# Patient Record
Sex: MALE | Race: WHITE | Employment: OTHER | URBAN - METROPOLITAN AREA
[De-identification: names, ages, dates, MRNs, and addresses within clinical notes are randomized per-mention and may not be internally consistent; named-entity substitution may affect disease eponyms.]

---

## 2017-03-29 ENCOUNTER — HOSPITAL ENCOUNTER (OUTPATIENT)
Age: 52
Discharge: HOME | End: 2017-03-29
Payer: MEDICARE

## 2017-03-29 ENCOUNTER — HOSPITAL ENCOUNTER (OUTPATIENT)
Dept: GENERAL RADIOLOGY | Age: 52
Discharge: HOME | End: 2017-03-29
Payer: MEDICARE

## 2017-03-29 DIAGNOSIS — R07.81 RIB PAIN ON RIGHT SIDE: ICD-10-CM

## 2017-03-29 DIAGNOSIS — Z00.00 HEALTH CARE MAINTENANCE: ICD-10-CM

## 2017-03-29 PROCEDURE — 36415 COLL VENOUS BLD VENIPUNCTURE: CPT

## 2017-03-29 PROCEDURE — 86787 VARICELLA-ZOSTER ANTIBODY: CPT

## 2017-03-29 PROCEDURE — 71101 X-RAY EXAM UNILAT RIBS/CHEST: CPT

## 2017-03-31 LAB — VZV IGG SER QL IA: 3.24

## 2017-06-12 ENCOUNTER — HOSPITAL ENCOUNTER (OUTPATIENT)
Age: 52
Setting detail: SPECIMEN
Discharge: HOME | End: 2017-06-12
Payer: COMMERCIAL

## 2017-06-12 DIAGNOSIS — R74.01 ELEVATED AST (SGOT): ICD-10-CM

## 2017-06-12 DIAGNOSIS — R10.11 RIGHT UPPER QUADRANT ABDOMINAL PAIN: ICD-10-CM

## 2017-06-12 LAB
ABSOLUTE EOS #: 0.3 K/UL (ref 0–0.4)
ABSOLUTE LYMPH #: 1.6 K/UL (ref 1–4.8)
ABSOLUTE MONO #: 1 K/UL (ref 0.1–1.2)
ALBUMIN SERPL-MCNC: 4.2 G/DL (ref 3.5–5.2)
ALBUMIN/GLOBULIN RATIO: 1.2 (ref 1–2.5)
ALP BLD-CCNC: 79 U/L (ref 40–129)
ALT SERPL-CCNC: 44 U/L (ref 5–41)
AST SERPL-CCNC: 129 U/L
BASOPHILS # BLD: 1 %
BASOPHILS ABSOLUTE: 0.1 K/UL (ref 0–0.2)
BILIRUB SERPL-MCNC: 0.9 MG/DL (ref 0.3–1.2)
BILIRUBIN DIRECT: 0.23 MG/DL
BILIRUBIN, INDIRECT: 0.67 MG/DL (ref 0–1)
DIFFERENTIAL TYPE: ABNORMAL
EOSINOPHILS RELATIVE PERCENT: 4 %
GGT: 422 U/L (ref 8–61)
GLOBULIN: ABNORMAL G/DL (ref 1.5–3.8)
HCT VFR BLD CALC: 42.3 % (ref 41–53)
HEMOGLOBIN: 14.2 G/DL (ref 13.5–17.5)
LYMPHOCYTES # BLD: 21 %
MCH RBC QN AUTO: 34 PG (ref 26–34)
MCHC RBC AUTO-ENTMCNC: 33.5 G/DL (ref 31–37)
MCV RBC AUTO: 101.6 FL (ref 80–100)
MONOCYTES # BLD: 13 %
PDW BLD-RTO: 12.4 % (ref 12.5–15.4)
PLATELET # BLD: 114 K/UL (ref 140–450)
PLATELET ESTIMATE: ABNORMAL
PMV BLD AUTO: 7.3 FL (ref 6–12)
RBC # BLD: 4.17 M/UL (ref 4.5–5.9)
RBC # BLD: ABNORMAL 10*6/UL
SEG NEUTROPHILS: 61 %
SEGMENTED NEUTROPHILS ABSOLUTE COUNT: 4.7 K/UL (ref 1.8–7.7)
TOTAL PROTEIN: 7.8 G/DL (ref 6.4–8.3)
WBC # BLD: 7.7 K/UL (ref 3.5–11)
WBC # BLD: ABNORMAL 10*3/UL

## 2017-06-26 PROBLEM — F10.10 EXCESSIVE DRINKING ALCOHOL: Status: ACTIVE | Noted: 2017-06-26

## 2017-06-26 PROBLEM — R74.8 ELEVATED LIVER ENZYMES: Status: ACTIVE | Noted: 2017-06-26

## 2017-06-26 PROBLEM — G89.29 CHRONIC LOW BACK PAIN: Status: ACTIVE | Noted: 2017-06-26

## 2017-06-26 PROBLEM — M54.50 CHRONIC LOW BACK PAIN: Status: ACTIVE | Noted: 2017-06-26

## 2017-06-26 PROBLEM — D69.6 THROMBOCYTOPENIA (HCC): Status: ACTIVE | Noted: 2017-06-26

## 2017-06-28 PROBLEM — D69.6 PLATELETS DECREASED (HCC): Status: ACTIVE | Noted: 2017-06-28

## 2017-06-28 PROBLEM — D69.6 THROMBOCYTOPENIA (HCC): Status: RESOLVED | Noted: 2017-06-26 | Resolved: 2017-06-28

## 2017-07-12 ENCOUNTER — ANESTHESIA (OUTPATIENT)
Dept: OPERATING ROOM | Age: 52
End: 2017-07-12
Payer: MEDICARE

## 2017-07-12 ENCOUNTER — HOSPITAL ENCOUNTER (OUTPATIENT)
Age: 52
Setting detail: OUTPATIENT SURGERY
Discharge: HOME | End: 2017-07-12
Attending: SURGERY | Admitting: SURGERY
Payer: MEDICARE

## 2017-07-12 ENCOUNTER — ANESTHESIA EVENT (OUTPATIENT)
Dept: OPERATING ROOM | Age: 52
End: 2017-07-12
Payer: MEDICARE

## 2017-07-12 VITALS
WEIGHT: 210 LBS | DIASTOLIC BLOOD PRESSURE: 77 MMHG | HEIGHT: 72 IN | HEART RATE: 93 BPM | OXYGEN SATURATION: 98 % | RESPIRATION RATE: 18 BRPM | TEMPERATURE: 96.6 F | SYSTOLIC BLOOD PRESSURE: 133 MMHG | BODY MASS INDEX: 28.44 KG/M2

## 2017-07-12 VITALS — OXYGEN SATURATION: 100 % | SYSTOLIC BLOOD PRESSURE: 114 MMHG | DIASTOLIC BLOOD PRESSURE: 69 MMHG

## 2017-07-12 PROCEDURE — 3700000000 HC ANESTHESIA ATTENDED CARE: Performed by: SURGERY

## 2017-07-12 PROCEDURE — 7100000001 HC PACU RECOVERY - ADDTL 15 MIN: Performed by: SURGERY

## 2017-07-12 PROCEDURE — 7100000031 HC ASPR PHASE II RECOVERY - ADDTL 15 MIN: Performed by: SURGERY

## 2017-07-12 PROCEDURE — 2580000003 HC RX 258: Performed by: ANESTHESIOLOGY

## 2017-07-12 PROCEDURE — 88305 TISSUE EXAM BY PATHOLOGIST: CPT

## 2017-07-12 PROCEDURE — 2500000003 HC RX 250 WO HCPCS

## 2017-07-12 PROCEDURE — 7100000000 HC PACU RECOVERY - FIRST 15 MIN: Performed by: SURGERY

## 2017-07-12 PROCEDURE — 7100000030 HC ASPR PHASE II RECOVERY - FIRST 15 MIN: Performed by: SURGERY

## 2017-07-12 PROCEDURE — 3609010400 HC COLONOSCOPY POLYPECTOMY HOT BIOPSY: Performed by: SURGERY

## 2017-07-12 PROCEDURE — 3700000001 HC ADD 15 MINUTES (ANESTHESIA): Performed by: SURGERY

## 2017-07-12 PROCEDURE — 6360000002 HC RX W HCPCS

## 2017-07-12 RX ORDER — MEPERIDINE HYDROCHLORIDE 25 MG/ML
12.5 INJECTION INTRAMUSCULAR; INTRAVENOUS; SUBCUTANEOUS EVERY 5 MIN PRN
Status: DISCONTINUED | OUTPATIENT
Start: 2017-07-12 | End: 2017-07-12 | Stop reason: HOSPADM

## 2017-07-12 RX ORDER — ONDANSETRON 2 MG/ML
4 INJECTION INTRAMUSCULAR; INTRAVENOUS
Status: DISCONTINUED | OUTPATIENT
Start: 2017-07-12 | End: 2017-07-12 | Stop reason: HOSPADM

## 2017-07-12 RX ORDER — MIDAZOLAM HYDROCHLORIDE 1 MG/ML
INJECTION INTRAMUSCULAR; INTRAVENOUS PRN
Status: DISCONTINUED | OUTPATIENT
Start: 2017-07-12 | End: 2017-07-12 | Stop reason: SDUPTHER

## 2017-07-12 RX ORDER — SODIUM CHLORIDE, SODIUM LACTATE, POTASSIUM CHLORIDE, CALCIUM CHLORIDE 600; 310; 30; 20 MG/100ML; MG/100ML; MG/100ML; MG/100ML
INJECTION, SOLUTION INTRAVENOUS CONTINUOUS
Status: DISCONTINUED | OUTPATIENT
Start: 2017-07-12 | End: 2017-07-12 | Stop reason: HOSPADM

## 2017-07-12 RX ORDER — FENTANYL CITRATE 50 UG/ML
25 INJECTION, SOLUTION INTRAMUSCULAR; INTRAVENOUS EVERY 5 MIN PRN
Status: DISCONTINUED | OUTPATIENT
Start: 2017-07-12 | End: 2017-07-12 | Stop reason: HOSPADM

## 2017-07-12 RX ORDER — EPHEDRINE SULFATE 50 MG/ML
INJECTION, SOLUTION INTRAVENOUS PRN
Status: DISCONTINUED | OUTPATIENT
Start: 2017-07-12 | End: 2017-07-12 | Stop reason: SDUPTHER

## 2017-07-12 RX ORDER — LIDOCAINE HYDROCHLORIDE 10 MG/ML
INJECTION, SOLUTION EPIDURAL; INFILTRATION; INTRACAUDAL; PERINEURAL PRN
Status: DISCONTINUED | OUTPATIENT
Start: 2017-07-12 | End: 2017-07-12 | Stop reason: SDUPTHER

## 2017-07-12 RX ORDER — FENTANYL CITRATE 50 UG/ML
INJECTION, SOLUTION INTRAMUSCULAR; INTRAVENOUS PRN
Status: DISCONTINUED | OUTPATIENT
Start: 2017-07-12 | End: 2017-07-12 | Stop reason: SDUPTHER

## 2017-07-12 RX ORDER — PROPOFOL 10 MG/ML
INJECTION, EMULSION INTRAVENOUS PRN
Status: DISCONTINUED | OUTPATIENT
Start: 2017-07-12 | End: 2017-07-12 | Stop reason: SDUPTHER

## 2017-07-12 RX ORDER — KETAMINE HYDROCHLORIDE 50 MG/ML
INJECTION, SOLUTION, CONCENTRATE INTRAMUSCULAR; INTRAVENOUS PRN
Status: DISCONTINUED | OUTPATIENT
Start: 2017-07-12 | End: 2017-07-12 | Stop reason: SDUPTHER

## 2017-07-12 RX ORDER — DIPHENHYDRAMINE HYDROCHLORIDE 50 MG/ML
12.5 INJECTION INTRAMUSCULAR; INTRAVENOUS
Status: DISCONTINUED | OUTPATIENT
Start: 2017-07-12 | End: 2017-07-12 | Stop reason: HOSPADM

## 2017-07-12 RX ORDER — PROMETHAZINE HYDROCHLORIDE 25 MG/ML
6.25 INJECTION, SOLUTION INTRAMUSCULAR; INTRAVENOUS
Status: DISCONTINUED | OUTPATIENT
Start: 2017-07-12 | End: 2017-07-12 | Stop reason: HOSPADM

## 2017-07-12 RX ORDER — LABETALOL HYDROCHLORIDE 5 MG/ML
5 INJECTION, SOLUTION INTRAVENOUS EVERY 10 MIN PRN
Status: DISCONTINUED | OUTPATIENT
Start: 2017-07-12 | End: 2017-07-12 | Stop reason: HOSPADM

## 2017-07-12 RX ADMIN — EPHEDRINE SULFATE 10 MG: 50 INJECTION INTRAMUSCULAR; INTRAVENOUS; SUBCUTANEOUS at 14:13

## 2017-07-12 RX ADMIN — KETAMINE HYDROCHLORIDE 30 MG: 50 INJECTION, SOLUTION INTRAMUSCULAR; INTRAVENOUS at 13:48

## 2017-07-12 RX ADMIN — FENTANYL CITRATE 50 MCG: 50 INJECTION, SOLUTION INTRAMUSCULAR; INTRAVENOUS at 14:00

## 2017-07-12 RX ADMIN — SODIUM CHLORIDE, POTASSIUM CHLORIDE, SODIUM LACTATE AND CALCIUM CHLORIDE: 600; 310; 30; 20 INJECTION, SOLUTION INTRAVENOUS at 12:06

## 2017-07-12 RX ADMIN — PROPOFOL 100 MG: 10 INJECTION, EMULSION INTRAVENOUS at 13:55

## 2017-07-12 RX ADMIN — LIDOCAINE HYDROCHLORIDE 50 MG: 10 INJECTION, SOLUTION EPIDURAL; INFILTRATION; INTRACAUDAL; PERINEURAL at 13:52

## 2017-07-12 RX ADMIN — MIDAZOLAM 2 MG: 1 INJECTION INTRAMUSCULAR; INTRAVENOUS at 13:44

## 2017-07-12 ASSESSMENT — PAIN SCALES - GENERAL
PAINLEVEL_OUTOF10: 0

## 2017-07-12 ASSESSMENT — PAIN - FUNCTIONAL ASSESSMENT: PAIN_FUNCTIONAL_ASSESSMENT: 0-10

## 2017-07-12 ASSESSMENT — COPD QUESTIONNAIRES: CAT_SEVERITY: MODERATE

## 2017-07-13 LAB — SURGICAL PATHOLOGY REPORT: NORMAL

## 2017-07-23 ENCOUNTER — HOSPITAL ENCOUNTER (OUTPATIENT)
Age: 52
Discharge: HOME | End: 2017-07-23
Payer: MEDICARE

## 2017-07-23 DIAGNOSIS — R74.8 ELEVATED LIVER ENZYMES: ICD-10-CM

## 2017-07-23 DIAGNOSIS — Z12.5 SCREENING FOR PROSTATE CANCER: ICD-10-CM

## 2017-07-23 DIAGNOSIS — R73.09 ELEVATED GLUCOSE: ICD-10-CM

## 2017-07-23 DIAGNOSIS — Z00.00 HEALTH CARE MAINTENANCE: ICD-10-CM

## 2017-07-23 DIAGNOSIS — I10 BENIGN ESSENTIAL HYPERTENSION: ICD-10-CM

## 2017-07-23 LAB
ABSOLUTE EOS #: 0.3 K/UL (ref 0–0.4)
ABSOLUTE LYMPH #: 1.3 K/UL (ref 1–4.8)
ABSOLUTE MONO #: 1 K/UL (ref 0.1–1.3)
ALBUMIN SERPL-MCNC: 3.9 G/DL (ref 3.5–5.2)
ALBUMIN/GLOBULIN RATIO: ABNORMAL (ref 1–2.5)
ALP BLD-CCNC: 82 U/L (ref 40–129)
ALT SERPL-CCNC: 42 U/L (ref 5–41)
AST SERPL-CCNC: 67 U/L
BASOPHILS # BLD: 1 %
BASOPHILS ABSOLUTE: 0.2 K/UL (ref 0–0.2)
BILIRUB SERPL-MCNC: 0.57 MG/DL (ref 0.3–1.2)
BILIRUBIN DIRECT: 0.2 MG/DL
BILIRUBIN, INDIRECT: 0.37 MG/DL (ref 0–1)
CHOLESTEROL, FASTING: 150 MG/DL
CHOLESTEROL/HDL RATIO: 3.9
DIFFERENTIAL TYPE: ABNORMAL
EOSINOPHILS RELATIVE PERCENT: 2 %
GLOBULIN: ABNORMAL G/DL (ref 1.5–3.8)
GLUCOSE FASTING: 117 MG/DL (ref 70–99)
HAV IGM SER IA-ACNC: NONREACTIVE
HBV SURFACE AB TITR SER: <3.5 MIU/ML
HCT VFR BLD CALC: 43.3 % (ref 41–53)
HDLC SERPL-MCNC: 38 MG/DL
HEMOGLOBIN: 14.7 G/DL (ref 13.5–17.5)
HEPATITIS B SURFACE ANTIGEN: NONREACTIVE
HEPATITIS C ANTIBODY: NONREACTIVE
LDL CHOLESTEROL: 98 MG/DL (ref 0–130)
LYMPHOCYTES # BLD: 9 %
MCH RBC QN AUTO: 34.1 PG (ref 26–34)
MCHC RBC AUTO-ENTMCNC: 34 G/DL (ref 31–37)
MCV RBC AUTO: 100.4 FL (ref 80–100)
MONOCYTES # BLD: 7 %
PDW BLD-RTO: 11.6 % (ref 11.5–14.9)
PLATELET # BLD: 316 K/UL (ref 150–450)
PLATELET ESTIMATE: ABNORMAL
PMV BLD AUTO: 7.5 FL (ref 6–12)
PROSTATE SPECIFIC ANTIGEN: 0.66 UG/L
RBC # BLD: 4.32 M/UL (ref 4.5–5.9)
RBC # BLD: ABNORMAL 10*6/UL
SEG NEUTROPHILS: 81 %
SEGMENTED NEUTROPHILS ABSOLUTE COUNT: 11.1 K/UL (ref 1.3–9.1)
TOTAL PROTEIN: 7.6 G/DL (ref 6.4–8.3)
TRIGLYCERIDE, FASTING: 68 MG/DL
VLDLC SERPL CALC-MCNC: ABNORMAL MG/DL (ref 1–30)
WBC # BLD: 13.8 K/UL (ref 3.5–11)
WBC # BLD: ABNORMAL 10*3/UL

## 2017-07-23 PROCEDURE — 80076 HEPATIC FUNCTION PANEL: CPT

## 2017-07-23 PROCEDURE — G0103 PSA SCREENING: HCPCS

## 2017-07-23 PROCEDURE — 80061 LIPID PANEL: CPT

## 2017-07-23 PROCEDURE — 86709 HEPATITIS A IGM ANTIBODY: CPT

## 2017-07-23 PROCEDURE — 85025 COMPLETE CBC W/AUTO DIFF WBC: CPT

## 2017-07-23 PROCEDURE — 82947 ASSAY GLUCOSE BLOOD QUANT: CPT

## 2017-07-23 PROCEDURE — 87340 HEPATITIS B SURFACE AG IA: CPT

## 2017-07-23 PROCEDURE — 36415 COLL VENOUS BLD VENIPUNCTURE: CPT

## 2017-07-23 PROCEDURE — 86803 HEPATITIS C AB TEST: CPT

## 2017-07-23 PROCEDURE — 87350 HEPATITIS BE AG IA: CPT

## 2017-07-23 PROCEDURE — 86706 HEP B SURFACE ANTIBODY: CPT

## 2017-07-26 LAB — HEPATITIS BE ANTIGEN: NEGATIVE

## 2017-07-30 PROBLEM — F10.230 ALCOHOL DEPENDENCE WITH UNCOMPLICATED WITHDRAWAL (HCC): Status: ACTIVE | Noted: 2017-07-30

## 2017-07-30 PROBLEM — F10.10 EXCESSIVE DRINKING ALCOHOL: Status: RESOLVED | Noted: 2017-06-26 | Resolved: 2017-07-30

## 2017-08-07 PROBLEM — I83.90 VARICOSE VEIN OF LEG: Status: ACTIVE | Noted: 2017-08-07

## 2017-08-17 ENCOUNTER — INITIAL CONSULT (OUTPATIENT)
Dept: VASCULAR SURGERY | Age: 52
End: 2017-08-17
Payer: MEDICARE

## 2017-08-17 VITALS
HEIGHT: 72 IN | WEIGHT: 207.89 LBS | OXYGEN SATURATION: 98 % | SYSTOLIC BLOOD PRESSURE: 126 MMHG | RESPIRATION RATE: 18 BRPM | HEART RATE: 75 BPM | BODY MASS INDEX: 28.16 KG/M2 | DIASTOLIC BLOOD PRESSURE: 72 MMHG

## 2017-08-17 DIAGNOSIS — F10.10 ALCOHOL ABUSE: ICD-10-CM

## 2017-08-17 DIAGNOSIS — I87.2 SAPHENOFEMORAL VENOUS REFLUX: ICD-10-CM

## 2017-08-17 DIAGNOSIS — I83.813 VARICOSE VEINS OF BOTH LOWER EXTREMITIES WITH PAIN: Primary | ICD-10-CM

## 2017-08-17 PROCEDURE — 99203 OFFICE O/P NEW LOW 30 MIN: CPT | Performed by: SURGERY

## 2017-08-21 ENCOUNTER — TELEPHONE (OUTPATIENT)
Dept: VASCULAR SURGERY | Age: 52
End: 2017-08-21

## 2017-08-23 PROBLEM — F10.21 ALCOHOL DEPENDENCE IN EARLY FULL REMISSION (HCC): Status: ACTIVE | Noted: 2017-08-23

## 2017-08-23 PROBLEM — F10.230 ALCOHOL DEPENDENCE WITH UNCOMPLICATED WITHDRAWAL (HCC): Status: RESOLVED | Noted: 2017-07-30 | Resolved: 2017-08-23

## 2017-09-02 ENCOUNTER — OFFICE VISIT (OUTPATIENT)
Dept: FAMILY MEDICINE CLINIC | Age: 52
End: 2017-09-02
Payer: MEDICARE

## 2017-09-02 VITALS
HEART RATE: 96 BPM | DIASTOLIC BLOOD PRESSURE: 75 MMHG | OXYGEN SATURATION: 98 % | WEIGHT: 207 LBS | BODY MASS INDEX: 28.04 KG/M2 | TEMPERATURE: 97.8 F | SYSTOLIC BLOOD PRESSURE: 126 MMHG | RESPIRATION RATE: 18 BRPM

## 2017-09-02 DIAGNOSIS — R10.13 EPIGASTRIC ABDOMINAL PAIN: Primary | ICD-10-CM

## 2017-09-02 PROCEDURE — 99213 OFFICE O/P EST LOW 20 MIN: CPT | Performed by: INTERNAL MEDICINE

## 2017-09-02 ASSESSMENT — ENCOUNTER SYMPTOMS
NAUSEA: 0
ABDOMINAL PAIN: 1
VOMITING: 0
SHORTNESS OF BREATH: 0
DIARRHEA: 0
BLOOD IN STOOL: 0
CONSTIPATION: 0

## 2017-09-20 ENCOUNTER — HOSPITAL ENCOUNTER (OUTPATIENT)
Dept: VASCULAR LAB | Age: 52
Discharge: HOME | End: 2017-09-20
Payer: MEDICARE

## 2017-09-20 DIAGNOSIS — I83.813 VARICOSE VEINS OF BOTH LOWER EXTREMITIES WITH PAIN: ICD-10-CM

## 2017-09-20 DIAGNOSIS — I87.2 SAPHENOFEMORAL VENOUS REFLUX: ICD-10-CM

## 2017-09-20 DIAGNOSIS — F10.10 ALCOHOL ABUSE: ICD-10-CM

## 2017-09-20 PROCEDURE — 93970 EXTREMITY STUDY: CPT

## 2017-10-22 ENCOUNTER — HOSPITAL ENCOUNTER (OUTPATIENT)
Dept: GENERAL RADIOLOGY | Age: 52
Discharge: HOME | End: 2017-10-22
Payer: MEDICARE

## 2017-10-22 ENCOUNTER — HOSPITAL ENCOUNTER (OUTPATIENT)
Age: 52
Discharge: HOME | End: 2017-10-22
Payer: MEDICARE

## 2017-10-22 DIAGNOSIS — M25.552 HIP PAIN, BILATERAL: ICD-10-CM

## 2017-10-22 DIAGNOSIS — M25.551 HIP PAIN, BILATERAL: ICD-10-CM

## 2017-10-22 PROCEDURE — 73502 X-RAY EXAM HIP UNI 2-3 VIEWS: CPT

## 2017-11-01 PROBLEM — B35.1 ONYCHOMYCOSIS: Status: ACTIVE | Noted: 2017-11-01

## 2017-11-01 PROBLEM — L24.4 IRRITANT CONTACT DERMATITIS DUE TO DRUG IN CONTACT WITH SKIN: Status: ACTIVE | Noted: 2017-11-01

## 2018-02-10 ENCOUNTER — HOSPITAL ENCOUNTER (OUTPATIENT)
Age: 53
Discharge: HOME | End: 2018-02-10
Payer: MEDICARE

## 2018-02-10 DIAGNOSIS — R74.8 ELEVATED LIVER ENZYMES: ICD-10-CM

## 2018-02-10 DIAGNOSIS — Z13.220 ENCOUNTER FOR LIPID SCREENING FOR CARDIOVASCULAR DISEASE: ICD-10-CM

## 2018-02-10 DIAGNOSIS — R73.09 ELEVATED GLUCOSE: ICD-10-CM

## 2018-02-10 DIAGNOSIS — I10 BENIGN ESSENTIAL HYPERTENSION: ICD-10-CM

## 2018-02-10 DIAGNOSIS — Z12.5 ENCOUNTER FOR PROSTATE CANCER SCREENING: ICD-10-CM

## 2018-02-10 DIAGNOSIS — D69.6 PLATELETS DECREASED (HCC): ICD-10-CM

## 2018-02-10 DIAGNOSIS — Z13.6 ENCOUNTER FOR LIPID SCREENING FOR CARDIOVASCULAR DISEASE: ICD-10-CM

## 2018-02-10 LAB
ABSOLUTE EOS #: 0.3 K/UL (ref 0–0.4)
ABSOLUTE IMMATURE GRANULOCYTE: ABNORMAL K/UL (ref 0–0.3)
ABSOLUTE LYMPH #: 1.7 K/UL (ref 1–4.8)
ABSOLUTE MONO #: 0.8 K/UL (ref 0.1–1.3)
ALBUMIN SERPL-MCNC: 4.1 G/DL (ref 3.5–5.2)
ALBUMIN/GLOBULIN RATIO: NORMAL (ref 1–2.5)
ALP BLD-CCNC: 100 U/L (ref 40–129)
ALT SERPL-CCNC: 19 U/L (ref 5–41)
ANION GAP SERPL CALCULATED.3IONS-SCNC: 8 MMOL/L (ref 9–17)
AST SERPL-CCNC: 19 U/L
BASOPHILS # BLD: 1 % (ref 0–2)
BASOPHILS ABSOLUTE: 0.1 K/UL (ref 0–0.2)
BILIRUB SERPL-MCNC: 0.6 MG/DL (ref 0.3–1.2)
BILIRUBIN DIRECT: 0.12 MG/DL
BILIRUBIN, INDIRECT: 0.48 MG/DL (ref 0–1)
BUN BLDV-MCNC: 10 MG/DL (ref 6–20)
BUN/CREAT BLD: ABNORMAL (ref 9–20)
CALCIUM SERPL-MCNC: 9.6 MG/DL (ref 8.6–10.4)
CHLORIDE BLD-SCNC: 101 MMOL/L (ref 98–107)
CHOLESTEROL/HDL RATIO: 3.7
CHOLESTEROL: 152 MG/DL
CO2: 31 MMOL/L (ref 20–31)
CREAT SERPL-MCNC: <0.4 MG/DL (ref 0.7–1.2)
DIFFERENTIAL TYPE: ABNORMAL
EOSINOPHILS RELATIVE PERCENT: 3 % (ref 0–4)
GFR AFRICAN AMERICAN: ABNORMAL ML/MIN
GFR NON-AFRICAN AMERICAN: ABNORMAL ML/MIN
GFR SERPL CREATININE-BSD FRML MDRD: ABNORMAL ML/MIN/{1.73_M2}
GFR SERPL CREATININE-BSD FRML MDRD: ABNORMAL ML/MIN/{1.73_M2}
GLOBULIN: NORMAL G/DL (ref 1.5–3.8)
GLUCOSE BLD-MCNC: 109 MG/DL (ref 70–99)
HCT VFR BLD CALC: 41.9 % (ref 41–53)
HDLC SERPL-MCNC: 41 MG/DL
HEMOGLOBIN: 14.6 G/DL (ref 13.5–17.5)
IMMATURE GRANULOCYTES: ABNORMAL %
LDL CHOLESTEROL: 94 MG/DL (ref 0–130)
LYMPHOCYTES # BLD: 22 % (ref 24–44)
MCH RBC QN AUTO: 31.3 PG (ref 26–34)
MCHC RBC AUTO-ENTMCNC: 34.9 G/DL (ref 31–37)
MCV RBC AUTO: 89.8 FL (ref 80–100)
MONOCYTES # BLD: 9 % (ref 1–7)
NRBC AUTOMATED: ABNORMAL PER 100 WBC
PDW BLD-RTO: 13.3 % (ref 11.5–14.9)
PLATELET # BLD: 203 K/UL (ref 150–450)
PLATELET ESTIMATE: ABNORMAL
PMV BLD AUTO: 7.6 FL (ref 6–12)
POTASSIUM SERPL-SCNC: 4.5 MMOL/L (ref 3.7–5.3)
PROSTATE SPECIFIC ANTIGEN: 0.71 UG/L
RBC # BLD: 4.66 M/UL (ref 4.5–5.9)
RBC # BLD: ABNORMAL 10*6/UL
SEG NEUTROPHILS: 65 % (ref 36–66)
SEGMENTED NEUTROPHILS ABSOLUTE COUNT: 5.2 K/UL (ref 1.3–9.1)
SODIUM BLD-SCNC: 140 MMOL/L (ref 135–144)
TOTAL PROTEIN: 7.6 G/DL (ref 6.4–8.3)
TRIGL SERPL-MCNC: 85 MG/DL
VLDLC SERPL CALC-MCNC: NORMAL MG/DL (ref 1–30)
WBC # BLD: 8 K/UL (ref 3.5–11)
WBC # BLD: ABNORMAL 10*3/UL

## 2018-02-10 PROCEDURE — 85025 COMPLETE CBC W/AUTO DIFF WBC: CPT

## 2018-02-10 PROCEDURE — 80061 LIPID PANEL: CPT

## 2018-02-10 PROCEDURE — 80048 BASIC METABOLIC PNL TOTAL CA: CPT

## 2018-02-10 PROCEDURE — G0103 PSA SCREENING: HCPCS

## 2018-02-10 PROCEDURE — 36415 COLL VENOUS BLD VENIPUNCTURE: CPT

## 2018-02-10 PROCEDURE — 80076 HEPATIC FUNCTION PANEL: CPT

## 2018-02-10 PROCEDURE — 83036 HEMOGLOBIN GLYCOSYLATED A1C: CPT

## 2018-02-11 LAB
ESTIMATED AVERAGE GLUCOSE: 111 MG/DL
HBA1C MFR BLD: 5.5 % (ref 4–6)

## 2018-03-12 ENCOUNTER — HOSPITAL ENCOUNTER (OUTPATIENT)
Dept: PHYSICAL THERAPY | Age: 53
Setting detail: THERAPIES SERIES
Discharge: HOME | End: 2018-03-12
Payer: MEDICARE

## 2018-03-12 PROCEDURE — G8986 CARRY D/C STATUS: HCPCS

## 2018-03-12 PROCEDURE — 97110 THERAPEUTIC EXERCISES: CPT

## 2018-03-12 PROCEDURE — 97161 PT EVAL LOW COMPLEX 20 MIN: CPT

## 2018-03-12 PROCEDURE — G8984 CARRY CURRENT STATUS: HCPCS

## 2018-03-12 PROCEDURE — G8985 CARRY GOAL STATUS: HCPCS

## 2018-03-12 NOTE — PROGRESS NOTES
Arc   [x]   IR      Tinel   [x]   Supraspinatus            Infraspinatus            Serratus Ant            Pectoralis            Lats            Mid Trap            Lower Trap            Elbow Flex. Elbow Ext. Pronation            Supination            Wrist Flex. Wrist Ext. Rad. Dev. Ulnar Dev.               Comments: Held MMT due to pain     OBSERVATION No Deficit Deficit Not Tested Comments   Forward Head [] [x] []    Rounded Shoulders [] [x] []    Kyphosis [] [x] [] Sig increase   Scap Height/Position [] [x] []    Winging [x] [] [] ABDucted bilateral   SH Rhythm [] [x] []    INSPECTION/PALPATION       SC/AC Joint [] [] []    Supraspinatus [] [] []    Biceps tendon/groove [] [x] []    Posterior shld [] [] []    Subscapularis [] [] []    NEUROLOGICAL       Cervical ROM/Quadrant [] [] [x]    Reflexes [] [] [x]    Compression/Distraction [] [] [x]    Sensation [] [] [x]        FUNCTION Normal Difficult Unable   Overhead reach [] [] [x]   Underarm reach  [] [x] []   Groom/Dress [] [x] []   Bra/Shirt tuck [] [x] []   Lift/Carry [] [x] []    [] [] []     Comments: Patient states his insurance co payment is too high to attend therapy . He requested to be instructed in a HEP and will continue with that IND at home    Assessment:  Problems:    [x] ? Pain: 3/10  [x] ? ROM: Limited all directions  [] ? Strength:  [x] ? Function: Optimal Instrument =13/3  [] Other:    STG: Per patient request evaluation only                   Patient goals: To reduce pain    Functional Limitation:   Functional Assessment Used:  Optimal Instrument =13/3      Rehab Potential:  [x] Good  [] Fair  [] Poor     Pt. Education:  [x] Plans/Goals, Risks/Benefits discussed  [x] Home exercise program  Method of Education: [x] Verbal  [x] Demo  [x] Written  Comprehension of Education:  [] Verbalizes understanding. [] Demonstrates understanding. [] Needs Review.   []

## 2018-03-21 ENCOUNTER — HOSPITAL ENCOUNTER (OUTPATIENT)
Age: 53
Discharge: HOME | End: 2018-03-21
Payer: MEDICARE

## 2018-03-21 DIAGNOSIS — R53.83 OTHER FATIGUE: ICD-10-CM

## 2018-03-21 DIAGNOSIS — I49.3 FREQUENT PVCS: ICD-10-CM

## 2018-03-21 DIAGNOSIS — I10 ESSENTIAL (PRIMARY) HYPERTENSION: ICD-10-CM

## 2018-03-21 PROBLEM — M54.50 CHRONIC LOW BACK PAIN: Status: RESOLVED | Noted: 2017-06-26 | Resolved: 2018-03-21

## 2018-03-21 PROBLEM — G89.29 CHRONIC LOW BACK PAIN: Status: RESOLVED | Noted: 2017-06-26 | Resolved: 2018-03-21

## 2018-03-21 PROBLEM — R74.8 ELEVATED LIVER ENZYMES: Status: RESOLVED | Noted: 2017-06-26 | Resolved: 2018-03-21

## 2018-03-21 LAB
ABSOLUTE EOS #: 0.3 K/UL (ref 0–0.4)
ABSOLUTE IMMATURE GRANULOCYTE: ABNORMAL K/UL (ref 0–0.3)
ABSOLUTE LYMPH #: 2 K/UL (ref 1–4.8)
ABSOLUTE MONO #: 0.8 K/UL (ref 0.1–1.3)
ANION GAP SERPL CALCULATED.3IONS-SCNC: 10 MMOL/L (ref 9–17)
BASOPHILS # BLD: 1 % (ref 0–2)
BASOPHILS ABSOLUTE: 0.1 K/UL (ref 0–0.2)
BUN BLDV-MCNC: 12 MG/DL (ref 6–20)
BUN/CREAT BLD: ABNORMAL (ref 9–20)
CALCIUM SERPL-MCNC: 9.6 MG/DL (ref 8.6–10.4)
CHLORIDE BLD-SCNC: 97 MMOL/L (ref 98–107)
CO2: 30 MMOL/L (ref 20–31)
CREAT SERPL-MCNC: 0.41 MG/DL (ref 0.7–1.2)
DIFFERENTIAL TYPE: ABNORMAL
EOSINOPHILS RELATIVE PERCENT: 3 % (ref 0–4)
GFR AFRICAN AMERICAN: >60 ML/MIN
GFR NON-AFRICAN AMERICAN: >60 ML/MIN
GFR SERPL CREATININE-BSD FRML MDRD: ABNORMAL ML/MIN/{1.73_M2}
GFR SERPL CREATININE-BSD FRML MDRD: ABNORMAL ML/MIN/{1.73_M2}
GLUCOSE BLD-MCNC: 92 MG/DL (ref 70–99)
HCT VFR BLD CALC: 44.1 % (ref 41–53)
HEMOGLOBIN: 14.8 G/DL (ref 13.5–17.5)
IMMATURE GRANULOCYTES: ABNORMAL %
LYMPHOCYTES # BLD: 20 % (ref 24–44)
MAGNESIUM: 1.8 MG/DL (ref 1.6–2.6)
MCH RBC QN AUTO: 31.2 PG (ref 26–34)
MCHC RBC AUTO-ENTMCNC: 33.7 G/DL (ref 31–37)
MCV RBC AUTO: 92.6 FL (ref 80–100)
MONOCYTES # BLD: 8 % (ref 1–7)
NRBC AUTOMATED: ABNORMAL PER 100 WBC
PDW BLD-RTO: 13.1 % (ref 11.5–14.9)
PLATELET # BLD: 192 K/UL (ref 150–450)
PLATELET ESTIMATE: ABNORMAL
PMV BLD AUTO: 7.3 FL (ref 6–12)
POTASSIUM SERPL-SCNC: 4.8 MMOL/L (ref 3.7–5.3)
RBC # BLD: 4.76 M/UL (ref 4.5–5.9)
RBC # BLD: ABNORMAL 10*6/UL
SEG NEUTROPHILS: 68 % (ref 36–66)
SEGMENTED NEUTROPHILS ABSOLUTE COUNT: 6.7 K/UL (ref 1.3–9.1)
SODIUM BLD-SCNC: 137 MMOL/L (ref 135–144)
THYROXINE, FREE: 1.25 NG/DL (ref 0.93–1.7)
TSH SERPL DL<=0.05 MIU/L-ACNC: 1.27 MIU/L (ref 0.3–5)
WBC # BLD: 9.9 K/UL (ref 3.5–11)
WBC # BLD: ABNORMAL 10*3/UL

## 2018-03-21 PROCEDURE — 83735 ASSAY OF MAGNESIUM: CPT

## 2018-03-21 PROCEDURE — 85025 COMPLETE CBC W/AUTO DIFF WBC: CPT

## 2018-03-21 PROCEDURE — 84443 ASSAY THYROID STIM HORMONE: CPT

## 2018-03-21 PROCEDURE — 84439 ASSAY OF FREE THYROXINE: CPT

## 2018-03-21 PROCEDURE — 80048 BASIC METABOLIC PNL TOTAL CA: CPT

## 2018-03-21 PROCEDURE — 36415 COLL VENOUS BLD VENIPUNCTURE: CPT

## 2018-07-16 PROBLEM — B35.1 ONYCHOMYCOSIS: Status: RESOLVED | Noted: 2017-11-01 | Resolved: 2018-07-16

## 2018-07-16 PROBLEM — D69.6 PLATELETS DECREASED (HCC): Status: RESOLVED | Noted: 2017-06-28 | Resolved: 2018-07-16

## 2018-07-16 PROBLEM — L24.4 IRRITANT CONTACT DERMATITIS DUE TO DRUG IN CONTACT WITH SKIN: Status: RESOLVED | Noted: 2017-11-01 | Resolved: 2018-07-16

## 2018-12-05 LAB
ANION GAP SERPL CALCULATED.3IONS-SCNC: NORMAL MMOL/L
BUN BLDV-MCNC: NORMAL MG/DL
BUN/CREAT BLD: NORMAL
C-REACTIVE PROTEIN: NORMAL
CALCIUM SERPL-MCNC: NORMAL MG/DL
CHLORIDE BLD-SCNC: NORMAL MMOL/L
CO2: NORMAL MMOL/L
CREAT SERPL-MCNC: NORMAL MG/DL
GFR AFRICAN AMERICAN: NORMAL
GFR NON-AFRICAN AMERICAN: NORMAL
GLUCOSE FASTING: NORMAL MG/DL
POTASSIUM SERPL-SCNC: NORMAL MMOL/L
SEDIMENTATION RATE, ERYTHROCYTE: NORMAL
SODIUM BLD-SCNC: NORMAL MMOL/L

## 2018-12-10 DIAGNOSIS — M19.90 INFLAMMATORY ARTHRITIS: ICD-10-CM

## 2019-01-02 DIAGNOSIS — R76.8 POSITIVE ANA (ANTINUCLEAR ANTIBODY): Primary | ICD-10-CM

## 2019-01-02 RX ORDER — METOPROLOL TARTRATE 50 MG/1
50 TABLET, FILM COATED ORAL 2 TIMES DAILY
Qty: 180 TABLET | Refills: 3 | Status: SHIPPED | OUTPATIENT
Start: 2019-01-02 | End: 2019-11-27 | Stop reason: SDUPTHER

## 2019-01-04 ENCOUNTER — TELEPHONE (OUTPATIENT)
Dept: PRIMARY CARE CLINIC | Age: 54
End: 2019-01-04

## 2019-01-15 ENCOUNTER — OFFICE VISIT (OUTPATIENT)
Dept: PRIMARY CARE CLINIC | Age: 54
End: 2019-01-15
Payer: MEDICARE

## 2019-01-15 VITALS
DIASTOLIC BLOOD PRESSURE: 84 MMHG | SYSTOLIC BLOOD PRESSURE: 136 MMHG | WEIGHT: 239.6 LBS | BODY MASS INDEX: 32.45 KG/M2 | HEART RATE: 78 BPM | OXYGEN SATURATION: 98 %

## 2019-01-15 DIAGNOSIS — L82.1 SEBORRHEIC KERATOSIS: ICD-10-CM

## 2019-01-15 DIAGNOSIS — D48.5 NEOPLASM OF UNCERTAIN BEHAVIOR OF SKIN: ICD-10-CM

## 2019-01-15 DIAGNOSIS — L91.8 INFLAMED SKIN TAG: Primary | ICD-10-CM

## 2019-01-15 PROCEDURE — 99213 OFFICE O/P EST LOW 20 MIN: CPT | Performed by: PHYSICIAN ASSISTANT

## 2019-01-15 ASSESSMENT — ENCOUNTER SYMPTOMS
RESPIRATORY NEGATIVE: 1
ABDOMINAL PAIN: 0
EYES NEGATIVE: 1
COLOR CHANGE: 0

## 2019-01-18 ENCOUNTER — OFFICE VISIT (OUTPATIENT)
Dept: PRIMARY CARE CLINIC | Age: 54
End: 2019-01-18
Payer: MEDICARE

## 2019-01-18 VITALS
DIASTOLIC BLOOD PRESSURE: 70 MMHG | OXYGEN SATURATION: 97 % | BODY MASS INDEX: 32.42 KG/M2 | SYSTOLIC BLOOD PRESSURE: 116 MMHG | HEART RATE: 73 BPM | WEIGHT: 239.36 LBS

## 2019-01-18 DIAGNOSIS — D48.5 NEOPLASM OF UNCERTAIN BEHAVIOR OF SKIN: Primary | ICD-10-CM

## 2019-01-18 PROCEDURE — 11102 TANGNTL BX SKIN SINGLE LES: CPT | Performed by: PHYSICIAN ASSISTANT

## 2019-01-22 LAB — SURGICAL PATHOLOGY REPORT: NORMAL

## 2019-01-23 ENCOUNTER — TELEPHONE (OUTPATIENT)
Dept: PRIMARY CARE CLINIC | Age: 54
End: 2019-01-23

## 2019-03-26 ENCOUNTER — OFFICE VISIT (OUTPATIENT)
Dept: PRIMARY CARE CLINIC | Age: 54
End: 2019-03-26
Payer: MEDICARE

## 2019-03-26 VITALS
DIASTOLIC BLOOD PRESSURE: 74 MMHG | HEIGHT: 72 IN | SYSTOLIC BLOOD PRESSURE: 122 MMHG | BODY MASS INDEX: 33.51 KG/M2 | OXYGEN SATURATION: 95 % | WEIGHT: 247.4 LBS | HEART RATE: 79 BPM

## 2019-03-26 DIAGNOSIS — Z00.00 ROUTINE GENERAL MEDICAL EXAMINATION AT A HEALTH CARE FACILITY: Primary | ICD-10-CM

## 2019-03-26 DIAGNOSIS — M54.16 LUMBAR RADICULOPATHY: ICD-10-CM

## 2019-03-26 DIAGNOSIS — G89.29 CHRONIC LOW BACK PAIN, UNSPECIFIED BACK PAIN LATERALITY, WITH SCIATICA PRESENCE UNSPECIFIED: ICD-10-CM

## 2019-03-26 DIAGNOSIS — J84.10 LUNG GRANULOMA (HCC): ICD-10-CM

## 2019-03-26 DIAGNOSIS — M54.5 CHRONIC LOW BACK PAIN, UNSPECIFIED BACK PAIN LATERALITY, WITH SCIATICA PRESENCE UNSPECIFIED: ICD-10-CM

## 2019-03-26 DIAGNOSIS — F10.21 ALCOHOL DEPENDENCE IN EARLY FULL REMISSION (HCC): ICD-10-CM

## 2019-03-26 DIAGNOSIS — F06.4 ANXIETY DISORDER DUE TO KNOWN PHYSIOLOGICAL CONDITION: ICD-10-CM

## 2019-03-26 PROCEDURE — G0439 PPPS, SUBSEQ VISIT: HCPCS | Performed by: FAMILY MEDICINE

## 2019-03-26 RX ORDER — LISINOPRIL 10 MG/1
10 TABLET ORAL DAILY
Qty: 90 TABLET | Refills: 3 | Status: SHIPPED | OUTPATIENT
Start: 2019-03-26 | End: 2019-09-24

## 2019-03-26 RX ORDER — LISINOPRIL 10 MG/1
TABLET ORAL
Qty: 90 TABLET | Refills: 1 | Status: SHIPPED | OUTPATIENT
Start: 2019-03-26 | End: 2020-03-10 | Stop reason: SDUPTHER

## 2019-03-26 RX ORDER — TRAMADOL HYDROCHLORIDE 50 MG/1
50 TABLET ORAL EVERY 8 HOURS PRN
Qty: 90 TABLET | Refills: 1 | Status: SHIPPED | OUTPATIENT
Start: 2019-03-26 | End: 2019-04-25

## 2019-03-26 RX ORDER — MELOXICAM 15 MG/1
15 TABLET ORAL DAILY
COMMUNITY
End: 2019-09-03

## 2019-03-26 RX ORDER — TRAMADOL HYDROCHLORIDE 50 MG/1
50 TABLET ORAL EVERY 8 HOURS PRN
Qty: 90 TABLET | Refills: 1 | Status: SHIPPED | OUTPATIENT
Start: 2019-03-26 | End: 2019-03-26

## 2019-03-26 RX ORDER — LORAZEPAM 1 MG/1
1 TABLET ORAL EVERY 4 HOURS PRN
Qty: 90 TABLET | Refills: 0 | Status: SHIPPED | OUTPATIENT
Start: 2019-03-26 | End: 2019-04-25

## 2019-03-26 ASSESSMENT — PATIENT HEALTH QUESTIONNAIRE - PHQ9
SUM OF ALL RESPONSES TO PHQ QUESTIONS 1-9: 2
SUM OF ALL RESPONSES TO PHQ QUESTIONS 1-9: 2

## 2019-03-26 ASSESSMENT — LIFESTYLE VARIABLES: HOW OFTEN DO YOU HAVE A DRINK CONTAINING ALCOHOL: 0

## 2019-08-15 ENCOUNTER — TELEPHONE (OUTPATIENT)
Dept: PRIMARY CARE CLINIC | Age: 54
End: 2019-08-15

## 2019-08-15 RX ORDER — CYCLOBENZAPRINE HCL 10 MG
10 TABLET ORAL 2 TIMES DAILY PRN
Qty: 30 TABLET | Refills: 0 | Status: SHIPPED | OUTPATIENT
Start: 2019-08-15 | End: 2019-08-25

## 2019-09-03 ENCOUNTER — OFFICE VISIT (OUTPATIENT)
Dept: PRIMARY CARE CLINIC | Age: 54
End: 2019-09-03
Payer: MEDICARE

## 2019-09-03 VITALS
DIASTOLIC BLOOD PRESSURE: 72 MMHG | WEIGHT: 261 LBS | BODY MASS INDEX: 35.4 KG/M2 | SYSTOLIC BLOOD PRESSURE: 124 MMHG | HEART RATE: 83 BPM | OXYGEN SATURATION: 98 %

## 2019-09-03 DIAGNOSIS — R25.2 SPASM: Primary | ICD-10-CM

## 2019-09-03 DIAGNOSIS — M54.16 LUMBAR RADICULOPATHY: ICD-10-CM

## 2019-09-03 PROCEDURE — 99213 OFFICE O/P EST LOW 20 MIN: CPT | Performed by: FAMILY MEDICINE

## 2019-09-03 RX ORDER — PREDNISONE 10 MG/1
TABLET ORAL
Qty: 48 TABLET | Refills: 0 | Status: SHIPPED | OUTPATIENT
Start: 2019-09-03 | End: 2019-09-24

## 2019-09-03 RX ORDER — MELOXICAM 15 MG/1
15 TABLET ORAL DAILY
Qty: 90 TABLET | Refills: 3 | Status: SHIPPED | OUTPATIENT
Start: 2019-09-03 | End: 2020-05-01 | Stop reason: ALTCHOICE

## 2019-09-03 ASSESSMENT — ENCOUNTER SYMPTOMS
BACK PAIN: 1
SHORTNESS OF BREATH: 0
WHEEZING: 0
COUGH: 0
SORE THROAT: 0
ABDOMINAL PAIN: 0
EYE DISCHARGE: 0
EYE REDNESS: 0
RHINORRHEA: 0
NAUSEA: 0
DIARRHEA: 0
VOMITING: 0

## 2019-09-03 NOTE — PROGRESS NOTES
[facility] PRIMARY CARE  [address] [facility] [address]  Dept: [phone]    Horace Doan is a 46 y.o. male who presents today for his medical conditions/complaintsas noted below. Chief Complaint   Patient presents with    Spasms      started 1 month ago        HPI:     HPI  Pt states started with severe spasm, starts in lower back. Now migrated over the the right lower back. Started on the left. Had back surgery done 2006. Now with spasms ever since the surgery. Pain is severe, incapacitating.     LDL Cholesterol (mg/dL)   Date Value   02/10/2018 94   2017 98       (goal LDL is <100)   AST (U/L)   Date Value   02/10/2018 19     ALT (U/L)   Date Value   02/10/2018 19     BUN (mg/dL)   Date Value   2018 12     BP Readings from Last 3 Encounters:   19 124/72   19 122/74   19 116/70          (goal 120/80)    Past Medical History:   Diagnosis Date    Arthritis     COPD (chronic obstructive pulmonary disease) (HCC)     mild    GERD (gastroesophageal reflux disease)     H/O chest pain     H/O dermatitis     Hypertension     Kyphosis     Lump of skin     Scratch of forearm       Past Surgical History:   Procedure Laterality Date    BACK SURGERY      cage    COLONOSCOPY      COLONOSCOPY  2017    CYST REMOVAL Right     index finger    HEMORRHOID SURGERY      HERNIA REPAIR      NOSE SURGERY      NE COLSC FLX W/REMOVAL LESION BY HOT BX FORCEPS N/A 2017    COLONOSCOPY POLYPECTOMY HOT BIOPSY performed by Lisa Newby MD at [address] [place] [address] TOE SURGERY Right     2nd toe    TONSILLECTOMY      VASECTOMY         Family History   Problem Relation Age of Onset    Coronary Art Dis Father         premature-- at 48    Coronary Art Dis Paternal Uncle         premature    Heart Failure Paternal Uncle        Social History     Tobacco Use    Smoking status: Former Smoker    Smokeless tobacco: Current User

## 2019-09-04 ENCOUNTER — HOSPITAL ENCOUNTER (OUTPATIENT)
Age: 54
Discharge: HOME | End: 2019-09-04
Payer: MEDICARE

## 2019-09-04 ENCOUNTER — HOSPITAL ENCOUNTER (OUTPATIENT)
Dept: GENERAL RADIOLOGY | Age: 54
Discharge: HOME | End: 2019-09-06
Payer: MEDICARE

## 2019-09-04 ENCOUNTER — HOSPITAL ENCOUNTER (OUTPATIENT)
Age: 54
Setting detail: SPECIMEN
Discharge: HOME | End: 2019-09-04
Payer: MEDICARE

## 2019-09-04 DIAGNOSIS — R25.2 SPASM: ICD-10-CM

## 2019-09-04 DIAGNOSIS — M54.16 LUMBAR RADICULOPATHY: ICD-10-CM

## 2019-09-04 LAB
ABSOLUTE EOS #: 0.21 K/UL (ref 0–0.44)
ABSOLUTE IMMATURE GRANULOCYTE: 0.04 K/UL (ref 0–0.3)
ABSOLUTE LYMPH #: 1.37 K/UL (ref 1.1–3.7)
ABSOLUTE MONO #: 0.42 K/UL (ref 0.1–1.2)
ANION GAP SERPL CALCULATED.3IONS-SCNC: 11 MMOL/L (ref 9–17)
BASOPHILS # BLD: 1 % (ref 0–2)
BASOPHILS ABSOLUTE: 0.06 K/UL (ref 0–0.2)
BUN BLDV-MCNC: 13 MG/DL (ref 6–20)
BUN/CREAT BLD: ABNORMAL (ref 9–20)
CALCIUM SERPL-MCNC: 9.1 MG/DL (ref 8.6–10.4)
CHLORIDE BLD-SCNC: 102 MMOL/L (ref 98–107)
CO2: 27 MMOL/L (ref 20–31)
CREAT SERPL-MCNC: 0.53 MG/DL (ref 0.7–1.2)
DIFFERENTIAL TYPE: ABNORMAL
EOSINOPHILS RELATIVE PERCENT: 2 % (ref 1–4)
GFR AFRICAN AMERICAN: >60 ML/MIN
GFR NON-AFRICAN AMERICAN: >60 ML/MIN
GFR SERPL CREATININE-BSD FRML MDRD: ABNORMAL ML/MIN/{1.73_M2}
GFR SERPL CREATININE-BSD FRML MDRD: ABNORMAL ML/MIN/{1.73_M2}
GLUCOSE BLD-MCNC: 116 MG/DL (ref 70–99)
HCT VFR BLD CALC: 42.3 % (ref 40.7–50.3)
HEMOGLOBIN: 13 G/DL (ref 13–17)
IMMATURE GRANULOCYTES: 0 %
LYMPHOCYTES # BLD: 15 % (ref 24–43)
MAGNESIUM: 1.9 MG/DL (ref 1.6–2.6)
MCH RBC QN AUTO: 29 PG (ref 25.2–33.5)
MCHC RBC AUTO-ENTMCNC: 30.7 G/DL (ref 28.4–34.8)
MCV RBC AUTO: 94.4 FL (ref 82.6–102.9)
MONOCYTES # BLD: 5 % (ref 3–12)
NRBC AUTOMATED: 0 PER 100 WBC
PDW BLD-RTO: 12.4 % (ref 11.8–14.4)
PLATELET # BLD: 190 K/UL (ref 138–453)
PLATELET ESTIMATE: ABNORMAL
PMV BLD AUTO: 10.2 FL (ref 8.1–13.5)
POTASSIUM SERPL-SCNC: 5.1 MMOL/L (ref 3.7–5.3)
RBC # BLD: 4.48 M/UL (ref 4.21–5.77)
RBC # BLD: ABNORMAL 10*6/UL
SEG NEUTROPHILS: 77 % (ref 36–65)
SEGMENTED NEUTROPHILS ABSOLUTE COUNT: 6.86 K/UL (ref 1.5–8.1)
SODIUM BLD-SCNC: 140 MMOL/L (ref 135–144)
WBC # BLD: 9 K/UL (ref 3.5–11.3)
WBC # BLD: ABNORMAL 10*3/UL

## 2019-09-04 PROCEDURE — 72100 X-RAY EXAM L-S SPINE 2/3 VWS: CPT

## 2019-09-06 ENCOUNTER — TELEPHONE (OUTPATIENT)
Dept: PRIMARY CARE CLINIC | Age: 54
End: 2019-09-06

## 2019-09-06 NOTE — TELEPHONE ENCOUNTER
OV: 9/3/19-Muscle Spasms. Taking Prednisone and Meloxicam as prescribed, still experiencing a lot of spasms-painful. Asking if okay to try using ice or heat for sx relief? XR and labs completed-awaiting results, understands next step possible PT. Any other recommendations? Please review/advise and call pt back.

## 2019-09-07 ENCOUNTER — TELEPHONE (OUTPATIENT)
Dept: PRIMARY CARE CLINIC | Age: 54
End: 2019-09-07

## 2019-09-21 ENCOUNTER — TELEPHONE (OUTPATIENT)
Dept: PRIMARY CARE CLINIC | Age: 54
End: 2019-09-21

## 2019-09-21 DIAGNOSIS — M62.838 MUSCLE SPASM: Primary | ICD-10-CM

## 2019-09-21 RX ORDER — TIZANIDINE 4 MG/1
4 TABLET ORAL EVERY 8 HOURS PRN
Qty: 20 TABLET | Refills: 0 | Status: SHIPPED | OUTPATIENT
Start: 2019-09-21 | End: 2019-09-24

## 2019-09-23 ENCOUNTER — HOSPITAL ENCOUNTER (EMERGENCY)
Age: 54
Discharge: LEFT BEFORE DISCHARGE | End: 2019-09-23
Payer: MEDICARE

## 2019-09-24 ENCOUNTER — TELEPHONE (OUTPATIENT)
Dept: PRIMARY CARE CLINIC | Age: 54
End: 2019-09-24

## 2019-09-24 ENCOUNTER — OFFICE VISIT (OUTPATIENT)
Dept: PRIMARY CARE CLINIC | Age: 54
End: 2019-09-24
Payer: MEDICARE

## 2019-09-24 ENCOUNTER — HOSPITAL ENCOUNTER (OUTPATIENT)
Age: 54
Setting detail: SPECIMEN
Discharge: HOME | End: 2019-09-24
Payer: MEDICARE

## 2019-09-24 VITALS — SYSTOLIC BLOOD PRESSURE: 126 MMHG | HEART RATE: 73 BPM | OXYGEN SATURATION: 97 % | DIASTOLIC BLOOD PRESSURE: 72 MMHG

## 2019-09-24 DIAGNOSIS — M54.16 LUMBAR RADICULOPATHY: Primary | ICD-10-CM

## 2019-09-24 DIAGNOSIS — M54.16 LUMBAR RADICULOPATHY: ICD-10-CM

## 2019-09-24 LAB
ANION GAP SERPL CALCULATED.3IONS-SCNC: 14 MMOL/L (ref 9–17)
BUN BLDV-MCNC: 12 MG/DL (ref 6–20)
BUN/CREAT BLD: ABNORMAL (ref 9–20)
CALCIUM SERPL-MCNC: 9.2 MG/DL (ref 8.6–10.4)
CHLORIDE BLD-SCNC: 98 MMOL/L (ref 98–107)
CO2: 28 MMOL/L (ref 20–31)
CREAT SERPL-MCNC: 0.56 MG/DL (ref 0.7–1.2)
GFR AFRICAN AMERICAN: >60 ML/MIN
GFR NON-AFRICAN AMERICAN: >60 ML/MIN
GFR SERPL CREATININE-BSD FRML MDRD: ABNORMAL ML/MIN/{1.73_M2}
GFR SERPL CREATININE-BSD FRML MDRD: ABNORMAL ML/MIN/{1.73_M2}
GLUCOSE BLD-MCNC: 89 MG/DL (ref 70–99)
POTASSIUM SERPL-SCNC: 4.6 MMOL/L (ref 3.7–5.3)
SODIUM BLD-SCNC: 140 MMOL/L (ref 135–144)

## 2019-09-24 PROCEDURE — 99214 OFFICE O/P EST MOD 30 MIN: CPT | Performed by: FAMILY MEDICINE

## 2019-09-24 RX ORDER — OXYCODONE AND ACETAMINOPHEN 10; 325 MG/1; MG/1
1 TABLET ORAL EVERY 4 HOURS PRN
Qty: 84 TABLET | Refills: 0 | Status: SHIPPED | OUTPATIENT
Start: 2019-09-24 | End: 2019-10-08

## 2019-09-24 RX ORDER — DIAZEPAM 2 MG/1
2 TABLET ORAL
COMMUNITY
Start: 2019-09-23 | End: 2019-09-24

## 2019-09-24 RX ORDER — TRAMADOL HYDROCHLORIDE 50 MG/1
50 TABLET ORAL EVERY 8 HOURS PRN
COMMUNITY
End: 2019-09-24

## 2019-09-24 RX ORDER — DIAZEPAM 5 MG/1
5 TABLET ORAL EVERY 6 HOURS PRN
Qty: 56 TABLET | Refills: 0 | Status: SHIPPED | OUTPATIENT
Start: 2019-09-24 | End: 2019-10-07

## 2019-09-24 RX ORDER — OXYCODONE HYDROCHLORIDE AND ACETAMINOPHEN 5; 325 MG/1; MG/1
1 TABLET ORAL
COMMUNITY
Start: 2019-09-23 | End: 2019-09-26

## 2019-09-24 ASSESSMENT — ENCOUNTER SYMPTOMS
SORE THROAT: 0
WHEEZING: 0
ABDOMINAL PAIN: 0
EYE REDNESS: 0
DIARRHEA: 0
RHINORRHEA: 0
BACK PAIN: 1
SHORTNESS OF BREATH: 0
EYE DISCHARGE: 0
VOMITING: 0
NAUSEA: 0
COUGH: 0

## 2019-09-25 ENCOUNTER — TELEPHONE (OUTPATIENT)
Dept: PRIMARY CARE CLINIC | Age: 54
End: 2019-09-25

## 2019-09-25 DIAGNOSIS — M54.16 LUMBAR RADICULOPATHY: Primary | ICD-10-CM

## 2019-10-03 ENCOUNTER — TELEPHONE (OUTPATIENT)
Dept: PRIMARY CARE CLINIC | Age: 54
End: 2019-10-03

## 2019-10-04 RX ORDER — FUROSEMIDE 20 MG/1
20 TABLET ORAL 2 TIMES DAILY
Qty: 60 TABLET | Refills: 0 | Status: SHIPPED | OUTPATIENT
Start: 2019-10-04 | End: 2020-02-19 | Stop reason: ALTCHOICE

## 2019-10-09 ENCOUNTER — HOSPITAL ENCOUNTER (OUTPATIENT)
Dept: MRI IMAGING | Age: 54
Discharge: HOME | End: 2019-10-11
Payer: MEDICARE

## 2019-10-09 ENCOUNTER — ANESTHESIA EVENT (OUTPATIENT)
Dept: MRI IMAGING | Age: 54
End: 2019-10-09

## 2019-10-09 ENCOUNTER — ANESTHESIA (OUTPATIENT)
Dept: MRI IMAGING | Age: 54
End: 2019-10-09

## 2019-10-09 VITALS
TEMPERATURE: 97.7 F | DIASTOLIC BLOOD PRESSURE: 76 MMHG | SYSTOLIC BLOOD PRESSURE: 106 MMHG | HEART RATE: 83 BPM | WEIGHT: 261.5 LBS | HEIGHT: 72 IN | BODY MASS INDEX: 35.42 KG/M2 | RESPIRATION RATE: 15 BRPM | OXYGEN SATURATION: 98 %

## 2019-10-09 VITALS
OXYGEN SATURATION: 97 % | RESPIRATION RATE: 24 BRPM | DIASTOLIC BLOOD PRESSURE: 84 MMHG | SYSTOLIC BLOOD PRESSURE: 148 MMHG

## 2019-10-09 DIAGNOSIS — M54.16 LUMBAR RADICULOPATHY: ICD-10-CM

## 2019-10-09 PROCEDURE — 6360000002 HC RX W HCPCS: Performed by: NURSE ANESTHETIST, CERTIFIED REGISTERED

## 2019-10-09 PROCEDURE — 7100000001 HC PACU RECOVERY - ADDTL 15 MIN

## 2019-10-09 PROCEDURE — 7100000010 HC PHASE II RECOVERY - FIRST 15 MIN

## 2019-10-09 PROCEDURE — 7100000000 HC PACU RECOVERY - FIRST 15 MIN

## 2019-10-09 PROCEDURE — 3700000000 HC ANESTHESIA ATTENDED CARE

## 2019-10-09 PROCEDURE — 3700000001 HC ADD 15 MINUTES (ANESTHESIA)

## 2019-10-09 PROCEDURE — 6360000002 HC RX W HCPCS: Performed by: ANESTHESIOLOGY

## 2019-10-09 PROCEDURE — 72158 MRI LUMBAR SPINE W/O & W/DYE: CPT

## 2019-10-09 PROCEDURE — 2500000003 HC RX 250 WO HCPCS: Performed by: NURSE ANESTHETIST, CERTIFIED REGISTERED

## 2019-10-09 PROCEDURE — A9576 INJ PROHANCE MULTIPACK: HCPCS | Performed by: FAMILY MEDICINE

## 2019-10-09 PROCEDURE — 2580000003 HC RX 258: Performed by: ANESTHESIOLOGY

## 2019-10-09 PROCEDURE — 6360000004 HC RX CONTRAST MEDICATION: Performed by: FAMILY MEDICINE

## 2019-10-09 RX ORDER — PROPOFOL 10 MG/ML
INJECTION, EMULSION INTRAVENOUS PRN
Status: DISCONTINUED | OUTPATIENT
Start: 2019-10-09 | End: 2019-10-09 | Stop reason: SDUPTHER

## 2019-10-09 RX ORDER — SODIUM CHLORIDE, SODIUM LACTATE, POTASSIUM CHLORIDE, CALCIUM CHLORIDE 600; 310; 30; 20 MG/100ML; MG/100ML; MG/100ML; MG/100ML
INJECTION, SOLUTION INTRAVENOUS CONTINUOUS
Status: DISCONTINUED | OUTPATIENT
Start: 2019-10-09 | End: 2019-10-12 | Stop reason: HOSPADM

## 2019-10-09 RX ORDER — FENTANYL CITRATE 50 UG/ML
25 INJECTION, SOLUTION INTRAMUSCULAR; INTRAVENOUS EVERY 5 MIN PRN
Status: DISCONTINUED | OUTPATIENT
Start: 2019-10-09 | End: 2019-10-12 | Stop reason: HOSPADM

## 2019-10-09 RX ORDER — FENTANYL CITRATE 50 UG/ML
INJECTION, SOLUTION INTRAMUSCULAR; INTRAVENOUS PRN
Status: DISCONTINUED | OUTPATIENT
Start: 2019-10-09 | End: 2019-10-09 | Stop reason: SDUPTHER

## 2019-10-09 RX ORDER — MEPERIDINE HYDROCHLORIDE 50 MG/ML
12.5 INJECTION INTRAMUSCULAR; INTRAVENOUS; SUBCUTANEOUS EVERY 5 MIN PRN
Status: DISCONTINUED | OUTPATIENT
Start: 2019-10-09 | End: 2019-10-12 | Stop reason: HOSPADM

## 2019-10-09 RX ORDER — PROPOFOL 10 MG/ML
INJECTION, EMULSION INTRAVENOUS CONTINUOUS PRN
Status: DISCONTINUED | OUTPATIENT
Start: 2019-10-09 | End: 2019-10-09 | Stop reason: SDUPTHER

## 2019-10-09 RX ORDER — FENTANYL CITRATE 50 UG/ML
50 INJECTION, SOLUTION INTRAMUSCULAR; INTRAVENOUS EVERY 5 MIN PRN
Status: DISCONTINUED | OUTPATIENT
Start: 2019-10-09 | End: 2019-10-12 | Stop reason: HOSPADM

## 2019-10-09 RX ORDER — ONDANSETRON 2 MG/ML
4 INJECTION INTRAMUSCULAR; INTRAVENOUS
Status: ACTIVE | OUTPATIENT
Start: 2019-10-09 | End: 2019-10-09

## 2019-10-09 RX ORDER — OXYCODONE AND ACETAMINOPHEN 10; 325 MG/1; MG/1
1 TABLET ORAL EVERY 4 HOURS PRN
COMMUNITY
End: 2020-01-06

## 2019-10-09 RX ORDER — DIAZEPAM 10 MG/1
10 TABLET ORAL EVERY 6 HOURS PRN
COMMUNITY
End: 2020-02-21 | Stop reason: ALTCHOICE

## 2019-10-09 RX ORDER — MIDAZOLAM HYDROCHLORIDE 1 MG/ML
1 INJECTION INTRAMUSCULAR; INTRAVENOUS EVERY 10 MIN PRN
Status: DISCONTINUED | OUTPATIENT
Start: 2019-10-09 | End: 2019-10-12 | Stop reason: HOSPADM

## 2019-10-09 RX ORDER — SODIUM CHLORIDE 0.9 % (FLUSH) 0.9 %
10 SYRINGE (ML) INJECTION 2 TIMES DAILY
Status: DISCONTINUED | OUTPATIENT
Start: 2019-10-09 | End: 2019-10-12 | Stop reason: HOSPADM

## 2019-10-09 RX ORDER — LIDOCAINE HYDROCHLORIDE 10 MG/ML
INJECTION, SOLUTION INFILTRATION; PERINEURAL PRN
Status: DISCONTINUED | OUTPATIENT
Start: 2019-10-09 | End: 2019-10-09 | Stop reason: SDUPTHER

## 2019-10-09 RX ADMIN — SODIUM CHLORIDE, POTASSIUM CHLORIDE, SODIUM LACTATE AND CALCIUM CHLORIDE: 600; 310; 30; 20 INJECTION, SOLUTION INTRAVENOUS at 11:27

## 2019-10-09 RX ADMIN — PROPOFOL 100 MCG/KG/MIN: 10 INJECTION, EMULSION INTRAVENOUS at 13:36

## 2019-10-09 RX ADMIN — SODIUM CHLORIDE, POTASSIUM CHLORIDE, SODIUM LACTATE AND CALCIUM CHLORIDE: 600; 310; 30; 20 INJECTION, SOLUTION INTRAVENOUS at 13:10

## 2019-10-09 RX ADMIN — FENTANYL CITRATE 50 MCG: 50 INJECTION INTRAMUSCULAR; INTRAVENOUS at 13:37

## 2019-10-09 RX ADMIN — FENTANYL CITRATE 50 MCG: 50 INJECTION INTRAMUSCULAR; INTRAVENOUS at 13:53

## 2019-10-09 RX ADMIN — PROPOFOL 200 MG: 10 INJECTION, EMULSION INTRAVENOUS at 13:36

## 2019-10-09 RX ADMIN — MIDAZOLAM HYDROCHLORIDE 2 MG: 1 INJECTION, SOLUTION INTRAMUSCULAR; INTRAVENOUS at 13:33

## 2019-10-09 RX ADMIN — GADOTERIDOL 20 ML: 279.3 INJECTION, SOLUTION INTRAVENOUS at 14:55

## 2019-10-09 RX ADMIN — PROPOFOL 30 MG: 10 INJECTION, EMULSION INTRAVENOUS at 14:07

## 2019-10-09 RX ADMIN — PROPOFOL 50 MG: 10 INJECTION, EMULSION INTRAVENOUS at 13:53

## 2019-10-09 RX ADMIN — LIDOCAINE HYDROCHLORIDE 50 MG: 10 INJECTION, SOLUTION INFILTRATION; PERINEURAL at 13:36

## 2019-10-09 ASSESSMENT — PAIN DESCRIPTION - PAIN TYPE
TYPE: CHRONIC PAIN
TYPE: CHRONIC PAIN

## 2019-10-09 ASSESSMENT — PAIN SCALES - GENERAL
PAINLEVEL_OUTOF10: 4
PAINLEVEL_OUTOF10: 0
PAINLEVEL_OUTOF10: 6

## 2019-10-09 ASSESSMENT — PAIN DESCRIPTION - LOCATION
LOCATION: BACK
LOCATION: BACK

## 2019-10-09 ASSESSMENT — PAIN - FUNCTIONAL ASSESSMENT: PAIN_FUNCTIONAL_ASSESSMENT: 0-10

## 2019-10-10 ENCOUNTER — TELEPHONE (OUTPATIENT)
Dept: PRIMARY CARE CLINIC | Age: 54
End: 2019-10-10

## 2019-10-10 DIAGNOSIS — M62.838 MUSCLE SPASM: Primary | ICD-10-CM

## 2019-10-11 ENCOUNTER — PATIENT MESSAGE (OUTPATIENT)
Dept: PRIMARY CARE CLINIC | Age: 54
End: 2019-10-11

## 2019-10-13 LAB
EKG ATRIAL RATE: 79 BPM
EKG P AXIS: -12 DEGREES
EKG P-R INTERVAL: 154 MS
EKG Q-T INTERVAL: 390 MS
EKG QRS DURATION: 104 MS
EKG QTC CALCULATION (BAZETT): 447 MS
EKG R AXIS: 20 DEGREES
EKG T AXIS: 23 DEGREES
EKG VENTRICULAR RATE: 79 BPM

## 2019-10-14 DIAGNOSIS — M62.838 MUSCLE SPASM: Primary | ICD-10-CM

## 2019-10-30 ENCOUNTER — OFFICE VISIT (OUTPATIENT)
Dept: NEUROLOGY | Age: 54
End: 2019-10-30
Payer: MEDICARE

## 2019-10-30 VITALS
HEIGHT: 72 IN | WEIGHT: 263.2 LBS | SYSTOLIC BLOOD PRESSURE: 119 MMHG | HEART RATE: 75 BPM | DIASTOLIC BLOOD PRESSURE: 77 MMHG | BODY MASS INDEX: 35.65 KG/M2

## 2019-10-30 DIAGNOSIS — M54.16 LUMBAR RADICULOPATHY: Primary | ICD-10-CM

## 2019-10-30 PROCEDURE — 99204 OFFICE O/P NEW MOD 45 MIN: CPT | Performed by: PSYCHIATRY & NEUROLOGY

## 2019-10-30 RX ORDER — LORAZEPAM 1 MG/1
1 TABLET ORAL EVERY 6 HOURS PRN
COMMUNITY
End: 2021-01-02 | Stop reason: ALTCHOICE

## 2019-10-30 RX ORDER — GABAPENTIN 100 MG/1
CAPSULE ORAL
Qty: 90 CAPSULE | Refills: 3 | Status: SHIPPED | OUTPATIENT
Start: 2019-10-30 | End: 2020-01-06

## 2019-11-18 ENCOUNTER — TELEPHONE (OUTPATIENT)
Dept: NEUROLOGY | Age: 54
End: 2019-11-18

## 2019-11-27 RX ORDER — METOPROLOL TARTRATE 50 MG/1
TABLET, FILM COATED ORAL
Qty: 180 TABLET | Refills: 2 | Status: SHIPPED | OUTPATIENT
Start: 2019-11-27 | End: 2020-09-18

## 2020-01-06 ENCOUNTER — HOSPITAL ENCOUNTER (OUTPATIENT)
Age: 55
Setting detail: SPECIMEN
Discharge: HOME | End: 2020-01-06
Payer: MEDICARE

## 2020-01-06 ENCOUNTER — OFFICE VISIT (OUTPATIENT)
Dept: PRIMARY CARE CLINIC | Age: 55
End: 2020-01-06
Payer: MEDICARE

## 2020-01-06 VITALS
OXYGEN SATURATION: 97 % | SYSTOLIC BLOOD PRESSURE: 138 MMHG | DIASTOLIC BLOOD PRESSURE: 70 MMHG | HEART RATE: 78 BPM | BODY MASS INDEX: 37.3 KG/M2 | HEIGHT: 72 IN | WEIGHT: 275.4 LBS

## 2020-01-06 LAB
THYROXINE, FREE: 1.14 NG/DL (ref 0.93–1.7)
TSH SERPL DL<=0.05 MIU/L-ACNC: 0.97 MIU/L (ref 0.3–5)

## 2020-01-06 PROCEDURE — 99213 OFFICE O/P EST LOW 20 MIN: CPT | Performed by: FAMILY MEDICINE

## 2020-01-06 PROCEDURE — G0296 VISIT TO DETERM LDCT ELIG: HCPCS | Performed by: FAMILY MEDICINE

## 2020-01-06 RX ORDER — GABAPENTIN 300 MG/1
300 CAPSULE ORAL 3 TIMES DAILY
Qty: 90 CAPSULE | Refills: 2
Start: 2020-01-06 | End: 2020-01-21 | Stop reason: SDUPTHER

## 2020-01-06 ASSESSMENT — ENCOUNTER SYMPTOMS
DIARRHEA: 0
SORE THROAT: 0
VOMITING: 0
SHORTNESS OF BREATH: 0
ABDOMINAL PAIN: 0
COUGH: 0
BACK PAIN: 1
EYE DISCHARGE: 0
WHEEZING: 0
RHINORRHEA: 0
EYE REDNESS: 0
NAUSEA: 0

## 2020-01-06 ASSESSMENT — PATIENT HEALTH QUESTIONNAIRE - PHQ9
SUM OF ALL RESPONSES TO PHQ QUESTIONS 1-9: 2
SUM OF ALL RESPONSES TO PHQ QUESTIONS 1-9: 2
2. FEELING DOWN, DEPRESSED OR HOPELESS: 1
SUM OF ALL RESPONSES TO PHQ9 QUESTIONS 1 & 2: 2
1. LITTLE INTEREST OR PLEASURE IN DOING THINGS: 1

## 2020-01-07 ENCOUNTER — TELEPHONE (OUTPATIENT)
Dept: PRIMARY CARE CLINIC | Age: 55
End: 2020-01-07

## 2020-01-20 NOTE — TELEPHONE ENCOUNTER
Pt called in stating that we increased the dose of his Gabapentin, he is now running out of medication. He is asking for a refill at the increased dose. He is now taking 200 mg TID. I told him that we would get the new script sent in. After looking at his chart I see that Dr Harper Jha increased his dose to 300 mg TID on 1/6/20. I called ptHEMANTH with this information and to call back with any other questions.

## 2020-01-21 RX ORDER — GABAPENTIN 300 MG/1
300 CAPSULE ORAL 3 TIMES DAILY
Qty: 90 CAPSULE | Refills: 2 | Status: SHIPPED | OUTPATIENT
Start: 2020-01-21 | End: 2020-02-13

## 2020-02-13 ENCOUNTER — OFFICE VISIT (OUTPATIENT)
Dept: PRIMARY CARE CLINIC | Age: 55
End: 2020-02-13
Payer: MEDICARE

## 2020-02-13 VITALS
HEART RATE: 74 BPM | OXYGEN SATURATION: 97 % | HEIGHT: 72 IN | DIASTOLIC BLOOD PRESSURE: 70 MMHG | SYSTOLIC BLOOD PRESSURE: 118 MMHG | WEIGHT: 266 LBS | BODY MASS INDEX: 36.03 KG/M2

## 2020-02-13 PROBLEM — M40.205 KYPHOSIS OF THORACOLUMBAR REGION: Status: ACTIVE | Noted: 2020-02-13

## 2020-02-13 PROCEDURE — 99213 OFFICE O/P EST LOW 20 MIN: CPT | Performed by: FAMILY MEDICINE

## 2020-02-13 RX ORDER — GABAPENTIN 400 MG/1
400 CAPSULE ORAL 3 TIMES DAILY
Qty: 90 CAPSULE | Refills: 2 | Status: SHIPPED | OUTPATIENT
Start: 2020-02-13 | End: 2020-03-10 | Stop reason: SDUPTHER

## 2020-02-13 RX ORDER — AMOXICILLIN 500 MG/1
1 CAPSULE ORAL 3 TIMES DAILY
COMMUNITY
Start: 2020-02-10 | End: 2020-03-10 | Stop reason: ALTCHOICE

## 2020-02-13 ASSESSMENT — ENCOUNTER SYMPTOMS
RHINORRHEA: 0
SHORTNESS OF BREATH: 0
DIARRHEA: 0
COUGH: 0
ABDOMINAL PAIN: 0
BACK PAIN: 1
EYE DISCHARGE: 0
VOMITING: 0
EYE REDNESS: 0
NAUSEA: 0
WHEEZING: 0
SORE THROAT: 0

## 2020-02-13 NOTE — PROGRESS NOTES
 BACK SURGERY  2006    cage    COLONOSCOPY      CYST REMOVAL Right     index finger    HEMORRHOID SURGERY  2017    HERNIA REPAIR      UMBILICAL    NOSE SURGERY      REALIGNED NOSE    TX COLSC FLX W/REMOVAL LESION BY HOT BX FORCEPS N/A 2017    COLONOSCOPY POLYPECTOMY HOT BIOPSY performed by Rosemary Stovall MD at [facility] TOE SURGERY Right     2nd toe    TONSILLECTOMY      AS A CHILD    VASECTOMY         Family History   Problem Relation Age of Onset    Coronary Art Dis Father         premature-- at 52    Diabetes Father     High Blood Pressure Father     Other Father         COPD, EMPHYSEMA    Coronary Art Dis Paternal Uncle         premature    Heart Failure Paternal Uncle     Diabetes Mother        Social History     Tobacco Use    Smoking status: Former Smoker     Packs/day: 1.50     Years: 23.00     Pack years: 34.50     Types: Cigarettes     Last attempt to quit:      Years since quittin.1    Smokeless tobacco: Former User     Types: Chew     Quit date: 9/10/2019   Substance Use Topics    Alcohol use: Not Currently     Comment: He states \"a lot\" daily, quit 16      Current Outpatient Medications   Medication Sig Dispense Refill    gabapentin (NEURONTIN) 400 MG capsule Take 1 capsule by mouth 3 times daily for 30 days. 1 tab a day for 3 days then 1 tab BID for 3 days then 1 tab TID 90 capsule 2    metoprolol tartrate (LOPRESSOR) 50 MG tablet TAKE 1 TABLET BY MOUTH TWO TIMES A DAY  180 tablet 2    LORazepam (ATIVAN) 1 MG tablet Take 1 mg by mouth every 6 hours as needed for Anxiety.  Acetaminophen (TYLENOL ARTHRITIS PAIN PO) Take by mouth      diazepam (VALIUM) 10 MG tablet Take 10 mg by mouth every 6 hours as needed for Anxiety.       glucosamine-chondroitin 500-400 MG tablet Take 1 tablet by mouth daily      furosemide (LASIX) 20 MG tablet Take 1 tablet by mouth 2 times daily 60 tablet 0    meloxicam (MOBIC) 15 MG tablet Take 1 tablet by mouth daily 90 Negative for abdominal pain, diarrhea, nausea and vomiting. Genitourinary: Negative for dysuria and frequency. Musculoskeletal: Positive for back pain. Negative for arthralgias and myalgias. Neurological: Negative for dizziness, light-headedness and headaches. Psychiatric/Behavioral: Negative for sleep disturbance. Objective:     /70   Pulse 74   Ht 6' (1.829 m)   Wt 266 lb (120.7 kg)   SpO2 97%   BMI 36.08 kg/m²   Physical Exam  Vitals signs and nursing note reviewed. Constitutional:       General: He is not in acute distress. Appearance: He is well-developed. HENT:      Head: Normocephalic and atraumatic. Eyes:      General: No scleral icterus. Right eye: No discharge. Left eye: No discharge. Conjunctiva/sclera: Conjunctivae normal.      Pupils: Pupils are equal, round, and reactive to light. Neck:      Thyroid: No thyromegaly. Trachea: No tracheal deviation. Cardiovascular:      Rate and Rhythm: Normal rate and regular rhythm. Heart sounds: Normal heart sounds. Comments: No carotid bruits  Pulmonary:      Effort: Pulmonary effort is normal. No respiratory distress. Breath sounds: Normal breath sounds. No wheezing. Lymphadenopathy:      Cervical: No cervical adenopathy. Skin:     General: Skin is warm. Findings: No rash. Neurological:      Mental Status: He is alert and oriented to person, place, and time. Psychiatric:         Behavior: Behavior normal.         Thought Content: Thought content normal.       1. Kyphosis of thoracolumbar region, unspecified kyphosis type  Unchanged. Increase neurontin to 400mg tid  Keep f/u with neurosurgery      2. Lumbar radiculopathy  Stable. continue neurontin  - gabapentin (NEURONTIN) 400 MG capsule; Take 1 capsule by mouth 3 times daily for 30 days. 1 tab a day for 3 days then 1 tab BID for 3 days then 1 tab TID  Dispense: 90 capsule; Refill: 2    3.  Class 2 obesity due to excess

## 2020-02-17 ENCOUNTER — HOSPITAL ENCOUNTER (OUTPATIENT)
Dept: ULTRASOUND IMAGING | Age: 55
Discharge: HOME | End: 2020-02-19
Payer: MEDICARE

## 2020-02-17 ENCOUNTER — TELEPHONE (OUTPATIENT)
Dept: ONCOLOGY | Age: 55
End: 2020-02-17

## 2020-02-17 PROCEDURE — 76536 US EXAM OF HEAD AND NECK: CPT

## 2020-02-17 NOTE — LETTER
2/17/2020        [address] [facility] [address]  [facility]    Dear Torrence Pallas:    Your healthcare provider has ordered a low dose CT scan of the chest for lung cancer screening. You will find enclosed, information about CT lung screening. Please review the statement of understanding, you will be asked to sign a copy of this at the time of your CT scan    If you have not already been contacted to make the appointment for your scan, please call our scheduling department at [phone]    Keep in mind that CT lung screening does not take the place of smoking cessation. If you are a current smoker, you will find enclosed smoking cessation resources. Please do not hesitate to contact me if you have any questions or concerns.     [address] [place] [address] Lung Screening Program  [phone]

## 2020-02-17 NOTE — TELEPHONE ENCOUNTER
Order received for ct lung screening does not contain all CMS required data. The order is missing the NPI. New Pending order placed to include required data. Patient is scheduled for 2/21/2020. Please sign new pended order. Thank you   I was able to add the with anesthesia.

## 2020-02-21 ENCOUNTER — ANESTHESIA EVENT (OUTPATIENT)
Dept: CT IMAGING | Age: 55
End: 2020-02-21

## 2020-02-21 ENCOUNTER — ANESTHESIA (OUTPATIENT)
Dept: CT IMAGING | Age: 55
End: 2020-02-21

## 2020-02-21 ENCOUNTER — HOSPITAL ENCOUNTER (OUTPATIENT)
Dept: MRI IMAGING | Age: 55
Discharge: HOME | End: 2020-02-23
Payer: MEDICARE

## 2020-02-21 ENCOUNTER — ANESTHESIA (OUTPATIENT)
Dept: MRI IMAGING | Age: 55
End: 2020-02-21

## 2020-02-21 ENCOUNTER — ANESTHESIA EVENT (OUTPATIENT)
Dept: MRI IMAGING | Age: 55
End: 2020-02-21

## 2020-02-21 ENCOUNTER — APPOINTMENT (OUTPATIENT)
Dept: CT IMAGING | Age: 55
End: 2020-02-21
Payer: MEDICARE

## 2020-02-21 ENCOUNTER — HOSPITAL ENCOUNTER (OUTPATIENT)
Dept: CT IMAGING | Age: 55
Discharge: HOME | End: 2020-02-23
Payer: MEDICARE

## 2020-02-21 VITALS
BODY MASS INDEX: 35.49 KG/M2 | SYSTOLIC BLOOD PRESSURE: 113 MMHG | RESPIRATION RATE: 16 BRPM | HEART RATE: 68 BPM | DIASTOLIC BLOOD PRESSURE: 75 MMHG | HEIGHT: 72 IN | TEMPERATURE: 97 F | WEIGHT: 262 LBS | OXYGEN SATURATION: 98 %

## 2020-02-21 VITALS — DIASTOLIC BLOOD PRESSURE: 56 MMHG | SYSTOLIC BLOOD PRESSURE: 88 MMHG

## 2020-02-21 VITALS
RESPIRATION RATE: 19 BRPM | SYSTOLIC BLOOD PRESSURE: 92 MMHG | DIASTOLIC BLOOD PRESSURE: 47 MMHG | OXYGEN SATURATION: 100 %

## 2020-02-21 LAB
GFR NON-AFRICAN AMERICAN: >60 ML/MIN
GFR SERPL CREATININE-BSD FRML MDRD: >60 ML/MIN
GFR SERPL CREATININE-BSD FRML MDRD: NORMAL ML/MIN/{1.73_M2}
GLUCOSE BLD-MCNC: 115 MG/DL (ref 74–100)
POC CREATININE: 0.51 MG/DL (ref 0.51–1.19)
POC POTASSIUM: 4.7 MMOL/L (ref 3.5–4.5)

## 2020-02-21 PROCEDURE — 7100000011 HC PHASE II RECOVERY - ADDTL 15 MIN

## 2020-02-21 PROCEDURE — 7100000001 HC PACU RECOVERY - ADDTL 15 MIN

## 2020-02-21 PROCEDURE — 2500000003 HC RX 250 WO HCPCS: Performed by: NURSE ANESTHETIST, CERTIFIED REGISTERED

## 2020-02-21 PROCEDURE — 2580000003 HC RX 258: Performed by: ANESTHESIOLOGY

## 2020-02-21 PROCEDURE — G0297 LDCT FOR LUNG CA SCREEN: HCPCS

## 2020-02-21 PROCEDURE — 84132 ASSAY OF SERUM POTASSIUM: CPT

## 2020-02-21 PROCEDURE — 3700000000 HC ANESTHESIA ATTENDED CARE

## 2020-02-21 PROCEDURE — 3700000001 HC ADD 15 MINUTES (ANESTHESIA)

## 2020-02-21 PROCEDURE — 2580000003 HC RX 258: Performed by: NURSE ANESTHETIST, CERTIFIED REGISTERED

## 2020-02-21 PROCEDURE — 82565 ASSAY OF CREATININE: CPT

## 2020-02-21 PROCEDURE — 6360000002 HC RX W HCPCS: Performed by: NURSE ANESTHETIST, CERTIFIED REGISTERED

## 2020-02-21 PROCEDURE — 82947 ASSAY GLUCOSE BLOOD QUANT: CPT

## 2020-02-21 PROCEDURE — 72146 MRI CHEST SPINE W/O DYE: CPT

## 2020-02-21 PROCEDURE — 72141 MRI NECK SPINE W/O DYE: CPT

## 2020-02-21 PROCEDURE — 7100000010 HC PHASE II RECOVERY - FIRST 15 MIN

## 2020-02-21 PROCEDURE — 7100000000 HC PACU RECOVERY - FIRST 15 MIN

## 2020-02-21 RX ORDER — PROPOFOL 10 MG/ML
INJECTION, EMULSION INTRAVENOUS CONTINUOUS PRN
Status: DISCONTINUED | OUTPATIENT
Start: 2020-02-21 | End: 2020-02-21 | Stop reason: SDUPTHER

## 2020-02-21 RX ORDER — PROPOFOL 10 MG/ML
INJECTION, EMULSION INTRAVENOUS PRN
Status: DISCONTINUED | OUTPATIENT
Start: 2020-02-21 | End: 2020-02-21 | Stop reason: SDUPTHER

## 2020-02-21 RX ORDER — SODIUM CHLORIDE, SODIUM LACTATE, POTASSIUM CHLORIDE, CALCIUM CHLORIDE 600; 310; 30; 20 MG/100ML; MG/100ML; MG/100ML; MG/100ML
INJECTION, SOLUTION INTRAVENOUS CONTINUOUS
Status: DISCONTINUED | OUTPATIENT
Start: 2020-02-21 | End: 2020-02-24 | Stop reason: HOSPADM

## 2020-02-21 RX ORDER — SODIUM CHLORIDE, SODIUM LACTATE, POTASSIUM CHLORIDE, CALCIUM CHLORIDE 600; 310; 30; 20 MG/100ML; MG/100ML; MG/100ML; MG/100ML
INJECTION, SOLUTION INTRAVENOUS CONTINUOUS PRN
Status: DISCONTINUED | OUTPATIENT
Start: 2020-02-21 | End: 2020-02-21 | Stop reason: SDUPTHER

## 2020-02-21 RX ORDER — FENTANYL CITRATE 50 UG/ML
INJECTION, SOLUTION INTRAMUSCULAR; INTRAVENOUS PRN
Status: DISCONTINUED | OUTPATIENT
Start: 2020-02-21 | End: 2020-02-21 | Stop reason: SDUPTHER

## 2020-02-21 RX ORDER — MIDAZOLAM HYDROCHLORIDE 1 MG/ML
INJECTION INTRAMUSCULAR; INTRAVENOUS PRN
Status: DISCONTINUED | OUTPATIENT
Start: 2020-02-21 | End: 2020-02-21 | Stop reason: SDUPTHER

## 2020-02-21 RX ORDER — LIDOCAINE HYDROCHLORIDE 10 MG/ML
INJECTION, SOLUTION INFILTRATION; PERINEURAL PRN
Status: DISCONTINUED | OUTPATIENT
Start: 2020-02-21 | End: 2020-02-21 | Stop reason: SDUPTHER

## 2020-02-21 RX ADMIN — SODIUM CHLORIDE, POTASSIUM CHLORIDE, SODIUM LACTATE AND CALCIUM CHLORIDE: 600; 310; 30; 20 INJECTION, SOLUTION INTRAVENOUS at 09:09

## 2020-02-21 RX ADMIN — LIDOCAINE HYDROCHLORIDE 50 MG: 10 INJECTION, SOLUTION INFILTRATION; PERINEURAL at 09:23

## 2020-02-21 RX ADMIN — PROPOFOL 200 MG: 10 INJECTION, EMULSION INTRAVENOUS at 09:23

## 2020-02-21 RX ADMIN — FENTANYL CITRATE 50 MCG: 50 INJECTION INTRAMUSCULAR; INTRAVENOUS at 09:23

## 2020-02-21 RX ADMIN — SODIUM CHLORIDE, POTASSIUM CHLORIDE, SODIUM LACTATE AND CALCIUM CHLORIDE: 600; 310; 30; 20 INJECTION, SOLUTION INTRAVENOUS at 10:31

## 2020-02-21 RX ADMIN — SODIUM CHLORIDE, POTASSIUM CHLORIDE, SODIUM LACTATE AND CALCIUM CHLORIDE: 600; 310; 30; 20 INJECTION, SOLUTION INTRAVENOUS at 08:44

## 2020-02-21 RX ADMIN — PROPOFOL 150 MCG/KG/MIN: 10 INJECTION, EMULSION INTRAVENOUS at 09:23

## 2020-02-21 RX ADMIN — MIDAZOLAM HYDROCHLORIDE 2 MG: 1 INJECTION, SOLUTION INTRAMUSCULAR; INTRAVENOUS at 09:17

## 2020-02-21 RX ADMIN — PROPOFOL 170 MCG/KG/MIN: 10 INJECTION, EMULSION INTRAVENOUS at 10:31

## 2020-02-21 ASSESSMENT — PAIN SCALES - GENERAL
PAINLEVEL_OUTOF10: 5
PAINLEVEL_OUTOF10: 6
PAINLEVEL_OUTOF10: 9
PAINLEVEL_OUTOF10: 4

## 2020-02-21 ASSESSMENT — PAIN DESCRIPTION - PAIN TYPE: TYPE: ACUTE PAIN

## 2020-02-21 ASSESSMENT — PAIN - FUNCTIONAL ASSESSMENT: PAIN_FUNCTIONAL_ASSESSMENT: 0-10

## 2020-02-21 ASSESSMENT — PAIN DESCRIPTION - LOCATION
LOCATION: NECK
LOCATION: NECK

## 2020-02-21 NOTE — ANESTHESIA PRE PROCEDURE
Units by mouth daily Indications: take as directed     Historical Provider, MD       Current medications:    Current Outpatient Medications   Medication Sig Dispense Refill    amoxicillin (AMOXIL) 500 MG capsule Take 1 capsule by mouth 3 times daily      gabapentin (NEURONTIN) 400 MG capsule Take 1 capsule by mouth 3 times daily for 30 days. 1 tab a day for 3 days then 1 tab BID for 3 days then 1 tab TID 90 capsule 2    metoprolol tartrate (LOPRESSOR) 50 MG tablet TAKE 1 TABLET BY MOUTH TWO TIMES A DAY  180 tablet 2    LORazepam (ATIVAN) 1 MG tablet Take 1 mg by mouth every 6 hours as needed for Anxiety.  Acetaminophen (TYLENOL ARTHRITIS PAIN PO) Take by mouth      diazepam (VALIUM) 10 MG tablet Take 10 mg by mouth every 6 hours as needed for Anxiety.  glucosamine-chondroitin 500-400 MG tablet Take 1 tablet by mouth daily      meloxicam (MOBIC) 15 MG tablet Take 1 tablet by mouth daily 90 tablet 3    lisinopril (PRINIVIL;ZESTRIL) 10 MG tablet TAKE 1 TABLET BY MOUTH ONE TIME A DAY  (Patient taking differently: Take 10 mg by mouth daily ) 90 tablet 1    Magnesium 400 MG TABS Take 400 mg by mouth 2 times daily 60 tablet 5    tadalafil (CIALIS) 20 MG tablet Take 1/2 tablet PRN 3 tablet 0    aspirin 81 MG tablet Take 81 mg by mouth 2 times daily       omeprazole (PRILOSEC) 20 MG capsule Take 20 mg by mouth daily      Cholecalciferol (VITAMIN D) 2000 UNITS CAPS capsule Take 2,000 Units by mouth daily Indications: take as directed        No current facility-administered medications for this visit. Allergies: Allergies   Allergen Reactions    Cymbalta [Duloxetine Hcl] Other (See Comments)     Unknown reaction    Adhesive Tape Other (See Comments)     REDNESS AND TAKES SKIN OFF. PAPER TAPE OK.     Neosporin [Neomycin-Polymyxin-Gramicidin] Rash     rash       Problem List:    Patient Active Problem List   Diagnosis Code    Anxiety disorder F41.9    Benign essential hypertension I10    Esophageal reflux K21.9    Frequent PVCs I49.3    Hypogonadism male E29.1    Inflammatory arthritis M19.90    Kyphoscoliosis M41.9    Lumbar radiculopathy M54.16    Pulmonary granuloma ([facility]) J84.10    Male erectile disorder N52.9    Sensorineural hearing loss H90.5    Tinnitus of both ears H93.13    Psoriasis L40.9    Arthralgia of right hip M25.551    Hip impingement syndrome M25.859    Varicose vein of leg I83.90    Alcohol dependence in early full remission ([facility]) F10.21    Inflamed skin tag L91.8    Neoplasm of uncertain behavior of skin D48.5    Seborrheic keratosis L82.1    Kyphosis of thoracolumbar region M40.205       Past Medical History:        Diagnosis Date    Arthritis     BACK AND FINGERS     Chronic back pain     dr Aubrey Hernandez [facility]    COPD (chronic obstructive pulmonary disease) ([facility])     pt denies    GERD (gastroesophageal reflux disease)     H/O chest pain     H/O dermatitis     [place] (hard of hearing)     LEFT EAR WORSE THAN RIGHT.  Hypertension     DR Kt Conley PCP    Kyphosis     Kyphosis     Left eye injury     BUNGY CORD STRAP BUSTED AND INJURED LEFT EYE    Lump of skin     Muscle spasm     LOWER RIGHT BACK    Nocturia     Prolonged emergence from general anesthesia     PATIENT STATES HIS B/P WOULD DROP WITH INTUBATION, GO UP AFTER ET TUBE REMOVED.  PVC's (premature ventricular contractions)     PVC'S.  NO CARDIOLOGIST, PCP DR Clarisa Holter    Rash     occas to see rheumatoid arthritis drLiss workup for lupus    Scoliosis     Scratch of forearm     Snores     mild, denies apnea, does \"grind\" his teeth    Use of cane as ambulatory aid     Uses walker     Wears glasses     Wears partial dentures     LOWER       Past Surgical History:        Procedure Laterality Date    BACK SURGERY  2006    cage    COLONOSCOPY      CYST REMOVAL Right     index finger    HEMORRHOID SURGERY  2017    HERNIA REPAIR      UMBILICAL    NOSE SURGERY REALIGNED NOSE    HI COLSC FLX W/REMOVAL LESION BY HOT BX FORCEPS N/A 2017    COLONOSCOPY POLYPECTOMY HOT BIOPSY performed by Loco Davis MD at [address] [place] [address] TOE SURGERY Right     2nd toe    TONSILLECTOMY      AS A CHILD    VASECTOMY         Social History:    Social History     Tobacco Use    Smoking status: Former Smoker     Packs/day: 1.50     Years: 23.00     Pack years: 34.50     Types: Cigarettes     Last attempt to quit: 2006     Years since quittin.1    Smokeless tobacco: Former User     Types: Chew     Quit date: 9/10/2019   Substance Use Topics    Alcohol use: Not Currently     Comment: He states \"a lot\" daily, quit 17                                Counseling given: Not Answered      Vital Signs (Current): There were no vitals filed for this visit.                                            BP Readings from Last 3 Encounters:   20 118/70   20 138/70   10/30/19 119/77       NPO Status:                                                                                 BMI:   Wt Readings from Last 3 Encounters:   20 262 lb (118.8 kg)   20 266 lb (120.7 kg)   20 275 lb 6.4 oz (124.9 kg)     There is no height or weight on file to calculate BMI.    CBC:   Lab Results   Component Value Date    WBC 9.0 2019    RBC 4.48 2019    HGB 13.0 2019    HCT 42.3 2019    MCV 94.4 2019    RDW 12.4 2019     2019       CMP:   Lab Results   Component Value Date     2019    K 4.6 2019    CL 98 2019    CO2 28 2019    BUN 12 2019    CREATININE 0.56 2019    GFRAA >60 2019    LABGLOM >60 2019    GLUCOSE 89 2019    PROT 7.6 02/10/2018    CALCIUM 9.2 2019    BILITOT 0.60 02/10/2018    ALKPHOS 100 02/10/2018    AST 19 02/10/2018    ALT 19 02/10/2018       POC Tests: No results for input(s): POCGLU, POCNA, POCK, POCCL, POCBUN, POCHEMO, POCHCT in the last 72 hours.    Coags: No results found for: PROTIME, INR, APTT    HCG (If Applicable): No results found for: PREGTESTUR, PREGSERUM, HCG, HCGQUANT     ABGs: No results found for: PHART, PO2ART, VHX4ZBY, HSX1QFK, BEART, G7RRFJCD     Type & Screen (If Applicable):  No results found for: LABABO, [address]    Anesthesia Evaluation  Patient summary reviewed and Nursing notes reviewed  Airway: Mallampati: III  TM distance: >3 FB   Neck ROM: full  Mouth opening: > = 3 FB Dental: normal exam         Pulmonary:normal exam    (+) COPD:                             Cardiovascular:    (+) hypertension:,                   Neuro/Psych:   (+) neuromuscular disease:, psychiatric history:            GI/Hepatic/Renal:   (+) GERD:,           Endo/Other:    (+) : arthritis: OA., .                 Abdominal:   (+) obese,         Vascular:                                          Anesthesia Plan      general     ASA 3       Induction: intravenous. Anesthetic plan and risks discussed with patient. Plan discussed with CRNA.                   Nithin Varner MD   2/21/2020

## 2020-02-21 NOTE — H&P
Units by mouth daily      UNABLE TO FIND Take by mouth 2 times daily Prescription tooth paste      amoxicillin (AMOXIL) 500 MG capsule Take 1 capsule by mouth 3 times daily For dental abscess      gabapentin (NEURONTIN) 400 MG capsule Take 1 capsule by mouth 3 times daily for 30 days. 1 tab a day for 3 days then 1 tab BID for 3 days then 1 tab TID (Patient taking differently: Take 300 mg by mouth 3 times daily. Dose being increased to 400 mg but has not started this yet) 90 capsule 2    metoprolol tartrate (LOPRESSOR) 50 MG tablet TAKE 1 TABLET BY MOUTH TWO TIMES A DAY  180 tablet 2    Acetaminophen (TYLENOL ARTHRITIS PAIN PO) Take 325 mg by mouth every 6 hours as needed       glucosamine-chondroitin 500-400 MG tablet Take 1 tablet by mouth daily      meloxicam (MOBIC) 15 MG tablet Take 1 tablet by mouth daily (Patient taking differently: Take 15 mg by mouth nightly ) 90 tablet 3    lisinopril (PRINIVIL;ZESTRIL) 10 MG tablet TAKE 1 TABLET BY MOUTH ONE TIME A DAY  (Patient taking differently: Take 10 mg by mouth daily ) 90 tablet 1    aspirin 81 MG tablet Take 81 mg by mouth 2 times daily       omeprazole (PRILOSEC) 20 MG capsule Take 20 mg by mouth nightly       LORazepam (ATIVAN) 1 MG tablet Take 1 mg by mouth every 6 hours as needed for Anxiety.  tadalafil (CIALIS) 20 MG tablet Take 1/2 tablet PRN 3 tablet 0     Continuous Infusions:  PRN Meds:. Allergies  is allergic to cymbalta [duloxetine hcl]; metronidazole; adhesive tape; and neosporin [neomycin-polymyxin-gramicidin]. Family History    family history includes Coronary Art Dis in his father and paternal uncle; Diabetes in his father and mother; Heart Failure in his paternal uncle; High Blood Pressure in his father; Other in his father.     Family Status   Relation Name Status    Father      PUnc  (Not Specified)    PUnc  (Not Specified)    Mother  Alive         Social History  Social History     Socioeconomic History    Marital lymphadenopathy noted, trachea midline and straight       2+ carotid, no bruit    LUNGS: Chest expands equally bilaterally upon respiration, no accessory muscle used. Ausculation reveals no adventitious breath sounds. CARDIOVASCULAR: \"Heart sounds are normal.  Regular rate and rhythm without murmur,    ABDOMEN: Bowel sounds are present in all four quadrants , obese     GENATALIA:Deferred. NEUROLOGIC: \"CN II-XII are grossly intact. EXTREMITIES: Pitting edema:  No,  Varicose veins: No     Dorsal pedal/posterior tibial pulses palpable: Yes         Strength:  Normal in right  , is right hand dominant , left hand 3/5        Patient Active Problem List   Diagnosis    Anxiety disorder    Benign essential hypertension    Esophageal reflux    Frequent PVCs    Hypogonadism male    Inflammatory arthritis    Kyphoscoliosis    Lumbar radiculopathy    Pulmonary granuloma ([address]    Male erectile disorder    Sensorineural hearing loss    Tinnitus of both ears    Psoriasis    Arthralgia of right hip    Hip impingement syndrome    Varicose vein of leg    Alcohol dependence in early full remission ([address]    Inflamed skin tag    Neoplasm of uncertain behavior of skin    Seborrheic keratosis    Kyphosis of thoracolumbar region               IMPRESSIONS:   1.    kyphosis  2. does not have any pertinent problems on file.     Nathalie [facility]  Electronically signed 2/21/2020 at 7:58 AM       Scheduled for:   MRI cervical and  thoracic  MRI  spine W/O with anesthesia , 1030 CT lung screening with anesthesia

## 2020-02-21 NOTE — ANESTHESIA PRE PROCEDURE
1/2 tablet PRN 7/10/17   Lis Chaney PA-C   aspirin 81 MG tablet Take 81 mg by mouth 2 times daily     Historical Provider, MD   omeprazole (PRILOSEC) 20 MG capsule Take 20 mg by mouth nightly     Historical Provider, MD       Current medications:    Current Outpatient Medications   Medication Sig Dispense Refill    Magnesium Cl-Calcium Carbonate (SLOW-MAG PO) Take 1 tablet by mouth 2 times daily      VITAMIN D PO Take 1,000 Units by mouth daily      UNABLE TO FIND Take by mouth 2 times daily Prescription tooth paste      amoxicillin (AMOXIL) 500 MG capsule Take 1 capsule by mouth 3 times daily For dental abscess      gabapentin (NEURONTIN) 400 MG capsule Take 1 capsule by mouth 3 times daily for 30 days. 1 tab a day for 3 days then 1 tab BID for 3 days then 1 tab TID (Patient taking differently: Take 300 mg by mouth 3 times daily. Dose being increased to 400 mg but has not started this yet) 90 capsule 2    metoprolol tartrate (LOPRESSOR) 50 MG tablet TAKE 1 TABLET BY MOUTH TWO TIMES A DAY  180 tablet 2    LORazepam (ATIVAN) 1 MG tablet Take 1 mg by mouth every 6 hours as needed for Anxiety.  Acetaminophen (TYLENOL ARTHRITIS PAIN PO) Take 325 mg by mouth every 6 hours as needed       glucosamine-chondroitin 500-400 MG tablet Take 1 tablet by mouth daily      meloxicam (MOBIC) 15 MG tablet Take 1 tablet by mouth daily (Patient taking differently: Take 15 mg by mouth nightly ) 90 tablet 3    lisinopril (PRINIVIL;ZESTRIL) 10 MG tablet TAKE 1 TABLET BY MOUTH ONE TIME A DAY  (Patient taking differently: Take 10 mg by mouth daily ) 90 tablet 1    tadalafil (CIALIS) 20 MG tablet Take 1/2 tablet PRN 3 tablet 0    aspirin 81 MG tablet Take 81 mg by mouth 2 times daily       omeprazole (PRILOSEC) 20 MG capsule Take 20 mg by mouth nightly        No current facility-administered medications for this visit.       Facility-Administered Medications Ordered in Other Visits   Medication Dose Route Frequency denies    Dizziness     GERD (gastroesophageal reflux disease)     H/O chest pain     H/O dermatitis     [place]hard [place] hearing[place]     LEFT EAR WORSE THAN RIGHT.  Hypertension     DR Sotero Vela PCP    Kyphosis     Kyphosis     Left eye injury     BUNGY CORD STRAP BUSTED AND INJURED LEFT EYE    Lump of skin     Muscle spasm     LOWER RIGHT BACK    Nocturia     Prolonged emergence from general anesthesia     PATIENT STATES HIS B/P WOULD DROP WITH INTUBATION, GO UP AFTER ET TUBE REMOVED.  PVC's (premature ventricular contractions)     PVC'S. NO CARDIOLOGIST, PCP DR Frank Mar    Rash     occas to see rheumatoid arthritis drLiss workup for lupus    Scoliosis     Scratch of forearm     Snores     mild, denies apnea, does \"grind\" his teeth    Use of cane as ambulatory aid     Uses walker     Wears glasses     Wears partial dentures     LOWER       Past Surgical History:        Procedure Laterality Date    BACK SURGERY  2006    cage    COLONOSCOPY      CYST REMOVAL Right     index finger    HEMORRHOID SURGERY  2017    HERNIA REPAIR      UMBILICAL    NOSE SURGERY      REALIGNED NOSE    OTHER SURGICAL HISTORY  2020    MRI cervical and thoracic spine without contrast . CT of lung    NH COLSC FLX W/REMOVAL LESION BY HOT BX FORCEPS N/A 2017    COLONOSCOPY POLYPECTOMY HOT BIOPSY performed by Macy Coffey MD at [address] [place] [address] TOE SURGERY Right     2nd toe    TONSILLECTOMY      AS A CHILD    VASECTOMY         Social History:    Social History     Tobacco Use    Smoking status: Former Smoker     Packs/day: 1.50     Years: 23.00     Pack years: 34.50     Types: Cigarettes     Last attempt to quit: 2006     Years since quittin.1    Smokeless tobacco: Former User     Types: Chew     Quit date: 9/10/2019   Substance Use Topics    Alcohol use: Not Currently     Comment: denies prior abuse                                Counseling given: Not Answered      Vital Signs (Current):    There were no vitals filed for this visit.                                            BP Readings from Last 3 Encounters:   02/21/20 (!) 88/56   02/21/20 (!) 92/47   02/21/20 98/64       NPO Status:                                                                                 BMI:   Wt Readings from Last 3 Encounters:   02/21/20 262 lb (118.8 kg)   02/19/20 262 lb (118.8 kg)   02/13/20 266 lb (120.7 kg)     There is no height or weight on file to calculate BMI.    CBC:   Lab Results   Component Value Date    WBC 9.0 09/04/2019    RBC 4.48 09/04/2019    HGB 13.0 09/04/2019    HCT 42.3 09/04/2019    MCV 94.4 09/04/2019    RDW 12.4 09/04/2019     09/04/2019       CMP:   Lab Results   Component Value Date     09/24/2019    K 4.6 09/24/2019    CL 98 09/24/2019    CO2 28 09/24/2019    BUN 12 09/24/2019    CREATININE 0.51 02/21/2020    CREATININE 0.56 09/24/2019    GFRAA >60 09/24/2019    LABGLOM >60 02/21/2020    GLUCOSE 89 09/24/2019    PROT 7.6 02/10/2018    CALCIUM 9.2 09/24/2019    BILITOT 0.60 02/10/2018    ALKPHOS 100 02/10/2018    AST 19 02/10/2018    ALT 19 02/10/2018       POC Tests:   Recent Labs     02/21/20  0840   POCGLU 115*   POCK 4.7*       Coags: No results found for: PROTIME, INR, APTT    HCG (If Applicable): No results found for: PREGTESTUR, PREGSERUM, HCG, HCGQUANT     ABGs: No results found for: PHART, PO2ART, RDF2VCD, GQI8JYX, BEART, L2SWGJCA     Type & Screen (If Applicable):  No results found for: LABABO, [address]    Anesthesia Evaluation  Patient summary reviewed and Nursing notes reviewed  Airway: Mallampati: III  TM distance: >3 FB   Neck ROM: full  Mouth opening: > = 3 FB Dental: normal exam         Pulmonary:normal exam    (+) COPD:                             Cardiovascular:    (+) hypertension:,                   Neuro/Psych:   (+) neuromuscular disease:, psychiatric history:            GI/Hepatic/Renal:   (+) GERD:,           Endo/Other:    (+) : arthritis: OA., .                 Abdominal:

## 2020-03-13 ENCOUNTER — HOSPITAL ENCOUNTER (OUTPATIENT)
Dept: CT IMAGING | Age: 55
Discharge: HOME | End: 2020-03-15
Payer: MEDICARE

## 2020-03-13 ENCOUNTER — ANESTHESIA (OUTPATIENT)
Dept: CT IMAGING | Age: 55
End: 2020-03-13

## 2020-03-13 ENCOUNTER — ANESTHESIA EVENT (OUTPATIENT)
Dept: CT IMAGING | Age: 55
End: 2020-03-13

## 2020-03-13 VITALS
HEART RATE: 67 BPM | WEIGHT: 258 LBS | DIASTOLIC BLOOD PRESSURE: 58 MMHG | RESPIRATION RATE: 20 BRPM | TEMPERATURE: 97.7 F | BODY MASS INDEX: 34.95 KG/M2 | OXYGEN SATURATION: 98 % | HEIGHT: 72 IN | SYSTOLIC BLOOD PRESSURE: 118 MMHG

## 2020-03-13 VITALS
SYSTOLIC BLOOD PRESSURE: 112 MMHG | DIASTOLIC BLOOD PRESSURE: 69 MMHG | RESPIRATION RATE: 17 BRPM | OXYGEN SATURATION: 97 %

## 2020-03-13 VITALS
SYSTOLIC BLOOD PRESSURE: 110 MMHG | RESPIRATION RATE: 18 BRPM | DIASTOLIC BLOOD PRESSURE: 71 MMHG | TEMPERATURE: 97.2 F | OXYGEN SATURATION: 96 % | HEART RATE: 64 BPM

## 2020-03-13 PROCEDURE — 3700000000 HC ANESTHESIA ATTENDED CARE

## 2020-03-13 PROCEDURE — 7100000010 HC PHASE II RECOVERY - FIRST 15 MIN

## 2020-03-13 PROCEDURE — 72131 CT LUMBAR SPINE W/O DYE: CPT

## 2020-03-13 PROCEDURE — 6360000002 HC RX W HCPCS: Performed by: NURSE ANESTHETIST, CERTIFIED REGISTERED

## 2020-03-13 PROCEDURE — 2580000003 HC RX 258: Performed by: ANESTHESIOLOGY

## 2020-03-13 PROCEDURE — 3700000001 HC ADD 15 MINUTES (ANESTHESIA)

## 2020-03-13 PROCEDURE — 72128 CT CHEST SPINE W/O DYE: CPT

## 2020-03-13 RX ORDER — SODIUM CHLORIDE, SODIUM LACTATE, POTASSIUM CHLORIDE, CALCIUM CHLORIDE 600; 310; 30; 20 MG/100ML; MG/100ML; MG/100ML; MG/100ML
INJECTION, SOLUTION INTRAVENOUS CONTINUOUS
Status: DISCONTINUED | OUTPATIENT
Start: 2020-03-13 | End: 2020-03-16 | Stop reason: HOSPADM

## 2020-03-13 RX ORDER — PROPOFOL 10 MG/ML
INJECTION, EMULSION INTRAVENOUS PRN
Status: DISCONTINUED | OUTPATIENT
Start: 2020-03-13 | End: 2020-03-13 | Stop reason: SDUPTHER

## 2020-03-13 RX ORDER — FENTANYL CITRATE 50 UG/ML
INJECTION, SOLUTION INTRAMUSCULAR; INTRAVENOUS PRN
Status: DISCONTINUED | OUTPATIENT
Start: 2020-03-13 | End: 2020-03-13 | Stop reason: SDUPTHER

## 2020-03-13 RX ORDER — M-VIT,TX,IRON,MINS/CALC/FOLIC 27MG-0.4MG
1 TABLET ORAL DAILY
COMMUNITY

## 2020-03-13 RX ORDER — MIDAZOLAM HYDROCHLORIDE 1 MG/ML
INJECTION INTRAMUSCULAR; INTRAVENOUS PRN
Status: DISCONTINUED | OUTPATIENT
Start: 2020-03-13 | End: 2020-03-13 | Stop reason: SDUPTHER

## 2020-03-13 RX ADMIN — PROPOFOL 20 MG: 10 INJECTION, EMULSION INTRAVENOUS at 12:09

## 2020-03-13 RX ADMIN — FENTANYL CITRATE 50 MCG: 50 INJECTION INTRAMUSCULAR; INTRAVENOUS at 12:04

## 2020-03-13 RX ADMIN — FENTANYL CITRATE 50 MCG: 50 INJECTION INTRAMUSCULAR; INTRAVENOUS at 11:56

## 2020-03-13 RX ADMIN — PROPOFOL 20 MG: 10 INJECTION, EMULSION INTRAVENOUS at 12:04

## 2020-03-13 RX ADMIN — SODIUM CHLORIDE, POTASSIUM CHLORIDE, SODIUM LACTATE AND CALCIUM CHLORIDE: 600; 310; 30; 20 INJECTION, SOLUTION INTRAVENOUS at 07:57

## 2020-03-13 RX ADMIN — SODIUM CHLORIDE, POTASSIUM CHLORIDE, SODIUM LACTATE AND CALCIUM CHLORIDE: 600; 310; 30; 20 INJECTION, SOLUTION INTRAVENOUS at 10:32

## 2020-03-13 RX ADMIN — PROPOFOL 10 MG: 10 INJECTION, EMULSION INTRAVENOUS at 12:11

## 2020-03-13 RX ADMIN — FENTANYL CITRATE 50 MCG: 50 INJECTION INTRAMUSCULAR; INTRAVENOUS at 12:13

## 2020-03-13 RX ADMIN — PROPOFOL 20 MG: 10 INJECTION, EMULSION INTRAVENOUS at 12:07

## 2020-03-13 RX ADMIN — MIDAZOLAM HYDROCHLORIDE 2 MG: 1 INJECTION, SOLUTION INTRAMUSCULAR; INTRAVENOUS at 11:56

## 2020-03-13 ASSESSMENT — PAIN SCALES - GENERAL
PAINLEVEL_OUTOF10: 0
PAINLEVEL_OUTOF10: 0

## 2020-03-13 ASSESSMENT — PAIN - FUNCTIONAL ASSESSMENT: PAIN_FUNCTIONAL_ASSESSMENT: 0-10

## 2020-03-13 NOTE — ANESTHESIA PRE PROCEDURE
Department of Anesthesiology  Preprocedure Note       Name:  Gutierrez Marte   Age:  53 y.o.  :  1965                                          MRN:  [id number]         Date:  3/13/2020      Surgeon: * No surgeons listed *    Procedure: CT LUMBAR SPINE WO CONTRAST    Medications prior to admission:   Prior to Admission medications    Medication Sig Start Date End Date Taking? Authorizing Provider   Multiple Vitamins-Minerals (THERAPEUTIC MULTIVITAMIN-MINERALS) tablet Take 1 tablet by mouth daily   Yes Historical Provider, MD   gabapentin (NEURONTIN) 400 MG capsule Take 400 mg by mouth 3 times daily. Yes Historical Provider, MD   lisinopril (PRINIVIL;ZESTRIL) 10 MG tablet Take 10 mg by mouth daily   Yes Historical Provider, MD   Sod Fluoride-Potassium Nitrate (PREVIDENT 5000 SENSITIVE) 1.1-5 % PSTE Place onto teeth   Yes Historical Provider, MD   Magnesium Cl-Calcium Carbonate (SLOW-MAG PO) Take 1 tablet by mouth 2 times daily   Yes Historical Provider, MD   VITAMIN D PO Take 1,000 Units by mouth daily   Yes Historical Provider, MD   metoprolol tartrate (LOPRESSOR) 50 MG tablet TAKE 1 TABLET BY MOUTH TWO TIMES A DAY  19  Yes Millie Joseph MD   Acetaminophen (TYLENOL ARTHRITIS PAIN PO) Take 325 mg by mouth every 6 hours as needed    Yes Historical Provider, MD   glucosamine-chondroitin 500-400 MG tablet Take 1 tablet by mouth daily   Yes Historical Provider, MD   meloxicam (MOBIC) 15 MG tablet Take 1 tablet by mouth daily  Patient taking differently: Take 15 mg by mouth nightly  9/3/19  Yes Millie Joseph MD   aspirin 81 MG tablet Take 81 mg by mouth 2 times daily    Yes Historical Provider, MD   omeprazole (PRILOSEC) 20 MG capsule Take 20 mg by mouth nightly    Yes Historical Provider, MD   LORazepam (ATIVAN) 1 MG tablet Take 1 mg by mouth every 6 hours as needed for Anxiety.     Historical Provider, MD   tadalafil (CIALIS) 20 MG tablet Take 1/2 tablet PRN 7/10/17   Evelina Payan essential hypertension I10    Esophageal reflux K21.9    Frequent PVCs I49.3    Hypogonadism male E29.1    Inflammatory arthritis M19.90    Kyphoscoliosis M41.9    Lumbar radiculopathy M54.16    Pulmonary granuloma ([facility]) J84.10    Male erectile disorder N52.9    Sensorineural hearing loss H90.5    Tinnitus of both ears H93.13    Psoriasis L40.9    Arthralgia of right hip M25.551    Hip impingement syndrome M25.859    Varicose vein of leg I83.90    Alcohol dependence in early full remission ([facility]) F10.21    Inflamed skin tag L91.8    Neoplasm of uncertain behavior of skin D48.5    Seborrheic keratosis L82.1    Kyphosis of thoracolumbar region M40.205       Past Medical History:        Diagnosis Date    Arthritis     BACK AND FINGERS     Chronic back pain     dr Cynthia Sanchez of - SPINAL SPECIALIST    Dizziness     GERD (gastroesophageal reflux disease)     H/O chest pain     H/O dermatitis     [place] (hard of hearing)     LEFT EAR WORSE THAN RIGHT.  Hypertension     DR Ole Chadwick PCP    Kyphosis     Left eye injury     BUNGY CORD STRAP BUSTED AND INJURED LEFT EYE    Muscle spasm     LOWER RIGHT BACK    Nocturia     Prolonged emergence from general anesthesia     PATIENT STATES HIS B/P WOULD DROP WITH INTUBATION, GO UP AFTER ET TUBE REMOVED.  PVC's (premature ventricular contractions)     PVC'S.  NO CARDIOLOGIST, PCP DR Myles Joyce    Rash     occas to see rheumatoid arthritis  workup for lupus    Snores     mild, denies apnea, does \"grind\" his teeth    Use of cane as ambulatory aid     Uses walker     Wears glasses     Wears partial dentures     LOWER       Past Surgical History:        Procedure Laterality Date    BACK SURGERY  2006    cage    COLONOSCOPY      CYST REMOVAL Right     index finger    HEMORRHOID SURGERY  2017    HERNIA REPAIR      UMBILICAL    NOSE SURGERY      REALIGNED NOSE    OTHER SURGICAL HISTORY  02/21/2020    MRI cervical and thoracic spine without >60 09/24/2019    LABGLOM >60 02/21/2020    GLUCOSE 89 09/24/2019    PROT 7.6 02/10/2018    CALCIUM 9.2 09/24/2019    BILITOT 0.60 02/10/2018    ALKPHOS 100 02/10/2018    AST 19 02/10/2018    ALT 19 02/10/2018       POC Tests: No results for input(s): POCGLU, POCNA, POCK, POCCL, POCBUN, POCHEMO, POCHCT in the last 72 hours. Coags: No results found for: PROTIME, INR, APTT    HCG (If Applicable): No results found for: PREGTESTUR, PREGSERUM, HCG, HCGQUANT     ABGs: No results found for: PHART, PO2ART, HMS5QNN, UBI7IZI, BEART, P3FTKXDH     Type & Screen (If Applicable):  No results found for: LABABO, [address]    Anesthesia Evaluation  Patient summary reviewed and Nursing notes reviewed no history of anesthetic complications:   Airway: Mallampati: II  TM distance: >3 FB   Neck ROM: full  Mouth opening: > = 3 FB Dental:    (+) partials and lower dentures      Pulmonary:Negative Pulmonary ROS and normal exam                              ROS comment: Lung Granuloma   Cardiovascular:    (+) hypertension: no interval change,       ECG reviewed  Rhythm: regular  Rate: normal           Beta Blocker:  Not on Beta Blocker         Neuro/Psych:   (+) neuromuscular disease:, psychiatric history:depression/anxiety             GI/Hepatic/Renal:   (+) GERD: no interval change,           Endo/Other:    (+) : arthritis:., .                 Abdominal:           Vascular:                                      Anesthesia Plan      MAC     ASA 3       Induction: intravenous. Anesthetic plan and risks discussed with patient and spouse. Plan discussed with CRNA.                 Erick Diaz MD   3/13/2020

## 2020-03-13 NOTE — ANESTHESIA POSTPROCEDURE EVALUATION
Department of Anesthesiology  Postprocedure Note    Patient: Layne Caro  MRN: [id number]  YOB: 1965  Date of evaluation: 3/13/2020  Time:  12:35 PM     Procedure Summary     Date:  03/13/20 Room / Location:  [address] [place] [address] CT Scan    Anesthesia Start:  7872 Anesthesia Stop:  1224    Procedure:  CT LUMBAR SPINE WO CONTRAST Diagnosis:       Scoliosis, unspecified scoliosis type, unspecified spinal region      Muscle spasm      Pain in both lower extremities      History of lumbar fusion    Scheduled Providers:   Responsible Provider:  Tresa Elliott MD    Anesthesia Type:  general, MAC ASA Status:  3          Anesthesia Type: general, MAC    Marci Phase I:      Marci Phase II:      Last vitals: Reviewed and per EMR flowsheets.        Anesthesia Post Evaluation    Patient location during evaluation: PACU  Patient participation: complete - patient participated  Level of consciousness: awake and alert  Pain score: 0  Airway patency: patent  Nausea & Vomiting: no vomiting and no nausea  Complications: no  Cardiovascular status: hemodynamically stable  Respiratory status: acceptable  Hydration status: stable

## 2020-03-13 NOTE — H&P
history that includes Colonoscopy; Hemorrhoid surgery (2017); Nose surgery; Vasectomy; Toe Surgery (Right); cyst removal (Right); pr colsc flx w/removal lesion by hot bx forceps (N/A, 7/12/2017); back surgery (2006); hernia repair; and Tonsillectomy.         Medications   Scheduled Meds:  Current Outpatient Rx          Current Outpatient Rx   Medication Sig Dispense Refill    Magnesium Cl-Calcium Carbonate (SLOW-MAG PO) Take 1 tablet by mouth 2 times daily        VITAMIN D PO Take 1,000 Units by mouth daily        UNABLE TO FIND Take by mouth 2 times daily Prescription tooth paste        amoxicillin (AMOXIL) 500 MG capsule Take 1 capsule by mouth 3 times daily For dental abscess        gabapentin (NEURONTIN) 400 MG capsule Take 1 capsule by mouth 3 times daily for 30 days. 1 tab a day for 3 days then 1 tab BID for 3 days then 1 tab TID (Patient taking differently: Take 300 mg by mouth 3 times daily. Dose being increased to 400 mg but has not started this yet) 90 capsule 2    metoprolol tartrate (LOPRESSOR) 50 MG tablet TAKE 1 TABLET BY MOUTH TWO TIMES A DAY  180 tablet 2    Acetaminophen (TYLENOL ARTHRITIS PAIN PO) Take 325 mg by mouth every 6 hours as needed         glucosamine-chondroitin 500-400 MG tablet Take 1 tablet by mouth daily        meloxicam (MOBIC) 15 MG tablet Take 1 tablet by mouth daily (Patient taking differently: Take 15 mg by mouth nightly ) 90 tablet 3    lisinopril (PRINIVIL;ZESTRIL) 10 MG tablet TAKE 1 TABLET BY MOUTH ONE TIME A DAY  (Patient taking differently: Take 10 mg by mouth daily ) 90 tablet 1    aspirin 81 MG tablet Take 81 mg by mouth 2 times daily         omeprazole (PRILOSEC) 20 MG capsule Take 20 mg by mouth nightly         LORazepam (ATIVAN) 1 MG tablet Take 1 mg by mouth every 6 hours as needed for Anxiety.        tadalafil (CIALIS) 20 MG tablet Take 1/2 tablet PRN 3 tablet 0         Continuous Infusions:  PRN Meds:.   Allergies  is allergic to cymbalta [duloxetine   Physically abused: Not on file       Forced sexual activity: Not on file   Other Topics Concern    Not on file   Social History Narrative    Not on file             Service:No           Hobbies:    Use to go camping       OBJECTIVE:   VITALS:  vitals were not taken for this visit.      CONSTITUTIONAL: Alert and oriented times 3, no acute distress and cooperative to examination. friendly and pleasant , robust stocky muscular  build .     SKIN: rash No, tattoos      HEENT: Head is normocephalic, atraumatic. EOMI, PERRLA     Oral air way :slightly narrow Yes     NECK: neck supple, no lymphadenopathy noted, trachea midline and straight       2+ carotid, no bruit     LUNGS: Chest expands equally bilaterally upon respiration, no accessory muscle used. Ausculation reveals no adventitious breath sounds.     CARDIOVASCULAR: \"Heart sounds are normal.  Regular rate and rhythm without murmur,     ABDOMEN: Bowel sounds are present in all four quadrants , obese      GENATALIA:Deferred.     NEUROLOGIC: \"CN II-XII are grossly intact.         EXTREMITIES: Pitting edema:  No,  Varicose veins: No      Dorsal pedal/posterior tibial pulses palpable:  Yes            Strength:  Normal in right  , is right hand dominant , left hand 3/5              Patient Active Problem List   Diagnosis    Anxiety disorder    Benign essential hypertension    Esophageal reflux    Frequent PVCs    Hypogonadism male    Inflammatory arthritis    Kyphoscoliosis    Lumbar radiculopathy    Pulmonary granuloma ([address]    Male erectile disorder    Sensorineural hearing loss    Tinnitus of both ears    Psoriasis    Arthralgia of right hip    Hip impingement syndrome    Varicose vein of leg    Alcohol dependence in early full remission ([address]    Inflamed skin tag    Neoplasm of uncertain behavior of skin    Seborrheic keratosis    Kyphosis of thoracolumbar region                   IMPRESSIONS:   1.    kyphosis  2. does not have any pertinent problems on file.     Radha Heads [facility]  Electronically signed 2/21/2020 at 7:58 AM        Scheduled for:   MRI cervical and  thoracic  MRI  spine W/O with anesthesia , 1030 CT lung screening with anesthesia

## 2020-04-13 ENCOUNTER — TELEMEDICINE (OUTPATIENT)
Dept: PRIMARY CARE CLINIC | Age: 55
End: 2020-04-13
Payer: MEDICARE

## 2020-04-13 VITALS
DIASTOLIC BLOOD PRESSURE: 98 MMHG | WEIGHT: 262 LBS | SYSTOLIC BLOOD PRESSURE: 150 MMHG | BODY MASS INDEX: 35.49 KG/M2 | HEIGHT: 72 IN | HEART RATE: 77 BPM

## 2020-04-13 PROBLEM — L30.9 ECZEMA: Status: ACTIVE | Noted: 2020-04-13

## 2020-04-13 PROCEDURE — 99214 OFFICE O/P EST MOD 30 MIN: CPT | Performed by: PHYSICIAN ASSISTANT

## 2020-04-13 RX ORDER — CYCLOBENZAPRINE HCL 10 MG
10 TABLET ORAL NIGHTLY PRN
Qty: 30 TABLET | Refills: 1 | Status: SHIPPED | OUTPATIENT
Start: 2020-04-13 | End: 2020-04-23

## 2020-04-13 ASSESSMENT — ENCOUNTER SYMPTOMS
ABDOMINAL PAIN: 0
BACK PAIN: 1
GASTROINTESTINAL NEGATIVE: 1

## 2020-04-13 NOTE — PROGRESS NOTES
[facility] PRIMARY [facility]ARE  [address] [facility] [address]  Dept: [phone]    Jeronimo Hendricks is a 53 y.o. male who presents today for his medical conditions/complaintsas noted below. Chief Complaint   Patient presents with    Medication Check     patient taking gabapentin 300mg 3 x a day, pt states not working   [employer] Other     pt said he was referred to [facility] and has lab work done to rule out lupus, patient said he has not heard anything back. HPI:     HPI  Rash: red and itching in the elbow creases and behind knees. Hx of eczema per patient. Also using Cetaphil     Gabapenitn: Right hip and spine DDD. Takes 400mg 1 po tid--not helping. Having surgery July 20 at [facility] for thoracic back issues/neck    Pain wakes him every 2 hours with a grabbing pain in the right hip. Does take Mobic as well as the gabapentin. The feeling like his hip is out of joint occurs first and then muscle spasm. No N/T into groin or legs. NO loss of bowel or bladder. Forgot meds last night--BP high. NO CP, SOB, dizziness or syncope  LDL Cholesterol (mg/dL)   Date Value   02/10/2018 94   07/23/2017 98       (goal LDL is <100)   AST (U/L)   Date Value   02/10/2018 19     ALT (U/L)   Date Value   02/10/2018 19     BUN (mg/dL)   Date Value   09/24/2019 12     BP Readings from Last 3 Encounters:   04/13/20 (!) 151/85   04/13/20 (!) 151/85   03/13/20 110/71          (goal 120/80)    Past Medical History:   Diagnosis Date    Arthritis     BACK AND FINGERS     Chronic back pain     dr Annabel Morales of [facility] SPINAL SPECIAL[facility]    Dizziness     GERD (gastroesophageal reflux disease)     H/O chest pain     H/O dermatitis     [place] (hard of hearing[place]     LEFT EAR WORSE THAN RIGHT.     Hypertension     DR Edgar Mood PCP    Kyphosis     Left eye injury     BUNGY CORD STRAP BUSTED AND INJURED LEFT EYE    Muscle spasm     LOWER RIGHT BACK    Nocturia     Prolonged emergence from nightly as needed for Muscle spasms 30 tablet 1    Multiple Vitamins-Minerals (THERAPEUTIC MULTIVITAMIN-MINERALS) tablet Take 1 tablet by mouth daily      gabapentin (NEURONTIN) 400 MG capsule Take 400 mg by mouth 3 times daily.  lisinopril (PRINIVIL;ZESTRIL) 10 MG tablet Take 10 mg by mouth daily      Sod Fluoride-Potassium Nitrate (PREVIDENT 5000 SENSITIVE) 1.1-5 % PSTE Place onto teeth      Magnesium Cl-Calcium Carbonate (SLOW-MAG PO) Take 1 tablet by mouth 2 times daily      VITAMIN D PO Take 1,000 Units by mouth daily      metoprolol tartrate (LOPRESSOR) 50 MG tablet TAKE 1 TABLET BY MOUTH TWO TIMES A DAY  180 tablet 2    LORazepam (ATIVAN) 1 MG tablet Take 1 mg by mouth every 6 hours as needed for Anxiety.  Acetaminophen (TYLENOL ARTHRITIS PAIN PO) Take 325 mg by mouth every 6 hours as needed       glucosamine-chondroitin 500-400 MG tablet Take 1 tablet by mouth daily      meloxicam (MOBIC) 15 MG tablet Take 1 tablet by mouth daily (Patient taking differently: Take 15 mg by mouth nightly ) 90 tablet 3    aspirin 81 MG tablet Take 81 mg by mouth 2 times daily       omeprazole (PRILOSEC) 20 MG capsule Take 20 mg by mouth nightly        No current facility-administered medications for this visit.       Allergies   Allergen Reactions    Metronidazole Other (See Comments)     \" caused a prickly feeling in my legs \"    Adhesive Tape Other (See Comments)     opsite and paper tape ok, bandaid ok    Cymbalta [Duloxetine Hcl] Other (See Comments)     Unknown reaction    Neosporin [Neomycin-Polymyxin-Gramicidin] Rash       Health Maintenance   Topic Date Due    HIV screen  10/24/1979    Shingles Vaccine (2 of 3) 06/01/2017    Annual Wellness Visit (AWV)  05/29/2019    Flu vaccine (Season Ended) 09/01/2020    Potassium monitoring  02/21/2021    Creatinine monitoring  02/21/2021    Low dose CT lung screening  02/21/2021    DTaP/Tdap/Td vaccine (2 - Td) 01/02/2023    Lipid screen  02/10/2023

## 2020-04-30 ENCOUNTER — TELEPHONE (OUTPATIENT)
Dept: PRIMARY CARE CLINIC | Age: 55
End: 2020-04-30

## 2020-05-01 ENCOUNTER — TELEPHONE (OUTPATIENT)
Dept: PRIMARY CARE CLINIC | Age: 55
End: 2020-05-01

## 2020-05-01 RX ORDER — GABAPENTIN 100 MG/1
100 CAPSULE ORAL 3 TIMES DAILY
Qty: 90 CAPSULE | Refills: 0 | Status: SHIPPED | OUTPATIENT
Start: 2020-05-01 | End: 2020-05-22

## 2020-05-01 RX ORDER — CELECOXIB 200 MG/1
200 CAPSULE ORAL 2 TIMES DAILY
Qty: 60 CAPSULE | Refills: 0 | Status: SHIPPED | OUTPATIENT
Start: 2020-05-01 | End: 2020-05-22

## 2020-05-04 ENCOUNTER — TELEPHONE (OUTPATIENT)
Dept: PRIMARY CARE CLINIC | Age: 55
End: 2020-05-04

## 2020-05-20 ENCOUNTER — OFFICE VISIT (OUTPATIENT)
Dept: PRIMARY CARE CLINIC | Age: 55
End: 2020-05-20
Payer: MEDICARE

## 2020-05-20 ENCOUNTER — HOSPITAL ENCOUNTER (OUTPATIENT)
Dept: GENERAL RADIOLOGY | Age: 55
Discharge: HOME | End: 2020-05-22
Payer: MEDICARE

## 2020-05-20 ENCOUNTER — HOSPITAL ENCOUNTER (OUTPATIENT)
Age: 55
Discharge: HOME | End: 2020-05-22
Payer: MEDICARE

## 2020-05-20 VITALS
SYSTOLIC BLOOD PRESSURE: 130 MMHG | OXYGEN SATURATION: 97 % | WEIGHT: 264.8 LBS | HEART RATE: 71 BPM | HEIGHT: 72 IN | TEMPERATURE: 98.1 F | BODY MASS INDEX: 35.87 KG/M2 | DIASTOLIC BLOOD PRESSURE: 76 MMHG

## 2020-05-20 PROBLEM — M35.00 SJOGREN'S SYNDROME (HCC): Status: ACTIVE | Noted: 2020-05-20

## 2020-05-20 LAB
BUN BLDV-MCNC: NORMAL MG/DL
CALCIUM SERPL-MCNC: NORMAL MG/DL
CHLORIDE BLD-SCNC: NORMAL MMOL/L
CO2: NORMAL
CREAT SERPL-MCNC: NORMAL MG/DL
GFR CALCULATED: NORMAL
GLUCOSE BLD-MCNC: NORMAL MG/DL
POTASSIUM SERPL-SCNC: NORMAL MMOL/L
PROSTATE SPECIFIC ANTIGEN: NORMAL
SODIUM BLD-SCNC: NORMAL MMOL/L

## 2020-05-20 PROCEDURE — 73502 X-RAY EXAM HIP UNI 2-3 VIEWS: CPT

## 2020-05-20 PROCEDURE — G0439 PPPS, SUBSEQ VISIT: HCPCS | Performed by: FAMILY MEDICINE

## 2020-05-20 RX ORDER — HYDROXYCHLOROQUINE SULFATE 200 MG/1
200 TABLET, FILM COATED ORAL 2 TIMES DAILY
COMMUNITY
Start: 2020-05-14 | End: 2020-12-23

## 2020-05-20 ASSESSMENT — PATIENT HEALTH QUESTIONNAIRE - PHQ9
SUM OF ALL RESPONSES TO PHQ QUESTIONS 1-9: 2
SUM OF ALL RESPONSES TO PHQ QUESTIONS 1-9: 2

## 2020-05-22 RX ORDER — GABAPENTIN 100 MG/1
CAPSULE ORAL
Qty: 90 CAPSULE | Refills: 5 | Status: SHIPPED | OUTPATIENT
Start: 2020-05-22 | End: 2020-12-07

## 2020-05-22 RX ORDER — CELECOXIB 200 MG/1
CAPSULE ORAL
Qty: 60 CAPSULE | Refills: 0 | Status: SHIPPED | OUTPATIENT
Start: 2020-05-22 | End: 2020-06-22

## 2020-05-26 RX ORDER — GABAPENTIN 300 MG/1
CAPSULE ORAL
Qty: 90 CAPSULE | Refills: 5 | Status: SHIPPED | OUTPATIENT
Start: 2020-05-26 | End: 2020-11-23

## 2020-05-26 NOTE — TELEPHONE ENCOUNTER
Pt is asking about the 300 mg gabapentin, he takes 400 mg TID.  The rx filled on 5/22 was for 100 mg

## 2020-06-02 ENCOUNTER — OFFICE VISIT (OUTPATIENT)
Dept: ORTHOPEDIC SURGERY | Age: 55
End: 2020-06-02
Payer: MEDICARE

## 2020-06-02 PROBLEM — M70.61 TROCHANTERIC BURSITIS, RIGHT HIP: Status: ACTIVE | Noted: 2020-06-02

## 2020-06-02 PROBLEM — M48.062 SPINAL STENOSIS OF LUMBAR REGION WITH NEUROGENIC CLAUDICATION: Status: ACTIVE | Noted: 2020-06-02

## 2020-06-02 PROBLEM — M45.4 ANKYLOSING SPONDYLITIS OF THORACIC REGION (HCC): Status: ACTIVE | Noted: 2020-06-02

## 2020-06-02 PROCEDURE — 20610 DRAIN/INJ JOINT/BURSA W/O US: CPT | Performed by: ORTHOPAEDIC SURGERY

## 2020-06-02 PROCEDURE — 99203 OFFICE O/P NEW LOW 30 MIN: CPT | Performed by: ORTHOPAEDIC SURGERY

## 2020-06-02 RX ORDER — BUPIVACAINE HYDROCHLORIDE 5 MG/ML
2 INJECTION, SOLUTION PERINEURAL ONCE
Status: COMPLETED | OUTPATIENT
Start: 2020-06-02 | End: 2020-06-02

## 2020-06-02 RX ORDER — LIDOCAINE HYDROCHLORIDE 10 MG/ML
2 INJECTION, SOLUTION EPIDURAL; INFILTRATION; INTRACAUDAL; PERINEURAL ONCE
Status: COMPLETED | OUTPATIENT
Start: 2020-06-02 | End: 2020-06-02

## 2020-06-02 RX ORDER — BETAMETHASONE SODIUM PHOSPHATE AND BETAMETHASONE ACETATE 3; 3 MG/ML; MG/ML
12 INJECTION, SUSPENSION INTRA-ARTICULAR; INTRALESIONAL; INTRAMUSCULAR; SOFT TISSUE ONCE
Status: COMPLETED | OUTPATIENT
Start: 2020-06-02 | End: 2020-06-02

## 2020-06-02 RX ADMIN — BUPIVACAINE HYDROCHLORIDE 10 MG: 5 INJECTION, SOLUTION PERINEURAL at 11:03

## 2020-06-02 RX ADMIN — BETAMETHASONE SODIUM PHOSPHATE AND BETAMETHASONE ACETATE 12 MG: 3; 3 INJECTION, SUSPENSION INTRA-ARTICULAR; INTRALESIONAL; INTRAMUSCULAR; SOFT TISSUE at 11:03

## 2020-06-02 RX ADMIN — LIDOCAINE HYDROCHLORIDE 2 ML: 10 INJECTION, SOLUTION EPIDURAL; INFILTRATION; INTRACAUDAL; PERINEURAL at 11:04

## 2020-06-02 NOTE — PROGRESS NOTES
Multiple Vitamins-Minerals (THERAPEUTIC MULTIVITAMIN-MINERALS) tablet, Take 1 tablet by mouth daily, Disp: , Rfl:     lisinopril (PRINIVIL;ZESTRIL) 10 MG tablet, Take 10 mg by mouth daily, Disp: , Rfl:     Sod Fluoride-Potassium Nitrate (PREVIDENT 5000 SENSITIVE) 1.1-5 % PSTE, Place onto teeth, Disp: , Rfl:     Magnesium Cl-Calcium Carbonate (SLOW-MAG PO), Take 1 tablet by mouth 2 times daily, Disp: , Rfl:     VITAMIN D PO, Take 1,000 Units by mouth daily, Disp: , Rfl:     metoprolol tartrate (LOPRESSOR) 50 MG tablet, TAKE 1 TABLET BY MOUTH TWO TIMES A DAY , Disp: 180 tablet, Rfl: 2    LORazepam (ATIVAN) 1 MG tablet, Take 1 mg by mouth every 6 hours as needed for Anxiety. , Disp: , Rfl:     Acetaminophen (TYLENOL ARTHRITIS PAIN PO), Take 325 mg by mouth every 6 hours as needed , Disp: , Rfl:     glucosamine-chondroitin 500-400 MG tablet, Take 1 tablet by mouth daily, Disp: , Rfl:     aspirin 81 MG tablet, Take 81 mg by mouth daily , Disp: , Rfl:     omeprazole (PRILOSEC) 20 MG capsule, Take 20 mg by mouth nightly , Disp: , Rfl:   Allergies   Allergen Reactions    Metronidazole Other (See Comments)     \" caused a prickly feeling in my legs \"    Adhesive Tape Other (See Comments)     opsite and paper tape ok, bandaid ok    Cymbalta [Duloxetine Hcl] Nausea And Vomiting    Neosporin [Neomycin-Polymyxin-Gramicidin] Rash     Social History     Socioeconomic History    Marital status:      Spouse name: Not on file    Number of children: Not on file    Years of education: Not on file    Highest education level: Not on file   Occupational History    Not on file   Social Needs    Financial resource strain: Not on file    Food insecurity     Worry: Not on file     Inability: Not on file   [employer] needs     Medical: Not on file     Non-medical: Not on file   Tobacco Use    Smoking status: Former Smoker     Packs/day: 1.50     Years: 23.00     Pack years: 34.50     Types: Cigarettes     Last attempt to quit: 2006     Years since quittin.4    Smokeless tobacco: Former User     Types: Chew     Quit date: 9/10/2019   Substance and Sexual Activity    Alcohol use: Not Currently     Comment: denies prior abuse    Drug use: No    Sexual activity: Not on file   Lifestyle    Physical activity     Days per week: Not on file     Minutes per session: Not on file    Stress: Not on file   Relationships    Social connections     Talks on phone: Not on file     Gets together: Not on file     Attends [employer] service: Not on file     Active member of club or organization: Not on file     Attends meetings of clubs or organizations: Not on file     Relationship status: Not on file    Intimate partner violence     Fear of current or ex partner: Not on file     Emotionally abused: Not on file     Physically abused: Not on file     Forced sexual activity: Not on file   Other Topics Concern    Not on file   Social History Narrative    Not on file     Past Medical History:   Diagnosis Date    Arthritis     BACK AND FINGERS     Chronic back pain     [facility]    Dizziness     GERD (gastroesophageal reflux disease)     H/O chest pain     H/O dermatitis     [place] (hard of hearing)     LEFT EAR WORSE THAN RIGHT.  Hypertension     DR Lorie Adams PCP    Kyphosis     Left eye injury     BUNGY CORD STRAP BUSTED AND INJURED LEFT EYE    Muscle spasm     LOWER RIGHT BACK    Nocturia     Prolonged emergence from general anesthesia     PATIENT STATES HIS B/P WOULD DROP WITH INTUBATION, GO UP AFTER ET TUBE REMOVED.  PVC's (premature ventricular contractions)     PVC'S.  NO CARDIOLOGIST, PCP DR Joe topete to see rheumatoid arthritis  workup for lupus    Snores     mild, denies apnea, does \"grind\" his teeth    Use of cane as ambulatory aid     Uses walker     Wears glasses     Wears partial dentures     LOWER     Past Surgical History:   Procedure Laterality Date    BACK SURGERY  2006    cage    COLONOSCOPY      CYST REMOVAL Right     index finger    HEMORRHOID SURGERY  2017    HERNIA REPAIR      UMBILICAL    NOSE SURGERY      REALIGNED NOSE    OTHER SURGICAL HISTORY  2020    MRI cervical and thoracic spine without contrast . CT of lung , all under general anesthesia    MS COLSC FLX W/REMOVAL LESION BY HOT BX FORCEPS N/A 2017    COLONOSCOPY POLYPECTOMY HOT BIOPSY performed by Hyacinth Leyden, MD at [address] TOE SURGERY Right     2nd toe    TONSILLECTOMY      AS A CHILD    VASECTOMY       Family History   Problem Relation Age of Onset    Coronary Art Dis Father         premature-- at 52    Diabetes Father     High Blood Pressure Father     Other Father         COPD, EMPHYSEMA    Coronary Art Dis Paternal Uncle         premature    Heart Failure Paternal Uncle     Diabetes Mother         Physical Exam:  Constitutional: Patient is oriented to person, place, and time. Patient appears well-developed and well nourished. HENT: Negative otherwise noted  Head: Normocephalic and Atraumatic  Nose: Normal  Eyes: Conjunctivae and EOM are normal  Neck: Normal range of motion Neck supple. Respiratory/Cardio: Effort normal. No respiratory distress. Musculoskeletal:  He does have tenderness over the right greater trochanter. No pain with motion of right hip. Neurological: Patient is alert and oriented to person, place, and time. Normal strenght. No sensory deficit. Skin: Skin is warm and dry  Psychiatric: Behavior is normal. Thought content normal.  Nursing note and vitals reviewed. Labs and Imaging:     None taken today. Reviewed previous XR from 20 that showed mild degenerative changes. Reviewed CT of thoracic/lumbar spine from 2020. Imaging reviewed from in the system.     Right hip a little varus but otherwise largely normal.    Hip range of motion normal without aggravation of pain    Patient exquisitely point tender Site  Hip Right Administered By  Charlie Hicks    Ordering Provider:  Glen Flores MD    NDC:  8455-2346-68    Lot#:  [id number]    :  HOSPIRA    Patient Supplied?:  No          lidocaine PF 1 % injection 2 mL     Admin Date  06/02/2020  11:04 Action  Given Dose  2 mL Route  Intra-articular Site  Hip Right Administered By  Charlie Hicks    Ordering Provider:  Glen Flores MD    NDC:  0224-7506-96    Lot#:  [id number]    :  MYRBJ    Patient Supplied?:  No              Provider Attestation:  Savanna Hanson, personally performed the services described in this documentation. All medical record entries made by the scribe were at my direction and in my presence. I have reviewed the chart and discharge instructions and agree that the records reflect my personal performance and is accurate and complete. Harlo Romp Soyla Peabody, MD 06/02/20       Scribe Attestation:  By signing my name below, Dinh Sepulveda, attest that this documentation has been prepared under the direction and in the presence of Dr. Sarai Espinoza. Electronically signed: Paty Desir, 6/2/20     Please note that this chart was generated using voice recognition Dragon dictation software. Although every effort was made to ensure the accuracy of this automated transcription, some errors in transcription may have occurred.

## 2020-06-03 ENCOUNTER — TELEPHONE (OUTPATIENT)
Dept: PRIMARY CARE CLINIC | Age: 55
End: 2020-06-03

## 2020-06-05 PROBLEM — M47.817 LUMBOSACRAL SPONDYLOSIS WITHOUT MYELOPATHY: Status: ACTIVE | Noted: 2020-06-05

## 2020-06-05 ASSESSMENT — ENCOUNTER SYMPTOMS
NAUSEA: 0
CONSTIPATION: 0
BLOOD IN STOOL: 0
BACK PAIN: 1
VOMITING: 0
SHORTNESS OF BREATH: 1
EYE DISCHARGE: 0
APNEA: 0
ANAL BLEEDING: 0
RECTAL PAIN: 0
EYES NEGATIVE: 1
DIARRHEA: 0
WHEEZING: 0
CHOKING: 0
TROUBLE SWALLOWING: 0
COUGH: 0
RHINORRHEA: 1
CHEST TIGHTNESS: 0
COLOR CHANGE: 0
GASTROINTESTINAL NEGATIVE: 1
FACIAL SWELLING: 0
SINUS PRESSURE: 1
STRIDOR: 0
VOICE CHANGE: 0
PHOTOPHOBIA: 0
SORE THROAT: 0
SINUS PAIN: 1
ABDOMINAL DISTENTION: 0
ABDOMINAL PAIN: 0
EYE REDNESS: 0
EYE PAIN: 0
EYE ITCHING: 0

## 2020-06-05 ASSESSMENT — PAIN - FUNCTIONAL ASSESSMENT: PAIN_FUNCTIONAL_ASSESSMENT: PREVENTS OR INTERFERES SOME ACTIVE ACTIVITIES AND ADLS

## 2020-06-05 ASSESSMENT — PAIN DESCRIPTION - LOCATION: LOCATION: BACK

## 2020-06-05 ASSESSMENT — PAIN DESCRIPTION - DIRECTION: RADIATING_TOWARDS: RIGHT HIP AND RIGHT LEG

## 2020-06-05 ASSESSMENT — PAIN SCALES - GENERAL: PAINLEVEL_OUTOF10: 8

## 2020-06-05 ASSESSMENT — PAIN DESCRIPTION - ORIENTATION: ORIENTATION: RIGHT

## 2020-06-05 ASSESSMENT — PAIN DESCRIPTION - PROGRESSION: CLINICAL_PROGRESSION: GRADUALLY WORSENING

## 2020-06-05 ASSESSMENT — PAIN DESCRIPTION - ONSET: ONSET: ON-GOING

## 2020-06-05 ASSESSMENT — PAIN DESCRIPTION - PAIN TYPE: TYPE: CHRONIC PAIN

## 2020-06-05 ASSESSMENT — PAIN DESCRIPTION - FREQUENCY: FREQUENCY: CONTINUOUS

## 2020-06-05 NOTE — PROGRESS NOTES
living. Patient reports his pain is severe and he is not able to function because of the severe pain and muscle spasms in the low back. Patient reports he is using a cane or a walker because when he had severe spasms he is scared of falling down. His muscle spasms are mostly in the low back and in the right hip region. Patient's pain is mostly in the low back area radiating to the right hip and occasionally to the knee laterally and posteriorly. Patient was evaluated by Dr. Ladonna Sandoval and had a hip bursa injection without any improvement in the pain. Patient denies any groin pains. Patient's pain is worse in the morning and towards the night. Patient reports pain is decreased to a certain extent when he sits in the lazy boy for less than 20 minutes at which time his pain gets worse when he has to get up and move around. Otherwise he cannot relate any factors that help the pain. Patient reports he was on several pain medications in the past for pain. After surgeryon his low back he had morphine which apparently helped the pain. Patient reports other medications do not help his pain. Patient reports he has problems participating in activities of daily living like dressing himself or putting socks on. Back Pain   This is a chronic problem. The current episode started more than 1 year ago. The problem occurs constantly. The problem has been gradually worsening since onset. The pain is present in the lumbar spine and sacro-iliac. The quality of the pain is described as burning (sharp). Radiates to: rt hip to knee. The pain is at a severity of 8/10 (3-10). The pain is severe. The pain is the same all the time. The symptoms are aggravated by bending, standing, twisting, sitting, lying down and position (lifting,walking, ADLS,sneezing). Associated symptoms include weakness. Pertinent negatives include no abdominal pain, chest pain, dysuria, fever, headaches or numbness.  (Rt hip) Risk factors include obesity and and walking  Currently seeing a Psychiatristor Psychologist: No, in the Psychologist.... for Pain attacks 18 yrs ago  Emotional Issues: anxiety and panic attacks  Mood: Depression, states he is not suicidal. He has thoughts in the past, but states he would never act on it. Had attempted suicidal attempt by taking pills. States he feels better since he has better, Celebrex. ABERRANT BEHAVIORS SINCE LAST VISIT:  Have you ever been treated in another Pain Clinic yes, Dr Efra Archuleta for prescriptions appropriate: not applicable  Lost rx/pills: not applicable  Taking more medication than prescribed:  not applicable  Are you receiving PAIN medications from  other doctors: states he is not getting any pain meds, states nothing works, states the last meds were in October. States Dr. Sarahy Francisco would give him Tramadol. States no other narcotics. Last Urine/Serum Drug Screen :  None in Epic will need to see Dr. Reema Garibay notes  Was Serum/UDS as anticipated? Unable to collect today. Patient not present R/T Covid 19 restrictions  Brought pill bottles in :na  Was Pill count appropriate? :not applicable   Are currently pregnant? not applicable  Recent ER visits: No             Past Medical History      Diagnosis Date    Arthritis     BACK AND FINGERS     Chronic back pain     dr Car Romero [facility]    Dizziness     GERD (gastroesophageal reflux disease)     H/O chest pain     H/O dermatitis     [place] (hard of hearing)     LEFT EAR WORSE THAN RIGHT.  Hypertension     DR Jody Romero PCP    Kyphosis     Left eye injury     BUNGY CORD STRAP BUSTED AND INJURED LEFT EYE    Muscle spasm     LOWER RIGHT BACK    Nocturia     Prolonged emergence from general anesthesia     PATIENT STATES HIS B/P WOULD DROP WITH INTUBATION, GO UP AFTER ET TUBE REMOVED.  PVC's (premature ventricular contractions)     PVC'S.  NO CARDIOLOGIST, PCP DR Nicole topete to see rheumatoid arthritis  workup for lupus canal stenosis or neural foraminal narrowing of the   thoracic spine.           Impression   1. Moderate to severe thoracic kyphosis centered within the lower thoracic   spine.  Partial osseous fusion of multiple mid and lower thoracic vertebral   bodies.       2. No significant spinal canal stenosis or neural foraminal stenosis within   thoracic spine.       3. No significant spinal canal stenosis.  No significant neural foraminal   stenosis. ative   EXAMINATION:   MRI OF THE CERVICAL SPINE WITHOUT CONTRAST 2/21/2020 9:22 am       TECHNIQUE:   Multiplanar multisequence MRI of the cervical spine was performed without the   administration of intravenous contrast.       COMPARISON:   None.       HISTORY:   ORDERING SYSTEM PROVIDED HISTORY: Balance problem       FINDINGS:   BONES/ALIGNMENT:       There is normal alignment of the spine. The vertebral body heights are   maintained.  The bone marrow signal appears unremarkable.       SPINAL CORD:       No abnormal cord signal is seen.       SOFT TISSUES:       No paraspinal mass identified.       C2-C3: No significant disc herniation.  No significant neural foraminal   stenosis.  No significant spinal canal stenosis.       C3-C4: Minimal posterior disc osteophyte complex.  Mild-to-moderate bilateral   neural foraminal stenosis.  No significant spinal canal stenosis.       C4-C5: Mild posterior disc osteophyte complex with bilateral facet   degenerative changes.  Minimal bilateral neural foraminal stenosis.  No   significant spinal canal stenosis.       C5-C6: Mild posterior disc osteophyte complex with bilateral facet   degenerative changes.  Moderate to severe bilateral neural foraminal   stenosis.  Minimal spinal canal stenosis.       C6-C7: Mild posterior disc osteophyte complex with bilateral facet   degenerative changes.  Mild bilateral neural foraminal stenosis.  No   significant spinal canal stenosis.       C7-T1: No significant disc herniation.  No significant neural foraminal   stenosis.  No significant spinal canal stenosis.         Impression   1. Multilevel degenerative changes of the cervical spine.  No significant   spinal canal stenosis.       2. No abnormal T2 hyperintense signal within the cervical spinal cord. EXAMINATION:   MRI OF THE LUMBAR SPINE WITHOUT AND WITH CONTRAST  10/9/2019 1:37 pm       TECHNIQUE:   Multiplanar multisequence MRI of the lumbar spine was performed without and   with the administration of intravenous contrast.       COMPARISON:   None.       HISTORY:   ORDERING SYSTEM PROVIDED HISTORY: Lumbar radiculopathy   TECHNOLOGIST PROVIDED HISTORY:   intractable low back pain       FINDINGS:   BONES/ALIGNMENT: Postoperative changes are noted at the lumbosacral junction. Multilevel endplate Schmorl's node deformities are noted.  Multilevel   degenerative endplate signal changes are noted.  Multilevel disc space   narrowing and disc desiccation is noted.  There is no acute lumbar or lower   thoracic spine fracture       SPINAL CORD:  Conus terminates at L1-2 and is grossly normal in appearance.       SOFT TISSUES: There is no mass, fluid collection or retroperitoneal adenopathy       L1-L2: Endplate and facet hypertrophic changes are noted.  There is very   minimal disc bulging.  Minimal narrowing of the canal is noted       L2-L3: Endplate and facet hypertrophic changes are noted.  Epidural fat   prominence is noted.  Mild narrowing of the canal is noted. Almer Dyke is very   minimal disc bulging       L3-L4: Minimal disc bulging is noted.  Endplate, facet and ligament   hypertrophic changes are noted.  Prominent epidural fat is noted.  There is   moderate narrowing of the canal.       L4-L5: Facet and ligament hypertrophic changes are noted.  Mild narrowing of   the canal is noted.       L5-S1: Facet hypertrophic changes are noted.  Foraminal detail is limited due   to artifact.  Proximal foraminal narrowing would be difficult to exclude, not significantly changed since   prior exam.  No visible fracture.       SOFT TISSUES/RETROPERITONEUM: Heart is mildly enlarged.  Coronary artery   atherosclerosis.  Calcified bilateral hilar lymph nodes.  Stone is seen in   the gallbladder lumen.  No paraspinal mass.           Impression   1. Exaggerated lower thoracic kyphosis with relative reversal of the upper   thoracic kyphosis. 2. L5-S1 posterior fusion with no CT evidence of complication. 3. 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  Please correlate with clinical history of ankylosing spondylitis. No evidence of sacroiliac ankylosis. 4. Mild anterior wedge deformity of T9 through L1, suspected to be chronic. 5. Cholelithiasis.  No CT evidence of cholecystitis. 6. Atherosclerosis.           Patient Active Problem List   Diagnosis    Anxiety disorder    Benign essential hypertension    Esophageal reflux    Frequent PVCs    Hypogonadism male    Inflammatory arthritis    Kyphoscoliosis    Lumbar radicular pain    Pulmonary granuloma ([address]    Male erectile disorder    Sensorineural hearing loss    Tinnitus of both ears    Psoriasis    Right hip pain    Hip impingement syndrome    Varicose vein of leg    Alcohol dependence in early full remission ([address]    Inflamed skin tag    Neoplasm of uncertain behavior of skin    Seborrheic keratosis    Kyphosis of thoracolumbar region    Eczema    Sjogren's syndrome ([address]    Ankylosing spondylitis of thoracic region [facility])    Spinal stenosis of lumbar region with neurogenic claudication    Trochanteric bursitis, right hip    Lumbosacral spondylosis without myelopathy        ASSESSMENT    Elidia Treviño is a 53 y.o. male with a chief complaint of low back pain    1. Sacroiliitis ([address]    2. DDD (degenerative disc disease), lumbar    3. Lumbosacral spondylosis without myelopathy    4. Status post lumbar spinal fusion    5.  Arthropathy of lumbar Visit was conducted, with patient's consent, to reduce the patient's risk of exposure to COVID-19 and provide continuity of care for an established patient. Services were provided through a video synchronous discussion virtually to substitute for in-person appointment. \"  Documentation:  I communicated with the patient and/or health care decision maker about plan of care  Details of this discussion including any medical advice provided: Total Time: minutes: 50-60 minutes    I affirm this is a Patient Initiated Episode with an Established Patient who has not had a related appointment within my department in the past 7 days or scheduled within the next 24 hours. This note was created using voice recognition software. There may be inaccuracies of transcription  that are inadvertently overlooked prior to the signature. There is any questions about the transcription please contact me.     Electronically signed by Gideon Mckeon MD on 6/9/2020 at 2:55 AM

## 2020-06-08 ENCOUNTER — HOSPITAL ENCOUNTER (OUTPATIENT)
Dept: PAIN MANAGEMENT | Age: 55
Discharge: HOME | End: 2020-06-08
Payer: MEDICARE

## 2020-06-08 PROCEDURE — 99204 OFFICE O/P NEW MOD 45 MIN: CPT | Performed by: PAIN MEDICINE

## 2020-06-08 PROCEDURE — 99203 OFFICE O/P NEW LOW 30 MIN: CPT

## 2020-06-19 RX ORDER — LISINOPRIL 10 MG/1
TABLET ORAL
Qty: 90 TABLET | Refills: 0 | Status: SHIPPED | OUTPATIENT
Start: 2020-06-19 | End: 2020-09-12

## 2020-06-22 RX ORDER — CELECOXIB 200 MG/1
CAPSULE ORAL
Qty: 60 CAPSULE | Refills: 0 | Status: SHIPPED | OUTPATIENT
Start: 2020-06-22 | End: 2020-07-27

## 2020-07-13 ENCOUNTER — TELEPHONE (OUTPATIENT)
Dept: PRIMARY CARE CLINIC | Age: 55
End: 2020-07-13

## 2020-07-13 NOTE — TELEPHONE ENCOUNTER
Pt has appt 7/17- for procedure, skin tag removal-has questions re; fee-if ins will cover?  Please advise

## 2020-07-17 ENCOUNTER — OFFICE VISIT (OUTPATIENT)
Dept: PRIMARY CARE CLINIC | Age: 55
End: 2020-07-17
Payer: MEDICARE

## 2020-07-17 VITALS
DIASTOLIC BLOOD PRESSURE: 80 MMHG | HEIGHT: 72 IN | TEMPERATURE: 98.3 F | WEIGHT: 272 LBS | OXYGEN SATURATION: 97 % | HEART RATE: 85 BPM | BODY MASS INDEX: 36.84 KG/M2 | SYSTOLIC BLOOD PRESSURE: 134 MMHG

## 2020-07-17 PROCEDURE — 11200 RMVL SKIN TAGS UP TO&INC 15: CPT | Performed by: PHYSICIAN ASSISTANT

## 2020-07-17 NOTE — PROGRESS NOTES
[facility]  [address] [place] [id number]  Phone: [phone]  Fax: [phone]    Pretty Youssef is a 53 y.o. male who presents today for Skin Tag Removal.      After Cleaning with alcohol, # 15 skin tags were not anesthestized. All skin tags snipped with Iris Scissors. ACl used for hemostasis. 1 tag was definitely irritated    Plan:  Keep area(s) cleansed twice daily and apply Neosporin or Vaseline petroleum jelly twice daily for 5 days,      Diagnosis Orders   1. Inflamed skin tag  PA REMOVAL OF SKIN TAGS, UP TO 15   2. Multiple acquired skin tags  PA REMOVAL OF SKIN TAGS, UP TO 15     Return if symptoms worsen or fail to improve.     Electronically signed by Shasha Nayak PA-C on 7/17/20 at 4:10 PM EDT

## 2020-07-27 RX ORDER — CELECOXIB 200 MG/1
CAPSULE ORAL
Qty: 60 CAPSULE | Refills: 0 | Status: SHIPPED | OUTPATIENT
Start: 2020-07-27 | End: 2020-08-25

## 2020-08-18 ENCOUNTER — OFFICE VISIT (OUTPATIENT)
Dept: PRIMARY CARE CLINIC | Age: 55
End: 2020-08-18
Payer: MEDICARE

## 2020-08-18 ENCOUNTER — TELEPHONE (OUTPATIENT)
Dept: PRIMARY CARE CLINIC | Age: 55
End: 2020-08-18

## 2020-08-18 VITALS
BODY MASS INDEX: 37.3 KG/M2 | HEIGHT: 72 IN | HEART RATE: 71 BPM | WEIGHT: 275.4 LBS | SYSTOLIC BLOOD PRESSURE: 132 MMHG | TEMPERATURE: 97.3 F | OXYGEN SATURATION: 97 % | DIASTOLIC BLOOD PRESSURE: 78 MMHG

## 2020-08-18 PROBLEM — E66.01 MORBIDLY OBESE (HCC): Status: ACTIVE | Noted: 2020-08-18

## 2020-08-18 PROCEDURE — 99214 OFFICE O/P EST MOD 30 MIN: CPT | Performed by: PHYSICIAN ASSISTANT

## 2020-08-18 NOTE — TELEPHONE ENCOUNTER
Pt states Dr. Maria A Lopez is not in his network and is going to see Dr. Yane Montanez.  He states they do not require a referral. JUST AN FYI

## 2020-08-18 NOTE — PROGRESS NOTES
[facility]  [address]  Phone: [phone]  Fax: [phone]    Mahin Malik is a 53 y.o. male who presents today for his medical conditions/complaintsas noted below. Chief Complaint   Patient presents with    Hip Pain     pain in right hip. Pt said he had xrays done, in chart. Pt seen  for right hip pain. Patinet said  gave him a shot in the hip but it did not work         HPI:     HPI   Right hip pain level 10/10 when standing and walking. Sitting is ok. HX of inflammatory polyarthritis. HX of chronic back pain: On Celebrex and gabapentin and TENS unit. On Plaquenil for Sjogren's. Just had a trochanter injection with Dr. Nathanael Vail that did not help. Did see pain clinic while awaiting spinal surgery, Dr. Juan Gambino, wanted to do steroid injection. Saw Dr. Scar Velez in past for back, not for hip. Muscle relaxers did not help. Tried Cymbalta but was too nauseated. Current Outpatient Medications   Medication Sig Dispense Refill    celecoxib (CELEBREX) 200 MG capsule TAKE 1 CAPSULE BY MOUTH TWO TIMES A DAY  60 capsule 0    lisinopril (PRINIVIL;ZESTRIL) 10 MG tablet TAKE 1 TABLET BY MOUTH ONE TIME A DAY  90 tablet 0    gabapentin (NEURONTIN) 300 MG capsule Take 1 capsule three times a day along with the 100 mg to equal 400 mg three times daily.  90 capsule 5    gabapentin (NEURONTIN) 100 MG capsule TAKE 1 CAPSULE BY MOUTH THREE TIMES A DAY FOR 30 DAYS 90 capsule 5    hydroxychloroquine (PLAQUENIL) 200 MG tablet Take 200 mg by mouth 2 times daily      Multiple Vitamins-Minerals (THERAPEUTIC MULTIVITAMIN-MINERALS) tablet Take 1 tablet by mouth daily      Sod Fluoride-Potassium Nitrate (PREVIDENT 5000 SENSITIVE) 1.1-5 % PSTE Place onto teeth      Magnesium Cl-Calcium Carbonate (SLOW-MAG PO) Take 1 tablet by mouth 2 times daily      VITAMIN D PO Take 1,000 Units by mouth daily      metoprolol tartrate (LOPRESSOR) 50 MG tablet TAKE 1 TABLET BY MOUTH TWO TIMES A DAY  180 tablet 2    LORazepam (ATIVAN) 1 MG tablet Take 1 mg by mouth every 6 hours as needed for Anxiety.  glucosamine-chondroitin 500-400 MG tablet Take 1 tablet by mouth daily      aspirin 81 MG tablet Take 81 mg by mouth daily       omeprazole (PRILOSEC) 20 MG capsule Take 20 mg by mouth nightly       hydrocortisone (WESTCORT) 0.2 % cream Apply topically 2 times daily. (Patient not taking: Reported on 8/18/2020) 45 g 1    Acetaminophen (TYLENOL ARTHRITIS PAIN PO) Take 325 mg by mouth every 6 hours as needed        No current facility-administered medications for this visit. Allergies   Allergen Reactions    Metronidazole Other (See Comments)     \" caused a prickly feeling in my legs \"    Adhesive Tape Other (See Comments)     opsite and paper tape ok, bandaid ok    Cymbalta [Duloxetine Hcl] Nausea And Vomiting    Neosporin [Neomycin-Polymyxin-Gramicidin] Rash       Subjective:      Review of Systems   Musculoskeletal: Positive for arthralgias (right hip). Objective:     /78   Pulse 71   Temp 97.3 °F (36.3 °C)   Ht 6' (1.829 m)   Wt 275 lb 6.4 oz (124.9 kg)   SpO2 97%   BMI 37.35 kg/m²   Physical Exam  Vitals signs and nursing note reviewed. Constitutional:       Appearance: Normal appearance. Cardiovascular:      Rate and Rhythm: Normal rate and regular rhythm. Heart sounds: Normal heart sounds. Pulmonary:      Effort: Pulmonary effort is normal.      Breath sounds: Normal breath sounds. No wheezing, rhonchi or rales. Musculoskeletal:      Right shoulder: He exhibits decreased range of motion, tenderness, deformity (thoracic kyphosis) and pain. He exhibits no bony tenderness, no swelling, no effusion, no crepitus, no laceration, no spasm, normal pulse and normal strength. Neurological:      Mental Status: He is alert and oriented to person, place, and time.    Psychiatric:         Mood and Affect: Mood normal.

## 2020-08-25 RX ORDER — CELECOXIB 200 MG/1
CAPSULE ORAL
Qty: 60 CAPSULE | Refills: 0 | Status: SHIPPED | OUTPATIENT
Start: 2020-08-25 | End: 2020-09-28

## 2020-08-27 ENCOUNTER — OFFICE VISIT (OUTPATIENT)
Dept: PRIMARY CARE CLINIC | Age: 55
End: 2020-08-27
Payer: MEDICARE

## 2020-08-27 VITALS
SYSTOLIC BLOOD PRESSURE: 130 MMHG | HEART RATE: 76 BPM | OXYGEN SATURATION: 98 % | DIASTOLIC BLOOD PRESSURE: 80 MMHG | TEMPERATURE: 97.2 F

## 2020-08-27 PROCEDURE — 99213 OFFICE O/P EST LOW 20 MIN: CPT | Performed by: FAMILY MEDICINE

## 2020-08-27 RX ORDER — MORPHINE SULFATE 15 MG/1
15 TABLET, FILM COATED, EXTENDED RELEASE ORAL 2 TIMES DAILY
Qty: 60 TABLET | Refills: 0 | Status: SHIPPED | OUTPATIENT
Start: 2020-08-27 | End: 2021-06-23 | Stop reason: SDUPTHER

## 2020-08-27 ASSESSMENT — ENCOUNTER SYMPTOMS
WHEEZING: 0
COUGH: 0
BACK PAIN: 1
NAUSEA: 0
DIARRHEA: 0
ABDOMINAL PAIN: 0
EYE DISCHARGE: 0
SORE THROAT: 0
VOMITING: 0
RHINORRHEA: 0
SHORTNESS OF BREATH: 0
EYE REDNESS: 0

## 2020-08-27 ASSESSMENT — PATIENT HEALTH QUESTIONNAIRE - PHQ9
1. LITTLE INTEREST OR PLEASURE IN DOING THINGS: 1
SUM OF ALL RESPONSES TO PHQ QUESTIONS 1-9: 2
SUM OF ALL RESPONSES TO PHQ9 QUESTIONS 1 & 2: 2
SUM OF ALL RESPONSES TO PHQ QUESTIONS 1-9: 2
2. FEELING DOWN, DEPRESSED OR HOPELESS: 1

## 2020-08-27 NOTE — PROGRESS NOTES
[address] [facility] PRIMARY CARE  [address] [address]  [facility] [address]  Dept: [phone]    Gabriel Borges is a 53 y.o. male who presents today for his medical conditions/complaintsas noted below. Chief Complaint   Patient presents with    Hip Pain    Back Pain       HPI:     HPI  Pt states back to like he was last year. Has severe pain in right hip, lower back pain. Using a walker and cane for ambulation. Last time had calmed down on its own. Pt had done physical therapy without improvement. Pain had resolved before the MRI was done. Pt states needs to be \"knocked out\" for MRI. Pt states pain management was \"a joke\". Seen Dr. Santos Godinez Tuesday, recommended injection September 2cnd   Pt sates having burning and numbness down right leg. Pain mostly lower to mid lumbar region. Patient was evaluated by orthopedic surgery and diagnosed with ankylosing spondylitis. Patient states pain level can be 10 of 10 and is incapacitating. LDL Cholesterol (mg/dL)   Date Value   02/10/2018 94   07/23/2017 98       (goal LDL is <100)   AST (U/L)   Date Value   02/10/2018 19     ALT (U/L)   Date Value   02/10/2018 19     BUN (mg/dL)   Date Value   09/24/2019 12     BP Readings from Last 3 Encounters:   08/27/20 130/80   08/18/20 132/78   07/17/20 134/80          (goal 120/80)    Past Medical History:   Diagnosis Date    Arthritis     BACK AND FINGERS     Chronic back pain     dr Myrna Guajardo of [facility]    Dizziness     GERD (gastroesophageal reflux disease)     H/O chest pain     H/O dermatitis     [place] (hard of hearing[place]     LEFT EAR WORSE THAN RIGHT.  Hypertension     DR Kristina Patrick PCP    Kyphosis     Left eye injury     BUNGY CORD STRAP BUSTED AND INJURED LEFT EYE    Muscle spasm     LOWER RIGHT BACK    Nocturia     Prolonged emergence from general anesthesia     PATIENT STATES HIS B/P WOULD DROP WITH INTUBATION, GO UP AFTER ET TUBE REMOVED.     PVC's (premature ventricular contractions)     PVC'S. NO CARDIOLOGIST, PCP DR Milad topete to see rheumatoid arthritis drLiss workup for lupus    Snores     mild, denies apnea, does \"grind\" his teeth    Use of cane as ambulatory aid     Uses walker     Wears glasses     Wears partial dentures     LOWER      Past Surgical History:   Procedure Laterality Date    BACK SURGERY  2006    cage    COLONOSCOPY      CYST REMOVAL Right     index finger    HEMORRHOID SURGERY  2017    HERNIA REPAIR      UMBILICAL    NOSE SURGERY      REALIGNED NOSE    OTHER SURGICAL HISTORY  2020    MRI cervical and thoracic spine without contrast . CT of lung , all under general anesthesia    [place] COLSC FLX W/REMOVAL LESION BY HOT BX FORCEPS N/A 2017    COLONOSCOPY POLYPECTOMY HOT BIOPSY performed by Mary Jo Jacobs MD at [facility] TOE SURGERY Right     2nd toe    TONSILLECTOMY      AS A CHILD    VASECTOMY         Family History   Problem Relation Age of Onset    Coronary Art Dis Father         premature-- at 52    Diabetes Father     High Blood Pressure Father     Other Father         COPD, EMPHYSEMA    Coronary Art Dis Paternal Uncle         premature    Heart Failure Paternal Uncle     Diabetes Mother        Social History     Tobacco Use    Smoking status: Former Smoker     Packs/day: 1.50     Years: 23.00     Pack years: 34.50     Types: Cigarettes     Last attempt to quit: 2006     Years since quittin.6    Smokeless tobacco: Former User     Types: Chew     Quit date: 9/10/2019    Tobacco comment: quit 14 years ago   Substance Use Topics    Alcohol use: Not Currently     Comment: 2017      Current Outpatient Medications   Medication Sig Dispense Refill    morphine (MS CONTIN) 15 MG extended release tablet Take 1 tablet by mouth 2 times daily for 30 days.  60 tablet 0    celecoxib (CELEBREX) 200 MG capsule TAKE 1 CAPSULE BY MOUTH TWO TIMES A DAY  60 capsule 0    lisinopril (PRINIVIL;ZESTRIL) 10 MG tablet TAKE 1 TABLET BY MOUTH ONE TIME A DAY  90 tablet 0    hydroxychloroquine (PLAQUENIL) 200 MG tablet Take 200 mg by mouth 2 times daily      hydrocortisone (WESTCORT) 0.2 % cream Apply topically 2 times daily. 45 g 1    Multiple Vitamins-Minerals (THERAPEUTIC MULTIVITAMIN-MINERALS) tablet Take 1 tablet by mouth daily      Sod Fluoride-Potassium Nitrate (PREVIDENT 5000 SENSITIVE) 1.1-5 % PSTE Place onto teeth      Magnesium Cl-Calcium Carbonate (SLOW-MAG PO) Take 1 tablet by mouth 2 times daily      VITAMIN D PO Take 1,000 Units by mouth daily      metoprolol tartrate (LOPRESSOR) 50 MG tablet TAKE 1 TABLET BY MOUTH TWO TIMES A DAY  180 tablet 2    LORazepam (ATIVAN) 1 MG tablet Take 1 mg by mouth every 6 hours as needed for Anxiety.  Acetaminophen (TYLENOL ARTHRITIS PAIN PO) Take 325 mg by mouth every 6 hours as needed       glucosamine-chondroitin 500-400 MG tablet Take 1 tablet by mouth daily      aspirin 81 MG tablet Take 81 mg by mouth daily       omeprazole (PRILOSEC) 20 MG capsule Take 20 mg by mouth nightly       gabapentin (NEURONTIN) 300 MG capsule Take 1 capsule three times a day along with the 100 mg to equal 400 mg three times daily. 90 capsule 5    gabapentin (NEURONTIN) 100 MG capsule TAKE 1 CAPSULE BY MOUTH THREE TIMES A DAY FOR 30 DAYS 90 capsule 5     No current facility-administered medications for this visit.       Allergies   Allergen Reactions    Metronidazole Other (See Comments)     \" caused a prickly feeling in my legs \"    Adhesive Tape Other (See Comments)     opsite and paper tape ok, bandaid ok    Cymbalta [Duloxetine Hcl] Nausea And Vomiting    Neosporin [Neomycin-Polymyxin-Gramicidin] Rash       Health Maintenance   Topic Date Due    HIV screen  10/24/1979    Shingles Vaccine (2 of 3) 06/01/2017    Flu vaccine (1) 09/01/2020    Low dose CT lung screening  02/21/2021    Potassium monitoring  05/20/2021    Creatinine monitoring 05/20/2021    Annual Wellness Visit (AWV)  05/21/2021    DTaP/Tdap/Td vaccine (2 - Td) 01/02/2023    Lipid screen  02/10/2023    Colon cancer screen colonoscopy  07/12/2027    Pneumococcal 0-64 years Vaccine  Completed    Hepatitis C screen  Completed    Hepatitis A vaccine  Aged Out    Hepatitis B vaccine  Aged Out    Hib vaccine  Aged Out    Meningococcal (ACWY) vaccine  Aged Out       Subjective:      Review of Systems   Constitutional: Negative for chills and fever. HENT: Negative for rhinorrhea and sore throat. Eyes: Negative for discharge and redness. Respiratory: Negative for cough, shortness of breath and wheezing. Cardiovascular: Negative for chest pain and palpitations. Gastrointestinal: Negative for abdominal pain, diarrhea, nausea and vomiting. Genitourinary: Negative for dysuria and frequency. Musculoskeletal: Positive for arthralgias and back pain. Negative for myalgias. Neurological: Negative for dizziness, light-headedness and headaches. Psychiatric/Behavioral: Negative for sleep disturbance. Objective:     /80   Pulse 76   Temp 97.2 °F (36.2 °C)   SpO2 98%   Physical Exam  Vitals signs and nursing note reviewed. Constitutional:       General: He is not in acute distress. Appearance: He is well-developed. He is not ill-appearing. HENT:      Head: Normocephalic and atraumatic. Right Ear: External ear normal.      Left Ear: External ear normal.   Eyes:      General: No scleral icterus. Right eye: No discharge. Left eye: No discharge. Conjunctiva/sclera: Conjunctivae normal.      Pupils: Pupils are equal, round, and reactive to light. Neck:      Thyroid: No thyromegaly. Trachea: No tracheal deviation. Cardiovascular:      Rate and Rhythm: Normal rate and regular rhythm. Heart sounds: Normal heart sounds. Pulmonary:      Effort: Pulmonary effort is normal. No respiratory distress.       Breath sounds: Normal breath sounds. No wheezing. Musculoskeletal:      Comments: Very limited flexion in the lumbar spine. Marked tenderness. Lymphadenopathy:      Cervical: No cervical adenopathy. Skin:     General: Skin is warm. Findings: No rash. Neurological:      Mental Status: He is alert and oriented to person, place, and time. Psychiatric:         Mood and Affect: Mood normal.         Behavior: Behavior normal.         Thought Content: Thought content normal.         Assessment:       Diagnosis Orders   1. Sjogren's syndrome, with unspecified organ involvement ([facility] [address]     2. Ankylosing spondylitis of thoracic region (HCC)  morphine (MS CONTIN) 15 MG extended release tablet        Plan:    Start morphine time release 15 mg twice daily. Patient sees rheumatology early next week. Patient is to ask them if he truly has ankylosing spondylitis and with treatment with a tissue necrosing factor be of benefit. Continue follow-up with pain management for injections. Potential addiction and tolerance to medication was discussed. Return in about 4 weeks (around 9/24/2020). No orders of the defined types were placed in this encounter. Orders Placed This Encounter   Medications    morphine (MS CONTIN) 15 MG extended release tablet     Sig: Take 1 tablet by mouth 2 times daily for 30 days. Dispense:  60 tablet     Refill:  0     Reduce doses taken as pain becomes manageable       Patient given educationalmaterials - see patient instructions. Discussed use, benefit, and side effectsof prescribed medications. All patient questions answered. Pt voiced understanding. Reviewed health maintenance. Instructed to continue current medications, diet andexercise. Patient agreed with treatment plan. Follow up as directed.      Electronicallysigned by Lazarus Bland, MD on 8/27/2020 at 11:40 AM

## 2020-09-03 ENCOUNTER — TELEPHONE (OUTPATIENT)
Dept: PRIMARY CARE CLINIC | Age: 55
End: 2020-09-03

## 2020-09-03 NOTE — TELEPHONE ENCOUNTER
Pt had hip injections yesterday at the pain clinic with Dr. Cristel Holliday. Pt asking if you want him to stop the Morphine? Pt asked at PM, but was told he needed to ask you. Pt is pretty sore today.  [phone]

## 2020-09-14 RX ORDER — LISINOPRIL 10 MG/1
TABLET ORAL
Qty: 90 TABLET | Refills: 3 | Status: SHIPPED | OUTPATIENT
Start: 2020-09-14 | End: 2021-08-25

## 2020-09-18 RX ORDER — METOPROLOL TARTRATE 50 MG/1
TABLET, FILM COATED ORAL
Qty: 180 TABLET | Refills: 0 | Status: SHIPPED | OUTPATIENT
Start: 2020-09-18 | End: 2020-12-22 | Stop reason: SDUPTHER

## 2020-09-24 ENCOUNTER — OFFICE VISIT (OUTPATIENT)
Dept: PRIMARY CARE CLINIC | Age: 55
End: 2020-09-24
Payer: MEDICARE

## 2020-09-24 VITALS
OXYGEN SATURATION: 97 % | SYSTOLIC BLOOD PRESSURE: 130 MMHG | BODY MASS INDEX: 40.52 KG/M2 | WEIGHT: 273.6 LBS | HEIGHT: 69 IN | DIASTOLIC BLOOD PRESSURE: 70 MMHG | TEMPERATURE: 97 F | HEART RATE: 71 BPM

## 2020-09-24 PROBLEM — M48.10 DIFFUSE IDIOPATHIC SKELETAL HYPEROSTOSIS: Status: ACTIVE | Noted: 2020-09-24

## 2020-09-24 PROBLEM — M45.4 ANKYLOSING SPONDYLITIS OF THORACIC REGION (HCC): Status: RESOLVED | Noted: 2020-06-02 | Resolved: 2020-09-24

## 2020-09-24 PROBLEM — L91.8 INFLAMED SKIN TAG: Status: RESOLVED | Noted: 2019-01-15 | Resolved: 2020-09-24

## 2020-09-24 PROCEDURE — 99213 OFFICE O/P EST LOW 20 MIN: CPT | Performed by: FAMILY MEDICINE

## 2020-09-24 ASSESSMENT — ENCOUNTER SYMPTOMS
ABDOMINAL PAIN: 0
EYE DISCHARGE: 0
NAUSEA: 0
COUGH: 0
SHORTNESS OF BREATH: 0
RHINORRHEA: 0
SORE THROAT: 0
DIARRHEA: 0
EYE REDNESS: 0
WHEEZING: 0
VOMITING: 0

## 2020-09-24 NOTE — PROGRESS NOTES
[facility] PRIMARY CARE  [address] [facility] [address]  Dept: [phone]    Verlyn Bence is a 53 y.o. male who presents today for his medical conditions/complaintsas noted below. Chief Complaint   Patient presents with    1 Month Follow-Up    Flu Vaccine     Declined       HPI:     HPI  Pt states pain is better. Had injection with Dr. Noemi Gaming pain management. No fever or chills. No light headed. Pt has follow up appointment with nurse practitioner at pain management. Pt saw rheumatology for possible ankylosing spondylitis. Pt c/o intermittent anal leakage. States has been present since having hemorrhoid surgery. Pt states has been dealing with depression. Irritable, not happy. Pt has been on effexor, cymbalta, zoloft, celexa in the past.   Pt states has issues with having sex with his wife due to her being overweight. LDL Cholesterol (mg/dL)   Date Value   02/10/2018 94   07/23/2017 98       (goal LDL is <100)   AST (U/L)   Date Value   02/10/2018 19     ALT (U/L)   Date Value   02/10/2018 19     BUN (mg/dL)   Date Value   09/24/2019 12     BP Readings from Last 3 Encounters:   09/24/20 130/70   08/27/20 130/80   08/18/20 132/78          (goal 120/80)    Past Medical History:   Diagnosis Date    Arthritis     BACK AND FINGERS     Chronic back pain     dr Angeles Villalpando of [facility]    Dizziness     GERD (gastroesophageal reflux disease)     H/O chest pain     H/O dermatitis     [place] (hard of hearing)     LEFT EAR WORSE THAN RIGHT.  Hypertension     DR Israel Sanders PCP    Kyphosis     Left eye injury     BUNGY CORD STRAP BUSTED AND INJURED LEFT EYE    Muscle spasm     LOWER RIGHT BACK    Nocturia     Prolonged emergence from general anesthesia     PATIENT STATES HIS B/P WOULD DROP WITH INTUBATION, GO UP AFTER ET TUBE REMOVED.  PVC's (premature ventricular contractions)     PVC'S.  NO CARDIOLOGIST, PCP DR Juvencio Valentino  Lipid screen  02/10/2023    Colon cancer screen colonoscopy  07/12/2027    Pneumococcal 0-64 years Vaccine  Completed    Hepatitis C screen  Completed    Hepatitis A vaccine  Aged Out    Hepatitis B vaccine  Aged Out    Hib vaccine  Aged Out    Meningococcal (ACWY) vaccine  Aged Out       Subjective:      Review of Systems   Constitutional: Negative for chills and fever. HENT: Negative for rhinorrhea and sore throat. Eyes: Negative for discharge and redness. Respiratory: Negative for cough, shortness of breath and wheezing. Cardiovascular: Negative for chest pain and palpitations. Gastrointestinal: Negative for abdominal pain, diarrhea, nausea and vomiting. Genitourinary: Negative for dysuria and frequency. Musculoskeletal: Negative for arthralgias and myalgias. Neurological: Negative for dizziness, light-headedness and headaches. Psychiatric/Behavioral: Negative for sleep disturbance. Objective:     /70   Pulse 71   Temp 97 °F (36.1 °C)   Ht 5' 9\" (1.753 m)   Wt 273 lb 9.6 oz (124.1 kg)   SpO2 97%   BMI 40.40 kg/m²   Physical Exam  Vitals signs and nursing note reviewed. Constitutional:       General: He is not in acute distress. Appearance: He is well-developed. He is not ill-appearing. HENT:      Head: Normocephalic and atraumatic. Right Ear: External ear normal.      Left Ear: External ear normal.   Eyes:      General: No scleral icterus. Right eye: No discharge. Left eye: No discharge. Conjunctiva/sclera: Conjunctivae normal.      Pupils: Pupils are equal, round, and reactive to light. Neck:      Thyroid: No thyromegaly. Trachea: No tracheal deviation. Cardiovascular:      Rate and Rhythm: Normal rate and regular rhythm. Heart sounds: Normal heart sounds. Pulmonary:      Effort: Pulmonary effort is normal. No respiratory distress. Breath sounds: Normal breath sounds. No wheezing.    Lymphadenopathy: Cervical: No cervical adenopathy. Skin:     General: Skin is warm. Findings: No rash. Neurological:      Mental Status: He is alert and oriented to person, place, and time. Psychiatric:         Mood and Affect: Mood normal.         Behavior: Behavior normal.         Thought Content: Thought content normal.         Assessment:       Diagnosis Orders   1. Sjogren's syndrome, with unspecified organ involvement ([address]     2. Diffuse idiopathic skeletal hyperostosis     3. [address] Psychiatry        Plan:    Humira per rheumatology  Pt to get blood work done   Flu shot discussed, pt declines    Return in about 3 months (around 12/24/2020). Orders Placed This Encounter   Procedures   [facility] Alexandra Psychiatry     Referral Priority:   Routine     Referral Type:   Eval and Treat     Referral Reason:   Specialty Services Required     Requested Specialty:   Psychiatry     Number of Visits Requested:   1     No orders of the defined types were placed in this encounter. Patient given educationalmaterials - see patient instructions. Discussed use, benefit, and side effectsof prescribed medications. All patient questions answered. Pt voiced understanding. Reviewed health maintenance. Instructed to continue current medications, diet andexercise. Patient agreed with treatment plan. Follow up as directed.      Electronicallysigned by Digna Fernandez MD on 9/24/2020 at 11:28 AM

## 2020-09-28 RX ORDER — CELECOXIB 200 MG/1
CAPSULE ORAL
Qty: 60 CAPSULE | Refills: 0 | Status: SHIPPED | OUTPATIENT
Start: 2020-09-28 | End: 2020-10-26

## 2020-10-20 ENCOUNTER — TELEPHONE (OUTPATIENT)
Dept: PRIMARY CARE CLINIC | Age: 55
End: 2020-10-20

## 2020-10-20 NOTE — TELEPHONE ENCOUNTER
Called [facility] on [place] she states that they only take orders from [facility]. Tried to call pt to find out where he would like to go instead.    NO answer left voicemail to call back

## 2020-10-26 RX ORDER — CELECOXIB 200 MG/1
CAPSULE ORAL
Qty: 60 CAPSULE | Refills: 0 | Status: SHIPPED | OUTPATIENT
Start: 2020-10-26 | End: 2020-11-23

## 2020-11-21 NOTE — TELEPHONE ENCOUNTER
OV 9/24/20    celecoxib (CELEBREX) 200 MG capsule     gabapentin (NEURONTIN) 100 MG capsule     [id number] - [address] [place] [address] [facility] [phone] - f [phone]

## 2020-11-23 RX ORDER — GABAPENTIN 300 MG/1
CAPSULE ORAL
Qty: 90 CAPSULE | Refills: 2 | Status: SHIPPED | OUTPATIENT
Start: 2020-11-23 | End: 2020-12-23

## 2020-11-23 RX ORDER — CELECOXIB 200 MG/1
CAPSULE ORAL
Qty: 60 CAPSULE | Refills: 0 | Status: SHIPPED | OUTPATIENT
Start: 2020-11-23 | End: 2020-12-23

## 2020-12-07 RX ORDER — GABAPENTIN 100 MG/1
CAPSULE ORAL
Qty: 90 CAPSULE | Refills: 0 | Status: SHIPPED | OUTPATIENT
Start: 2020-12-07 | End: 2020-12-23

## 2020-12-07 NOTE — TELEPHONE ENCOUNTER
Last filled 5/22/20    I verified with pt that he is taking both 300mg and 100mg and he does take both per Dr. Magaly Spear as well.        Please approve or deny

## 2020-12-14 ENCOUNTER — TELEPHONE (OUTPATIENT)
Dept: PRIMARY CARE CLINIC | Age: 55
End: 2020-12-14

## 2020-12-14 NOTE — TELEPHONE ENCOUNTER
Pt asking to get a referral to see Dr. Jacky Nash in [place]. Pt has not been there for 4 yrs and needs referral re his Tinnitis and he can hear echo's in both ears.  Okay for referral?

## 2020-12-22 RX ORDER — METOPROLOL TARTRATE 50 MG/1
TABLET, FILM COATED ORAL
Qty: 180 TABLET | Refills: 0 | Status: SHIPPED | OUTPATIENT
Start: 2020-12-22 | End: 2020-12-23

## 2020-12-22 NOTE — TELEPHONE ENCOUNTER
INCOMING FAX:    LOV 09/24/20Odessa Combe in [address] [place] [address]    If needed see request scanned in media

## 2020-12-23 ENCOUNTER — OFFICE VISIT (OUTPATIENT)
Dept: PRIMARY CARE CLINIC | Age: 55
End: 2020-12-23
Payer: MEDICARE

## 2020-12-23 VITALS
SYSTOLIC BLOOD PRESSURE: 120 MMHG | DIASTOLIC BLOOD PRESSURE: 80 MMHG | BODY MASS INDEX: 41.23 KG/M2 | HEIGHT: 69 IN | TEMPERATURE: 96.8 F | OXYGEN SATURATION: 97 % | WEIGHT: 278.4 LBS | HEART RATE: 72 BPM

## 2020-12-23 PROCEDURE — 99214 OFFICE O/P EST MOD 30 MIN: CPT | Performed by: FAMILY MEDICINE

## 2020-12-23 RX ORDER — GABAPENTIN 400 MG/1
400 CAPSULE ORAL 3 TIMES DAILY
Qty: 90 CAPSULE | Refills: 5 | Status: SHIPPED | OUTPATIENT
Start: 2020-12-23 | End: 2021-01-08 | Stop reason: SDUPTHER

## 2020-12-23 RX ORDER — CELECOXIB 200 MG/1
200 CAPSULE ORAL 2 TIMES DAILY
Qty: 180 CAPSULE | Refills: 3 | Status: SHIPPED | OUTPATIENT
Start: 2020-12-23 | End: 2021-10-13

## 2020-12-23 RX ORDER — METOPROLOL TARTRATE 50 MG/1
TABLET, FILM COATED ORAL
Qty: 180 TABLET | Refills: 2 | Status: SHIPPED | OUTPATIENT
Start: 2020-12-23 | End: 2021-07-27 | Stop reason: SDUPTHER

## 2020-12-23 RX ORDER — HYDROXYCHLOROQUINE SULFATE 200 MG/1
200 TABLET, FILM COATED ORAL 2 TIMES DAILY
Qty: 180 TABLET | Refills: 3 | Status: SHIPPED | OUTPATIENT
Start: 2020-12-23 | End: 2021-12-30

## 2020-12-23 SDOH — ECONOMIC STABILITY: TRANSPORTATION INSECURITY
IN THE PAST 12 MONTHS, HAS LACK OF TRANSPORTATION KEPT YOU FROM MEETINGS, WORK, OR FROM GETTING THINGS NEEDED FOR DAILY LIVING?: NO

## 2020-12-23 SDOH — ECONOMIC STABILITY: INCOME INSECURITY: HOW HARD IS IT FOR YOU TO PAY FOR THE VERY BASICS LIKE FOOD, HOUSING, MEDICAL CARE, AND HEATING?: NOT VERY HARD

## 2020-12-23 SDOH — ECONOMIC STABILITY: FOOD INSECURITY: WITHIN THE PAST 12 MONTHS, YOU WORRIED THAT YOUR FOOD WOULD RUN OUT BEFORE YOU GOT MONEY TO BUY MORE.: NEVER TRUE

## 2020-12-23 SDOH — ECONOMIC STABILITY: TRANSPORTATION INSECURITY
IN THE PAST 12 MONTHS, HAS THE LACK OF TRANSPORTATION KEPT YOU FROM MEDICAL APPOINTMENTS OR FROM GETTING MEDICATIONS?: NO

## 2020-12-23 SDOH — ECONOMIC STABILITY: FOOD INSECURITY: WITHIN THE PAST 12 MONTHS, THE FOOD YOU BOUGHT JUST DIDN'T LAST AND YOU DIDN'T HAVE MONEY TO GET MORE.: NEVER TRUE

## 2020-12-23 ASSESSMENT — ENCOUNTER SYMPTOMS
COUGH: 0
SORE THROAT: 0
EYE DISCHARGE: 0
RHINORRHEA: 0
NAUSEA: 0
EYE REDNESS: 0
ABDOMINAL PAIN: 0
VOMITING: 0
DIARRHEA: 0
SHORTNESS OF BREATH: 0
WHEEZING: 0

## 2020-12-23 NOTE — PROGRESS NOTES
[address] [facility] PRIMARY CARE  [address] [address] [facility] [place] [address]  Dept: [phone]    Marlin Mayen is a 63 y.o. male who presents today for his medical conditions/complaintsas noted below. Chief Complaint   Patient presents with    3 Month Follow-Up       HPI:     HPI  Pt states right hip is better. Had right hip injection done by pain management. Pain level now 3-4 of 10. Had trigger point injection, scheduled for repeat. Pt states never went on Humira. States was too expensive . Pt could not get Plaquenil refilled due to not being seen recently. Has appointment with ENT this evening. Hisotory of tinnitus   Has not seen psychiatry yet. Pt still with anxiety. Using ativan as needed. Not checking blood pressure outside the office . Pt has been using morphine sparingly. LDL Cholesterol (mg/dL)   Date Value   02/10/2018 94   07/23/2017 98       (goal LDL is <100)   AST (U/L)   Date Value   02/10/2018 19     ALT (U/L)   Date Value   02/10/2018 19     BUN (mg/dL)   Date Value   09/24/2019 12     BP Readings from Last 3 Encounters:   12/23/20 120/80   09/24/20 130/70   08/27/20 130/80          (goal 120/80)    Past Medical History:   Diagnosis Date    Arthritis     BACK AND FINGERS     Chronic back pain     dr Marley Sepulveda of - SPINAL SPECIALIST    Dizziness     GERD (gastroesophageal reflux disease)     H/O chest pain     H/O dermatitis     [place] (hard of hearing)     LEFT EAR WORSE THAN RIGHT.  Hypertension     DR Shyann Garcia PCP    Kyphosis     Left eye injury     BUNGY CORD STRAP BUSTED AND INJURED LEFT EYE    Muscle spasm     LOWER RIGHT BACK    Nocturia     Prolonged emergence from general anesthesia     PATIENT STATES HIS B/P WOULD DROP WITH INTUBATION, GO UP AFTER ET TUBE REMOVED.  PVC's (premature ventricular contractions)     PVC'S.  NO CARDIOLOGIST, PCP DR Kendrick Anna  celecoxib (CELEBREX) 200 MG capsule Take 1 capsule by mouth 2 times daily 180 capsule 3    lisinopril (PRINIVIL;ZESTRIL) 10 MG tablet TAKE 1 TABLET BY MOUTH ONE TIME A DAY  90 tablet 3    hydrocortisone (WESTCORT) 0.2 % cream Apply topically 2 times daily. 45 g 1    Multiple Vitamins-Minerals (THERAPEUTIC MULTIVITAMIN-MINERALS) tablet Take 1 tablet by mouth daily      Sod Fluoride-Potassium Nitrate (PREVIDENT 5000 SENSITIVE) 1.1-5 % PSTE Place onto teeth      Magnesium Cl-Calcium Carbonate (SLOW-MAG PO) Take 1 tablet by mouth 2 times daily      VITAMIN D PO Take 1,000 Units by mouth daily      LORazepam (ATIVAN) 1 MG tablet Take 1 mg by mouth every 6 hours as needed for Anxiety.  Acetaminophen (TYLENOL ARTHRITIS PAIN PO) Take 325 mg by mouth every 6 hours as needed       glucosamine-chondroitin 500-400 MG tablet Take 1 tablet by mouth daily      aspirin 81 MG tablet Take 81 mg by mouth daily       omeprazole (PRILOSEC) 20 MG capsule Take 20 mg by mouth nightly        No current facility-administered medications for this visit.       Allergies   Allergen Reactions    Metronidazole Other (See Comments)     \" caused a prickly feeling in my legs \"    Adhesive Tape Other (See Comments)     opsite and paper tape ok, bandaid ok    Cymbalta [Duloxetine Hcl] Nausea And Vomiting    Neosporin [Neomycin-Polymyxin-Gramicidin] Rash       Health Maintenance   Topic Date Due    HIV screen  10/24/1979    Shingles Vaccine (2 of 3) 06/01/2017    Flu vaccine (1) 09/01/2020    Low dose CT lung screening  02/21/2021    Potassium monitoring  05/20/2021    Creatinine monitoring  05/20/2021    Annual Wellness Visit (AWV)  05/21/2021    DTaP/Tdap/Td vaccine (2 - Td) 01/02/2023    Lipid screen  02/10/2023    Colon cancer screen colonoscopy  07/12/2027    Pneumococcal 0-64 years Vaccine  Completed    Hepatitis C screen  Completed    Hepatitis A vaccine  Aged Out    Hepatitis B vaccine  Aged Out Mood and Affect: Mood normal.         Behavior: Behavior normal.         Thought Content: Thought content normal.         Assessment:       Diagnosis Orders   1. Inflammatory arthritis  hydroxychloroquine (PLAQUENIL) 200 MG tablet   2. Sjogren's syndrome, with unspecified organ involvement ([facility] [address]     3. Diffuse idiopathic skeletal hyperostosis     4. Benign essential hypertension     5. Anxiety disorder, unspecified type          Plan:    will get flu shot information  Renew meds  Await input psychiatry  Labs ordered    Return in about 6 months (around 6/23/2021). No orders of the defined types were placed in this encounter. Orders Placed This Encounter   Medications    hydroxychloroquine (PLAQUENIL) 200 MG tablet     Sig: Take 1 tablet by mouth 2 times daily     Dispense:  180 tablet     Refill:  3    gabapentin (NEURONTIN) 400 MG capsule     Sig: Take 1 capsule by mouth 3 times daily for 30 days. Dispense:  90 capsule     Refill:  5    metoprolol tartrate (LOPRESSOR) 50 MG tablet     Sig: TAKE 1 TABLET BY MOUTH TWO TIMES A DAY     Dispense:  180 tablet     Refill:  2    celecoxib (CELEBREX) 200 MG capsule     Sig: Take 1 capsule by mouth 2 times daily     Dispense:  180 capsule     Refill:  3       Patient given educationalmaterials - see patient instructions. Discussed use, benefit, and side effectsof prescribed medications. All patient questions answered. Pt voiced understanding. Reviewed health maintenance. Instructed to continue current medications, diet andexercise. Patient agreed with treatment plan. Follow up as directed.      Electronicallysigned by Arthur Corrales MD on 12/23/2020 at 10:27 AM

## 2021-01-02 ENCOUNTER — HOSPITAL ENCOUNTER (EMERGENCY)
Age: 56
Discharge: HOME | End: 2021-01-02
Attending: EMERGENCY MEDICINE
Payer: MEDICARE

## 2021-01-02 VITALS
TEMPERATURE: 98 F | HEART RATE: 68 BPM | WEIGHT: 280 LBS | BODY MASS INDEX: 41.47 KG/M2 | RESPIRATION RATE: 15 BRPM | HEIGHT: 69 IN | DIASTOLIC BLOOD PRESSURE: 69 MMHG | OXYGEN SATURATION: 97 % | SYSTOLIC BLOOD PRESSURE: 117 MMHG

## 2021-01-02 DIAGNOSIS — R42 VERTIGO: Primary | ICD-10-CM

## 2021-01-02 LAB
ABSOLUTE EOS #: 0.2 K/UL (ref 0–0.4)
ABSOLUTE IMMATURE GRANULOCYTE: ABNORMAL K/UL (ref 0–0.3)
ABSOLUTE LYMPH #: 1.1 K/UL (ref 1–4.8)
ABSOLUTE MONO #: 0.5 K/UL (ref 0.1–1.3)
ALBUMIN SERPL-MCNC: 4 G/DL (ref 3.5–5.2)
ALBUMIN/GLOBULIN RATIO: ABNORMAL (ref 1–2.5)
ALP BLD-CCNC: 94 U/L (ref 40–129)
ALT SERPL-CCNC: 13 U/L (ref 5–41)
ANION GAP SERPL CALCULATED.3IONS-SCNC: 10 MMOL/L (ref 9–17)
AST SERPL-CCNC: 16 U/L
BASOPHILS # BLD: 1 % (ref 0–2)
BASOPHILS ABSOLUTE: 0.1 K/UL (ref 0–0.2)
BILIRUB SERPL-MCNC: 0.21 MG/DL (ref 0.3–1.2)
BUN BLDV-MCNC: 9 MG/DL (ref 6–20)
BUN/CREAT BLD: ABNORMAL (ref 9–20)
CALCIUM SERPL-MCNC: 9.1 MG/DL (ref 8.6–10.4)
CHLORIDE BLD-SCNC: 100 MMOL/L (ref 98–107)
CO2: 25 MMOL/L (ref 20–31)
CREAT SERPL-MCNC: 0.49 MG/DL (ref 0.7–1.2)
DIFFERENTIAL TYPE: ABNORMAL
EOSINOPHILS RELATIVE PERCENT: 2 % (ref 0–4)
GFR AFRICAN AMERICAN: >60 ML/MIN
GFR NON-AFRICAN AMERICAN: >60 ML/MIN
GFR SERPL CREATININE-BSD FRML MDRD: ABNORMAL ML/MIN/{1.73_M2}
GFR SERPL CREATININE-BSD FRML MDRD: ABNORMAL ML/MIN/{1.73_M2}
GLUCOSE BLD-MCNC: 122 MG/DL (ref 70–99)
HCT VFR BLD CALC: 41 % (ref 41–53)
HEMOGLOBIN: 13.8 G/DL (ref 13.5–17.5)
IMMATURE GRANULOCYTES: ABNORMAL %
LYMPHOCYTES # BLD: 14 % (ref 24–44)
MAGNESIUM: 2 MG/DL (ref 1.6–2.6)
MCH RBC QN AUTO: 29.7 PG (ref 26–34)
MCHC RBC AUTO-ENTMCNC: 33.6 G/DL (ref 31–37)
MCV RBC AUTO: 88.2 FL (ref 80–100)
MONOCYTES # BLD: 7 % (ref 1–7)
NRBC AUTOMATED: ABNORMAL PER 100 WBC
PDW BLD-RTO: 13.2 % (ref 11.5–14.9)
PLATELET # BLD: 181 K/UL (ref 150–450)
PLATELET ESTIMATE: ABNORMAL
PMV BLD AUTO: 6.8 FL (ref 6–12)
POTASSIUM SERPL-SCNC: 4.6 MMOL/L (ref 3.7–5.3)
RBC # BLD: 4.65 M/UL (ref 4.5–5.9)
RBC # BLD: ABNORMAL 10*6/UL
SEG NEUTROPHILS: 76 % (ref 36–66)
SEGMENTED NEUTROPHILS ABSOLUTE COUNT: 6.3 K/UL (ref 1.3–9.1)
SODIUM BLD-SCNC: 135 MMOL/L (ref 135–144)
TOTAL PROTEIN: 7.3 G/DL (ref 6.4–8.3)
WBC # BLD: 8.1 K/UL (ref 3.5–11)
WBC # BLD: ABNORMAL 10*3/UL

## 2021-01-02 PROCEDURE — 83735 ASSAY OF MAGNESIUM: CPT

## 2021-01-02 PROCEDURE — 99284 EMERGENCY DEPT VISIT MOD MDM: CPT

## 2021-01-02 PROCEDURE — 80053 COMPREHEN METABOLIC PANEL: CPT

## 2021-01-02 PROCEDURE — 6370000000 HC RX 637 (ALT 250 FOR IP): Performed by: EMERGENCY MEDICINE

## 2021-01-02 PROCEDURE — 36415 COLL VENOUS BLD VENIPUNCTURE: CPT

## 2021-01-02 PROCEDURE — 85025 COMPLETE CBC W/AUTO DIFF WBC: CPT

## 2021-01-02 RX ORDER — DIAZEPAM 5 MG/1
5 TABLET ORAL EVERY 6 HOURS PRN
Qty: 10 TABLET | Refills: 0 | Status: SHIPPED | OUTPATIENT
Start: 2021-01-02 | End: 2021-01-12

## 2021-01-02 RX ORDER — MECLIZINE HYDROCHLORIDE 25 MG/1
25 TABLET ORAL 3 TIMES DAILY PRN
Qty: 30 TABLET | Refills: 0 | Status: SHIPPED | OUTPATIENT
Start: 2021-01-02 | End: 2021-01-02 | Stop reason: SDUPTHER

## 2021-01-02 RX ORDER — DIAZEPAM 5 MG/1
5 TABLET ORAL ONCE
Status: COMPLETED | OUTPATIENT
Start: 2021-01-02 | End: 2021-01-02

## 2021-01-02 RX ORDER — MECLIZINE HYDROCHLORIDE 25 MG/1
25 TABLET ORAL 3 TIMES DAILY PRN
Qty: 30 TABLET | Refills: 0 | Status: SHIPPED | OUTPATIENT
Start: 2021-01-02 | End: 2021-01-08 | Stop reason: SDUPTHER

## 2021-01-02 RX ORDER — DIAZEPAM 5 MG/1
5 TABLET ORAL EVERY 6 HOURS PRN
Qty: 10 TABLET | Refills: 0 | Status: SHIPPED | OUTPATIENT
Start: 2021-01-02 | End: 2021-01-02 | Stop reason: SDUPTHER

## 2021-01-02 RX ORDER — FLUTICASONE PROPIONATE 50 MCG
2 SPRAY, SUSPENSION (ML) NASAL DAILY
COMMUNITY
End: 2021-03-05 | Stop reason: ALTCHOICE

## 2021-01-02 RX ORDER — MECLIZINE HYDROCHLORIDE 25 MG/1
25 TABLET ORAL ONCE
Status: COMPLETED | OUTPATIENT
Start: 2021-01-02 | End: 2021-01-02

## 2021-01-02 RX ADMIN — MECLIZINE HYDROCHLORIDE 25 MG: 25 TABLET ORAL at 08:18

## 2021-01-02 RX ADMIN — DIAZEPAM 5 MG: 5 TABLET ORAL at 08:18

## 2021-01-02 ASSESSMENT — ENCOUNTER SYMPTOMS
NAUSEA: 1
DIARRHEA: 0

## 2021-01-02 NOTE — ED NOTES
Pt ambulates to bathroom and back without assistance. Pt states he still has some vertigo, but it is markedly improved.      Jadyn Mckeon RN  01/02/21 4924

## 2021-01-02 NOTE — ED NOTES
Mode of arrival (squad #, walk in, police, etc) : walk in (wife in waiting room)        Chief complaint(s): dizziness, nausea, hear ringing        Arrival Note (brief scenario, treatment PTA, etc). : patient states this morning when he attempted to get out of bed the room began spinning. Patient states he does experience this sometimes, but states today the dizziness wouldn't go away. Patient states he has been having issues with his right ear. Patient states he has seen his PCP for his right ear and they prescribed him some Flonase. Patient states the Flonase doesn't seem to be helping with his right ear. Patient states he has been feeling nauseated today, but states that is related to his nerves over the situation. Patient is alert and oriented x3.        C= \"Have you ever felt that you should Cut down on your drinking? \"  No  A= \"Have people Annoyed you by criticizing your drinking? \"  No  G= \"Have you ever felt bad or Guilty about your drinking? \"  No  E= \"Have you ever had a drink as an Eye-opener first thing in the morning to steady your nerves or to help a hangover? \"  No      Deferred []      Reason for deferring: N/A    *If yes to two or more: probable alcohol abuse. Sabrina Stark RN  01/02/21 [id number]

## 2021-01-02 NOTE — ED NOTES
Pt discharged in stable condition with Rx's and discharge instructions, including follow up with PCP and ENT. Pt ambulates to door with steady gait and without assistance.       Vincent Dakins, RN  01/02/21 7229

## 2021-01-02 NOTE — ED PROVIDER NOTES
EMERGENCY DEPARTMENT ENCOUNTER    Pt Name: Russel Cranker  MRN: [id number]  [place] 1965  Date of evaluation: 1/2/21  CHIEF COMPLAINT       Chief Complaint   Patient presents with    Dizziness    Nausea     HISTORY OF PRESENT ILLNESS     Dizziness  Quality:  Room spinning  Severity:  Severe  Onset quality:  Sudden  Progression:  Partially resolved  Chronicity:  New  Context comment:  Has had ringing in right ear off and on for about 10 days, saw ENT for this, they will follow him up  Relieved by:  Being still  Worsened by:  Turning head, movement, eye movement and being still  Ineffective treatments:  None tried  Associated symptoms: nausea    Associated symptoms: no chest pain and no diarrhea            REVIEW OF SYSTEMS     Review of Systems   Cardiovascular: Negative for chest pain. Gastrointestinal: Positive for nausea. Negative for diarrhea. Neurological: Positive for dizziness. All other systems reviewed and are negative. PASTMEDICAL HISTORY     Past Medical History:   Diagnosis Date    Arthritis     BACK AND FINGERS     Chronic back pain     dr Sheba [facility]    Dizziness     GERD (gastroesophageal reflux disease)     H/O chest pain     H/O dermatitis     [place] (hard of hearing)     LEFT EAR WORSE THAN RIGHT.  Hypertension     DR Cheri Yousif PCP    Kyphosis     Left eye injury     BUNGY CORD STRAP BUSTED AND INJURED LEFT EYE    Muscle spasm     LOWER RIGHT BACK    Nocturia     Prolonged emergence from general anesthesia     PATIENT STATES HIS B/P WOULD DROP WITH INTUBATION, GO UP AFTER ET TUBE REMOVED.  PVC's (premature ventricular contractions)     PVC'S.  NO CARDIOLOGIST, PCP DR Katherine nAna     occas to see rheumatoid arthritis  workup for lupus    Snores     mild, denies apnea, does \"grind\" his teeth    Use of cane as ambulatory aid     Uses walker     Wears glasses     Wears partial dentures     LOWER     Past Problem List  Patient Active Problem List   Diagnosis Code    Anxiety disorder F41.9    Benign essential hypertension I10    Esophageal reflux K21.9    Hypogonadism male E29.1    Inflammatory arthritis M19.90    Kyphoscoliosis M41.9    Lumbar radicular pain M54.16    Pulmonary granuloma ([address] J84.10    Male erectile disorder N52.9    Sensorineural hearing loss H90.5    Tinnitus of both ears H93.13    Psoriasis L40.9    Hip impingement syndrome M25.859    Varicose vein of leg I83.90    Alcohol dependence in early full remission ([facility]) F10.21    Neoplasm of uncertain behavior of skin D48.5    Seborrheic keratosis L82.1    Kyphosis of thoracolumbar region M40.205    Eczema L30.9    Sjogren's syndrome ([facility]) M35.00    Spinal stenosis of lumbar region with neurogenic claudication M48.062    Trochanteric bursitis, right hip M70.61    Lumbosacral spondylosis without myelopathy M47.817    Morbidly obese ([facility]) E66.01    Diffuse idiopathic skeletal hyperostosis M48.10     SURGICAL HISTORY       Past Surgical History:   Procedure Laterality Date    BACK SURGERY  2006    cage    COLONOSCOPY      CYST REMOVAL Right     index finger    HEMORRHOID SURGERY  2017    HERNIA REPAIR      UMBILICAL    NOSE SURGERY      REALIGNED NOSE    OTHER SURGICAL HISTORY  02/21/2020    MRI cervical and thoracic spine without contrast . CT of lung , all under general anesthesia    CO COLSC FLX W/REMOVAL LESION BY HOT BX FORCEPS N/A 7/12/2017    COLONOSCOPY POLYPECTOMY HOT BIOPSY performed by Jasvir Jaimes MD at [address] [place] [address] TOE SURGERY Right     2nd toe    TONSILLECTOMY      AS A CHILD    VASECTOMY       CURRENT MEDICATIONS       Current Discharge Medication List      CONTINUE these medications which have NOT CHANGED    Details   fluticasone (FLONASE) 50 MCG/ACT nasal spray 2 sprays by Each Nostril route daily      hydroxychloroquine (PLAQUENIL) 200 MG tablet Take 1 tablet by mouth 2 times daily  Qty: 180 tablet, Refills: 3    Associated Diagnoses: Inflammatory arthritis      gabapentin (NEURONTIN) 400 MG capsule Take 1 capsule by mouth 3 times daily for 30 days. Qty: 90 capsule, Refills: 5      metoprolol tartrate (LOPRESSOR) 50 MG tablet TAKE 1 TABLET BY MOUTH TWO TIMES A DAY  Qty: 180 tablet, Refills: 2      celecoxib (CELEBREX) 200 MG capsule Take 1 capsule by mouth 2 times daily  Qty: 180 capsule, Refills: 3      lisinopril (PRINIVIL;ZESTRIL) 10 MG tablet TAKE 1 TABLET BY MOUTH ONE TIME A DAY   Qty: 90 tablet, Refills: 3      hydrocortisone (WESTCORT) 0.2 % cream Apply topically 2 times daily. Qty: 45 g, Refills: 1      Multiple Vitamins-Minerals (THERAPEUTIC MULTIVITAMIN-MINERALS) tablet Take 1 tablet by mouth daily      Sod Fluoride-Potassium Nitrate (PREVIDENT 5000 SENSITIVE) 1.1-5 % PSTE Place onto teeth      Magnesium Cl-Calcium Carbonate (SLOW-MAG PO) Take 1 tablet by mouth 2 times daily      VITAMIN D PO Take 1,000 Units by mouth daily      Acetaminophen (TYLENOL ARTHRITIS PAIN PO) Take 325 mg by mouth every 6 hours as needed       glucosamine-chondroitin 500-400 MG tablet Take 1 tablet by mouth daily      aspirin 81 MG tablet Take 81 mg by mouth daily       omeprazole (PRILOSEC) 20 MG capsule Take 20 mg by mouth nightly            ALLERGIES     is allergic to metronidazole; adhesive tape; cymbalta [duloxetine hcl]; and neosporin [neomycin-polymyxin-gramicidin]. FAMILY HISTORY     He indicated that his mother is alive. He indicated that his father is .      SOCIAL HISTORY       Social History     Tobacco Use    Smoking status: Former Smoker     Packs/day: 1.50     Years: 23.00     Pack years: 34.50     Types: Cigarettes     Quit date:      Years since quitting: 15.0    Smokeless tobacco: Former User     Types: [address] date: 9/10/2019    Tobacco comment: quit 14 years ago   Substance Use Topics    Alcohol use: Not Currently     Comment: 2017    Drug use: No     PHYSICAL EXAM     INITIAL VITALS: /66   Pulse movement to catch-up  Nystagmus: There is positive horizontal suggesting peripheral cause. Most notable when gaze to the right. No vertical or torsional nystagmus or changing direction nystagmus  Test of Skew:  No quick vertical gaze corrections     Psychiatric:         Mood and Affect: Mood normal.         Behavior: Behavior normal.         Thought Content: Thought content normal.         Judgment: Judgment normal.         MEDICAL DECISION MAKING:   The patient has vertigo symptoms, suspect peripheral etiology. He is having ringing in right ear, he has horizontal nystagmus with right gaze. Checking basic labs, giving fluids, giving meclizine and valium. ED Course as of Jan 02 0937   Sat Jan 02, 2021   0932 Symptoms greatly improved  Suspect BPV  Rx valium and meclizine  Discussed with patient anticipatory guidance, discharge instructions, follow up PCP 24 hours    Fu his ENT also    [WM]      ED Course User Index  [WM] Rene Nunez MD     Do not suspect stroke or ami or sepsis or central vertigo    Procedures    DIAGNOSTIC RESULTS     LABS: All lab results were reviewed by myself, and all abnormals are listed below.   Labs Reviewed   CBC WITH AUTO DIFFERENTIAL - Abnormal; Notable for the following components:       Result Value    Seg Neutrophils 76 (*)     Lymphocytes 14 (*)     All other components within normal limits   COMPREHENSIVE METABOLIC PANEL - Abnormal; Notable for the following components:    Glucose 122 (*)     CREATININE 0.49 (*)     Total Bilirubin 0.21 (*)     All other components within normal limits   MAGNESIUM       EMERGENCY DEPARTMENTCOURSE:         Vitals:    Vitals:    01/02/21 0720 01/02/21 0815   BP: 127/68 110/66   Pulse: 80 75   Resp: 18 16   Temp: 98 °F (36.7 °C)    TempSrc: Oral    SpO2: 96% 98%   Weight: 280 lb (127 kg)    Height: 5' 9\" (1.753 m)        The patient was given the following medications while in the emergency department:  Orders Placed This Encounter Medications    diazePAM (VALIUM) tablet 5 mg    meclizine (ANTIVERT) tablet 25 mg    DISCONTD: meclizine (ANTIVERT) 25 MG tablet     Sig: Take 1 tablet by mouth 3 times daily as needed for Dizziness     Dispense:  30 tablet     Refill:  0    DISCONTD: diazePAM (VALIUM) 5 MG tablet     Sig: Take 1 tablet by mouth every 6 hours as needed (severe vertigo symptoms) for up to 10 days. Dispense:  10 tablet     Refill:  0    meclizine (ANTIVERT) 25 MG tablet     Sig: Take 1 tablet by mouth 3 times daily as needed for Dizziness     Dispense:  30 tablet     Refill:  0    diazePAM (VALIUM) 5 MG tablet     Sig: Take 1 tablet by mouth every 6 hours as needed (severe vertigo symptoms) for up to 10 days. Dispense:  10 tablet     Refill:  0       FINAL IMPRESSION      1. Vertigo          DISPOSITION/PLAN   DISPOSITION Decision To Discharge 01/02/2021 09:16:31 AM      PATIENT REFERRED TO:  Angeles Chappell MD  [address]  [facility] [address]  [phone]    Schedule an appointment as soon as possible for a visit in 1 day  also see your ENT doctor monday    DISCHARGE MEDICATIONS:  Current Discharge Medication List      START taking these medications    Details   meclizine (ANTIVERT) 25 MG tablet Take 1 tablet by mouth 3 times daily as needed for Dizziness  Qty: 30 tablet, Refills: 0      diazePAM (VALIUM) 5 MG tablet Take 1 tablet by mouth every 6 hours as needed (severe vertigo symptoms) for up to 10 days.   Qty: 10 tablet, Refills: 0    Associated Diagnoses: Vertigo           Shirlene Funk MD  Attending Emergency Physician                    Shirlene Funk MD  01/02/21 3909

## 2021-01-08 ENCOUNTER — OFFICE VISIT (OUTPATIENT)
Dept: PRIMARY CARE CLINIC | Age: 56
End: 2021-01-08
Payer: MEDICARE

## 2021-01-08 VITALS
TEMPERATURE: 97.5 F | DIASTOLIC BLOOD PRESSURE: 68 MMHG | SYSTOLIC BLOOD PRESSURE: 120 MMHG | OXYGEN SATURATION: 96 % | HEART RATE: 81 BPM | BODY MASS INDEX: 41.26 KG/M2 | WEIGHT: 279.4 LBS

## 2021-01-08 DIAGNOSIS — L85.3 XEROSIS CUTIS: ICD-10-CM

## 2021-01-08 DIAGNOSIS — R42 VERTIGO: ICD-10-CM

## 2021-01-08 DIAGNOSIS — H93.13 TINNITUS OF BOTH EARS: Primary | ICD-10-CM

## 2021-01-08 PROCEDURE — 99213 OFFICE O/P EST LOW 20 MIN: CPT | Performed by: PHYSICIAN ASSISTANT

## 2021-01-08 RX ORDER — MECLIZINE HYDROCHLORIDE 25 MG/1
25 TABLET ORAL 3 TIMES DAILY PRN
Qty: 45 TABLET | Refills: 0 | Status: SHIPPED | OUTPATIENT
Start: 2021-01-08 | End: 2021-01-21

## 2021-01-08 RX ORDER — GABAPENTIN 400 MG/1
400 CAPSULE ORAL 3 TIMES DAILY
Qty: 90 CAPSULE | Refills: 0 | Status: SHIPPED | OUTPATIENT
Start: 2021-01-08 | End: 2021-02-17

## 2021-01-08 ASSESSMENT — ENCOUNTER SYMPTOMS
VOMITING: 0
RESPIRATORY NEGATIVE: 1
NAUSEA: 1

## 2021-01-08 NOTE — PROGRESS NOTES
[facility]  [address] [place] [address] [address] [address]  Dept: [phone]    Jason Small is a 63 y.o. male who presents today for his medical conditions/complaintsas noted below. Chief Complaint   Patient presents with    Follow-up     patient was feeling better - vertigo is starting again today.  Other     pt is requesting a refill of Antivert. HPI:     HPI  Vertigo was worst ever when he sat up in bed last Saturday. Did get nauseated. No vomiting. Sanabria had tinnitus. Went to ER 1/2/21 with this. Hum just before Jorge instead of ringing. Taking Flonase. Seeing ENT in one month---having hearing test.      LDL Cholesterol (mg/dL)   Date Value   02/10/2018 94   07/23/2017 98       (goal LDL is <100)   AST (U/L)   Date Value   01/02/2021 16     ALT (U/L)   Date Value   01/02/2021 13     BUN (mg/dL)   Date Value   01/02/2021 9     BP Readings from Last 3 Encounters:   01/08/21 120/68   01/02/21 117/69   12/23/20 120/80          (goal 120/80)    Past Medical History:   Diagnosis Date    Arthritis     BACK AND FINGERS     Chronic back pain     dr Gaurav Swanson of [facility]    Dizziness     GERD (gastroesophageal reflux disease)     H/O chest pain     H/O dermatitis     [place] (hard of hearing)     LEFT EAR WORSE THAN RIGHT.  Hypertension     DR Antony Romero PCP    Kyphosis     Left eye injury     BUNGY CORD STRAP BUSTED AND INJURED LEFT EYE    Muscle spasm     LOWER RIGHT BACK    Nocturia     Prolonged emergence from general anesthesia     PATIENT STATES HIS B/P WOULD DROP WITH INTUBATION, GO UP AFTER ET TUBE REMOVED.  PVC's (premature ventricular contractions)     PVC'S.  NO CARDIOLOGIST, PCP DR Barbie Cedillo    Rash     yoselyn to see rheumatoid arthritis  workup for lupus    Snores     mild, denies apnea, does \"grind\" his teeth    Use of cane as ambulatory aid     Uses walker     Wears glasses     Wears partial dentures LOWER      Past Surgical History:   Procedure Laterality Date    BACK SURGERY  2006    cage    COLONOSCOPY      CYST REMOVAL Right     index finger    HEMORRHOID SURGERY  2017    HERNIA REPAIR      UMBILICAL    NOSE SURGERY      REALIGNED NOSE    OTHER SURGICAL HISTORY  2020    MRI cervical and thoracic spine without contrast . CT of lung , all under general anesthesia    SC COLSC FLX W/REMOVAL LESION BY HOT BX FORCEPS N/A 2017    COLONOSCOPY POLYPECTOMY HOT BIOPSY performed by Rosetta Boss MD at [facility] TOE SURGERY Right     2nd toe    TONSILLECTOMY      AS A CHILD    VASECTOMY         Family History   Problem Relation Age of Onset    Coronary Art Dis Father         premature-- at 52    Diabetes Father     High Blood Pressure Father     Other Father         COPD, EMPHYSEMA    Coronary Art Dis Paternal Uncle         premature    Heart Failure Paternal Uncle     Diabetes Mother        Social History     Tobacco Use    Smoking status: Former Smoker     Packs/day: 1.50     Years: 23.00     Pack years: 34.50     Types: Cigarettes     Quit date:      Years since quitting: 15.0    Smokeless tobacco: Former User     Types: Chew     Quit date: 9/10/2019    Tobacco comment: quit 14 years ago   Substance Use Topics    Alcohol use: Not Currently     Comment: 2017      Current Outpatient Medications   Medication Sig Dispense Refill    meclizine (ANTIVERT) 25 MG tablet Take 1 tablet by mouth 3 times daily as needed for Dizziness 45 tablet 0    gabapentin (NEURONTIN) 400 MG capsule Take 1 capsule by mouth 3 times daily for 30 days. 90 capsule 0    fluticasone (FLONASE) 50 MCG/ACT nasal spray 2 sprays by Each Nostril route daily      diazePAM (VALIUM) 5 MG tablet Take 1 tablet by mouth every 6 hours as needed (severe vertigo symptoms) for up to 10 days.  10 tablet 0    hydroxychloroquine (PLAQUENIL) 200 MG tablet Take 1 tablet by mouth 2 times daily 180 tablet 3    metoprolol tartrate (LOPRESSOR) 50 MG tablet TAKE 1 TABLET BY MOUTH TWO TIMES A  tablet 2    celecoxib (CELEBREX) 200 MG capsule Take 1 capsule by mouth 2 times daily 180 capsule 3    lisinopril (PRINIVIL;ZESTRIL) 10 MG tablet TAKE 1 TABLET BY MOUTH ONE TIME A DAY  90 tablet 3    hydrocortisone (WESTCORT) 0.2 % cream Apply topically 2 times daily. 45 g 1    Multiple Vitamins-Minerals (THERAPEUTIC MULTIVITAMIN-MINERALS) tablet Take 1 tablet by mouth daily      Sod Fluoride-Potassium Nitrate (PREVIDENT 5000 SENSITIVE) 1.1-5 % PSTE Place onto teeth      Magnesium Cl-Calcium Carbonate (SLOW-MAG PO) Take 1 tablet by mouth 2 times daily      VITAMIN D PO Take 1,000 Units by mouth daily      Acetaminophen (TYLENOL ARTHRITIS PAIN PO) Take 325 mg by mouth every 6 hours as needed       glucosamine-chondroitin 500-400 MG tablet Take 1 tablet by mouth daily      aspirin 81 MG tablet Take 81 mg by mouth daily       omeprazole (PRILOSEC) 20 MG capsule Take 20 mg by mouth nightly        No current facility-administered medications for this visit.       Allergies   Allergen Reactions    Metronidazole Other (See Comments)     \" caused a prickly feeling in my legs \"    Adhesive Tape Other (See Comments)     opsite and paper tape ok, bandaid ok    Cymbalta [Duloxetine Hcl] Nausea And Vomiting    Neosporin [Neomycin-Polymyxin-Gramicidin] Rash       Health Maintenance   Topic Date Due    HIV screen  10/24/1979    Shingles Vaccine (2 of 3) 06/01/2017    Flu vaccine (1) 09/01/2020    Diabetes screen  02/10/2021    Annual Wellness Visit (AWV)  05/21/2021    Potassium monitoring  01/02/2022    Creatinine monitoring  01/02/2022    DTaP/Tdap/Td vaccine (2 - Td) 01/02/2023    Lipid screen  02/10/2023    Colon cancer screen colonoscopy  07/12/2027    Pneumococcal 0-64 years Vaccine  Completed    Hepatitis C screen  Completed    Hepatitis A vaccine  Aged Out    Hepatitis B vaccine  Aged Out    Hib vaccine  Aged C/ Richar Michael 19 Meningococcal (ACWY) vaccine  Aged Out       Subjective:      Review of Systems   Constitutional: Negative for chills, diaphoresis and fever. Respiratory: Negative. Cardiovascular: Negative. Gastrointestinal: Positive for nausea. Negative for vomiting. Endocrine: Negative. Neurological: Positive for dizziness. Objective:     /68   Pulse 81   Temp 97.5 °F (36.4 °C)   Wt 279 lb 6.4 oz (126.7 kg)   SpO2 96%   BMI 41.26 kg/m²   Physical Exam  Vitals signs and nursing note reviewed. Constitutional:       Appearance: Normal appearance. HENT:      Right Ear: Ear canal and external ear normal. A middle ear effusion (very small fluid line) is present. Left Ear: Ear canal and external ear normal.  No middle ear effusion. Cardiovascular:      Rate and Rhythm: Normal rate and regular rhythm. Heart sounds: Normal heart sounds. Pulmonary:      Effort: Pulmonary effort is normal.      Breath sounds: Normal breath sounds. Musculoskeletal:      Right lower leg: No edema. Left lower leg: No edema. Skin:     Comments: Dry all over   Neurological:      Mental Status: He is alert. Assessment:       Diagnosis Orders   1. Tinnitus of both ears     2. Vertigo     3. Xerosis cutis          Plan:    See ENT in f/u  Antivert refilled. Cerave cream and body wash  Gave script printed for Gabapentin for one month. Return if symptoms worsen or fail to improve. No orders of the defined types were placed in this encounter. Orders Placed This Encounter   Medications    meclizine (ANTIVERT) 25 MG tablet     Sig: Take 1 tablet by mouth 3 times daily as needed for Dizziness     Dispense:  45 tablet     Refill:  0    gabapentin (NEURONTIN) 400 MG capsule     Sig: Take 1 capsule by mouth 3 times daily for 30 days. Dispense:  90 capsule     Refill:  0       Patient given educationalmaterials - see patient instructions.   Discussed use, benefit, and side effectsof

## 2021-01-13 ENCOUNTER — TELEPHONE (OUTPATIENT)
Dept: PRIMARY CARE CLINIC | Age: 56
End: 2021-01-13

## 2021-01-13 NOTE — TELEPHONE ENCOUNTER
Pt states he tried the cerave and had an allergic reaction, he has hives, itching, red.  Would like to see an allergist, ok for referral? Pt denies oral swelling, advised er if he does develop oral swelling

## 2021-01-14 ENCOUNTER — HOSPITAL ENCOUNTER (OUTPATIENT)
Age: 56
Setting detail: SPECIMEN
Discharge: HOME | End: 2021-01-14
Payer: MEDICARE

## 2021-01-14 DIAGNOSIS — M19.90 INFLAMMATORY ARTHRITIS: ICD-10-CM

## 2021-01-14 DIAGNOSIS — I10 BENIGN ESSENTIAL HYPERTENSION: ICD-10-CM

## 2021-01-14 LAB
ANION GAP SERPL CALCULATED.3IONS-SCNC: 9 MMOL/L (ref 9–17)
BUN BLDV-MCNC: 15 MG/DL (ref 6–20)
BUN/CREAT BLD: ABNORMAL (ref 9–20)
C-REACTIVE PROTEIN: 12.5 MG/L (ref 0–5)
CALCIUM SERPL-MCNC: 9.1 MG/DL (ref 8.6–10.4)
CHLORIDE BLD-SCNC: 96 MMOL/L (ref 98–107)
CO2: 31 MMOL/L (ref 20–31)
CREAT SERPL-MCNC: 0.63 MG/DL (ref 0.7–1.2)
GFR AFRICAN AMERICAN: >60 ML/MIN
GFR NON-AFRICAN AMERICAN: >60 ML/MIN
GFR SERPL CREATININE-BSD FRML MDRD: ABNORMAL ML/MIN/{1.73_M2}
GFR SERPL CREATININE-BSD FRML MDRD: ABNORMAL ML/MIN/{1.73_M2}
GLUCOSE FASTING: 101 MG/DL (ref 70–99)
POTASSIUM SERPL-SCNC: 5 MMOL/L (ref 3.7–5.3)
SEDIMENTATION RATE, ERYTHROCYTE: 7 MM (ref 0–20)
SODIUM BLD-SCNC: 136 MMOL/L (ref 135–144)

## 2021-01-14 NOTE — TELEPHONE ENCOUNTER
Ok to see allergist. But I would advise he try a second product first. I would suggest our Xeracalm Cleansing Oil and Cream.

## 2021-01-14 NOTE — TELEPHONE ENCOUNTER
Patient notified of the message below -Verbalized Understanding   States that he will stop in tomorrow and will purchase this and try it

## 2021-01-19 ENCOUNTER — TELEPHONE (OUTPATIENT)
Dept: PRIMARY CARE CLINIC | Age: 56
End: 2021-01-19

## 2021-01-19 DIAGNOSIS — L85.3 XEROSIS CUTIS: Primary | ICD-10-CM

## 2021-01-21 RX ORDER — MECLIZINE HYDROCHLORIDE 25 MG/1
TABLET ORAL
Qty: 45 TABLET | Refills: 0 | Status: SHIPPED | OUTPATIENT
Start: 2021-01-21 | End: 2021-02-10

## 2021-01-25 NOTE — TELEPHONE ENCOUNTER
Amadou is a 13 year old who is being evaluated via a billable video visit.      How would you like to obtain your AVS? Mail a copy  If the video visit is dropped, the invitation should be resent by: Text to cell phone: [phone]  Will anyone else be joining your video visit? No      Video Start Time:   Video-Visit Details    Type of service:  Video Visit    Video End Time:    Originating Location (pt. Location): Home    Distant Location (provider location):  [facility] [place]     Platform used for Video Visit: [employer]     I increased his gabapentin to 500mg tid (take his 400mg and I sent in 100mg tablets) and switched the Mobic to Celebrex

## 2021-02-10 RX ORDER — MECLIZINE HYDROCHLORIDE 25 MG/1
TABLET ORAL
Qty: 45 TABLET | Refills: 0 | Status: SHIPPED | OUTPATIENT
Start: 2021-02-10 | End: 2021-02-25

## 2021-02-17 RX ORDER — GABAPENTIN 400 MG/1
CAPSULE ORAL
Qty: 90 CAPSULE | Refills: 0 | Status: SHIPPED | OUTPATIENT
Start: 2021-02-17 | End: 2021-03-17

## 2021-02-25 ENCOUNTER — TELEPHONE (OUTPATIENT)
Dept: PRIMARY CARE CLINIC | Age: 56
End: 2021-02-25

## 2021-02-25 RX ORDER — MECLIZINE HYDROCHLORIDE 25 MG/1
TABLET ORAL
Qty: 45 TABLET | Refills: 0 | Status: SHIPPED | OUTPATIENT
Start: 2021-02-25 | End: 2021-03-11

## 2021-02-25 NOTE — TELEPHONE ENCOUNTER
[facility] received a call from:Ramakrishna    Name of Caller: same    Relationship to patient:self    Organization name: na    Best contact number: same    Reason for call: Patient is calling today and made appt for Monday for f/u on back pain. He said right now the pain mgmt is saying has to wait another month for injection due to insur. He is in a lot of pain theysaid to f/u pcp. If he can get in sooner than Monday call him back. He says not bad enough to go to Er . Call patient.

## 2021-03-01 ENCOUNTER — OFFICE VISIT (OUTPATIENT)
Dept: PRIMARY CARE CLINIC | Age: 56
End: 2021-03-01
Payer: MEDICARE

## 2021-03-01 ENCOUNTER — TELEPHONE (OUTPATIENT)
Dept: PRIMARY CARE CLINIC | Age: 56
End: 2021-03-01

## 2021-03-01 VITALS
WEIGHT: 281.6 LBS | TEMPERATURE: 97.8 F | SYSTOLIC BLOOD PRESSURE: 128 MMHG | DIASTOLIC BLOOD PRESSURE: 74 MMHG | BODY MASS INDEX: 41.71 KG/M2 | HEART RATE: 69 BPM | OXYGEN SATURATION: 96 % | HEIGHT: 69 IN

## 2021-03-01 DIAGNOSIS — R60.0 LOCALIZED EDEMA: ICD-10-CM

## 2021-03-01 DIAGNOSIS — K13.0 ANGULAR CHEILITIS: ICD-10-CM

## 2021-03-01 DIAGNOSIS — E66.01 MORBIDLY OBESE (HCC): ICD-10-CM

## 2021-03-01 DIAGNOSIS — M54.16 LUMBAR RADICULOPATHY: Primary | ICD-10-CM

## 2021-03-01 DIAGNOSIS — Z87.891 PERSONAL HISTORY OF TOBACCO USE: ICD-10-CM

## 2021-03-01 DIAGNOSIS — J84.10 PULMONARY GRANULOMA (HCC): ICD-10-CM

## 2021-03-01 DIAGNOSIS — M54.16 LUMBAR RADICULOPATHY: ICD-10-CM

## 2021-03-01 DIAGNOSIS — M35.00 SJOGREN'S SYNDROME, WITH UNSPECIFIED ORGAN INVOLVEMENT (HCC): ICD-10-CM

## 2021-03-01 DIAGNOSIS — F10.21 ALCOHOL DEPENDENCE IN EARLY FULL REMISSION (HCC): ICD-10-CM

## 2021-03-01 DIAGNOSIS — Z87.891 PERSONAL HISTORY OF TOBACCO USE: Primary | ICD-10-CM

## 2021-03-01 PROCEDURE — 99214 OFFICE O/P EST MOD 30 MIN: CPT | Performed by: FAMILY MEDICINE

## 2021-03-01 RX ORDER — MUPIROCIN CALCIUM 20 MG/G
CREAM TOPICAL
Qty: 30 G | Refills: 1 | Status: SHIPPED | OUTPATIENT
Start: 2021-03-01 | End: 2021-03-31

## 2021-03-01 RX ORDER — TERBINAFINE HYDROCHLORIDE 250 MG/1
250 TABLET ORAL DAILY
COMMUNITY
Start: 2021-02-05 | End: 2021-06-01

## 2021-03-01 RX ORDER — FUROSEMIDE 20 MG/1
20 TABLET ORAL DAILY PRN
Qty: 90 TABLET | Refills: 1 | Status: SHIPPED | OUTPATIENT
Start: 2021-03-01 | End: 2021-04-20

## 2021-03-01 ASSESSMENT — ENCOUNTER SYMPTOMS
NAUSEA: 0
SORE THROAT: 0
SHORTNESS OF BREATH: 0
DIARRHEA: 0
COUGH: 0
VOMITING: 0
EYE DISCHARGE: 0
EYE REDNESS: 0
RHINORRHEA: 0
WHEEZING: 0
ABDOMINAL PAIN: 0

## 2021-03-01 ASSESSMENT — PATIENT HEALTH QUESTIONNAIRE - PHQ9
SUM OF ALL RESPONSES TO PHQ9 QUESTIONS 1 & 2: 2
1. LITTLE INTEREST OR PLEASURE IN DOING THINGS: 1

## 2021-03-01 NOTE — PROGRESS NOTES
[facility] PRIMARY CARE  [address] [facility] [address]  Dept: [phone]    Darrelyn Osler is a 63 y.o. male Established patient, who presents today for his medical conditions/complaintsas noted below. Chief Complaint   Patient presents with    Back Pain     Ongoing       HPI:     HPI  Pt has been seeing pain clinic. Had injection in to the right hip 9 months ago. Had some lower back injections since. States pain started to build up again. Called pain management, was advised he can't come in, needs to wait 90 days. Told to call his primary care physician. Getting deviated septum fixed in April. Continues to have tinnitus. On Plaquenil for Sjogrens syndrome. States no change. Pt wanting to lose weight. Has been trying diet. Afraid to exercise due to back pain. Patient also complaining of rash in the corners of the mouth. States has been present for some time. States it hurts. Has tried no medication. Patient also complaining of swelling in the feet. States since he has been eating asleep in his lazy boy chair due to his back pain now having swelling of the ankles.     Reviewed prior notes Orthopedics  Reviewed previous Labs and Imaging    LDL Cholesterol (mg/dL)   Date Value   02/10/2018 94   07/23/2017 98       (goal LDL is <100)   AST (U/L)   Date Value   01/02/2021 16     ALT (U/L)   Date Value   01/02/2021 13     BUN (mg/dL)   Date Value   01/14/2021 15     Hemoglobin A1C (%)   Date Value   02/10/2018 5.5     TSH (mIU/L)   Date Value   01/06/2020 0.97     BP Readings from Last 3 Encounters:   03/01/21 128/74   01/08/21 120/68   01/02/21 117/69          (goal 120/80)    Past Medical History:   Diagnosis Date    Arthritis     BACK AND FINGERS     Chronic back pain     dr Eliana Lyn of [facility]    Dizziness     GERD (gastroesophageal reflux disease)     H/O chest pain     H/O dermatitis     [place] (hard of hearing) LEFT EAR WORSE THAN RIGHT.  Hypertension     DR Riddhi Flores PCP    Kyphosis     Left eye injury     BUNGY CORD STRAP BUSTED AND INJURED LEFT EYE    Muscle spasm     LOWER RIGHT BACK    Nocturia     Prolonged emergence from general anesthesia     PATIENT STATES HIS B/P WOULD DROP WITH INTUBATION, GO UP AFTER ET TUBE REMOVED.  PVC's (premature ventricular contractions)     PVC'S.  NO CARDIOLOGIST, PCP DR Dann Anna     occas to see rheumatoid arthritis  workup for lupus    Snores     mild, denies apnea, does \"grind\" his teeth    Use of cane as ambulatory aid     Uses walker     Wears glasses     Wears partial dentures     LOWER      Past Surgical History:   Procedure Laterality Date    BACK SURGERY  2006    cage    COLONOSCOPY      CYST REMOVAL Right     index finger    HEMORRHOID SURGERY  2017    HERNIA REPAIR      UMBILICAL    NOSE SURGERY      REALIGNED NOSE    OTHER SURGICAL HISTORY  2020    MRI cervical and thoracic spine without contrast . CT of lung , all under general anesthesia    [facility] COLSC FLX W/REMOVAL LESION BY HOT BX FORCEPS N/A 2017    COLONOSCOPY POLYPECTOMY HOT BIOPSY performed by Darcy Kenny MD at [facility] TOE SURGERY Right     2nd toe    TONSILLECTOMY      AS A CHILD    VASECTOMY         Family History   Problem Relation Age of Onset    Coronary Art Dis Father         premature-- at 52    Diabetes Father     High Blood Pressure Father     Other Father         COPD, EMPHYSEMA    Coronary Art Dis Paternal Uncle         premature    Heart Failure Paternal Uncle     Diabetes Mother        Social History     Tobacco Use    Smoking status: Former Smoker     Packs/day: 1.50     Years: 23.00     Pack years: 34.50     Types: Cigarettes     Quit date:      Years since quitting: 15.1    Smokeless tobacco: Former User     Types: Chew     Quit date: 9/10/2019    Tobacco comment: quit 14 years ago   Substance Use Topics    Alcohol use: Not Currently     Comment: 2017      Current Outpatient Medications   Medication Sig Dispense Refill    terbinafine (LAMISIL) 250 MG tablet Take 250 mg by mouth daily      mupirocin (BACTROBAN) 2 % cream Apply 2 times daily. 30 g 1    ciclopirox (LOPROX) 0.77 % cream Apply topically 2 times daily. 30 g 1    furosemide (LASIX) 20 MG tablet Take 1 tablet by mouth daily as needed (swelling) 90 tablet 1    meclizine (ANTIVERT) 25 MG tablet TAKE 1 TABLET BY MOUTH THREE TIMES A DAY AS NEEDED FOR DIZZINESS 45 tablet 0    gabapentin (NEURONTIN) 400 MG capsule TAKE 1 CAPSULE BY MOUTH THREE TIMES A DAY  90 capsule 0    fluticasone (FLONASE) 50 MCG/ACT nasal spray 2 sprays by Each Nostril route daily      hydroxychloroquine (PLAQUENIL) 200 MG tablet Take 1 tablet by mouth 2 times daily 180 tablet 3    metoprolol tartrate (LOPRESSOR) 50 MG tablet TAKE 1 TABLET BY MOUTH TWO TIMES A  tablet 2    celecoxib (CELEBREX) 200 MG capsule Take 1 capsule by mouth 2 times daily 180 capsule 3    lisinopril (PRINIVIL;ZESTRIL) 10 MG tablet TAKE 1 TABLET BY MOUTH ONE TIME A DAY  90 tablet 3    hydrocortisone (WESTCORT) 0.2 % cream Apply topically 2 times daily. 45 g 1    Multiple Vitamins-Minerals (THERAPEUTIC MULTIVITAMIN-MINERALS) tablet Take 1 tablet by mouth daily      Sod Fluoride-Potassium Nitrate (PREVIDENT 5000 SENSITIVE) 1.1-5 % PSTE Place onto teeth      Magnesium Cl-Calcium Carbonate (SLOW-MAG PO) Take 1 tablet by mouth 2 times daily      VITAMIN D PO Take 1,000 Units by mouth daily      Acetaminophen (TYLENOL ARTHRITIS PAIN PO) Take 325 mg by mouth every 6 hours as needed       glucosamine-chondroitin 500-400 MG tablet Take 1 tablet by mouth daily      aspirin 81 MG tablet Take 81 mg by mouth daily       omeprazole (PRILOSEC) 20 MG capsule Take 20 mg by mouth nightly        No current facility-administered medications for this visit.       Allergies   Allergen Reactions    Metronidazole Other (See Comments) \" caused a prickly feeling in my legs \"    Adhesive Tape Other (See Comments)     opsite and paper tape ok, bandaid ok    Cymbalta [Duloxetine Hcl] Nausea And Vomiting    Neosporin [Neomycin-Polymyxin-Gramicidin] Rash       Health Maintenance   Topic Date Due    HIV screen  10/24/1979    Shingles Vaccine (2 of 3) 06/01/2017    Flu vaccine (1) 09/01/2020    Annual Wellness Visit (AWV)  05/21/2021    Potassium monitoring  01/14/2022    Creatinine monitoring  01/14/2022    DTaP/Tdap/Td vaccine (2 - Td) 01/02/2023    Lipid screen  02/10/2023    Diabetes screen  01/14/2024    Colon cancer screen colonoscopy  07/12/2027    Pneumococcal 0-64 years Vaccine  Completed    Hepatitis C screen  Completed    Hepatitis A vaccine  Aged Out    Hepatitis B vaccine  Aged Out    Hib vaccine  Aged Out    Meningococcal (ACWY) vaccine  Aged Out       Subjective:      Review of Systems   Constitutional: Negative for chills and fever. HENT: Negative for rhinorrhea and sore throat. Eyes: Negative for discharge and redness. Respiratory: Negative for cough, shortness of breath and wheezing. Cardiovascular: Negative for chest pain and palpitations. Gastrointestinal: Negative for abdominal pain, diarrhea, nausea and vomiting. Genitourinary: Negative for dysuria and frequency. Musculoskeletal: Negative for arthralgias and myalgias. Neurological: Negative for dizziness, light-headedness and headaches. Psychiatric/Behavioral: Negative for sleep disturbance. Objective:     /74   Pulse 69   Temp 97.8 °F (36.6 °C)   Ht 5' 9\" (1.753 m)   Wt 281 lb 9.6 oz (127.7 kg)   SpO2 96%   BMI 41.59 kg/m²   Physical Exam  Vitals signs and nursing note reviewed. Constitutional:       General: He is not in acute distress. Appearance: He is well-developed. He is not ill-appearing. HENT:      Head: Normocephalic and atraumatic.       Right Ear: External ear normal.      Left Ear: External ear normal. Eyes:      General: No scleral icterus. Right eye: No discharge. Left eye: No discharge. Conjunctiva/sclera: Conjunctivae normal.      Pupils: Pupils are equal, round, and reactive to light. Neck:      Thyroid: No thyromegaly. Trachea: No tracheal deviation. Cardiovascular:      Rate and Rhythm: Normal rate and regular rhythm. Heart sounds: Normal heart sounds. Pulmonary:      Effort: Pulmonary effort is normal. No respiratory distress. Breath sounds: Normal breath sounds. No wheezing. Musculoskeletal:      Right lower leg: Edema present. Left lower leg: Edema present. Comments: 1+ pitting edema lower extremities   Lymphadenopathy:      Cervical: No cervical adenopathy. Skin:     General: Skin is warm. Findings: No rash. Neurological:      Mental Status: He is alert and oriented to person, place, and time. Psychiatric:         Mood and Affect: Mood normal.         Behavior: Behavior normal.         Thought Content: Thought content normal.         Assessment:       Diagnosis Orders   1. Lumbar radiculopathy  MRI LUMBAR SPINE [address] [facility], [facility], [address] [facility], [facility], [place]   2. Sjogren's syndrome, with unspecified organ involvement ([address]     3. Alcohol dependence in early full remission ([address]     4. Morbidly obese ([address]     5. Pulmonary granuloma (HCC)     6. Personal history of tobacco use  CT LUNG SCREENING   7. Angular cheilitis     8. Localized edema  furosemide (LASIX) 20 MG tablet        Plan:    mri lumbar spine  Refer to neurosurgery for opinion on surgery  Keep follow up with ENT for tinnitus and hearing aids  Pt to get low dose ct chest done  Start Lasix 20 mg daily as needed  Bactroban and Loprox for angular cheilitis  Sjogren's under good control. Continue Plaquenil as is. Patient still not drinking alcohol. Weight loss options discussed. Return in about 3 months (around 6/1/2021), or Medicare wellness.     Orders Placed This Encounter   Procedures    MRI LUMBAR SPINE WO CONTRAST     Standing Status:   Future     Standing Expiration Date:   3/1/2022     Order Specific Question:   Reason for exam:     Answer:   right lumbar radiculopathy    CT LUNG SCREENING     Standing Status:   Future     Standing Expiration Date:   9/1/2021     Order Specific Question:   Is there documentation of shared decision making? Answer:   Yes     Order Specific Question:   Does the patient show any signs or symptoms of lung cancer? Answer:   No     Order Specific Question:   Is this the first (baseline) CT or an annual exam?     Answer: Annual [2]     Order Specific Question:   Is this a low dose CT or a routine CT? Answer:   Low Dose CT [1]     Order Specific Question:   Smoking Status? Answer: Former Smoker [4]     Order Specific Question:   Date quit smoking? (must be within 15 years)     Answer:   1/1/2006     Order Specific Question:   Smoking packs per day? Answer:   1.5     Order Specific Question:   Years smoking? Answer:   [address], [address], [place], Neurosurgery, [place]     Referral Priority:   Routine     Referral Type:   Eval and Treat     Referred to Provider:   Dimitris Kim DO     Requested Specialty:   Neurosurgery     Number of Visits Requested:   1     Orders Placed This Encounter   Medications    mupirocin (BACTROBAN) 2 % cream     Sig: Apply 2 times daily. Dispense:  30 g     Refill:  1    ciclopirox (LOPROX) 0.77 % cream     Sig: Apply topically 2 times daily. Dispense:  30 g     Refill:  1    furosemide (LASIX) 20 MG tablet     Sig: Take 1 tablet by mouth daily as needed (swelling)     Dispense:  90 tablet     Refill:  1       Patient given educationalmaterials - see patient instructions. Discussed use, benefit, and side effectsof prescribed medications. All patient questions answered. Pt voiced understanding. Reviewed health maintenance.   Instructed to continue current medications, diet andexercise. Patient agreed with treatment plan. Follow up as directed.      Electronicallysigned by Syed Arellano MD on 3/1/2021 at 11:08 AM

## 2021-03-05 ENCOUNTER — HOSPITAL ENCOUNTER (OUTPATIENT)
Dept: LAB | Age: 56
Setting detail: SPECIMEN
Discharge: HOME | End: 2021-03-05
Payer: MEDICARE

## 2021-03-05 DIAGNOSIS — Z01.818 PRE-OP TESTING: Primary | ICD-10-CM

## 2021-03-05 PROCEDURE — U0003 INFECTIOUS AGENT DETECTION BY NUCLEIC ACID (DNA OR RNA); SEVERE ACUTE RESPIRATORY SYNDROME CORONAVIRUS 2 (SARS-COV-2) (CORONAVIRUS DISEASE [COVID-19]), AMPLIFIED PROBE TECHNIQUE, MAKING USE OF HIGH THROUGHPUT TECHNOLOGIES AS DESCRIBED BY CMS-2020-01-R: HCPCS

## 2021-03-05 PROCEDURE — U0005 INFEC AGEN DETEC AMPLI PROBE: HCPCS

## 2021-03-06 LAB
SARS-COV-2: NORMAL
SARS-COV-2: NOT DETECTED
SOURCE: NORMAL

## 2021-03-08 ENCOUNTER — TELEPHONE (OUTPATIENT)
Dept: ONCOLOGY | Age: 56
End: 2021-03-08

## 2021-03-09 ENCOUNTER — ANESTHESIA (OUTPATIENT)
Dept: CT IMAGING | Age: 56
End: 2021-03-09

## 2021-03-09 ENCOUNTER — HOSPITAL ENCOUNTER (OUTPATIENT)
Dept: CT IMAGING | Age: 56
Discharge: HOME | End: 2021-03-11
Payer: MEDICARE

## 2021-03-09 ENCOUNTER — HOSPITAL ENCOUNTER (OUTPATIENT)
Dept: MRI IMAGING | Age: 56
Discharge: HOME | End: 2021-03-11
Payer: MEDICARE

## 2021-03-09 ENCOUNTER — ANESTHESIA EVENT (OUTPATIENT)
Dept: MRI IMAGING | Age: 56
End: 2021-03-09

## 2021-03-09 ENCOUNTER — ANESTHESIA EVENT (OUTPATIENT)
Dept: CT IMAGING | Age: 56
End: 2021-03-09

## 2021-03-09 ENCOUNTER — ANESTHESIA (OUTPATIENT)
Dept: MRI IMAGING | Age: 56
End: 2021-03-09

## 2021-03-09 ENCOUNTER — APPOINTMENT (OUTPATIENT)
Dept: MRI IMAGING | Age: 56
End: 2021-03-09
Payer: MEDICARE

## 2021-03-09 VITALS
HEART RATE: 86 BPM | OXYGEN SATURATION: 94 % | SYSTOLIC BLOOD PRESSURE: 110 MMHG | DIASTOLIC BLOOD PRESSURE: 78 MMHG | RESPIRATION RATE: 16 BRPM | TEMPERATURE: 97.9 F

## 2021-03-09 VITALS
HEIGHT: 69 IN | SYSTOLIC BLOOD PRESSURE: 116 MMHG | HEART RATE: 74 BPM | WEIGHT: 285 LBS | RESPIRATION RATE: 16 BRPM | OXYGEN SATURATION: 98 % | TEMPERATURE: 97.3 F | BODY MASS INDEX: 42.21 KG/M2 | DIASTOLIC BLOOD PRESSURE: 72 MMHG

## 2021-03-09 VITALS
DIASTOLIC BLOOD PRESSURE: 72 MMHG | OXYGEN SATURATION: 100 % | SYSTOLIC BLOOD PRESSURE: 104 MMHG | RESPIRATION RATE: 23 BRPM

## 2021-03-09 VITALS — SYSTOLIC BLOOD PRESSURE: 110 MMHG | OXYGEN SATURATION: 99 % | DIASTOLIC BLOOD PRESSURE: 60 MMHG

## 2021-03-09 DIAGNOSIS — M54.16 LUMBAR RADICULOPATHY: ICD-10-CM

## 2021-03-09 DIAGNOSIS — Z87.891 PERSONAL HISTORY OF TOBACCO USE: ICD-10-CM

## 2021-03-09 DIAGNOSIS — H90.3 SENSORY HEARING LOSS, BILATERAL: ICD-10-CM

## 2021-03-09 DIAGNOSIS — H93.13 TINNITUS OF BOTH EARS: ICD-10-CM

## 2021-03-09 LAB — POC POTASSIUM: 4.7 MMOL/L (ref 3.5–4.5)

## 2021-03-09 PROCEDURE — 2500000003 HC RX 250 WO HCPCS: Performed by: NURSE ANESTHETIST, CERTIFIED REGISTERED

## 2021-03-09 PROCEDURE — 72148 MRI LUMBAR SPINE W/O DYE: CPT

## 2021-03-09 PROCEDURE — 7100000010 HC PHASE II RECOVERY - FIRST 15 MIN

## 2021-03-09 PROCEDURE — 3700000000 HC ANESTHESIA ATTENDED CARE

## 2021-03-09 PROCEDURE — 71271 CT THORAX LUNG CANCER SCR C-: CPT

## 2021-03-09 PROCEDURE — 6360000002 HC RX W HCPCS: Performed by: NURSE ANESTHETIST, CERTIFIED REGISTERED

## 2021-03-09 PROCEDURE — 2580000003 HC RX 258: Performed by: ANESTHESIOLOGY

## 2021-03-09 PROCEDURE — 93005 ELECTROCARDIOGRAM TRACING: CPT | Performed by: ANESTHESIOLOGY

## 2021-03-09 PROCEDURE — 6360000002 HC RX W HCPCS: Performed by: ANESTHESIOLOGY

## 2021-03-09 PROCEDURE — 3700000001 HC ADD 15 MINUTES (ANESTHESIA)

## 2021-03-09 PROCEDURE — 7100000011 HC PHASE II RECOVERY - ADDTL 15 MIN

## 2021-03-09 PROCEDURE — 84132 ASSAY OF SERUM POTASSIUM: CPT

## 2021-03-09 RX ORDER — HYDRALAZINE HYDROCHLORIDE 20 MG/ML
5 INJECTION INTRAMUSCULAR; INTRAVENOUS EVERY 10 MIN PRN
Status: DISCONTINUED | OUTPATIENT
Start: 2021-03-09 | End: 2021-03-12 | Stop reason: HOSPADM

## 2021-03-09 RX ORDER — FENTANYL CITRATE 50 UG/ML
50 INJECTION, SOLUTION INTRAMUSCULAR; INTRAVENOUS EVERY 5 MIN PRN
Status: DISCONTINUED | OUTPATIENT
Start: 2021-03-09 | End: 2021-03-12 | Stop reason: HOSPADM

## 2021-03-09 RX ORDER — PROMETHAZINE HYDROCHLORIDE 25 MG/ML
6.25 INJECTION, SOLUTION INTRAMUSCULAR; INTRAVENOUS
Status: ACTIVE | OUTPATIENT
Start: 2021-03-09 | End: 2021-03-09

## 2021-03-09 RX ORDER — SODIUM CHLORIDE, SODIUM LACTATE, POTASSIUM CHLORIDE, CALCIUM CHLORIDE 600; 310; 30; 20 MG/100ML; MG/100ML; MG/100ML; MG/100ML
INJECTION, SOLUTION INTRAVENOUS CONTINUOUS
Status: DISCONTINUED | OUTPATIENT
Start: 2021-03-09 | End: 2021-03-12 | Stop reason: HOSPADM

## 2021-03-09 RX ORDER — 0.9 % SODIUM CHLORIDE 0.9 %
500 INTRAVENOUS SOLUTION INTRAVENOUS
Status: ACTIVE | OUTPATIENT
Start: 2021-03-09 | End: 2021-03-09

## 2021-03-09 RX ORDER — OXYCODONE HYDROCHLORIDE AND ACETAMINOPHEN 5; 325 MG/1; MG/1
1 TABLET ORAL EVERY 4 HOURS PRN
Status: DISCONTINUED | OUTPATIENT
Start: 2021-03-09 | End: 2021-03-12 | Stop reason: HOSPADM

## 2021-03-09 RX ORDER — FENTANYL CITRATE 50 UG/ML
25 INJECTION, SOLUTION INTRAMUSCULAR; INTRAVENOUS EVERY 5 MIN PRN
Status: DISCONTINUED | OUTPATIENT
Start: 2021-03-09 | End: 2021-03-12 | Stop reason: HOSPADM

## 2021-03-09 RX ORDER — PROPOFOL 10 MG/ML
INJECTION, EMULSION INTRAVENOUS CONTINUOUS PRN
Status: DISCONTINUED | OUTPATIENT
Start: 2021-03-09 | End: 2021-03-09 | Stop reason: SDUPTHER

## 2021-03-09 RX ORDER — GLYCOPYRROLATE 1 MG/5 ML
SYRINGE (ML) INTRAVENOUS PRN
Status: DISCONTINUED | OUTPATIENT
Start: 2021-03-09 | End: 2021-03-09 | Stop reason: SDUPTHER

## 2021-03-09 RX ORDER — LABETALOL HYDROCHLORIDE 5 MG/ML
5 INJECTION, SOLUTION INTRAVENOUS EVERY 10 MIN PRN
Status: DISCONTINUED | OUTPATIENT
Start: 2021-03-09 | End: 2021-03-12 | Stop reason: HOSPADM

## 2021-03-09 RX ORDER — FENTANYL CITRATE 50 UG/ML
INJECTION, SOLUTION INTRAMUSCULAR; INTRAVENOUS
Status: DISCONTINUED
Start: 2021-03-09 | End: 2021-03-09

## 2021-03-09 RX ORDER — DIPHENHYDRAMINE HYDROCHLORIDE 50 MG/ML
12.5 INJECTION INTRAMUSCULAR; INTRAVENOUS
Status: ACTIVE | OUTPATIENT
Start: 2021-03-09 | End: 2021-03-09

## 2021-03-09 RX ORDER — KETAMINE HYDROCHLORIDE 10 MG/ML
INJECTION, SOLUTION INTRAMUSCULAR; INTRAVENOUS PRN
Status: DISCONTINUED | OUTPATIENT
Start: 2021-03-09 | End: 2021-03-09 | Stop reason: SDUPTHER

## 2021-03-09 RX ORDER — KETAMINE HYDROCHLORIDE 100 MG/ML
INJECTION, SOLUTION INTRAMUSCULAR; INTRAVENOUS PRN
Status: DISCONTINUED | OUTPATIENT
Start: 2021-03-09 | End: 2021-03-09 | Stop reason: SDUPTHER

## 2021-03-09 RX ORDER — MIDAZOLAM HYDROCHLORIDE 1 MG/ML
INJECTION INTRAMUSCULAR; INTRAVENOUS PRN
Status: DISCONTINUED | OUTPATIENT
Start: 2021-03-09 | End: 2021-03-09 | Stop reason: SDUPTHER

## 2021-03-09 RX ORDER — ONDANSETRON 2 MG/ML
4 INJECTION INTRAMUSCULAR; INTRAVENOUS
Status: ACTIVE | OUTPATIENT
Start: 2021-03-09 | End: 2021-03-09

## 2021-03-09 RX ADMIN — PROPOFOL 150 MCG/KG/MIN: 10 INJECTION, EMULSION INTRAVENOUS at 09:10

## 2021-03-09 RX ADMIN — KETAMINE HYDROCHLORIDE 50 MG: 10 INJECTION INTRAMUSCULAR; INTRAVENOUS at 10:11

## 2021-03-09 RX ADMIN — PROPOFOL 100 MCG/KG/MIN: 10 INJECTION, EMULSION INTRAVENOUS at 10:10

## 2021-03-09 RX ADMIN — MIDAZOLAM HYDROCHLORIDE 2 MG: 1 INJECTION, SOLUTION INTRAMUSCULAR; INTRAVENOUS at 09:30

## 2021-03-09 RX ADMIN — KETAMINE HYDROCHLORIDE 75 MG: 100 INJECTION, SOLUTION, CONCENTRATE INTRAMUSCULAR; INTRAVENOUS at 09:10

## 2021-03-09 RX ADMIN — MIDAZOLAM HYDROCHLORIDE 2 MG: 1 INJECTION, SOLUTION INTRAMUSCULAR; INTRAVENOUS at 09:05

## 2021-03-09 RX ADMIN — FENTANYL CITRATE 50 MCG: 50 INJECTION, SOLUTION INTRAMUSCULAR; INTRAVENOUS at 11:46

## 2021-03-09 RX ADMIN — Medication 0.2 MG: at 09:10

## 2021-03-09 RX ADMIN — SODIUM CHLORIDE, POTASSIUM CHLORIDE, SODIUM LACTATE AND CALCIUM CHLORIDE: 600; 310; 30; 20 INJECTION, SOLUTION INTRAVENOUS at 11:20

## 2021-03-09 RX ADMIN — SODIUM CHLORIDE, POTASSIUM CHLORIDE, SODIUM LACTATE AND CALCIUM CHLORIDE: 600; 310; 30; 20 INJECTION, SOLUTION INTRAVENOUS at 09:04

## 2021-03-09 ASSESSMENT — PAIN DESCRIPTION - LOCATION: LOCATION: BACK

## 2021-03-09 ASSESSMENT — ENCOUNTER SYMPTOMS: SHORTNESS OF BREATH: 1

## 2021-03-09 ASSESSMENT — PAIN SCALES - GENERAL
PAINLEVEL_OUTOF10: 10

## 2021-03-09 NOTE — H&P
History and Physical    Pt Name: Caesar Swanson  MRN: [id number]  YOB: 1965  Date of evaluation: 3/9/2021  Primary Care Physician: Lucas Grossman MD    SUBJECTIVE:   History of Chief Complaint:    Caesar Swanson is a 63 y.o. male who is scheduled today for CT lung screening, MRI Brain and Lumbar with anesthesia. He reports a history of ongoing back pain and history of kyphosis. He reports \"severe muscle spasms and he is unable to lie down in bed and has been sleeping sitting upright in a chair. He is s/p lumbar surgery in 2006. He also reports a history of vertigo and is having this evaluated after years. He reports being an ex-smoker, quit in 2006 after 23 years of smoking so is having CT lung screening. These studies were able to be coordinated to be done along with MRI lumbar with anesthesia. Allergies  is allergic to metronidazole; adhesive tape; cymbalta [duloxetine hcl]; and neosporin [neomycin-polymyxin-gramicidin]. Medications  Prior to Admission medications    Medication Sig Start Date End Date Taking? Authorizing Provider   terbinafine (LAMISIL) 250 MG tablet Take 250 mg by mouth daily 2/5/21   Historical Provider, MD   mupirocin (BACTROBAN) 2 % cream Apply 2 times daily. 3/1/21 3/31/21  Lucas Grossman MD   ciclopirox (LOPROX) 0.77 % cream Apply topically 2 times daily.  3/1/21   Lucas Grossman MD   furosemide (LASIX) 20 MG tablet Take 1 tablet by mouth daily as needed (swelling) 3/1/21   Lucas Grossman MD   meclizine (ANTIVERT) 25 MG tablet TAKE 1 TABLET BY MOUTH THREE TIMES A DAY AS NEEDED FOR DIZZINESS 2/25/21   Lucas Grossman MD   gabapentin (NEURONTIN) 400 MG capsule TAKE 1 CAPSULE BY MOUTH THREE TIMES A DAY  2/17/21 3/19/21  Lucas Grossman MD   hydroxychloroquine (PLAQUENIL) 200 MG tablet Take 1 tablet by mouth 2 times daily 12/23/20   Lucas Grossman MD   metoprolol tartrate (LOPRESSOR) 50 MG tablet TAKE 1 TABLET BY MOUTH TWO TIMES A DAY flx w/removal lesion by hot bx forceps (N/A, 2017); back surgery (); hernia repair; Tonsillectomy; and other surgical history (2020). Social History   reports that he quit smoking about 15 years ago. His smoking use included cigarettes. He has a 34.50 pack-year smoking history. He quit smokeless tobacco use about 17 months ago. His smokeless tobacco use included chew. reports previous alcohol use. reports no history of drug use. Marital Status    Children   Occupation disabled. Family History  Family Status   Relation Name Status    Father      PUn  (Not Specified)    [facility]  (Not Specified)    Mother  Alive     family history includes Coronary Art Dis in his father and paternal uncle; Diabetes in his father and mother; Heart Failure in his paternal uncle; High Blood Pressure in his father; Other in his father. OBJECTIVE:   VITALS: per [address] & oriented x 3, no acute distress. Friendly. Very pleasant. Significant kyphosis. SKIN:  Warm and dry, no rashes on exposed areas of skin   HEAD:  Normocephalic, atraumatic   EYES: PERRL. EOMs intact. EARS:  Equal bilaterally, no edema or thickening, skin is intact without lumps or lesions. No discharge. Hearing grossly WNL. NOSE:  Nares patent. No rhinorrhea   MOUTH/THROAT:  Mucous membranes moist, tongue is pink, uvula midline, teeth appear to be intact, partial dentures removed  NECK:supple, no lymphadenopathy, large thick neck   LUNGS: Respirations even and non-labored. Clear to auscultation bilaterally, no wheezes, rales, or rhonchi. CARDIOVASCULAR: Regular rate and rhythm, no murmurs/rubs/gallops   ABDOMEN: soft, non-tender, non-distended, bowel sounds active x 4, obese  EXTREMITIES: 1+ pitting edema bilateral lower extremities. No varicosities bilateral lower extremities. NEUROLOGIC: CN II-XII are grossly intact. Gait not assessed.    IMPRESSIONS:   Chronic back pain, lumbar

## 2021-03-09 NOTE — ANESTHESIA PRE PROCEDURE
Department of Anesthesiology  Preprocedure Note       Name:  Alex Mascorro   Age:  63 y.o.  :  1965                                          MRN:  [id number]         Date:  3/9/2021      Surgeon: * Surgery not found *    Procedure:     Medications prior to admission:   Prior to Admission medications    Medication Sig Start Date End Date Taking? Authorizing Provider   terbinafine (LAMISIL) 250 MG tablet Take 250 mg by mouth daily 21   Historical Provider, MD   mupirocin (BACTROBAN) 2 % cream Apply 2 times daily. 3/1/21 3/31/21  Frederick Sullivan MD   ciclopirox (LOPROX) 0.77 % cream Apply topically 2 times daily. 3/1/21   Frederick Sullivan MD   furosemide (LASIX) 20 MG tablet Take 1 tablet by mouth daily as needed (swelling) 3/1/21   Frederick Sullivan MD   meclizine (ANTIVERT) 25 MG tablet TAKE 1 TABLET BY MOUTH THREE TIMES A DAY AS NEEDED FOR DIZZINESS 21   Frederick Sullivan MD   gabapentin (NEURONTIN) 400 MG capsule TAKE 1 CAPSULE BY MOUTH THREE TIMES A DAY  2/17/21 3/19/21  Frederick Sullivan MD   hydroxychloroquine (PLAQUENIL) 200 MG tablet Take 1 tablet by mouth 2 times daily 20   Frederick Sullivan MD   metoprolol tartrate (LOPRESSOR) 50 MG tablet TAKE 1 TABLET BY MOUTH TWO TIMES A DAY 20   Frederick Sullivan MD   celecoxib (CELEBREX) 200 MG capsule Take 1 capsule by mouth 2 times daily 20   Frederick Sullivan MD   lisinopril (PRINIVIL;ZESTRIL) 10 MG tablet TAKE 1 TABLET BY MOUTH ONE TIME A DAY  20   Frederick Sullivan MD   hydrocortisone (WESTCORT) 0.2 % cream Apply topically 2 times daily.  20   Gaetano MusicADAM   Multiple Vitamins-Minerals (THERAPEUTIC MULTIVITAMIN-MINERALS) tablet Take 1 tablet by mouth daily    Historical Provider, MD   Sod Fluoride-Potassium Nitrate (PREVIDENT 5000 SENSITIVE) 1.1-5 % PSTE Place onto teeth    Historical Provider, MD   Magnesium Cl-Calcium Carbonate (SLOW-MAG PO) Take 1 tablet by mouth 2 times daily Historical Provider, MD   VITAMIN D PO Take 1,000 Units by mouth daily    Historical Provider, MD   Acetaminophen (TYLENOL ARTHRITIS PAIN PO) Take 325 mg by mouth every 6 hours as needed     Historical Provider, MD   glucosamine-chondroitin 500-400 MG tablet Take 1 tablet by mouth daily    Historical Provider, MD   aspirin 81 MG tablet Take 81 mg by mouth daily     Historical Provider, MD   omeprazole (PRILOSEC) 20 MG capsule Take 20 mg by mouth nightly     Historical Provider, MD       Current medications:    Current Outpatient Medications   Medication Sig Dispense Refill    terbinafine (LAMISIL) 250 MG tablet Take 250 mg by mouth daily      mupirocin (BACTROBAN) 2 % cream Apply 2 times daily. 30 g 1    ciclopirox (LOPROX) 0.77 % cream Apply topically 2 times daily. 30 g 1    furosemide (LASIX) 20 MG tablet Take 1 tablet by mouth daily as needed (swelling) 90 tablet 1    meclizine (ANTIVERT) 25 MG tablet TAKE 1 TABLET BY MOUTH THREE TIMES A DAY AS NEEDED FOR DIZZINESS 45 tablet 0    gabapentin (NEURONTIN) 400 MG capsule TAKE 1 CAPSULE BY MOUTH THREE TIMES A DAY  90 capsule 0    hydroxychloroquine (PLAQUENIL) 200 MG tablet Take 1 tablet by mouth 2 times daily 180 tablet 3    metoprolol tartrate (LOPRESSOR) 50 MG tablet TAKE 1 TABLET BY MOUTH TWO TIMES A  tablet 2    celecoxib (CELEBREX) 200 MG capsule Take 1 capsule by mouth 2 times daily 180 capsule 3    lisinopril (PRINIVIL;ZESTRIL) 10 MG tablet TAKE 1 TABLET BY MOUTH ONE TIME A DAY  90 tablet 3    hydrocortisone (WESTCORT) 0.2 % cream Apply topically 2 times daily.  45 g 1    Multiple Vitamins-Minerals (THERAPEUTIC MULTIVITAMIN-MINERALS) tablet Take 1 tablet by mouth daily      Sod Fluoride-Potassium Nitrate (PREVIDENT 5000 SENSITIVE) 1.1-5 % PSTE Place onto teeth      Magnesium Cl-Calcium Carbonate (SLOW-MAG PO) Take 1 tablet by mouth 2 times daily      VITAMIN D PO Take 1,000 Units by mouth daily      Acetaminophen (TYLENOL ARTHRITIS PAIN PO) Take 325 mg by mouth every 6 hours as needed       glucosamine-chondroitin 500-400 MG tablet Take 1 tablet by mouth daily      aspirin 81 MG tablet Take 81 mg by mouth daily       omeprazole (PRILOSEC) 20 MG capsule Take 20 mg by mouth nightly        No current facility-administered medications for this encounter. Allergies:     Allergies   Allergen Reactions    Metronidazole Other (See Comments)     \" caused a prickly feeling in my legs \"    Adhesive Tape Other (See Comments)     opsite and paper tape ok, bandaid ok    Cymbalta [Duloxetine Hcl] Nausea And Vomiting    Neosporin [Neomycin-Polymyxin-Gramicidin] Rash       Problem List:    Patient Active Problem List   Diagnosis Code    Anxiety disorder F41.9    Benign essential hypertension I10    Esophageal reflux K21.9    Hypogonadism male E29.1    Inflammatory arthritis M19.90    Kyphoscoliosis M41.9    Lumbar radicular pain M54.16    Pulmonary granuloma ([facility] [address] J84.10    Male erectile disorder N52.9    Sensorineural hearing loss H90.5    Tinnitus of both ears H93.13    Vertigo R42    Psoriasis L40.9    Hip impingement syndrome M25.859    Varicose vein of leg I83.90    Alcohol dependence in early full remission ([address] F10.21    Neoplasm of uncertain behavior of skin D48.5    Seborrheic keratosis L82.1    Kyphosis of thoracolumbar region M40.205    Eczema L30.9    Sjogren's syndrome ([facility]) M35.00    Spinal stenosis of lumbar region with neurogenic claudication M48.062    Trochanteric bursitis, right hip M70.61    Lumbosacral spondylosis without myelopathy M47.817    Morbidly obese ([facility]) E66.01    Diffuse idiopathic skeletal hyperostosis M48.10    Xerosis cutis L85.3    Angular cheilitis K13.0       Past Medical History:        Diagnosis Date    Angular cheilitis     Arthritis     BACK AND FINGERS     Chronic back pain     dr Stefan Cabral of - [facility]    Dizziness     Eczema     GERD (gastroesophageal reflux disease)     H/O chest pain     H/O dermatitis     [place] (hard of hearing)     LEFT EAR WORSE THAN RIGHT. getting hearing aides eloisa    Hypertension     DR Antony Romero PCP    Kyphosis     Left eye injury     BUNGY CORD STRAP BUSTED AND INJURED LEFT EYE    Muscle spasm     LOWER RIGHT BACK    Nocturia     Prolonged emergence from general anesthesia     PATIENT STATES HIS B/P WOULD DROP WITH INTUBATION, GO UP AFTER ET TUBE REMOVED.  PVC's (premature ventricular contractions)     PVC'S.  NO CARDIOLOGIST, PCP DR Barbie Cedillo    Rash     occas to see rheumatoid arthritis  workup for lupus    Sjogren's syndrome ([facility] [address]     Snores     mild, denies apnea, does \"grind\" his teeth    SOB (shortness of breath)     occas    Use of cane as ambulatory aid     Wears glasses     Wears partial dentures     LOWER       Past Surgical History:        Procedure Laterality Date    BACK SURGERY  2006    cage    COLONOSCOPY      CYST REMOVAL Right     index finger    HEMORRHOID SURGERY  2017    HERNIA REPAIR      UMBILICAL    NOSE SURGERY      REALIGNED NOSE    OTHER SURGICAL HISTORY  02/21/2020    MRI cervical and thoracic spine without contrast . CT of lung , all under general anesthesia    ND COLSC FLX W/REMOVAL LESION BY HOT BX FORCEPS N/A 7/12/2017    COLONOSCOPY POLYPECTOMY HOT BIOPSY performed by Antonia Ortiz MD at [facility] TOE SURGERY Right     2nd toe    TONSILLECTOMY      AS A CHILD    VASECTOMY         Social History:    Social History     Tobacco Use    Smoking status: Former Smoker     Packs/day: 1.50     Years: 23.00     Pack years: 34.50     Types: Cigarettes     Quit date: 2006     Years since quitting: 15.1    Smokeless tobacco: Former User     Types: [address] date: 9/10/2019    Tobacco comment: quit 14 years ago   Substance Use Topics    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                                Counseling given: Not Answered  Comment: quit 14 years ago Vital Signs (Current): There were no vitals filed for this visit. BP Readings from Last 3 Encounters:   03/09/21 116/72   03/01/21 128/74   01/08/21 120/68       NPO Status:                                                                                 BMI:   Wt Readings from Last 3 Encounters:   03/09/21 285 lb (129.3 kg)   03/05/21 281 lb (127.5 kg)   03/01/21 281 lb 9.6 oz (127.7 kg)     There is no height or weight on file to calculate BMI.    CBC:   Lab Results   Component Value Date    WBC 8.1 01/02/2021    RBC 4.65 01/02/2021    HGB 13.8 01/02/2021    HCT 41.0 01/02/2021    MCV 88.2 01/02/2021    RDW 13.2 01/02/2021     01/02/2021       CMP:   Lab Results   Component Value Date     01/14/2021    K 5.0 01/14/2021    CL 96 01/14/2021    CO2 31 01/14/2021    BUN 15 01/14/2021    CREATININE 0.63 01/14/2021    GFRAA >60 01/14/2021    LABGLOM >60 01/14/2021    GLUCOSE 122 01/02/2021    PROT 7.3 01/02/2021    CALCIUM 9.1 01/14/2021    BILITOT 0.21 01/02/2021    ALKPHOS 94 01/02/2021    AST 16 01/02/2021    ALT 13 01/02/2021       POC Tests: No results for input(s): POCGLU, POCNA, POCK, POCCL, POCBUN, POCHEMO, POCHCT in the last 72 hours.     Coags: No results found for: PROTIME, INR, APTT    HCG (If Applicable): No results found for: PREGTESTUR, PREGSERUM, HCG, HCGQUANT     ABGs: No results found for: PHART, PO2ART, ZHL1NRG, FJE6VVY, BEART, K1ZMUWRX     Type & Screen (If Applicable):  No results found for: LABABO, LABRH    Drug/Infectious Status (If Applicable):  Lab Results   Component Value Date    HEPCAB NONREACTIVE 07/23/2017       COVID-19 Screening (If Applicable):   Lab Results   Component Value Date    COVID19 Not Detected 03/05/2021         Anesthesia Evaluation  Patient summary reviewed and Nursing notes reviewed no history of anesthetic complications:   Airway: Mallampati: II  TM distance: >3 FB   Neck ROM: full  Mouth opening: > = 3 FB Dental: normal exam         Pulmonary:normal exam    (+) shortness of breath:                             Cardiovascular:  Exercise tolerance: good (>4 METS),   (+) hypertension:,     (-) past MI, CAD, CABG/stent, dysrhythmias and  angina      Rhythm: regular  Rate: normal           Beta Blocker:  Dose within 24 Hrs         Neuro/Psych:   (+) neuromuscular disease:, psychiatric history:            GI/Hepatic/Renal:   (+) GERD:, morbid obesity          Endo/Other:    (+) : arthritis:., .                 Abdominal:           Vascular:                                        Anesthesia Plan      general     ASA 3       Induction: intravenous. MIPS: Postoperative opioids intended and Prophylactic antiemetics administered. Anesthetic plan and risks discussed with patient.       Plan discussed with CRNA and surgical team.                  Skip Maxwell MD   3/9/2021

## 2021-03-09 NOTE — ANESTHESIA POSTPROCEDURE EVALUATION
Department of Anesthesiology  Postprocedure Note    Patient: Flash Swain  MRN: [id number]  YOB: 1965  Date of evaluation: 3/9/2021  Time:  2:30 PM     Procedure Summary     Date: 03/09/21 Room / Location: [facility] MRI    Anesthesia Start: 1011 Anesthesia Stop: 1982    Procedure: MRI LUMBAR SPINE WO CONTRAST Diagnosis: Lumbar radiculopathy    Scheduled Providers:  Responsible Provider: Serenity Pimentel MD    Anesthesia Type: general ASA Status: 3          Anesthesia Type: general    Macri Phase I:      Marci Phase II: Marci Score: 10    Last vitals: Reviewed and per EMR flowsheets.        Anesthesia Post Evaluation    Patient location during evaluation: PACU  Patient participation: complete - patient participated  Level of consciousness: awake  Pain score: 1  Airway patency: patent  Nausea & Vomiting: no nausea and no vomiting  Complications: no  Cardiovascular status: blood pressure returned to baseline and hemodynamically stable  Respiratory status: acceptable  Hydration status: euvolemic

## 2021-03-10 LAB
EKG ATRIAL RATE: 68 BPM
EKG P AXIS: 25 DEGREES
EKG P-R INTERVAL: 156 MS
EKG Q-T INTERVAL: 420 MS
EKG QRS DURATION: 92 MS
EKG QTC CALCULATION (BAZETT): 446 MS
EKG R AXIS: 18 DEGREES
EKG T AXIS: 30 DEGREES
EKG VENTRICULAR RATE: 68 BPM

## 2021-03-10 PROCEDURE — 93010 ELECTROCARDIOGRAM REPORT: CPT | Performed by: INTERNAL MEDICINE

## 2021-03-11 RX ORDER — MECLIZINE HYDROCHLORIDE 25 MG/1
TABLET ORAL
Qty: 45 TABLET | Refills: 0 | Status: SHIPPED | OUTPATIENT
Start: 2021-03-11 | End: 2021-03-24

## 2021-03-15 ASSESSMENT — ENCOUNTER SYMPTOMS: SHORTNESS OF BREATH: 1

## 2021-03-15 NOTE — ANESTHESIA POSTPROCEDURE EVALUATION
Department of Anesthesiology  Postprocedure Note    Patient: Keiry Bro  MRN: [id number]  YOB: 1965  Date of evaluation: 3/15/2021  Time:  11:01 AM     Procedure Summary     Date: 03/09/21 Room / Location: [address] [place] [address] CT Scan    Anesthesia Start: 0930 Anesthesia Stop: 1010    Procedure: CT LUNG SCREENING Diagnosis: Personal history of tobacco use    Scheduled Providers:  Responsible Provider: Ladan Avila MD    Anesthesia Type: general ASA Status: 3          Anesthesia Type: No value filed. Marci Phase I:      Marci Phase II:      Last vitals: Reviewed and per EMR flowsheets.        Anesthesia Post Evaluation    Patient location during evaluation: PACU  Patient participation: complete - patient participated  Level of consciousness: awake and alert  Pain score: 0  Airway patency: patent  Nausea & Vomiting: no nausea and no vomiting  Complications: no  Cardiovascular status: hemodynamically stable  Respiratory status: nasal cannula  Hydration status: euvolemic

## 2021-03-15 NOTE — ANESTHESIA PRE PROCEDURE
Department of Anesthesiology  Preprocedure Note       Name:  Brit Garcia   Age:  63 y.o.  :  1965                                          MRN:  [id number]         Date:  3/15/2021      Surgeon: * No surgeons listed *    Procedure:     Medications prior to admission:   Prior to Admission medications    Medication Sig Start Date End Date Taking? Authorizing Provider   meclizine (ANTIVERT) 25 MG tablet TAKE 1 TABLET BY MOUTH AS 3 TIMES A DAY AS NEEDED FOR DIZZINESS 3/11/21   Kentrell Pollard MD   terbinafine (LAMISIL) 250 MG tablet Take 250 mg by mouth daily 21   Historical Provider, MD   mupirocin (BACTROBAN) 2 % cream Apply 2 times daily. 3/1/21 3/31/21  Kentrell Pollard MD   ciclopirox (LOPROX) 0.77 % cream Apply topically 2 times daily. 3/1/21   Kentrell Pollard MD   furosemide (LASIX) 20 MG tablet Take 1 tablet by mouth daily as needed (swelling) 3/1/21   Kentrell Pollard MD   gabapentin (NEURONTIN) 400 MG capsule TAKE 1 CAPSULE BY MOUTH THREE TIMES A DAY  2/17/21 3/19/21  Kentrell Pollard MD   hydroxychloroquine (PLAQUENIL) 200 MG tablet Take 1 tablet by mouth 2 times daily 20   Kentrell Pollard MD   metoprolol tartrate (LOPRESSOR) 50 MG tablet TAKE 1 TABLET BY MOUTH TWO TIMES A DAY 20   Kentrell Pollard MD   celecoxib (CELEBREX) 200 MG capsule Take 1 capsule by mouth 2 times daily 20   Kentrell Pollard MD   lisinopril (PRINIVIL;ZESTRIL) 10 MG tablet TAKE 1 TABLET BY MOUTH ONE TIME A DAY  20   Kentrell Pollard MD   hydrocortisone (WESTCORT) 0.2 % cream Apply topically 2 times daily.  20   Mary Soto PA-C   Multiple Vitamins-Minerals (THERAPEUTIC MULTIVITAMIN-MINERALS) tablet Take 1 tablet by mouth daily    Historical Provider, MD   Sod Fluoride-Potassium Nitrate (PREVIDENT 5000 SENSITIVE) 1.1-5 % PSTE Place onto teeth    Historical Provider, MD   Magnesium Cl-Calcium Carbonate (SLOW-MAG PO) Take 1 tablet by mouth 2 times daily Historical Provider, MD   VITAMIN D PO Take 1,000 Units by mouth daily    Historical Provider, MD   Acetaminophen (TYLENOL ARTHRITIS PAIN PO) Take 325 mg by mouth every 6 hours as needed     Historical Provider, MD   glucosamine-chondroitin 500-400 MG tablet Take 1 tablet by mouth daily    Historical Provider, MD   aspirin 81 MG tablet Take 81 mg by mouth daily     Historical Provider, MD   omeprazole (PRILOSEC) 20 MG capsule Take 20 mg by mouth nightly     Historical Provider, MD       Current medications:    Current Outpatient Medications   Medication Sig Dispense Refill    meclizine (ANTIVERT) 25 MG tablet TAKE 1 TABLET BY MOUTH AS 3 TIMES A DAY AS NEEDED FOR DIZZINESS 45 tablet 0    terbinafine (LAMISIL) 250 MG tablet Take 250 mg by mouth daily      mupirocin (BACTROBAN) 2 % cream Apply 2 times daily. 30 g 1    ciclopirox (LOPROX) 0.77 % cream Apply topically 2 times daily. 30 g 1    furosemide (LASIX) 20 MG tablet Take 1 tablet by mouth daily as needed (swelling) 90 tablet 1    gabapentin (NEURONTIN) 400 MG capsule TAKE 1 CAPSULE BY MOUTH THREE TIMES A DAY  90 capsule 0    hydroxychloroquine (PLAQUENIL) 200 MG tablet Take 1 tablet by mouth 2 times daily 180 tablet 3    metoprolol tartrate (LOPRESSOR) 50 MG tablet TAKE 1 TABLET BY MOUTH TWO TIMES A  tablet 2    celecoxib (CELEBREX) 200 MG capsule Take 1 capsule by mouth 2 times daily 180 capsule 3    lisinopril (PRINIVIL;ZESTRIL) 10 MG tablet TAKE 1 TABLET BY MOUTH ONE TIME A DAY  90 tablet 3    hydrocortisone (WESTCORT) 0.2 % cream Apply topically 2 times daily.  45 g 1    Multiple Vitamins-Minerals (THERAPEUTIC MULTIVITAMIN-MINERALS) tablet Take 1 tablet by mouth daily      Sod Fluoride-Potassium Nitrate (PREVIDENT 5000 SENSITIVE) 1.1-5 % PSTE Place onto teeth      Magnesium Cl-Calcium Carbonate (SLOW-MAG PO) Take 1 tablet by mouth 2 times daily      VITAMIN D PO Take 1,000 Units by mouth daily      Acetaminophen (TYLENOL ARTHRITIS PAIN PO) Take 325 mg by mouth every 6 hours as needed       glucosamine-chondroitin 500-400 MG tablet Take 1 tablet by mouth daily      aspirin 81 MG tablet Take 81 mg by mouth daily       omeprazole (PRILOSEC) 20 MG capsule Take 20 mg by mouth nightly        No current facility-administered medications for this visit. Allergies:     Allergies   Allergen Reactions    Metronidazole Other (See Comments)     \" caused a prickly feeling in my legs \"    Adhesive Tape Other (See Comments)     opsite and paper tape ok, bandaid ok    Cymbalta [Duloxetine Hcl] Nausea And Vomiting    Neosporin [Neomycin-Polymyxin-Gramicidin] Rash       Problem List:    Patient Active Problem List   Diagnosis Code    Anxiety disorder F41.9    Benign essential hypertension I10    Esophageal reflux K21.9    Hypogonadism male E29.1    Inflammatory arthritis M19.90    Kyphoscoliosis M41.9    Lumbar radicular pain M54.16    Pulmonary granuloma ([facility] [address] J84.10    Male erectile disorder N52.9    Sensorineural hearing loss H90.5    Tinnitus of both ears H93.13    Vertigo R42    Psoriasis L40.9    Hip impingement syndrome M25.859    Varicose vein of leg I83.90    Alcohol dependence in early full remission ([facility] [address] F10.21    Neoplasm of uncertain behavior of skin D48.5    Seborrheic keratosis L82.1    Kyphosis of thoracolumbar region M40.205    Eczema L30.9    Sjogren's syndrome ([facility]) M35.00    Spinal stenosis of lumbar region with neurogenic claudication M48.062    Trochanteric bursitis, right hip M70.61    Lumbosacral spondylosis without myelopathy M47.817    Morbidly obese ([facility]) E66.01    Diffuse idiopathic skeletal hyperostosis M48.10    Xerosis cutis L85.3    Angular cheilitis K13.0       Past Medical History:        Diagnosis Date    Angular cheilitis     Arthritis     BACK AND FINGERS     Bilateral lower extremity edema 03/2021    started on Lasix per PCP    Chronic back pain     dr Ciro Peralta of [facility]    Dizziness     Eczema     Ex-smoker     quit in 2006, smoked for 23 years    GERD (gastroesophageal reflux disease)     H/O chest pain     H/O dermatitis     [place] (hard of hearing)     LEFT EAR WORSE THAN RIGHT. getting hearing aides eloisa    Hypertension     DR Davy Restrepo PCP    Kyphosis     Left eye injury     BUNGY CORD STRAP BUSTED AND INJURED LEFT EYE    Muscle spasm     LOWER RIGHT BACK    Nocturia     Obesity     Prolonged emergence from general anesthesia     PATIENT STATES HIS B/P WOULD DROP WITH INTUBATION, GO UP AFTER ET TUBE REMOVED.  PVC's (premature ventricular contractions)     PVC'S.  NO CARDIOLOGIST, PCP DR Isabella Anna     occas to see rheumatoid arthritis dr. workup for lupus    Sjogren's syndrome ([facility] [address]     Snores     mild, denies apnea, does \"grind\" his teeth    SOB (shortness of breath)     occas    Use of cane as ambulatory aid     Vertigo     Wears glasses     Wears partial dentures     LOWER       Past Surgical History:        Procedure Laterality Date    BACK SURGERY  2006    cage    COLONOSCOPY      CYST REMOVAL Right     index finger    HEMORRHOID SURGERY  2017    HERNIA REPAIR      UMBILICAL    NOSE SURGERY      REALIGNED NOSE    OTHER SURGICAL HISTORY  02/21/2020    MRI cervical and thoracic spine without contrast . CT of lung , all under general anesthesia    [place] COLSC FLX W/REMOVAL LESION BY HOT BX FORCEPS N/A 7/12/2017    COLONOSCOPY POLYPECTOMY HOT BIOPSY performed by Emilee Osgood, MD at [address] [place] [address] TOE SURGERY Right     2nd toe    TONSILLECTOMY      AS A CHILD    VASECTOMY         Social History:    Social History     Tobacco Use    Smoking status: Former Smoker     Packs/day: 1.50     Years: 23.00     Pack years: 34.50     Types: Cigarettes     Quit date: 2006     Years since quitting: 15.2    Smokeless tobacco: Former User     Types: Chew     Quit date: 9/10/2019    Tobacco comment: quit 14 years ago   Substance Use Topics    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 BP Readings from Last 3 Encounters:   03/09/21 110/78   03/09/21 116/72   03/09/21 110/60       NPO Status:                                                                                 BMI:   Wt Readings from Last 3 Encounters:   03/09/21 285 lb (129.3 kg)   03/05/21 281 lb (127.5 kg)   03/01/21 281 lb 9.6 oz (127.7 kg)     There is no height or weight on file to calculate BMI.    CBC:   Lab Results   Component Value Date    WBC 8.1 01/02/2021    RBC 4.65 01/02/2021    HGB 13.8 01/02/2021    HCT 41.0 01/02/2021    MCV 88.2 01/02/2021    RDW 13.2 01/02/2021     01/02/2021       CMP:   Lab Results   Component Value Date     01/14/2021    K 5.0 01/14/2021    CL 96 01/14/2021    CO2 31 01/14/2021    BUN 15 01/14/2021    CREATININE 0.63 01/14/2021    GFRAA >60 01/14/2021    LABGLOM >60 01/14/2021    GLUCOSE 122 01/02/2021    PROT 7.3 01/02/2021    CALCIUM 9.1 01/14/2021    BILITOT 0.21 01/02/2021    ALKPHOS 94 01/02/2021    AST 16 01/02/2021    ALT 13 01/02/2021       POC Tests: No results for input(s): POCGLU, POCNA, POCK, POCCL, POCBUN, POCHEMO, POCHCT in the last 72 hours.     Coags: No results found for: PROTIME, INR, APTT    HCG (If Applicable): No results found for: PREGTESTUR, PREGSERUM, HCG, HCGQUANT     ABGs: No results found for: PHART, PO2ART, PBO3MHQ, BRM7COQ, BEART, I6SHKMGT     Type & Screen (If Applicable):  No results found for: LABABO, LABRH    Drug/Infectious Status (If Applicable):  Lab Results   Component Value Date    HEPCAB NONREACTIVE 07/23/2017       COVID-19 Screening (If Applicable):   Lab Results   Component Value Date    COVID19 Not Detected 03/05/2021         Anesthesia Evaluation  Patient summary reviewed and Nursing notes

## 2021-03-17 RX ORDER — GABAPENTIN 400 MG/1
CAPSULE ORAL
Qty: 90 CAPSULE | Refills: 0 | Status: SHIPPED | OUTPATIENT
Start: 2021-03-17 | End: 2021-04-14

## 2021-03-18 ENCOUNTER — OFFICE VISIT (OUTPATIENT)
Dept: NEUROSURGERY | Age: 56
End: 2021-03-18
Payer: MEDICARE

## 2021-03-18 ENCOUNTER — HOSPITAL ENCOUNTER (OUTPATIENT)
Dept: GENERAL RADIOLOGY | Facility: CLINIC | Age: 56
Discharge: HOME | End: 2021-03-20
Payer: MEDICARE

## 2021-03-18 ENCOUNTER — HOSPITAL ENCOUNTER (OUTPATIENT)
Facility: CLINIC | Age: 56
Discharge: HOME | End: 2021-03-20
Payer: MEDICARE

## 2021-03-18 VITALS
WEIGHT: 285 LBS | SYSTOLIC BLOOD PRESSURE: 118 MMHG | RESPIRATION RATE: 18 BRPM | OXYGEN SATURATION: 98 % | TEMPERATURE: 97.7 F | DIASTOLIC BLOOD PRESSURE: 76 MMHG | BODY MASS INDEX: 42.21 KG/M2 | HEART RATE: 80 BPM | HEIGHT: 69 IN

## 2021-03-18 DIAGNOSIS — M47.816 LUMBAR SPONDYLOSIS: ICD-10-CM

## 2021-03-18 DIAGNOSIS — M40.204 KYPHOSIS OF THORACIC REGION, UNSPECIFIED KYPHOSIS TYPE: Primary | ICD-10-CM

## 2021-03-18 DIAGNOSIS — M40.204 KYPHOSIS OF THORACIC REGION, UNSPECIFIED KYPHOSIS TYPE: ICD-10-CM

## 2021-03-18 DIAGNOSIS — Z98.1 HISTORY OF LUMBAR FUSION: ICD-10-CM

## 2021-03-18 PROCEDURE — 99204 OFFICE O/P NEW MOD 45 MIN: CPT | Performed by: NURSE PRACTITIONER

## 2021-03-18 PROCEDURE — 72082 X-RAY EXAM ENTIRE SPI 2/3 VW: CPT

## 2021-03-18 NOTE — PROGRESS NOTES
[facility] NEUROSURGERY  [facility] [facility]  Dept: [phone]    Patient:  Parag Robles  YOB: 1965  Date: 3/18/21    The patient is a 63 y.o. male who presents today for consult of the following problems:     Chief Complaint   Patient presents with    New Patient     lumbar radiculopathy mri 3/9/21         HPI:     Parag Robles is a 63 y.o. male on whom neurosurgical consultation was requested by Suleman Porras MD for management of back and leg pain. Pt has had back pain since 14, was diagnosed with kyphosis and was treated with a back brace. States there was no improvement to his kyphosis. Has had progressive pain since. Is now not able to work, mostly stays at home. Has completed multiple rounds of physical therapy, injections through pain specialist.   Was evaluated at the [facility]-was last evaluated last year for surgery to address the kyphosis. Pain on average is 7/10, primarily lower right back, travels down to lateral right hip. Sitting is more tolerable, particularly in a recliner. Unable to lie flat, has woken up in intense pains several nights from sleep and is now sleeping in a recliner. Groin pain: No  Saddle anesthesia: No  Hip Pain: Yes  Bowel/bladder incontinence: No urinary incontinence, does have issues with bowel leakage that corresponded with hemorrhoid surgery  Constipation or Urinary retention: No    LIMITATIONS    Pain significantly limiting from a daily standpoint. Assistive devices: not presently  Daily pharmacologic pain control include: gabapentin  Back versus leg: both    MANAGEMENT     Prior Surgery: Yes-L5-S1 fusion-in [place]  Prior to 1yr ago:   In the last year:    Physical Therapy: Yes-Aquatic exercise class at the [facility]   Chiropractic Interventions: No   Injections: Yes  Improvement: Dr Sally Villalba    Types of injections/responses:   Last injections this past January    History:     Past Medical  Other Father         COPD, EMPHYSEMA    Coronary Art Dis Paternal Uncle         premature    Heart Failure Paternal Uncle     Diabetes Mother      Current Outpatient Medications on File Prior to Visit   Medication Sig Dispense Refill    gabapentin (NEURONTIN) 400 MG capsule TAKE 1 CAPSULE BY MOUTH THREE TIMES A DAY  90 capsule 0    meclizine (ANTIVERT) 25 MG tablet TAKE 1 TABLET BY MOUTH AS 3 TIMES A DAY AS NEEDED FOR DIZZINESS 45 tablet 0    terbinafine (LAMISIL) 250 MG tablet Take 250 mg by mouth daily      mupirocin (BACTROBAN) 2 % cream Apply 2 times daily. 30 g 1    ciclopirox (LOPROX) 0.77 % cream Apply topically 2 times daily. 30 g 1    furosemide (LASIX) 20 MG tablet Take 1 tablet by mouth daily as needed (swelling) 90 tablet 1    hydroxychloroquine (PLAQUENIL) 200 MG tablet Take 1 tablet by mouth 2 times daily 180 tablet 3    metoprolol tartrate (LOPRESSOR) 50 MG tablet TAKE 1 TABLET BY MOUTH TWO TIMES A  tablet 2    celecoxib (CELEBREX) 200 MG capsule Take 1 capsule by mouth 2 times daily 180 capsule 3    lisinopril (PRINIVIL;ZESTRIL) 10 MG tablet TAKE 1 TABLET BY MOUTH ONE TIME A DAY  90 tablet 3    hydrocortisone (WESTCORT) 0.2 % cream Apply topically 2 times daily. 45 g 1    Multiple Vitamins-Minerals (THERAPEUTIC MULTIVITAMIN-MINERALS) tablet Take 1 tablet by mouth daily      Sod Fluoride-Potassium Nitrate (PREVIDENT 5000 SENSITIVE) 1.1-5 % PSTE Place onto teeth      Magnesium Cl-Calcium Carbonate (SLOW-MAG PO) Take 1 tablet by mouth 2 times daily      VITAMIN D PO Take 1,000 Units by mouth daily      Acetaminophen (TYLENOL ARTHRITIS PAIN PO) Take 325 mg by mouth every 6 hours as needed       glucosamine-chondroitin 500-400 MG tablet Take 1 tablet by mouth daily      aspirin 81 MG tablet Take 81 mg by mouth daily       omeprazole (PRILOSEC) 20 MG capsule Take 20 mg by mouth nightly        No current facility-administered medications on file prior to visit.       Social History     Tobacco Use    Smoking status: Former Smoker     Packs/day: 1.50     Years: 23.00     Pack years: 34.50     Types: Cigarettes     Quit date: 2006     Years since quitting: 15.2    Smokeless tobacco: Former User     Types: [address] date: 9/10/2019    Tobacco comment: quit 14 years ago   Substance Use Topics    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    Drug use: No       Allergies   Allergen Reactions    Metronidazole Other (See Comments)     \" caused a prickly feeling in my legs \"    Adhesive Tape Other (See Comments)     opsite and paper tape ok, bandaid ok    Cymbalta [Duloxetine Hcl] Nausea And Vomiting    Neosporin [Neomycin-Polymyxin-Gramicidin] Rash       Review of Systems  Constitutional: Negative for activity change and appetite change. HENT: Negative for ear pain and facial swelling. Eyes: Negative for discharge and itching. Respiratory: Negative for choking and chest tightness. Cardiovascular: Negative for chest pain and leg swelling. Gastrointestinal: Negative for nausea and abdominal pain. Endocrine: Negative for cold intolerance and heat intolerance. Genitourinary: Negative for frequency and flank pain. Musculoskeletal: Negative for myalgias and joint swelling. Skin: Negative for rash and wound. Allergic/Immunologic: Negative for environmental allergies and food allergies. Hematological: Negative for adenopathy. Does not bruise/bleed easily. Psychiatric/Behavioral: Negative for self-injury. The patient is not nervous/anxious. Physical Exam:      /76   Pulse 80   Temp 97.7 °F (36.5 °C)   Resp 18   Ht 5' 9\" (1.753 m)   Wt 285 lb (129.3 kg)   SpO2 98%   BMI 42.09 kg/m²   Estimated body mass index is 42.09 kg/m² as calculated from the following:    Height as of this encounter: 5' 9\" (1.753 m). Weight as of this encounter: 285 lb (129.3 kg).     General:  Raghavendra Henderson is a 63y.o. year old male who appears his stated age.   HEENT: Normocephalic atraumatic. Neck supple. Chest: regular rate; pulses equal  Abdomen: Soft nontender nondistended. Ext: DP and PT pulses 2+, good cap refill  Neuro    Mentation  Appropriate affect  Registration intact  Orientation intact    Cranial Nerves:   Pupils equal and reactive to light  Extraocular motion intact  Face and shrug symmetric  Tongue midline  No dysarthria  v1-3 sensation symmetric, masseter tone symmetric  Hearing symmetric and intact    Sensation: Intact    Motor  L deltoid 5/5; R deltoid 5/5  L biceps 5/5; R biceps 5/5  L triceps 5/5; R triceps 5/5  L wrist extension 5/5; R wrist extension 5/5  L intrinsics 5/5; R intrinsics 5/5     L iliopsoas 5/5 , R iliopsoas 5/5  L quadriceps 5/5; R quadriceps 5/5  L Dorsiflexion 5/5; R dorsiflexion 5/5  L Plantarflexion 5/5; R plantarflexion 5/5  L EHL 5/5; R EHL 5/5    Reflexes  L Brachioradialis 2+/4; R brachioradialis 2+/4  L Biceps 2+/4; R Biceps 2+/4  L Triceps 2+/4; R Triceps 2+/4  L Patellar 1+/4: R Patellar 1+/4  L Achilles 1+/4; R Achilles 1+/4    hoffmans L: neg  hoffmans R: neg  Clonus L: neg  Clonus R: neg  Babinski L: neg  Babinski R: neg    CORDELIA: Negative  Straight leg raise: Negative  SI joint tenderness: Negative    Studies Review:     MRI lumbar spine 3/9/2021 (images reviewed):   FINDINGS:   BONES/ALIGNMENT: There is posterior fixation with artificial disc material at   the L5-S1 level.  There is no hardware complication.  The vertebral body   heights are maintained.  There is age-appropriate bone marrow signal. Jarold Marts   is degenerative disc disease at the remaining levels with loss of disc   signal. Jarold Marts is disc space narrowing at the L1-2 level.  There is no   spondylolisthesis.       SPINAL CORD: Conus medullaris is normal in caliber and signal and terminates   at the L2 level.  The cauda equina is unremarkable.       SOFT TISSUES: The posterior paraspinal soft tissues are unremarkable.  The   visualized abdominal structures are unremarkable.       L1-L2: There is a mild circumferential disc bulge with facet hypertrophy. There is no canal stenosis or foraminal narrowing.       L2-L3: There is a circumferential disc bulge with facet and ligamentous   hypertrophy.  There is no significant canal stenosis or foraminal narrowing.       L3-L4: There is a circumferential disc bulge with facet and ligamentous   hypertrophy.  There is canal stenosis measuring 8 mm in AP dimension.  There   is mild bilateral foraminal narrowing.       L4-L5: There is a circumferential disc bulge with facet hypertrophy.  There   is no canal stenosis or left foraminal narrowing.  There is mild right   foraminal narrowing.       L5-S1: There is artificial disc material.  There is no canal stenosis or   significant foraminal narrowing. MRI thoracic spine 2/21/2020 (images reviewed): Impression   1. Moderate to severe thoracic kyphosis centered within the lower thoracic   spine.  Partial osseous fusion of multiple mid and lower thoracic vertebral   bodies.       2. No significant spinal canal stenosis or neural foraminal stenosis within   thoracic spine.       3. No significant spinal canal stenosis.  No significant neural foraminal   stenosis. CT thoracic spine 3/13/2020 (images reviewed): Impression   1. Exaggerated lower thoracic kyphosis with relative reversal of the upper   thoracic kyphosis. 2. L5-S1 posterior fusion with no CT evidence of complication. 3. 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  Please correlate with clinical history of ankylosing spondylitis. No evidence of sacroiliac ankylosis. 4. Mild anterior wedge deformity of T9 through L1, suspected to be chronic. 5. Cholelithiasis.  No CT evidence of cholecystitis. 6. Atherosclerosis. Assessment and Plan:      1. Kyphosis of thoracic region, unspecified kyphosis type    2. Lumbar spondylosis    3.  History of lumbar fusion Plan: Patient with longstanding progressive axial back pain with radiation into right hip, associated muscle spasms. Does have reported gait instability. Patient does have a significant thoracolumbar kyphosis, diagnosed in adolescence. Was [place] facility for deformity correction, wishes to follow more locally. Does have multiple levels of osseous fusion throughout thoracic region. Also with recent lumbar MRI showing mild neural narrowing at several levels, nothing to distinctly correspond with right lower extremity symptoms. Will obtain scoliosis x-rays to evaluate overall alignment. We will also obtain records from pain specialist, Dr. Sally Villalba regarding prior interventions. Patient would like to follow up with surgeon to discuss any potential treatment options as he feels that symptoms are significantly debilitating and affecting quality of life. Followup: Return in about 4 weeks (around 4/15/2021), or if symptoms worsen or fail to improve. Prescriptions Ordered:  None    Orders Placed:  Orders Placed This Encounter   Procedures    XR SPINE ENTIRE (2-3 VIEWS)     Standing Status:   Future     Number of Occurrences:   1     Standing Expiration Date:   3/18/2022     Scheduling Instructions:      STANDING -- AP and Lateral; Include C2 to Pelvis & both femoral heads     Order Specific Question:   Reason for exam:     Answer:   scoliosis        Electronically signed by JYOTSNA Morse CNP on 3/23/2021 at 8:50 AM    Please note that this chart was generated using voice recognition Dragon dictation software. Although every effort was made to ensure the accuracy of this automated transcription, some errors in transcription may have occurred.

## 2021-03-24 RX ORDER — MECLIZINE HYDROCHLORIDE 25 MG/1
TABLET ORAL
Qty: 45 TABLET | Refills: 0 | Status: SHIPPED | OUTPATIENT
Start: 2021-03-24 | End: 2021-04-14

## 2021-04-08 ENCOUNTER — OFFICE VISIT (OUTPATIENT)
Dept: NEUROSURGERY | Age: 56
End: 2021-04-08
Payer: MEDICARE

## 2021-04-08 VITALS
OXYGEN SATURATION: 98 % | WEIGHT: 285 LBS | BODY MASS INDEX: 42.21 KG/M2 | RESPIRATION RATE: 18 BRPM | DIASTOLIC BLOOD PRESSURE: 68 MMHG | HEIGHT: 69 IN | SYSTOLIC BLOOD PRESSURE: 117 MMHG | TEMPERATURE: 98.2 F | HEART RATE: 75 BPM

## 2021-04-08 DIAGNOSIS — M40.204 KYPHOSIS OF THORACIC REGION, UNSPECIFIED KYPHOSIS TYPE: Primary | ICD-10-CM

## 2021-04-08 PROCEDURE — 99214 OFFICE O/P EST MOD 30 MIN: CPT | Performed by: NEUROLOGICAL SURGERY

## 2021-04-08 RX ORDER — SODIUM CHLORIDE, SODIUM LACTATE, POTASSIUM CHLORIDE, CALCIUM CHLORIDE 600; 310; 30; 20 MG/100ML; MG/100ML; MG/100ML; MG/100ML
1000 INJECTION, SOLUTION INTRAVENOUS CONTINUOUS
Status: CANCELLED | OUTPATIENT
Start: 2021-04-08
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
## 2021-04-09 ENCOUNTER — TELEPHONE (OUTPATIENT)
Dept: NEUROSURGERY | Age: 56
End: 2021-04-09

## 2021-04-09 NOTE — TELEPHONE ENCOUNTER
Patient called stating he talked to Dr. Pia Shelley, they notified him that they are cancelling elective surgeries due to high count of covid. The office would like to do a telemedicine visit with physician assistant. Please notify patient with your opinion.

## 2021-04-12 ENCOUNTER — HOSPITAL ENCOUNTER (OUTPATIENT)
Dept: PREADMISSION TESTING | Age: 56
Discharge: HOME | End: 2021-04-12

## 2021-04-14 RX ORDER — MECLIZINE HYDROCHLORIDE 25 MG/1
TABLET ORAL
Qty: 45 TABLET | Refills: 0 | Status: SHIPPED | OUTPATIENT
Start: 2021-04-14 | End: 2021-05-04

## 2021-04-14 RX ORDER — GABAPENTIN 400 MG/1
CAPSULE ORAL
Qty: 90 CAPSULE | Refills: 0 | Status: SHIPPED | OUTPATIENT
Start: 2021-04-14 | End: 2021-05-19

## 2021-04-16 ENCOUNTER — TELEPHONE (OUTPATIENT)
Dept: PRIMARY CARE CLINIC | Age: 56
End: 2021-04-16

## 2021-04-20 ENCOUNTER — OFFICE VISIT (OUTPATIENT)
Dept: PRIMARY CARE CLINIC | Age: 56
End: 2021-04-20
Payer: MEDICARE

## 2021-04-20 VITALS
SYSTOLIC BLOOD PRESSURE: 126 MMHG | DIASTOLIC BLOOD PRESSURE: 70 MMHG | BODY MASS INDEX: 42.89 KG/M2 | WEIGHT: 289.6 LBS | HEART RATE: 81 BPM | HEIGHT: 69 IN | OXYGEN SATURATION: 96 %

## 2021-04-20 DIAGNOSIS — R60.9 EDEMA, UNSPECIFIED TYPE: ICD-10-CM

## 2021-04-20 DIAGNOSIS — R60.0 LOCALIZED EDEMA: ICD-10-CM

## 2021-04-20 DIAGNOSIS — M48.10 DIFFUSE IDIOPATHIC SKELETAL HYPEROSTOSIS: ICD-10-CM

## 2021-04-20 DIAGNOSIS — M54.16 LUMBAR RADICULAR PAIN: Primary | ICD-10-CM

## 2021-04-20 PROCEDURE — 99213 OFFICE O/P EST LOW 20 MIN: CPT | Performed by: FAMILY MEDICINE

## 2021-04-20 RX ORDER — POTASSIUM CHLORIDE 20 MEQ/1
20 TABLET, EXTENDED RELEASE ORAL DAILY
Qty: 90 TABLET | Refills: 3 | Status: SHIPPED | OUTPATIENT
Start: 2021-04-20 | End: 2022-07-20

## 2021-04-20 RX ORDER — FUROSEMIDE 40 MG/1
40 TABLET ORAL DAILY
Qty: 90 TABLET | Refills: 3 | Status: SHIPPED | OUTPATIENT
Start: 2021-04-20 | End: 2022-05-05

## 2021-04-20 ASSESSMENT — ENCOUNTER SYMPTOMS
SHORTNESS OF BREATH: 0
RHINORRHEA: 0
ABDOMINAL PAIN: 0
VOMITING: 0
NAUSEA: 0
DIARRHEA: 0
EYE REDNESS: 0
BACK PAIN: 1
EYE DISCHARGE: 0
WHEEZING: 0
COUGH: 0
SORE THROAT: 0

## 2021-04-20 ASSESSMENT — PATIENT HEALTH QUESTIONNAIRE - PHQ9
SUM OF ALL RESPONSES TO PHQ9 QUESTIONS 1 & 2: 2
SUM OF ALL RESPONSES TO PHQ QUESTIONS 1-9: 2
SUM OF ALL RESPONSES TO PHQ QUESTIONS 1-9: 2
1. LITTLE INTEREST OR PLEASURE IN DOING THINGS: 1

## 2021-05-04 RX ORDER — MECLIZINE HYDROCHLORIDE 25 MG/1
TABLET ORAL
Qty: 45 TABLET | Refills: 0 | Status: SHIPPED | OUTPATIENT
Start: 2021-05-04 | End: 2021-05-19

## 2021-05-19 RX ORDER — MECLIZINE HYDROCHLORIDE 25 MG/1
TABLET ORAL
Qty: 45 TABLET | Refills: 0 | Status: SHIPPED | OUTPATIENT
Start: 2021-05-19 | End: 2021-06-02

## 2021-05-19 RX ORDER — GABAPENTIN 400 MG/1
CAPSULE ORAL
Qty: 90 CAPSULE | Refills: 0 | Status: SHIPPED | OUTPATIENT
Start: 2021-05-19 | End: 2021-06-15

## 2021-05-24 ENCOUNTER — HOSPITAL ENCOUNTER (OUTPATIENT)
Age: 56
Setting detail: SPECIMEN
Discharge: HOME | End: 2021-05-24
Payer: MEDICARE

## 2021-05-24 DIAGNOSIS — R60.0 LOCALIZED EDEMA: ICD-10-CM

## 2021-05-24 LAB
ANION GAP SERPL CALCULATED.3IONS-SCNC: 12 MMOL/L (ref 9–17)
BUN BLDV-MCNC: 9 MG/DL (ref 6–20)
BUN/CREAT BLD: ABNORMAL (ref 9–20)
CALCIUM SERPL-MCNC: 8.8 MG/DL (ref 8.6–10.4)
CHLORIDE BLD-SCNC: 101 MMOL/L (ref 98–107)
CO2: 25 MMOL/L (ref 20–31)
CREAT SERPL-MCNC: 0.54 MG/DL (ref 0.7–1.2)
GFR AFRICAN AMERICAN: >60 ML/MIN
GFR NON-AFRICAN AMERICAN: >60 ML/MIN
GFR SERPL CREATININE-BSD FRML MDRD: ABNORMAL ML/MIN/{1.73_M2}
GFR SERPL CREATININE-BSD FRML MDRD: ABNORMAL ML/MIN/{1.73_M2}
GLUCOSE BLD-MCNC: 108 MG/DL (ref 70–99)
POTASSIUM SERPL-SCNC: 4.5 MMOL/L (ref 3.7–5.3)
SODIUM BLD-SCNC: 138 MMOL/L (ref 135–144)

## 2021-06-01 ENCOUNTER — OFFICE VISIT (OUTPATIENT)
Dept: PRIMARY CARE CLINIC | Age: 56
End: 2021-06-01
Payer: MEDICARE

## 2021-06-01 VITALS
SYSTOLIC BLOOD PRESSURE: 124 MMHG | WEIGHT: 286 LBS | BODY MASS INDEX: 42.36 KG/M2 | OXYGEN SATURATION: 97 % | HEIGHT: 69 IN | HEART RATE: 73 BPM | DIASTOLIC BLOOD PRESSURE: 62 MMHG

## 2021-06-01 DIAGNOSIS — L30.9 DERMATITIS: ICD-10-CM

## 2021-06-01 DIAGNOSIS — Z00.00 ROUTINE GENERAL MEDICAL EXAMINATION AT A HEALTH CARE FACILITY: Primary | ICD-10-CM

## 2021-06-01 PROBLEM — D48.5 NEOPLASM OF UNCERTAIN BEHAVIOR OF SKIN: Status: RESOLVED | Noted: 2019-01-15 | Resolved: 2021-06-01

## 2021-06-01 PROBLEM — L85.3 XEROSIS CUTIS: Status: RESOLVED | Noted: 2021-01-08 | Resolved: 2021-06-01

## 2021-06-01 PROBLEM — K13.0 ANGULAR CHEILITIS: Status: RESOLVED | Noted: 2021-03-01 | Resolved: 2021-06-01

## 2021-06-01 PROBLEM — M54.50 LUMBAR SPINE PAIN: Status: RESOLVED | Noted: 2017-06-26 | Resolved: 2021-06-01

## 2021-06-01 PROBLEM — F10.21 ALCOHOL DEPENDENCE IN EARLY FULL REMISSION (HCC): Status: RESOLVED | Noted: 2017-08-23 | Resolved: 2021-06-01

## 2021-06-01 PROBLEM — I83.90 VARICOSE VEIN OF LEG: Status: RESOLVED | Noted: 2017-08-07 | Resolved: 2021-06-01

## 2021-06-01 PROBLEM — Z98.1 HISTORY OF LUMBAR FUSION: Status: ACTIVE | Noted: 2021-05-20

## 2021-06-01 PROBLEM — M70.61 TROCHANTERIC BURSITIS, RIGHT HIP: Status: RESOLVED | Noted: 2020-06-02 | Resolved: 2021-06-01

## 2021-06-01 PROCEDURE — G0439 PPPS, SUBSEQ VISIT: HCPCS | Performed by: FAMILY MEDICINE

## 2021-06-01 RX ORDER — FLUTICASONE PROPIONATE 50 MCG
SPRAY, SUSPENSION (ML) NASAL
COMMUNITY
Start: 2021-04-30

## 2021-06-01 ASSESSMENT — PATIENT HEALTH QUESTIONNAIRE - PHQ9
2. FEELING DOWN, DEPRESSED OR HOPELESS: 0
1. LITTLE INTEREST OR PLEASURE IN DOING THINGS: 0
SUM OF ALL RESPONSES TO PHQ QUESTIONS 1-9: 0
SUM OF ALL RESPONSES TO PHQ9 QUESTIONS 1 & 2: 0
SUM OF ALL RESPONSES TO PHQ QUESTIONS 1-9: 0
SUM OF ALL RESPONSES TO PHQ QUESTIONS 1-9: 0

## 2021-06-01 NOTE — PROGRESS NOTES
hydrocortisone (WESTCORT) 0.2 % cream Apply topically 2 times daily. Yes Miki Rivera PA-C   Multiple Vitamins-Minerals (THERAPEUTIC MULTIVITAMIN-MINERALS) tablet Take 1 tablet by mouth daily Yes Historical Provider, MD   Sod Fluoride-Potassium Nitrate (PREVIDENT 5000 SENSITIVE) 1.1-5 % PSTE Place onto teeth Yes Historical Provider, MD   Magnesium Cl-Calcium Carbonate (SLOW-MAG PO) Take 1 tablet by mouth 2 times daily Yes Historical Provider, MD   VITAMIN D PO Take 1,000 Units by mouth daily Yes Historical Provider, MD   Acetaminophen (TYLENOL ARTHRITIS PAIN PO) Take 325 mg by mouth every 6 hours as needed  Yes Historical Provider, MD   glucosamine-chondroitin 500-400 MG tablet Take 1 tablet by mouth daily Yes Historical Provider, MD   aspirin 81 MG tablet Take 81 mg by mouth daily  Yes Historical Provider, MD   omeprazole (PRILOSEC) 20 MG capsule Take 20 mg by mouth nightly  Yes Historical Provider, MD         Past Medical History:   Diagnosis Date    Angular cheilitis     Arthritis     BACK AND FINGERS     Bilateral lower extremity edema 03/2021    started on Lasix per PCP    Chronic back pain     [facility] Jacqu[facility] Ordaz [facility]    Dizziness     Eczema     Ex-smoker     quit in 2006, smoked for 23 years    GERD (gastroesophageal reflux disease)     H/O chest pain     H/O dermatitis     [place] (hard of hearing)     LEFT EAR WORSE THAN RIGHT. getting hearing aides eloisa    Hypertension     DR Umesh Medley PCP    Kyphosis     Left eye injury     BUNGY CORD STRAP BUSTED AND INJURED LEFT EYE    Muscle spasm     LOWER RIGHT BACK    Nocturia     Obesity     Prolonged emergence from general anesthesia     PATIENT STATES HIS B/P WOULD DROP WITH INTUBATION, GO UP AFTER ET TUBE REMOVED.  PVC's (premature ventricular contractions)     PVC'S.  NO CARDIOLOGIST, PCP DR Chelsy Torre    Rash     occas to see rheumatoid arthritis  workup for lupus    Sjogren's syndrome ([facility] [address]     Snores     mild, denies apnea, does \"grind\" his teeth    SOB (shortness of breath)     occas    Use of cane as ambulatory aid     Vertigo     Wears glasses     Wears partial dentures     LOWER       Past Surgical History:   Procedure Laterality Date    BACK SURGERY  2006    cage    COLONOSCOPY      CYST REMOVAL Right     index finger    HEMORRHOID SURGERY  2017    HERNIA REPAIR      UMBILICAL    NOSE SURGERY      REALIGNED NOSE    OTHER SURGICAL HISTORY  2020    MRI cervical and thoracic spine without contrast . CT of lung , all under general anesthesia    [place] COLSC FLX W/REMOVAL LESION BY HOT BX FORCEPS N/A 2017    COLONOSCOPY POLYPECTOMY HOT BIOPSY performed by Nazario Hahn MD at  [facility] TOE SURGERY Right     2nd toe    TONSILLECTOMY      AS A CHILD    VASECTOMY           Family History   Problem Relation Age of Onset    Coronary Art Dis Father         premature-- at 52    Diabetes Father     High Blood Pressure Father     Other Father         COPD, EMPHYSEMA    Coronary Art Dis Paternal Uncle         premature    Heart Failure Paternal Uncle     Diabetes Mother        CareTeam (Including outside providers/suppliers regularly involved in providing care):   Patient Care Team:  Stella Johnson MD as PCP - General (Family Medicine)  Stella Johnson MD as PCP - [facility]  Radha Garner RN as Nurse Navigator    Wt Readings from Last 3 Encounters:   21 286 lb (129.7 kg)   21 289 lb 9.6 oz (131.4 kg)   21 285 lb (129.3 kg)     Vitals:    21 1103   BP: 124/62   Pulse: 73   SpO2: 97%   Weight: 286 lb (129.7 kg)   Height: 5' 9\" (1.753 m)     Body mass index is 42.23 kg/m². Based upon direct observation of the patient, evaluation of cognition reveals recent and remote memory intact.     General Appearance: alert and oriented to person, place and time, well developed and well- nourished, in no acute distress  Skin: warm and dry, no rash or erythema  Head: normocephalic and atraumatic  Eyes: pupils equal, round, and reactive to light, extraocular eye movements intact, conjunctivae normal  ENT: tympanic membrane, external ear and ear canal normal bilaterally, nose without deformity, nasal mucosa and turbinates normal without polyps  Neck: supple and non-tender without mass, no thyromegaly or thyroid nodules, no cervical lymphadenopathy  Pulmonary/Chest: clear to auscultation bilaterally- no wheezes, rales or rhonchi, normal air movement, no respiratory distress  Cardiovascular: normal rate, regular rhythm, normal S1 and S2, no murmurs, rubs, clicks, or gallops, distal pulses intact, no carotid bruits  Abdomen: soft, non-tender, non-distended, normal bowel sounds, no masses or organomegaly  Extremities: no cyanosis, clubbing or edema  Musculoskeletal: normal range of motion, no joint swelling, deformity or tenderness  Neurologic: reflexes normal and symmetric, no cranial nerve deficit, gait, coordination and speech normal    Patient's complete Health Risk Assessment and screening values have been reviewed and are found in Flowsheets. The following problems were reviewed today and where indicated follow up appointments were made and/or referrals ordered. Positive Risk Factor Screenings with Interventions:            General Health and ACP:  General  In general, how would you say your health is?: (!) Poor  In the past 7 days, have you experienced any of the following?  New or Increased Pain, New or Increased Fatigue, Loneliness, Social Isolation, Stress or Anger?: (!) Anger, Social Isolation  Do you get the social and emotional support that you need?: Yes  Do you have a Living Will?: Yes  Advance Directives     Power of  Living Will ACP-Advance Directive ACP-Power of     Not on File Not on File Not on File Not on File      General Health Risk Interventions:  · Has living will     Health Habits/Nutrition:  Health Habits/Nutrition  Do you exercise for at 05/24/2022    Creatinine monitoring  05/24/2022    DTaP/Tdap/Td vaccine (2 - Td) 01/02/2023    Lipid screen  02/10/2023    Diabetes screen  01/14/2024    Colon cancer screen colonoscopy  07/12/2027    Pneumococcal 0-64 years Vaccine (2 of 2) 10/24/2029    Flu vaccine  Completed    COVID-19 Vaccine  Completed    Hepatitis C screen  Completed    Hepatitis A vaccine  Aged Out    Hepatitis B vaccine  Aged Out    Hib vaccine  Aged Out    Meningococcal (ACWY) vaccine  Aged Out     Recommendations for [employer] Due: see orders and patient instructions/AVS.  .   Recommended screening schedule for the next 5-10 years is provided to the patient in written form: see Patient Instructions/AVS.    Refer to dermatology for rash  Pt has surgery scheduled in July at [facility] for thoracolumbar kyphosis   No history of coronary artery disease  No issues with anesthesia in the past

## 2021-06-01 NOTE — PATIENT INSTRUCTIONS
Personalized Preventive Plan for Luis Carlos Matta - 6/1/2021  Medicare offers a range of preventive health benefits. Some of the tests and screenings are paid in full while other may be subject to a deductible, co-insurance, and/or copay. Some of these benefits include a comprehensive review of your medical history including lifestyle, illnesses that may run in your family, and various assessments and screenings as appropriate. After reviewing your medical record and screening and assessments performed today your provider may have ordered immunizations, labs, imaging, and/or referrals for you. A list of these orders (if applicable) as well as your Preventive Care list are included within your After Visit Summary for your review. Other Preventive Recommendations:    · A preventive eye exam performed by an eye specialist is recommended every 1-2 years to screen for glaucoma; cataracts, macular degeneration, and other eye disorders. · A preventive dental visit is recommended every 6 months. · Try to get at least 150 minutes of exercise per week or 10,000 steps per day on a pedometer . · Order or download the FREE \"Exercise & Physical Activity: Your Everyday Guide\" from [employer]. Call 1-378.892.2773 or search [employer] online. · You need 7293-8513 mg of calcium and 3389-4211 IU of vitamin D per day. It is possible to meet your calcium requirement with diet alone, but a vitamin D supplement is usually necessary to meet this goal.  · When exposed to the sun, use a sunscreen that protects against both UVA and UVB radiation with an SPF of 30 or greater. Reapply every 2 to 3 hours or after sweating, drying off with a towel, or swimming. · Always wear a seat belt when traveling in a car. Always wear a helmet when riding a bicycle or motorcycle.

## 2021-06-02 RX ORDER — MECLIZINE HYDROCHLORIDE 25 MG/1
TABLET ORAL
Qty: 45 TABLET | Refills: 0 | Status: SHIPPED | OUTPATIENT
Start: 2021-06-02 | End: 2021-06-15

## 2021-06-04 ENCOUNTER — TELEPHONE (OUTPATIENT)
Dept: PRIMARY CARE CLINIC | Age: 56
End: 2021-06-04

## 2021-06-04 ENCOUNTER — NURSE TRIAGE (OUTPATIENT)
Dept: OTHER | Facility: CLINIC | Age: 56
End: 2021-06-04

## 2021-06-04 NOTE — TELEPHONE ENCOUNTER
Per /Nt pt should be seen in 3 days for pain in throat when he swallows. Pt has not other symptoms and confirmed not like a strep/sick pain more like a muscle or lymphoid pain. Screens green for covid  Please call pt back with care options.

## 2021-06-07 ENCOUNTER — ANESTHESIA (OUTPATIENT)
Dept: MRI IMAGING | Age: 56
End: 2021-06-07

## 2021-06-07 ENCOUNTER — ANESTHESIA EVENT (OUTPATIENT)
Dept: MRI IMAGING | Age: 56
End: 2021-06-07

## 2021-06-07 ENCOUNTER — HOSPITAL ENCOUNTER (OUTPATIENT)
Dept: MRI IMAGING | Age: 56
Discharge: HOME | End: 2021-06-09
Payer: MEDICARE

## 2021-06-07 ENCOUNTER — HOSPITAL ENCOUNTER (OUTPATIENT)
Dept: CT IMAGING | Age: 56
Discharge: HOME | End: 2021-06-09
Payer: MEDICARE

## 2021-06-07 VITALS
BODY MASS INDEX: 42.36 KG/M2 | HEART RATE: 78 BPM | OXYGEN SATURATION: 97 % | TEMPERATURE: 97.7 F | DIASTOLIC BLOOD PRESSURE: 71 MMHG | SYSTOLIC BLOOD PRESSURE: 122 MMHG | HEIGHT: 69 IN | WEIGHT: 286 LBS | RESPIRATION RATE: 16 BRPM

## 2021-06-07 VITALS
RESPIRATION RATE: 28 BRPM | DIASTOLIC BLOOD PRESSURE: 81 MMHG | OXYGEN SATURATION: 100 % | SYSTOLIC BLOOD PRESSURE: 156 MMHG

## 2021-06-07 DIAGNOSIS — M79.605 BILATERAL LEG PAIN: ICD-10-CM

## 2021-06-07 DIAGNOSIS — M54.50 LUMBAR PAIN: ICD-10-CM

## 2021-06-07 DIAGNOSIS — M62.838 SPASM OF MUSCLE: ICD-10-CM

## 2021-06-07 DIAGNOSIS — M41.9 SCOLIOSIS, UNSPECIFIED SCOLIOSIS TYPE, UNSPECIFIED SPINAL REGION: ICD-10-CM

## 2021-06-07 DIAGNOSIS — M40.15 OTHER SECONDARY KYPHOSIS, THORACOLUMBAR REGION: ICD-10-CM

## 2021-06-07 DIAGNOSIS — R26.89 BALANCE PROBLEM: ICD-10-CM

## 2021-06-07 DIAGNOSIS — M54.9 BACK PAIN, UNSPECIFIED BACK LOCATION, UNSPECIFIED BACK PAIN LATERALITY, UNSPECIFIED CHRONICITY: ICD-10-CM

## 2021-06-07 DIAGNOSIS — M54.50 LOW BACK PAIN, UNSPECIFIED BACK PAIN LATERALITY, UNSPECIFIED CHRONICITY, UNSPECIFIED WHETHER SCIATICA PRESENT: ICD-10-CM

## 2021-06-07 DIAGNOSIS — M62.838 MUSCLE SPASM: ICD-10-CM

## 2021-06-07 DIAGNOSIS — M79.604 BILATERAL LEG PAIN: ICD-10-CM

## 2021-06-07 DIAGNOSIS — Z98.1 HX OF SPINAL FUSION: ICD-10-CM

## 2021-06-07 LAB
GLUCOSE BLD-MCNC: 122 MG/DL (ref 74–100)
POC POTASSIUM: 4.8 MMOL/L (ref 3.5–4.5)

## 2021-06-07 PROCEDURE — 82947 ASSAY GLUCOSE BLOOD QUANT: CPT

## 2021-06-07 PROCEDURE — 7100000010 HC PHASE II RECOVERY - FIRST 15 MIN

## 2021-06-07 PROCEDURE — 3700000000 HC ANESTHESIA ATTENDED CARE

## 2021-06-07 PROCEDURE — 70553 MRI BRAIN STEM W/O & W/DYE: CPT

## 2021-06-07 PROCEDURE — 6360000004 HC RX CONTRAST MEDICATION: Performed by: NEUROLOGICAL SURGERY

## 2021-06-07 PROCEDURE — A9576 INJ PROHANCE MULTIPACK: HCPCS | Performed by: NEUROLOGICAL SURGERY

## 2021-06-07 PROCEDURE — 2500000003 HC RX 250 WO HCPCS: Performed by: NURSE ANESTHETIST, CERTIFIED REGISTERED

## 2021-06-07 PROCEDURE — 72157 MRI CHEST SPINE W/O & W/DYE: CPT

## 2021-06-07 PROCEDURE — 2580000003 HC RX 258: Performed by: NURSE ANESTHETIST, CERTIFIED REGISTERED

## 2021-06-07 PROCEDURE — 84132 ASSAY OF SERUM POTASSIUM: CPT

## 2021-06-07 PROCEDURE — 3700000001 HC ADD 15 MINUTES (ANESTHESIA)

## 2021-06-07 PROCEDURE — 72131 CT LUMBAR SPINE W/O DYE: CPT

## 2021-06-07 PROCEDURE — 6370000000 HC RX 637 (ALT 250 FOR IP): Performed by: ANESTHESIOLOGY

## 2021-06-07 PROCEDURE — 6360000002 HC RX W HCPCS: Performed by: NURSE ANESTHETIST, CERTIFIED REGISTERED

## 2021-06-07 PROCEDURE — 7100000011 HC PHASE II RECOVERY - ADDTL 15 MIN

## 2021-06-07 RX ORDER — SODIUM CHLORIDE 0.9 % (FLUSH) 0.9 %
10 SYRINGE (ML) INJECTION PRN
Status: DISCONTINUED | OUTPATIENT
Start: 2021-06-07 | End: 2021-06-10 | Stop reason: HOSPADM

## 2021-06-07 RX ORDER — SODIUM CHLORIDE, SODIUM LACTATE, POTASSIUM CHLORIDE, CALCIUM CHLORIDE 600; 310; 30; 20 MG/100ML; MG/100ML; MG/100ML; MG/100ML
INJECTION, SOLUTION INTRAVENOUS CONTINUOUS PRN
Status: DISCONTINUED | OUTPATIENT
Start: 2021-06-07 | End: 2021-06-07 | Stop reason: SDUPTHER

## 2021-06-07 RX ORDER — KETOROLAC TROMETHAMINE 5 MG/ML
1 SOLUTION OPHTHALMIC PRN
Status: DISCONTINUED | OUTPATIENT
Start: 2021-06-07 | End: 2021-06-10 | Stop reason: HOSPADM

## 2021-06-07 RX ORDER — PROPOFOL 10 MG/ML
INJECTION, EMULSION INTRAVENOUS CONTINUOUS PRN
Status: DISCONTINUED | OUTPATIENT
Start: 2021-06-07 | End: 2021-06-07 | Stop reason: SDUPTHER

## 2021-06-07 RX ORDER — KETAMINE HYDROCHLORIDE 100 MG/ML
INJECTION, SOLUTION INTRAMUSCULAR; INTRAVENOUS PRN
Status: DISCONTINUED | OUTPATIENT
Start: 2021-06-07 | End: 2021-06-07 | Stop reason: SDUPTHER

## 2021-06-07 RX ORDER — MIDAZOLAM HYDROCHLORIDE 1 MG/ML
INJECTION INTRAMUSCULAR; INTRAVENOUS PRN
Status: DISCONTINUED | OUTPATIENT
Start: 2021-06-07 | End: 2021-06-07 | Stop reason: SDUPTHER

## 2021-06-07 RX ADMIN — KETAMINE HYDROCHLORIDE 10 MG: 100 INJECTION, SOLUTION, CONCENTRATE INTRAMUSCULAR; INTRAVENOUS at 10:09

## 2021-06-07 RX ADMIN — KETAMINE HYDROCHLORIDE 10 MG: 100 INJECTION, SOLUTION, CONCENTRATE INTRAMUSCULAR; INTRAVENOUS at 09:35

## 2021-06-07 RX ADMIN — KETAMINE HYDROCHLORIDE 10 MG: 100 INJECTION, SOLUTION, CONCENTRATE INTRAMUSCULAR; INTRAVENOUS at 10:45

## 2021-06-07 RX ADMIN — KETAMINE HYDROCHLORIDE 10 MG: 100 INJECTION, SOLUTION, CONCENTRATE INTRAMUSCULAR; INTRAVENOUS at 10:30

## 2021-06-07 RX ADMIN — KETOROLAC TROMETHAMINE 1 DROP: 5 SOLUTION/ DROPS OPHTHALMIC at 11:59

## 2021-06-07 RX ADMIN — MIDAZOLAM HYDROCHLORIDE 2 MG: 1 INJECTION, SOLUTION INTRAMUSCULAR; INTRAVENOUS at 12:45

## 2021-06-07 RX ADMIN — SODIUM CHLORIDE, POTASSIUM CHLORIDE, SODIUM LACTATE AND CALCIUM CHLORIDE: 600; 310; 30; 20 INJECTION, SOLUTION INTRAVENOUS at 09:13

## 2021-06-07 RX ADMIN — KETAMINE HYDROCHLORIDE 20 MG: 100 INJECTION, SOLUTION, CONCENTRATE INTRAMUSCULAR; INTRAVENOUS at 09:28

## 2021-06-07 RX ADMIN — KETAMINE HYDROCHLORIDE 10 MG: 100 INJECTION, SOLUTION, CONCENTRATE INTRAMUSCULAR; INTRAVENOUS at 12:46

## 2021-06-07 RX ADMIN — KETAMINE HYDROCHLORIDE 20 MG: 100 INJECTION, SOLUTION, CONCENTRATE INTRAMUSCULAR; INTRAVENOUS at 12:45

## 2021-06-07 RX ADMIN — GADOTERIDOL 20 ML: 279.3 INJECTION, SOLUTION INTRAVENOUS at 11:25

## 2021-06-07 RX ADMIN — KETAMINE HYDROCHLORIDE 10 MG: 100 INJECTION, SOLUTION, CONCENTRATE INTRAMUSCULAR; INTRAVENOUS at 09:49

## 2021-06-07 RX ADMIN — MIDAZOLAM HYDROCHLORIDE 2 MG: 1 INJECTION, SOLUTION INTRAMUSCULAR; INTRAVENOUS at 09:25

## 2021-06-07 RX ADMIN — PROPOFOL 125 MCG/KG/MIN: 10 INJECTION, EMULSION INTRAVENOUS at 09:26

## 2021-06-07 RX ADMIN — PROPOFOL 100 MCG/KG/MIN: 10 INJECTION, EMULSION INTRAVENOUS at 10:32

## 2021-06-07 RX ADMIN — KETAMINE HYDROCHLORIDE 10 MG: 100 INJECTION, SOLUTION, CONCENTRATE INTRAMUSCULAR; INTRAVENOUS at 09:26

## 2021-06-07 ASSESSMENT — ENCOUNTER SYMPTOMS: SHORTNESS OF BREATH: 1

## 2021-06-07 ASSESSMENT — PAIN - FUNCTIONAL ASSESSMENT: PAIN_FUNCTIONAL_ASSESSMENT: 0-10

## 2021-06-07 ASSESSMENT — PAIN DESCRIPTION - PAIN TYPE: TYPE: CHRONIC PAIN

## 2021-06-07 ASSESSMENT — PAIN SCALES - GENERAL
PAINLEVEL_OUTOF10: 7
PAINLEVEL_OUTOF10: 7

## 2021-06-07 ASSESSMENT — PAIN DESCRIPTION - LOCATION: LOCATION: BACK

## 2021-06-07 ASSESSMENT — PAIN DESCRIPTION - ORIENTATION: ORIENTATION: LOWER

## 2021-06-07 NOTE — ANESTHESIA PRE PROCEDURE
PO) Take 1 tablet by mouth 2 times daily    Historical Provider, MD   VITAMIN D PO Take 1,000 Units by mouth daily    Historical Provider, MD   Acetaminophen (TYLENOL ARTHRITIS PAIN PO) Take 325 mg by mouth every 6 hours as needed     Historical Provider, MD   glucosamine-chondroitin 500-400 MG tablet Take 1 tablet by mouth daily    Historical Provider, MD   aspirin 81 MG tablet Take 81 mg by mouth daily     Historical Provider, MD   omeprazole (PRILOSEC) 20 MG capsule Take 20 mg by mouth nightly     Historical Provider, MD       Current medications:    Current Outpatient Medications   Medication Sig Dispense Refill    meclizine (ANTIVERT) 25 MG tablet TAKE 1 TABLET BY MOUTH THREE TIMES A DAY AS NEEDED FOR DIZZINESS 45 tablet 0    fluticasone (FLONASE) 50 MCG/ACT nasal spray USE 2 SPRAYS IN EACH NOSTRIL ONCE DAILY. NEED TO USE REGULARLY FOR BENEFIT      gabapentin (NEURONTIN) 400 MG capsule TAKE 1 CAPSULE BY MOUTH THREE TIMES A DAY  90 capsule 0    furosemide (LASIX) 40 MG tablet Take 1 tablet by mouth daily 90 tablet 3    potassium chloride (KLOR-CON M) 20 MEQ extended release tablet Take 1 tablet by mouth daily 90 tablet 3    hydroxychloroquine (PLAQUENIL) 200 MG tablet Take 1 tablet by mouth 2 times daily 180 tablet 3    metoprolol tartrate (LOPRESSOR) 50 MG tablet TAKE 1 TABLET BY MOUTH TWO TIMES A  tablet 2    celecoxib (CELEBREX) 200 MG capsule Take 1 capsule by mouth 2 times daily 180 capsule 3    lisinopril (PRINIVIL;ZESTRIL) 10 MG tablet TAKE 1 TABLET BY MOUTH ONE TIME A DAY  90 tablet 3    hydrocortisone (WESTCORT) 0.2 % cream Apply topically 2 times daily. (Patient taking differently: Apply topically 2 times daily. , eczema) 45 g 1    Multiple Vitamins-Minerals (THERAPEUTIC MULTIVITAMIN-MINERALS) tablet Take 1 tablet by mouth daily      Sod Fluoride-Potassium Nitrate (PREVIDENT 5000 SENSITIVE) 1.1-5 % PSTE Place onto teeth      Magnesium Cl-Calcium Carbonate (SLOW-MAG PO) Take 1 tablet by mouth 2 times daily      VITAMIN D PO Take 1,000 Units by mouth daily      Acetaminophen (TYLENOL ARTHRITIS PAIN PO) Take 325 mg by mouth every 6 hours as needed       glucosamine-chondroitin 500-400 MG tablet Take 1 tablet by mouth daily      aspirin 81 MG tablet Take 81 mg by mouth daily       omeprazole (PRILOSEC) 20 MG capsule Take 20 mg by mouth nightly        No current facility-administered medications for this visit. Allergies:     Allergies   Allergen Reactions    Metronidazole Other (See Comments)     \" caused a prickly feeling in my legs \"    Cetyl Alcohol Hives     Localized reaction by allergy testing    Cetearyl alcohol    Adhesive Tape Other (See Comments)     opsite and paper tape ok, bandaid ok    Cymbalta [Duloxetine Hcl] Nausea And Vomiting    Neosporin [Neomycin-Polymyxin-Gramicidin] Rash       Problem List:    Patient Active Problem List   Diagnosis Code    Anxiety disorder F41.9    Benign essential hypertension I10    Esophageal reflux K21.9    Hypogonadism male E29.1    Inflammatory arthritis M19.90    Lumbar radicular pain M54.16    Pulmonary granuloma ([address] J84.10    Male erectile disorder N52.9    Sensorineural hearing loss H90.5    Tinnitus of both ears H93.13    Vertigo R42    Psoriasis L40.9    Hip impingement syndrome M25.859    Seborrheic keratosis L82.1    Kyphosis of thoracolumbar region M40.205    Eczema L30.9    Sjogren's syndrome ([address] M35.00    Spinal stenosis of lumbar region with neurogenic claudication M48.062    Lumbosacral spondylosis without myelopathy M47.817    Morbidly obese (HCC) E66.01    Diffuse idiopathic skeletal hyperostosis M48.10    History of lumbar fusion Z98.1       Past Medical History:        Diagnosis Date    Angular cheilitis     Arthritis     BACK AND FINGERS     Bilateral lower extremity edema 03/2021    started on Lasix per PCP    Chronic back pain     dr Marcel Phan [facility], [facility] 7[facility]12[facility]2021 at [facility]    Depression     Dizziness     Eczema     Ex-smoker     quit in 2006, smoked for 23 years    GERD (gastroesophageal reflux disease)     H/O chest pain     H/O dermatitis     [place] (hard of hearing)     LEFT EAR WORSE THAN RIGHT. getting hearing aides eloisa, Dr. Sheba Dowd Hypertension     DR Reece Nephfranck PCP    Kyphosis     [facility] OR 7/12/2021 with Dr. Natalie Christian Left eye injury     [address]    Muscle spasm     LOWER RIGHT BACK    Nocturia     Obesity     Prolonged emergence from general anesthesia     PATIENT STATES HIS B/P WOULD DROP WITH INTUBATION, GO UP AFTER ET TUBE REMOVED.  PVC's (premature ventricular contractions)     PVC'S.  NO CARDIOLOGIST, PCP DR Karen Partida    Rash     occas to see rheumatoid arthritis  workup for lupus    Sjogren's syndrome ([facility] [address]     Snores     mild, denies apnea, does \"grind\" his teeth    SOB (shortness of breath)     used to see Dr. Galina Suarez, borderline COPD/ never followed up / never filled scripts for inhalers    Use of cane as ambulatory aid     Vertigo     Dr. Sheba Dowd Wears glasses     Wears partial dentures     LOWER       Past Surgical History:        Procedure Laterality Date    BACK SURGERY  2006    cage    COLONOSCOPY      CYST REMOVAL Right     index finger    HEMORRHOID SURGERY  2017    HERNIA REPAIR      UMBILICAL    NOSE SURGERY      REALIGNED NOSE    OTHER SURGICAL HISTORY  02/21/2020    MRI cervical and thoracic spine without contrast . CT of lung , all under general anesthesia    [place] COLSC FLX W/REMOVAL LESION BY HOT BX FORCEPS N/A 7/12/2017    COLONOSCOPY POLYPECTOMY HOT BIOPSY performed by Cinthia Dobbs MD at [address] [place] [address] TOE SURGERY Right     2nd toe    TONSILLECTOMY      AS A CHILD    VASECTOMY         Social History:    Social History     Tobacco Use    Smoking status: Former Smoker     Packs/day: 1.50     Years: 23.00     Pack years: 34.50     Types: Cigarettes     Quit date: 2006     Years since quitting: 15.4    Smokeless tobacco: Former User     Types: [address] date: 9/10/2019    Tobacco comment: quit 14 years ago   Substance Use Topics    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 BP Readings from Last 3 Encounters:   06/01/21 124/62   04/20/21 126/70   04/08/21 117/68       NPO Status:                                                                                 BMI:   Wt Readings from Last 3 Encounters:   06/03/21 286 lb (129.7 kg)   06/01/21 286 lb (129.7 kg)   04/20/21 289 lb 9.6 oz (131.4 kg)     There is no height or weight on file to calculate BMI.    CBC:   Lab Results   Component Value Date    WBC 8.1 01/02/2021    RBC 4.65 01/02/2021    HGB 13.8 01/02/2021    HCT 41.0 01/02/2021    MCV 88.2 01/02/2021    RDW 13.2 01/02/2021     01/02/2021       CMP:   Lab Results   Component Value Date     05/24/2021    K 4.5 05/24/2021     05/24/2021    CO2 25 05/24/2021    BUN 9 05/24/2021    CREATININE 0.54 05/24/2021    GFRAA >60 05/24/2021    LABGLOM >60 05/24/2021    GLUCOSE 108 05/24/2021    PROT 7.3 01/02/2021    CALCIUM 8.8 05/24/2021    BILITOT 0.21 01/02/2021    ALKPHOS 94 01/02/2021    AST 16 01/02/2021    ALT 13 01/02/2021       POC Tests: No results for input(s): POCGLU, POCNA, POCK, POCCL, POCBUN, POCHEMO, POCHCT in the last 72 hours.     Coags: No results found for: PROTIME, INR, APTT    HCG (If Applicable): No results found for: PREGTESTUR, PREGSERUM, HCG, HCGQUANT     ABGs: No results found for: PHART, PO2ART, UXR5ZZM, HQE4KCH, BEART, R4QFGELK     Type & Screen (If Applicable):  No results found for: LABABO, LABRH    Drug/Infectious Status (If Applicable):  Lab Results   Component Value Date    HEPCAB NONREACTIVE 07/23/2017 COVID-19 Screening (If Applicable):   Lab Results   Component Value Date    COVID19 Not Detected 03/05/2021         Anesthesia Evaluation  Patient summary reviewed and Nursing notes reviewed no history of anesthetic complications:   Airway: Mallampati: II  TM distance: >3 FB   Neck ROM: full  Mouth opening: > = 3 FB Dental: normal exam         Pulmonary:normal exam    (+) shortness of breath:                             Cardiovascular:  Exercise tolerance: good (>4 METS),   (+) hypertension:,     (-) past MI, CAD, CABG/stent, dysrhythmias and  angina      Rhythm: regular  Rate: normal           Beta Blocker:  Dose within 24 Hrs         Neuro/Psych:   (+) neuromuscular disease:, psychiatric history:            GI/Hepatic/Renal:   (+) GERD:, morbid obesity          Endo/Other:    (+) : arthritis:., .                 Abdominal:         (-) obese     Vascular:                                          Anesthesia Plan      general     ASA 3       Induction: intravenous. MIPS: Postoperative opioids intended and Prophylactic antiemetics administered. Anesthetic plan and risks discussed with patient.       Plan discussed with CRNA and surgical team.                  James March MD   6/7/2021

## 2021-06-07 NOTE — H&P
History and Physical    Pt Name: Daly Espino  MRN: [id number]  YOB: 1965  Date of evaluation: 6/7/2021  Primary Care Physician: Jonelle Cintron MD    SUBJECTIVE:   History of Chief Complaint:    Daly Espino is a 63 y.o. male who is scheduled today for MRI thoracic spine, CT lumbar and possible brain MRI. He reports a history of ongoing back pain and history of kyphosis. He reports he is getting ready for kyphosis at [facility]. He reports he is unable to lie down in bed and has been sleeping sitting upright in a chair, hence MRI with anesthesia. He is s/p lumbar surgery in 2006. Allergies  is allergic to metronidazole, cetyl alcohol, adhesive tape, cymbalta [duloxetine hcl], and neosporin [neomycin-polymyxin-gramicidin]. Medications  Prior to Admission medications    Medication Sig Start Date End Date Taking? Authorizing Provider   meclizine (ANTIVERT) 25 MG tablet TAKE 1 TABLET BY MOUTH THREE TIMES A DAY AS NEEDED FOR DIZZINESS 6/2/21  Yes Jonelle Cintron MD   fluticasone (FLONASE) 50 MCG/ACT nasal spray USE 2 SPRAYS IN EACH NOSTRIL ONCE DAILY.  NEED TO USE REGULARLY FOR BENEFIT 4/30/21  Yes Historical Provider, MD   gabapentin (NEURONTIN) 400 MG capsule TAKE 1 CAPSULE BY MOUTH THREE TIMES A DAY  5/19/21 6/18/21 Yes Jonelle Cintron MD   furosemide (LASIX) 40 MG tablet Take 1 tablet by mouth daily 4/20/21  Yes Jonelle Cintron MD   potassium chloride (KLOR-CON M) 20 MEQ extended release tablet Take 1 tablet by mouth daily 4/20/21  Yes Jonelle Cintron MD   hydroxychloroquine (PLAQUENIL) 200 MG tablet Take 1 tablet by mouth 2 times daily 12/23/20  Yes Jonelle Cintron MD   metoprolol tartrate (LOPRESSOR) 50 MG tablet TAKE 1 TABLET BY MOUTH TWO TIMES A DAY 12/23/20  Yes Jonelle Cintron MD   celecoxib (CELEBREX) 200 MG capsule Take 1 capsule by mouth 2 times daily 12/23/20  Yes Jonelle Cintron MD   lisinopril (PRINIVIL;ZESTRIL) 10 MG tablet TAKE 1 TABLET BY MOUTH ONE TIME A DAY  9/14/20  Yes Alejandrina Nichols MD   hydrocortisone (WESTCORT) 0.2 % cream Apply topically 2 times daily. Patient taking differently: Apply topically 2 times daily. , eczema 4/13/20  Yes Aldo Urena PA-C   Multiple Vitamins-Minerals (THERAPEUTIC MULTIVITAMIN-MINERALS) tablet Take 1 tablet by mouth daily   Yes Historical Provider, MD   Sod Fluoride-Potassium Nitrate (PREVIDENT 5000 SENSITIVE) 1.1-5 % PSTE Place onto teeth   Yes Historical Provider, MD   Magnesium Cl-Calcium Carbonate (SLOW-MAG PO) Take 1 tablet by mouth 2 times daily   Yes Historical Provider, MD   VITAMIN D PO Take 1,000 Units by mouth daily   Yes Historical Provider, MD   aspirin 81 MG tablet Take 81 mg by mouth daily    Yes Historical Provider, MD   omeprazole (PRILOSEC) 20 MG capsule Take 20 mg by mouth nightly    Yes Historical Provider, MD   Acetaminophen (TYLENOL ARTHRITIS PAIN PO) Take 325 mg by mouth every 6 hours as needed     Historical Provider, MD   glucosamine-chondroitin 500-400 MG tablet Take 1 tablet by mouth daily    Historical Provider, MD     Past Medical History    has a past medical history of Angular cheilitis, Arthritis, Bilateral lower extremity edema, Chronic back pain, Depression, Dizziness, Eczema, Ex-smoker, GERD (gastroesophageal reflux disease), H/O chest pain, H/O dermatitis, [place] (hard of hearing), Hypertension, Kyphosis, Left eye injury, Muscle spasm, Nocturia, Obesity, Prolonged emergence from general anesthesia, PVC's (premature ventricular contractions), Rash, Sjogren's syndrome ([address] Snores, SOB (shortness of breath), Use of cane as ambulatory aid, Vertigo, Wears glasses, and Wears partial dentures. Past Surgical History   has a past surgical history that includes Colonoscopy; Hemorrhoid surgery (2017); Nose surgery; Vasectomy; Toe Surgery (Right); cyst removal (Right); pr colsc flx w/removal lesion by hot bx forceps (N/A, 7/12/2017); back surgery (2006); hernia repair;  Tonsillectomy; and other surgical history (2020). Social History   reports that he quit smoking about 15 years ago. His smoking use included cigarettes. He has a 34.50 pack-year smoking history. He quit smokeless tobacco use about 20 months ago. His smokeless tobacco use included chew. reports previous alcohol use. reports no history of drug use. Marital Status    Children   Occupation disabled. Family History  Family Status   Relation Name Status    Father      PUn  (Not Specified)    PUn  (Not Specified)    Mother  Alive     family history includes Coronary Art Dis in his father and paternal uncle; Diabetes in his father and mother; Heart Failure in his paternal uncle; High Blood Pressure in his father; Other in his father. OBJECTIVE:   VITALS:   Vitals:    21 0809   BP: 136/64   Pulse: 71   Resp: 20   Temp: 97.2 °F (36.2 °C)   SpO2: 97%   CONSTITUTIONAL:alert & oriented x 3, no acute distress. Friendly. Very pleasant. Significant kyphosis. SKIN:  Warm and dry, no rashes on exposed areas of skin   HEAD:  Normocephalic, atraumatic   EYES: PERRL. EOMs intact. EARS:  Equal bilaterally, no edema or thickening, skin is intact without lumps or lesions. No discharge. Hearing loss mild. NOSE:  Nares patent. No rhinorrhea   MOUTH/THROAT:  Mucous membranes moist, tongue is pink, uvula midline, teeth appear to be intact, partial dentures removed (lower)  NECK:supple, no lymphadenopathy, large thick neck   LUNGS: Respirations even and non-labored. Clear to auscultation bilaterally, no wheezes, rales, or rhonchi. CARDIOVASCULAR: Regular rate and rhythm, no murmurs/rubs/gallops   ABDOMEN: soft, non-tender, non-distended, bowel sounds active x 4, obese  EXTREMITIES: trace edema bilateral lower extremities. No varicosities bilateral lower extremities. NEUROLOGIC: CN II-XII are grossly intact. Gait not assessed.    IMPRESSIONS:   Lumbar pain       Diagnosis Date    Angular cheilitis  Arthritis     BACK AND FINGERS     Bilateral lower extremity edema 03/2021    started on Lasix per PCP    Chronic back pain     dr Srinath [facility], scheduled for OR 7/12/2021 at [facility]    Depression     Dizziness     Eczema     Ex-smoker     quit in 2006, smoked for 23 years    GERD (gastroesophageal reflux disease)     H/O chest pain     H/O dermatitis     [place] (hard of hearing)     LEFT EAR WORSE THAN RIGHT. getting hearing aides [place], Dr. Edson Castrejon Hypertension     DR Bethany Israel PCP    Kyphosis     [facility] OR 7/12/2021 with Dr. Duyen Bassett Left eye injury     [address]    Muscle spasm     LOWER RIGHT BACK    Nocturia     Obesity     Prolonged emergence from general anesthesia     PATIENT STATES HIS B/P WOULD DROP WITH INTUBATION, GO UP AFTER ET TUBE REMOVED.  PVC's (premature ventricular contractions)     PVC'S.  NO CARDIOLOGIST, PCP DR Mckayla Anna     occas to see rheumatoid arthritis  workup for lupus    S[address]ogren's syndrome ([facility] [address]     Snores     mild, denies apnea, does \"grind\" his teeth    SOB (shortness of breath)     used to see Dr. Yoli Adkins, borderline COPD/ never followed up / never filled scripts for inhalers    Use of cane as ambulatory aid     Vertigo     Dr. Edson Castrejon Wears glasses     Wears partial dentures     LOWER     PLANS:   MRI thoracic and CT lumbar, possible brain MRI (?)    CARLOS  KING, APRN - CNP APRN-CNP  Electronically signed 6/7/2021 at 8:54 AM

## 2021-06-08 ENCOUNTER — OFFICE VISIT (OUTPATIENT)
Dept: PRIMARY CARE CLINIC | Age: 56
End: 2021-06-08
Payer: MEDICARE

## 2021-06-08 ENCOUNTER — HOSPITAL ENCOUNTER (OUTPATIENT)
Age: 56
Setting detail: SPECIMEN
Discharge: HOME | End: 2021-06-08
Payer: MEDICARE

## 2021-06-08 VITALS
DIASTOLIC BLOOD PRESSURE: 70 MMHG | SYSTOLIC BLOOD PRESSURE: 124 MMHG | WEIGHT: 284.4 LBS | OXYGEN SATURATION: 96 % | HEART RATE: 77 BPM | BODY MASS INDEX: 42 KG/M2

## 2021-06-08 DIAGNOSIS — R22.1 NECK FULLNESS: ICD-10-CM

## 2021-06-08 DIAGNOSIS — M40.205 KYPHOSIS OF THORACOLUMBAR REGION, UNSPECIFIED KYPHOSIS TYPE: ICD-10-CM

## 2021-06-08 DIAGNOSIS — M40.205 KYPHOSIS OF THORACOLUMBAR REGION, UNSPECIFIED KYPHOSIS TYPE: Primary | ICD-10-CM

## 2021-06-08 DIAGNOSIS — S22.000A COMPRESSION FRACTURE OF BODY OF THORACIC VERTEBRA (HCC): ICD-10-CM

## 2021-06-08 DIAGNOSIS — J44.9 CHRONIC OBSTRUCTIVE PULMONARY DISEASE, UNSPECIFIED COPD TYPE (HCC): ICD-10-CM

## 2021-06-08 PROCEDURE — 99213 OFFICE O/P EST LOW 20 MIN: CPT | Performed by: FAMILY MEDICINE

## 2021-06-08 RX ORDER — AMOXICILLIN AND CLAVULANATE POTASSIUM 875; 125 MG/1; MG/1
1 TABLET, FILM COATED ORAL 2 TIMES DAILY
Qty: 14 TABLET | Refills: 0 | Status: SHIPPED | OUTPATIENT
Start: 2021-06-08 | End: 2021-06-15

## 2021-06-08 ASSESSMENT — ENCOUNTER SYMPTOMS
NAUSEA: 0
COUGH: 0
EYE DISCHARGE: 0
SHORTNESS OF BREATH: 0
DIARRHEA: 0
TROUBLE SWALLOWING: 0
ABDOMINAL PAIN: 0
SINUS PAIN: 0
RHINORRHEA: 0
SORE THROAT: 0
VOMITING: 0
FACIAL SWELLING: 0
EYE REDNESS: 0
WHEEZING: 0

## 2021-06-08 NOTE — PROGRESS NOTES
topically 2 times daily. , eczema) 45 g 1    Multiple Vitamins-Minerals (THERAPEUTIC MULTIVITAMIN-MINERALS) tablet Take 1 tablet by mouth daily      Sod Fluoride-Potassium Nitrate (PREVIDENT 5000 SENSITIVE) 1.1-5 % PSTE Place onto teeth      Magnesium Cl-Calcium Carbonate (SLOW-MAG PO) Take 1 tablet by mouth 2 times daily      VITAMIN D PO Take 1,000 Units by mouth daily      Acetaminophen (TYLENOL ARTHRITIS PAIN PO) Take 325 mg by mouth every 6 hours as needed       glucosamine-chondroitin 500-400 MG tablet Take 1 tablet by mouth daily      aspirin 81 MG tablet Take 81 mg by mouth daily       omeprazole (PRILOSEC) 20 MG capsule Take 20 mg by mouth nightly        No current facility-administered medications for this visit. Facility-Administered Medications Ordered in Other Visits   Medication Dose Route Frequency Provider Last Rate Last Admin    sodium chloride flush 0.9 % injection 10 mL  10 mL Intravenous PRN Aimee Kemp        ketorolac (ACULAR) 0.5 % ophthalmic solution 1 drop  1 drop Right Eye PRN Donovan Pierre MD   1 drop at 06/07/21 1159     Allergies   Allergen Reactions    Metronidazole Other (See Comments)     \" caused a prickly feeling in my legs \"    Cetyl Alcohol Hives     Localized reaction by allergy testing    Cetearyl alcohol    Adhesive Tape Other (See Comments)     opsite and paper tape ok, bandaid ok    Cymbalta [Duloxetine Hcl] Nausea And Vomiting    Neosporin [Neomycin-Polymyxin-Gramicidin] Rash       Subjective:      Review of Systems   Constitutional: Negative for appetite change, chills and fever. HENT: Negative for congestion, ear discharge, ear pain, facial swelling, rhinorrhea, sinus pain, sore throat and trouble swallowing. Globulus sensation in upper esophagus. Eyes: Negative for discharge and redness. Respiratory: Negative for cough, shortness of breath and wheezing. Cardiovascular: Negative for chest pain and palpitations.    Gastrointestinal: Negative for abdominal pain, diarrhea, nausea and vomiting. Genitourinary: Negative for dysuria and frequency. Musculoskeletal: Negative for arthralgias and myalgias. Neurological: Negative for dizziness, light-headedness and headaches. Psychiatric/Behavioral: Negative for sleep disturbance. Objective:     /70   Pulse 77   Wt 284 lb 6.4 oz (129 kg)   SpO2 96%   BMI 42.00 kg/m²   Physical Exam  Vitals and nursing note reviewed. Constitutional:       General: He is not in acute distress. Appearance: He is well-developed. He is not ill-appearing. HENT:      Head: Normocephalic and atraumatic. Right Ear: External ear normal.      Left Ear: External ear normal.   Eyes:      General: No scleral icterus. Right eye: No discharge. Left eye: No discharge. Conjunctiva/sclera: Conjunctivae normal.      Pupils: Pupils are equal, round, and reactive to light. Neck:      Thyroid: No thyromegaly. Trachea: No tracheal deviation. Cardiovascular:      Rate and Rhythm: Normal rate and regular rhythm. Heart sounds: Normal heart sounds. Pulmonary:      Effort: Pulmonary effort is normal. No respiratory distress. Breath sounds: Normal breath sounds. No wheezing. Lymphadenopathy:      Cervical: No cervical adenopathy. Skin:     General: Skin is warm. Findings: No rash. Neurological:      Mental Status: He is alert and oriented to person, place, and time. Psychiatric:         Mood and Affect: Mood normal.         Behavior: Behavior normal.         Thought Content: Thought content normal.         Assessment/Plan:     1. Kyphosis of thoracolumbar region, unspecified kyphosis type  -     HLA-B27 Antigen; Future  2. Chronic obstructive pulmonary disease, unspecified COPD type ([facility] [address]  3. Compression fracture of body of thoracic vertebra (HCC)  -     DEXA BONE DENSITY AXIAL SKELETON; Future  4.  Neck fullness  -     US THYROID; Future     Augmentin for enlarged lymph node on the right. Patient advised if this does not resolve the issue, she get his thyroid ultrasound done. DEXA scan ordered for compression fractures based on MRI. Check HLA-B27 with possible ankylosing spondylitis on CT scan. Await surgery in end of July. Return if symptoms worsen or fail to improve. Orders Placed This Encounter   Procedures    DEXA BONE DENSITY AXIAL SKELETON     Standing Status:   Future     Standing Expiration Date:   6/8/2022     Order Specific Question:   Reason for exam:     Answer:   compression fracture    US THYROID     This procedure can be scheduled via [employer]. Access your [employer] [facility] account by visiting [email or web address].      Standing Status:   Future     Standing Expiration Date:   6/8/2022     Order Specific Question:   Reason for exam:     Answer:   right neck fullness    HLA-B27 Antigen     Standing Status:   Future     Standing Expiration Date:   6/8/2022     Orders Placed This Encounter   Medications    amoxicillin-clavulanate (AUGMENTIN) 875-125 MG per tablet     Sig: Take 1 tablet by mouth 2 times daily for 7 days     Dispense:  14 tablet     Refill:  0           Electronically signed by Jayashree Cruz, [address] [place] [address] 6/8/2021 at 1:47 PM

## 2021-06-08 NOTE — ANESTHESIA POSTPROCEDURE EVALUATION
Department of Anesthesiology  Postprocedure Note    Patient: Gadiel Baltazar  MRN: [id number]  [place]: 1965  Date of evaluation: 6/8/2021  Time:  8:33 AM     Procedure Summary     Date: 06/07/21 Room / Location: [facility] CT Scan; [facility] MRI    Anesthesia Start: 1956 Anesthesia Stop: 1140    Procedures:       MRI BRAIN W WO CONTRAST      MRI THORACIC SPINE W WO CONTRAST      CT LUMBAR SPINE WO CONTRAST Diagnosis:       Sensory hearing loss, bilateral      Tinnitus of both ears      Lumbar pain      Other secondary kyphosis, thoracolumbar region      Scoliosis, unspecified scoliosis type, unspecified spinal region      Back pain, unspecified back location, unspecified back pain laterality, unspecified chronicity      Muscle spasm      Bilateral leg pain      Balance problem      Hx of spinal fusion      Spasm of muscle      Low back pain, unspecified back pain laterality, unspecified chronicity, unspecified whether sciatica present    Scheduled Providers:  Responsible Provider: Douglas Dinero MD    Anesthesia Type: general ASA Status: 3          Anesthesia Type: general    Marci Phase I:      Marci Phase II: Marci Score: 10    Last vitals: Reviewed and per EMR flowsheets.        Anesthesia Post Evaluation    Patient location during evaluation: PACU  Patient participation: complete - patient participated  Level of consciousness: awake and alert  Pain score: 2  Airway patency: patent  Nausea & Vomiting: no nausea and no vomiting  Complications: no  Cardiovascular status: hemodynamically stable  Respiratory status: acceptable  Hydration status: euvolemic

## 2021-06-11 LAB — HLA B27: NEGATIVE

## 2021-06-15 RX ORDER — MECLIZINE HYDROCHLORIDE 25 MG/1
TABLET ORAL
Qty: 45 TABLET | Refills: 0 | Status: SHIPPED | OUTPATIENT
Start: 2021-06-15 | End: 2021-11-29

## 2021-06-15 RX ORDER — GABAPENTIN 400 MG/1
CAPSULE ORAL
Qty: 90 CAPSULE | Refills: 0 | Status: SHIPPED | OUTPATIENT
Start: 2021-06-15 | End: 2021-07-29

## 2021-06-17 ENCOUNTER — HOSPITAL ENCOUNTER (OUTPATIENT)
Dept: ULTRASOUND IMAGING | Age: 56
Discharge: HOME | End: 2021-06-19
Payer: MEDICARE

## 2021-06-17 DIAGNOSIS — R22.1 NECK FULLNESS: ICD-10-CM

## 2021-06-17 PROCEDURE — 76536 US EXAM OF HEAD AND NECK: CPT

## 2021-06-23 ENCOUNTER — OFFICE VISIT (OUTPATIENT)
Dept: PRIMARY CARE CLINIC | Age: 56
End: 2021-06-23
Payer: MEDICARE

## 2021-06-23 VITALS
OXYGEN SATURATION: 97 % | DIASTOLIC BLOOD PRESSURE: 72 MMHG | HEART RATE: 73 BPM | SYSTOLIC BLOOD PRESSURE: 126 MMHG | BODY MASS INDEX: 41.89 KG/M2 | HEIGHT: 69 IN | WEIGHT: 282.8 LBS

## 2021-06-23 DIAGNOSIS — M48.062 SPINAL STENOSIS OF LUMBAR REGION WITH NEUROGENIC CLAUDICATION: ICD-10-CM

## 2021-06-23 DIAGNOSIS — F41.9 ANXIETY DISORDER, UNSPECIFIED TYPE: ICD-10-CM

## 2021-06-23 DIAGNOSIS — M40.205 KYPHOSIS OF THORACOLUMBAR REGION, UNSPECIFIED KYPHOSIS TYPE: ICD-10-CM

## 2021-06-23 DIAGNOSIS — L30.9 ECZEMA, UNSPECIFIED TYPE: Primary | ICD-10-CM

## 2021-06-23 DIAGNOSIS — M45.4 ANKYLOSING SPONDYLITIS OF THORACIC REGION (HCC): ICD-10-CM

## 2021-06-23 PROCEDURE — 99213 OFFICE O/P EST LOW 20 MIN: CPT | Performed by: FAMILY MEDICINE

## 2021-06-23 RX ORDER — MORPHINE SULFATE 15 MG/1
15 TABLET, FILM COATED, EXTENDED RELEASE ORAL 2 TIMES DAILY
Qty: 60 TABLET | Refills: 0 | Status: SHIPPED | OUTPATIENT
Start: 2021-06-23 | End: 2021-07-23

## 2021-06-23 RX ORDER — LORAZEPAM 1 MG/1
1 TABLET ORAL EVERY 8 HOURS PRN
Qty: 90 TABLET | Refills: 0 | Status: SHIPPED | OUTPATIENT
Start: 2021-06-23 | End: 2021-07-23

## 2021-06-23 ASSESSMENT — PATIENT HEALTH QUESTIONNAIRE - PHQ9
2. FEELING DOWN, DEPRESSED OR HOPELESS: 1
SUM OF ALL RESPONSES TO PHQ QUESTIONS 1-9: 2
SUM OF ALL RESPONSES TO PHQ QUESTIONS 1-9: 2
1. LITTLE INTEREST OR PLEASURE IN DOING THINGS: 1
SUM OF ALL RESPONSES TO PHQ QUESTIONS 1-9: 2
SUM OF ALL RESPONSES TO PHQ9 QUESTIONS 1 & 2: 2

## 2021-06-23 ASSESSMENT — ENCOUNTER SYMPTOMS
EYE REDNESS: 0
RHINORRHEA: 0
BACK PAIN: 1
SORE THROAT: 0
WHEEZING: 0
EYE DISCHARGE: 0
SHORTNESS OF BREATH: 0
ABDOMINAL PAIN: 0
VOMITING: 0
NAUSEA: 0
COUGH: 0
DIARRHEA: 0

## 2021-06-23 NOTE — PROGRESS NOTES
[facility]  [address] [facility] [address] [address]  Dept: [phone]    Allen Hernandez is a 63 y.o. male Established patient, who presents today for his medical conditions/complaints as noted below. Chief Complaint   Patient presents with    Back Pain     Discuss surgery coming up       HPI:     HPI  Pt states scheduled for dexa June 28. Thoracic surgery July 12th. No chest pain or sob. No melena or hematochezia. Pt had also seen dermatology, was diagnosed with eczema. Reviewed prior notes None  Reviewed previous Labs    LDL Cholesterol (mg/dL)   Date Value   02/10/2018 94   07/23/2017 98       (goal LDL is <100)   AST (U/L)   Date Value   01/02/2021 16     ALT (U/L)   Date Value   01/02/2021 13     BUN (mg/dL)   Date Value   05/24/2021 9     Hemoglobin A1C (%)   Date Value   02/10/2018 5.5     TSH (mIU/L)   Date Value   01/06/2020 0.97     BP Readings from Last 3 Encounters:   06/23/21 126/72   06/07/21 122/71   06/08/21 124/70          (goal 120/80)    Past Medical History:   Diagnosis Date    Angular cheilitis     Arthritis     BACK AND FINGERS     Bilateral lower extremity edema 03/2021    started on Lasix per PCP    Chronic back pain     dr Wiliam [facility], scheduled for [facility] 7/12/2021 at [facility]    Depression     Dizziness     Eczema     Ex-smoker     quit in 2006, smoked for 23 years    GERD (gastroesophageal reflux disease)     H/O chest pain     H/O dermatitis     [place] (hard of hearing)     LEFT EAR WORSE THAN RIGHT. getting hearing aides eloisa, Dr. Briseyda Alicea Hypertension     DR Zarina Ibarra PCP    Kyphosis     [facility] OR 7/12/2021 with Dr. Pina Ventura Left eye injury     [address]    Muscle spasm     LOWER RIGHT BACK    Nocturia     Obesity     Prolonged emergence from general anesthesia     PATIENT STATES HIS B/P WOULD DROP WITH INTUBATION, GO UP AFTER ET TUBE REMOVED.  PVC's (premature ventricular contractions)     PVC'S.  NO CARDIOLOGIST, PCP DR Jose Anna     occas to see rheumatoid arthritis  workup for lupus    Sjogren's syndrome ([facility] [address]     Snores     mild, denies apnea, does \"grind\" his teeth    SOB (shortness of breath)     used to see Dr. Charmayne Conroy, borderline COPD/ never followed up / never filled scripts for inhalers    Use of cane as ambulatory aid     Vertigo     Dr. Laura Trinh Wears glasses     Wears partial dentures     LOWER      Past Surgical History:   Procedure Laterality Date    BACK SURGERY  2006    cage    COLONOSCOPY      CYST REMOVAL Right     index finger    HEMORRHOID SURGERY  2017    HERNIA REPAIR      UMBILICAL    NOSE SURGERY      REALIGNED NOSE    OTHER SURGICAL HISTORY  2020    MRI cervical and thoracic spine without contrast . CT of lung , all under general anesthesia    CA COLSC FLX W/REMOVAL LESION BY HOT BX FORCEPS N/A 2017    COLONOSCOPY POLYPECTOMY HOT BIOPSY performed by Carley Solorzano MD at [address] [facility] TOE SURGERY Right     2nd toe    TONSILLECTOMY      AS A CHILD    VASECTOMY         Family History   Problem Relation Age of Onset    Coronary Art Dis Father         premature-- at 52    Diabetes Father     High Blood Pressure Father     Other Father         COPD, EMPHYSEMA    Coronary Art Dis Paternal Uncle         premature    Heart Failure Paternal Uncle     Diabetes Mother        Social History     Tobacco Use    Smoking status: Former Smoker     Packs/day: 1.50     Years: 23.00     Pack years: 34.50     Types: Cigarettes     Start date: 1983     Quit date: 2006     Years since quitting: 15.0    Smokeless tobacco: Former User     Types: [address] date: 9/10/2019    Tobacco comment: quit 14 years ago   Substance Use Topics    Alcohol use: Not Currently     Alcohol/week: 0.0 standard drinks     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      Current Outpatient Medications   Medication Sig Dispense Refill    morphine (MS CONTIN) 15 MG extended release tablet Take 1 tablet by mouth 2 times daily for 30 days. 60 tablet 0    LORazepam (ATIVAN) 1 MG tablet Take 1 tablet by mouth every 8 hours as needed for Anxiety for up to 30 days. 90 tablet 0    meclizine (ANTIVERT) 25 MG tablet TAKE 1 TABLET BY MOUTH THREE TIMES A DAY AS NEEDED FOR DIZZINESS 45 tablet 0    gabapentin (NEURONTIN) 400 MG capsule TAKE 1 CAPSULE BY MOUTH THREE TIMES A DAY  90 capsule 0    fluticasone (FLONASE) 50 MCG/ACT nasal spray USE 2 SPRAYS IN EACH NOSTRIL ONCE DAILY. NEED TO USE REGULARLY FOR BENEFIT      furosemide (LASIX) 40 MG tablet Take 1 tablet by mouth daily 90 tablet 3    potassium chloride (KLOR-CON M) 20 MEQ extended release tablet Take 1 tablet by mouth daily 90 tablet 3    hydroxychloroquine (PLAQUENIL) 200 MG tablet Take 1 tablet by mouth 2 times daily 180 tablet 3    metoprolol tartrate (LOPRESSOR) 50 MG tablet TAKE 1 TABLET BY MOUTH TWO TIMES A  tablet 2    celecoxib (CELEBREX) 200 MG capsule Take 1 capsule by mouth 2 times daily 180 capsule 3    lisinopril (PRINIVIL;ZESTRIL) 10 MG tablet TAKE 1 TABLET BY MOUTH ONE TIME A DAY  90 tablet 3    hydrocortisone (WESTCORT) 0.2 % cream Apply topically 2 times daily. (Patient taking differently: Apply topically 2 times daily. , eczema) 45 g 1    Multiple Vitamins-Minerals (THERAPEUTIC MULTIVITAMIN-MINERALS) tablet Take 1 tablet by mouth daily      Sod Fluoride-Potassium Nitrate (PREVIDENT 5000 SENSITIVE) 1.1-5 % PSTE Place onto teeth      Magnesium Cl-Calcium Carbonate (SLOW-MAG PO) Take 1 tablet by mouth 2 times daily      VITAMIN D PO Take 1,000 Units by mouth daily      Acetaminophen (TYLENOL ARTHRITIS PAIN PO) Take 325 mg by mouth every 6 hours as needed       glucosamine-chondroitin 500-400 MG tablet Take 1 tablet by mouth daily      aspirin 81 MG tablet Take 81 mg by mouth daily       omeprazole (PRILOSEC) 20 MG capsule Take 20 mg by mouth nightly No current facility-administered medications for this visit. Allergies   Allergen Reactions    Metronidazole Other (See Comments)     \" caused a prickly feeling in my legs \"    Cetyl Alcohol Hives     Localized reaction by allergy testing    Cetearyl alcohol    Adhesive Tape Other (See Comments)     opsite and paper tape ok, bandaid ok    Cymbalta [Duloxetine Hcl] Nausea And Vomiting    Neosporin [Neomycin-Polymyxin-Gramicidin] Rash       Health Maintenance   Topic Date Due    HIV screen  Never done    Shingles Vaccine (2 of 3) 06/01/2017    Creatinine monitoring  05/24/2022    Annual Wellness Visit (AWV)  06/02/2022    Potassium monitoring  06/07/2022    DTaP/Tdap/Td vaccine (2 - Td or Tdap) 01/02/2023    Lipid screen  02/10/2023    Diabetes screen  01/14/2024    Colon cancer screen colonoscopy  07/12/2027    Pneumococcal 0-64 years Vaccine (2 of 2) 10/24/2029    Flu vaccine  Completed    COVID-19 Vaccine  Completed    Hepatitis C screen  Completed    Hepatitis A vaccine  Aged Out    Hepatitis B vaccine  Aged Out    Hib vaccine  Aged Out    Meningococcal (ACWY) vaccine  Aged Out       Subjective:      Review of Systems   Constitutional: Negative for chills and fever. HENT: Negative for rhinorrhea and sore throat. Eyes: Negative for discharge and redness. Respiratory: Negative for cough, shortness of breath and wheezing. Cardiovascular: Negative for chest pain and palpitations. Gastrointestinal: Negative for abdominal pain, diarrhea, nausea and vomiting. Genitourinary: Negative for dysuria and frequency. Musculoskeletal: Positive for back pain. Negative for arthralgias and myalgias. Neurological: Negative for dizziness, light-headedness and headaches. Psychiatric/Behavioral: Negative for sleep disturbance.        Objective:     /72   Pulse 73   Ht 5' 9\" (1.753 m)   Wt 282 lb 12.8 oz (128.3 kg)   SpO2 97%   BMI 41.76 kg/m²   Physical Exam  Vitals and nursing note reviewed. Constitutional:       General: He is not in acute distress. Appearance: He is well-developed. He is not ill-appearing. HENT:      Head: Normocephalic and atraumatic. Right Ear: External ear normal.      Left Ear: External ear normal.   Eyes:      General: No scleral icterus. Right eye: No discharge. Left eye: No discharge. Conjunctiva/sclera: Conjunctivae normal.      Pupils: Pupils are equal, round, and reactive to light. Neck:      Thyroid: No thyromegaly. Trachea: No tracheal deviation. Cardiovascular:      Rate and Rhythm: Normal rate and regular rhythm. Heart sounds: Normal heart sounds. Pulmonary:      Effort: Pulmonary effort is normal. No respiratory distress. Breath sounds: Normal breath sounds. No wheezing. Lymphadenopathy:      Cervical: No cervical adenopathy. Skin:     General: Skin is warm. Findings: No rash. Neurological:      Mental Status: He is alert and oriented to person, place, and time. Psychiatric:         Mood and Affect: Mood normal.         Behavior: Behavior normal.         Thought Content: Thought content normal.         Assessment:       Diagnosis Orders   1. Eczema, unspecified type     2. Kyphosis of thoracolumbar region, unspecified kyphosis type     3. Spinal stenosis of lumbar region with neurogenic claudication     4. Anxiety disorder, unspecified type  LORazepam (ATIVAN) 1 MG tablet   5. Ankylosing spondylitis of thoracic region ([facility] [address]  morphine (MS CONTIN) 15 MG extended release tablet        Plan: We will attempt to get progress note from dermatology. Patient advised to hold aspirin and Celebrex 7 days prior to surgery. Refill of morphine and Ativan. Patient has been using very sparingly. Follow-up in office after surgery    Return if symptoms worsen or fail to improve. No orders of the defined types were placed in this encounter.     Orders Placed This Encounter   Medications    morphine (MS CONTIN) 15 MG extended release tablet     Sig: Take 1 tablet by mouth 2 times daily for 30 days. Dispense:  60 tablet     Refill:  0     Reduce doses taken as pain becomes manageable    LORazepam (ATIVAN) 1 MG tablet     Sig: Take 1 tablet by mouth every 8 hours as needed for Anxiety for up to 30 days. Dispense:  90 tablet     Refill:  0       Patient given educational materials - see patient instructions. Discussed use, benefit, and side effects of prescribed medications. All patient questions answered. Pt voiced understanding. Reviewed health maintenance. Instructed to continue current medications, diet andexercise. Patient agreed with treatment plan. Follow up as directed.      Electronicallysigned by Liz Rogers MD on 6/23/2021 at 10:27 AM

## 2021-06-28 ENCOUNTER — HOSPITAL ENCOUNTER (OUTPATIENT)
Dept: WOMENS IMAGING | Age: 56
Discharge: HOME | End: 2021-06-30
Payer: MEDICARE

## 2021-06-28 DIAGNOSIS — S22.000A COMPRESSION FRACTURE OF BODY OF THORACIC VERTEBRA (HCC): ICD-10-CM

## 2021-06-28 PROCEDURE — 77080 DXA BONE DENSITY AXIAL: CPT

## 2021-06-29 ENCOUNTER — TELEPHONE (OUTPATIENT)
Dept: PRIMARY CARE CLINIC | Age: 56
End: 2021-06-29

## 2021-06-29 NOTE — TELEPHONE ENCOUNTER
Pt asking if you would result on his Dexa Scan and fax it to Dr. Akil Arevalo @ [facility]# [phone]  FAX # [phone]. Pt has appt with him on fri. Thankyou!

## 2021-07-07 ENCOUNTER — TELEPHONE (OUTPATIENT)
Dept: PRIMARY CARE CLINIC | Age: 56
End: 2021-07-07

## 2021-07-07 NOTE — TELEPHONE ENCOUNTER
Pt asked for records to be sent to [facility] and [employer] stating that he was told to D/C PT and get stat imaging done. Requested info sent to below numbers. Fax numbers Connor Colunga [phone]  [employer] id # [id number]  Case # [id number]. Vane Pittman [phone].

## 2021-07-19 ENCOUNTER — TELEPHONE (OUTPATIENT)
Dept: PRIMARY CARE CLINIC | Age: 56
End: 2021-07-19

## 2021-07-19 NOTE — TELEPHONE ENCOUNTER
----- Message from Danni Reza sent at 7/19/2021  4:56 PM EDT -----  Subject: Appointment Request    Reason for Call: Routine Hospital Follow Up    QUESTIONS  Type of Appointment? Established Patient  Reason for appointment request? Available appointments did not meet   patient need  Additional Information for Provider? Patient is being D/C from [facility] 7/21/21 for back surgery that was done on 7/12/21. Would like to   have his valery removed by Dr. Skye Yu so he doesn't have to drive back to   [facility] to have them removed. Please call patient to get him   scheduled prior to August if possible. Thanks.  ---------------------------------------------------------------------------  --------------  Deannie Sandhoff INFO  What is the best way for the office to contact you? OK to leave message on   voicemail  Preferred Call Back Phone Number? [phone]  ---------------------------------------------------------------------------  --------------  SCRIPT ANSWERS  Relationship to Patient? Other  Representative Name? Wing Deadaniel  Additional information verified (besides Name and Date of Birth)? Address  Appointment reason? Well Care/Follow Ups  Select a Well Care/Follow Ups appointment reason? Adult Hospital Follow Up   [Inpatient Discharge, DavidaLiss [address]  (Patient requests to see provider urgently. )? No  (Has the patient been discharged from the hospital within 2 business days   AND does not have a Telephone Encounter  Follow Up From [address] [place] [address]   documented in [address]?)? No  Do you have any questions for your primary care provider that need to be   answered prior to your appointment? (Use RN Triage if question pertains to   anything on the red flag list)? No  (Patient needs follow up visit after hospital discharge) Book first   available appointment within 7 days OF DISCHARGE, if no appt, proceed to   book the next available time slot within 14 days OF DISCHARGE AND Send   Message to Provider.  [address] Follow Up appointment cannot be booked   beyond 14 Days and should result in a Message to Provider. ? Yes   Have you been diagnosed with, awaiting test results for, or told that you   are suspected of having COVID-19 (Coronavirus)? (If patient has tested   negative or was tested as a requirement for work, school, or travel and   not based on symptoms, answer no)? No  Do you currently have flu-like symptoms including fever or chills, cough,   shortness of breath, difficulty breathing, or new loss of taste or smell? No  Have you had close contact with someone with COVID-19 in the last 14 days? No  (Service Expert  click yes below to proceed with [employer] As Usual   Scheduling)?  Yes

## 2021-07-20 NOTE — TELEPHONE ENCOUNTER
[facility],     He is not discharged yet. I will call and do the TCM tomorrow.  Please advise on where to put him for his hospital follow up/suture removal.    Send this back to me please

## 2021-07-21 ENCOUNTER — TELEPHONE (OUTPATIENT)
Dept: PRIMARY CARE CLINIC | Age: 56
End: 2021-07-21

## 2021-07-21 NOTE — TELEPHONE ENCOUNTER
Spoke to pt and he states staples were put in on 7/12/21, he will be d/c'd from hospital tomorrow 7/22/21 and staples need to be removed either Monday or Tuesday.    Appointment scheduled for Tuesday 7/27/21

## 2021-07-21 NOTE — TELEPHONE ENCOUNTER
[facility] Initial Follow Up Call    Outreach made within 2 business days of discharge: Yes    Patient: Radha Oconnor Patient : 1965   MRN: [id number]  Reason for Admission: There are no discharge diagnoses documented for the most recent discharge. Discharge Date: 21       Spoke with: DAVIDA    Discharge department/facility: [facility]     TCM Interactive Patient Contact:  Was patient able to fill all prescriptions: Yes  Was patient instructed to bring all medications to the follow-up visit: Yes  Is patient taking all medications as directed in the discharge summary?  Yes  Does patient understand their discharge instructions: Yes  Does patient have questions or concerns that need addressed prior to 7-14 day follow up office visit:   [facility] [address]      Scheduled appointment with PCP within 7-14 days  APPT SCHEDULED WITH PCP     Follow Up  Future Appointments   Date Time Provider Lilli Hill   2021 11:30 AM MD KAROL Duenas [facility]   2021 11:00 AM MD KAROL Duenas MA

## 2021-07-27 ENCOUNTER — OFFICE VISIT (OUTPATIENT)
Dept: PRIMARY CARE CLINIC | Age: 56
End: 2021-07-27
Payer: MEDICARE

## 2021-07-27 VITALS
SYSTOLIC BLOOD PRESSURE: 136 MMHG | HEIGHT: 69 IN | HEART RATE: 102 BPM | OXYGEN SATURATION: 97 % | DIASTOLIC BLOOD PRESSURE: 78 MMHG | WEIGHT: 272.4 LBS | BODY MASS INDEX: 40.35 KG/M2

## 2021-07-27 DIAGNOSIS — M40.205 KYPHOSIS OF THORACOLUMBAR REGION, UNSPECIFIED KYPHOSIS TYPE: ICD-10-CM

## 2021-07-27 DIAGNOSIS — I10 BENIGN ESSENTIAL HYPERTENSION: ICD-10-CM

## 2021-07-27 DIAGNOSIS — M48.10 DIFFUSE IDIOPATHIC SKELETAL HYPEROSTOSIS: Primary | ICD-10-CM

## 2021-07-27 PROCEDURE — 1111F DSCHRG MED/CURRENT MED MERGE: CPT | Performed by: FAMILY MEDICINE

## 2021-07-27 PROCEDURE — 99495 TRANSJ CARE MGMT MOD F2F 14D: CPT | Performed by: FAMILY MEDICINE

## 2021-07-27 RX ORDER — OXYCODONE HYDROCHLORIDE 5 MG/1
5 TABLET ORAL EVERY 4 HOURS PRN
COMMUNITY
Start: 2021-07-22 | End: 2021-10-04 | Stop reason: SDUPTHER

## 2021-07-27 RX ORDER — METOPROLOL TARTRATE 50 MG/1
TABLET, FILM COATED ORAL
Qty: 180 TABLET | Refills: 3 | Status: SHIPPED | OUTPATIENT
Start: 2021-07-27 | End: 2022-02-10

## 2021-07-27 RX ORDER — BACLOFEN 5 MG/1
TABLET ORAL
COMMUNITY
Start: 2021-07-22 | End: 2021-08-25

## 2021-07-27 RX ORDER — PSEUDOEPHEDRINE HCL 30 MG
100 TABLET ORAL 2 TIMES DAILY
COMMUNITY
Start: 2021-07-22 | End: 2021-11-29

## 2021-07-27 NOTE — PROGRESS NOTES
on:07/27/21 1210   Medication Information                      Acetaminophen (TYLENOL ARTHRITIS PAIN PO)  Take 325 mg by mouth every 6 hours as needed              aspirin 81 MG tablet  Take 81 mg by mouth daily              Baclofen (LIORESAL) 5 MG tablet  TAKE 1 TABLET BY MOUTH THREE TIMES A DAY             celecoxib (CELEBREX) 200 MG capsule  Take 1 capsule by mouth 2 times daily             docusate (COLACE, DULCOLAX) 100 MG CAPS  Take 100 mg by mouth 2 times daily             fluticasone (FLONASE) 50 MCG/ACT nasal spray  USE 2 SPRAYS IN EACH NOSTRIL ONCE DAILY. NEED TO USE REGULARLY FOR BENEFIT             furosemide (LASIX) 40 MG tablet  Take 1 tablet by mouth daily             gabapentin (NEURONTIN) 400 MG capsule  TAKE 1 CAPSULE BY MOUTH THREE TIMES A DAY              glucosamine-chondroitin 500-400 MG tablet  Take 1 tablet by mouth daily             hydrocortisone (WESTCORT) 0.2 % cream  Apply topically 2 times daily. hydroxychloroquine (PLAQUENIL) 200 MG tablet  Take 1 tablet by mouth 2 times daily             lisinopril (PRINIVIL;ZESTRIL) 10 MG tablet  TAKE 1 TABLET BY MOUTH ONE TIME A DAY              Magnesium Cl-Calcium Carbonate (SLOW-MAG PO)  Take 1 tablet by mouth 2 times daily             meclizine (ANTIVERT) 25 MG tablet  TAKE 1 TABLET BY MOUTH THREE TIMES A DAY AS NEEDED FOR DIZZINESS             metoprolol tartrate (LOPRESSOR) 50 MG tablet  TAKE 1 TABLET BY MOUTH TWO TIMES A DAY             Multiple Vitamins-Minerals (THERAPEUTIC MULTIVITAMIN-MINERALS) tablet  Take 1 tablet by mouth daily             omeprazole (PRILOSEC) 20 MG capsule  Take 20 mg by mouth nightly              oxyCODONE (ROXICODONE) 5 MG immediate release tablet  Take 5 mg by mouth every 4 hours as needed.              potassium chloride (KLOR-CON M) 20 MEQ extended release tablet  Take 1 tablet by mouth daily             Sod Fluoride-Potassium Nitrate (PREVIDENT 5000 SENSITIVE) 1.1-5 % PSTE  Place onto teeth VITAMIN D PO  Take 1,000 Units by mouth daily                   Medications marked \"taking\" at this time  Outpatient Medications Marked as Taking for the 7/27/21 encounter (Office Visit) with Mily Gregorio MD   Medication Sig Dispense Refill    oxyCODONE (ROXICODONE) 5 MG immediate release tablet Take 5 mg by mouth every 4 hours as needed.  Baclofen (LIORESAL) 5 MG tablet TAKE 1 TABLET BY MOUTH THREE TIMES A DAY      docusate (COLACE, DULCOLAX) 100 MG CAPS Take 100 mg by mouth 2 times daily      metoprolol tartrate (LOPRESSOR) 50 MG tablet TAKE 1 TABLET BY MOUTH TWO TIMES A  tablet 3    meclizine (ANTIVERT) 25 MG tablet TAKE 1 TABLET BY MOUTH THREE TIMES A DAY AS NEEDED FOR DIZZINESS 45 tablet 0    fluticasone (FLONASE) 50 MCG/ACT nasal spray USE 2 SPRAYS IN EACH NOSTRIL ONCE DAILY. NEED TO USE REGULARLY FOR BENEFIT      furosemide (LASIX) 40 MG tablet Take 1 tablet by mouth daily 90 tablet 3    potassium chloride (KLOR-CON M) 20 MEQ extended release tablet Take 1 tablet by mouth daily 90 tablet 3    hydroxychloroquine (PLAQUENIL) 200 MG tablet Take 1 tablet by mouth 2 times daily 180 tablet 3    celecoxib (CELEBREX) 200 MG capsule Take 1 capsule by mouth 2 times daily 180 capsule 3    lisinopril (PRINIVIL;ZESTRIL) 10 MG tablet TAKE 1 TABLET BY MOUTH ONE TIME A DAY  90 tablet 3    hydrocortisone (WESTCORT) 0.2 % cream Apply topically 2 times daily. (Patient taking differently: Apply topically 2 times daily. , eczema) 45 g 1    Multiple Vitamins-Minerals (THERAPEUTIC MULTIVITAMIN-MINERALS) tablet Take 1 tablet by mouth daily      Sod Fluoride-Potassium Nitrate (PREVIDENT 5000 SENSITIVE) 1.1-5 % PSTE Place onto teeth      Magnesium Cl-Calcium Carbonate (SLOW-MAG PO) Take 1 tablet by mouth 2 times daily      VITAMIN D PO Take 1,000 Units by mouth daily      Acetaminophen (TYLENOL ARTHRITIS PAIN PO) Take 325 mg by mouth every 6 hours as needed       glucosamine-chondroitin 500-400 MG tablet Take 1 tablet by mouth daily      aspirin 81 MG tablet Take 81 mg by mouth daily       omeprazole (PRILOSEC) 20 MG capsule Take 20 mg by mouth nightly           Medications patient taking as of now reconciled against medications ordered at time of hospital discharge: Yes    Chief Complaint   Patient presents with    Other     remove staples out of pt back       History of Present illness - Follow up of Hospital diagnosis(es):     Inpatient course: Discharge summary reviewed- see chart. Interval history/Current status: Patient is s/p T10 PSO, T7-L2 posterior spinal fusion with cement augmentation. Patient was discharged on 7/22/2021. Patient has a prolonged hospital stay secondary to ileus. Patient did have to have NG tube placement. Patient still reports loose BM's. Patient have 2 loose BM's / day. Patient is still on bending, lifting and twisting restrictions. [facility] home health care is involved. PT and OT should be starting soon. Pain controlled with oxy, taking Q8H. Highest pain up to 8/10. F/u with orthopedic surgeon on 8/24/2021 with Dr. Manuel Avila. Was given Baclofen and has not had any muscle spasms. Patient also having increased LE edema. Patient was also having desats in the hospital would like polysom completed. A comprehensive review of systems was negative except for what was noted in the HPI. Vitals:    07/27/21 1129   BP: 136/78   Pulse: 102   SpO2: 97%   Weight: 272 lb 6.4 oz (123.6 kg)   Height: 5' 9\" (1.753 m)     Body mass index is 40.23 kg/m².    Wt Readings from Last 3 Encounters:   07/27/21 272 lb 6.4 oz (123.6 kg)   06/23/21 282 lb 12.8 oz (128.3 kg)   06/07/21 286 lb (129.7 kg)     BP Readings from Last 3 Encounters:   07/27/21 136/78   06/23/21 126/72   06/07/21 122/71        Physical Exam:  General Appearance: alert and oriented to person, place and time, well developed and well- nourished, in no acute distress  Skin: warm and dry, no rash or erythema  Head: normocephalic and atraumatic  Eyes: pupils equal, round, and reactive to light, extraocular eye movements intact, conjunctivae normal  ENT: tympanic membrane, external ear and ear canal normal bilaterally, nose without deformity, nasal mucosa and turbinates normal without polyps  Neck: supple and non-tender without mass, no thyromegaly or thyroid nodules, no cervical lymphadenopathy  Pulmonary/Chest: clear to auscultation bilaterally- no wheezes, rales or rhonchi, normal air movement, no respiratory distress  Cardiovascular: normal rate, regular rhythm, normal S1 and S2, no murmurs, rubs, clicks, or gallops, distal pulses intact, no carotid bruits  Abdomen: soft, non-tender, non-distended, normal bowel sounds, no masses or organomegaly  Extremities: no cyanosis, clubbing or edema  Musculoskeletal: normal range of motion, no joint swelling, deformity or tenderness  Neurologic: reflexes normal and symmetric, no cranial nerve deficit, gait, coordination and speech normal  Staples multiple were noted. No sign of infection. Wound was well-healed. Assessment/Plan:  Renewal for Handicap Placard       1. Diffuse idiopathic skeletal hyperostosis  Staples removed without complication. Steri-Strips and benzoin applied  - PA DISCHARGE MEDS RECONCILED W/ CURRENT OUTPATIENT MED LIST  - metoprolol tartrate (LOPRESSOR) 50 MG tablet; TAKE 1 TABLET BY MOUTH TWO TIMES A DAY  Dispense: 180 tablet; Refill: 3    2. Benign essential hypertension  Continue blood pressure medication as is    3. Kyphosis of thoracolumbar region, unspecified kyphosis type  Steri-Strips and benzoin applied. Staples removed. - metoprolol tartrate (LOPRESSOR) 50 MG tablet; TAKE 1 TABLET BY MOUTH TWO TIMES A DAY  Dispense: 180 tablet;  Refill: 3        Medical Decision Making: moderate complexity

## 2021-07-29 RX ORDER — GABAPENTIN 400 MG/1
CAPSULE ORAL
Qty: 90 CAPSULE | Refills: 0 | Status: SHIPPED | OUTPATIENT
Start: 2021-07-29 | End: 2021-08-25

## 2021-08-09 ENCOUNTER — TELEPHONE (OUTPATIENT)
Dept: PRIMARY CARE CLINIC | Age: 56
End: 2021-08-09

## 2021-08-09 NOTE — TELEPHONE ENCOUNTER
Patient called and c/o bowel Incontinence just at night since Saturday. Pt is not taking anything to prevent his diarrhea. Per Dr. Martha Bruce- Pt needs to contact his neurosurgeon. Pt notified. Voiced understanding.

## 2021-08-19 ENCOUNTER — TELEPHONE (OUTPATIENT)
Dept: PRIMARY CARE CLINIC | Age: 56
End: 2021-08-19

## 2021-08-25 ENCOUNTER — HOSPITAL ENCOUNTER (OUTPATIENT)
Age: 56
Setting detail: SPECIMEN
Discharge: HOME | End: 2021-08-25
Payer: MEDICARE

## 2021-08-25 ENCOUNTER — OFFICE VISIT (OUTPATIENT)
Dept: PRIMARY CARE CLINIC | Age: 56
End: 2021-08-25
Payer: MEDICARE

## 2021-08-25 VITALS
WEIGHT: 253.2 LBS | SYSTOLIC BLOOD PRESSURE: 96 MMHG | OXYGEN SATURATION: 97 % | HEIGHT: 69 IN | HEART RATE: 85 BPM | DIASTOLIC BLOOD PRESSURE: 66 MMHG | BODY MASS INDEX: 37.5 KG/M2

## 2021-08-25 DIAGNOSIS — I95.9 HYPOTENSION, UNSPECIFIED HYPOTENSION TYPE: ICD-10-CM

## 2021-08-25 DIAGNOSIS — M40.205 KYPHOSIS OF THORACOLUMBAR REGION, UNSPECIFIED KYPHOSIS TYPE: ICD-10-CM

## 2021-08-25 DIAGNOSIS — I10 BENIGN ESSENTIAL HYPERTENSION: ICD-10-CM

## 2021-08-25 DIAGNOSIS — R19.7 DIARRHEA, UNSPECIFIED TYPE: ICD-10-CM

## 2021-08-25 DIAGNOSIS — I95.9 HYPOTENSION, UNSPECIFIED HYPOTENSION TYPE: Primary | ICD-10-CM

## 2021-08-25 PROBLEM — Z98.1 S/P FUSION OF THORACIC SPINE: Status: ACTIVE | Noted: 2021-05-20

## 2021-08-25 LAB
ABSOLUTE EOS #: 0.43 K/UL (ref 0–0.44)
ABSOLUTE IMMATURE GRANULOCYTE: <0.03 K/UL (ref 0–0.3)
ABSOLUTE LYMPH #: 1.6 K/UL (ref 1.1–3.7)
ABSOLUTE MONO #: 0.69 K/UL (ref 0.1–1.2)
ANION GAP SERPL CALCULATED.3IONS-SCNC: 14 MMOL/L (ref 9–17)
BASOPHILS # BLD: 1 % (ref 0–2)
BASOPHILS ABSOLUTE: 0.06 K/UL (ref 0–0.2)
BUN BLDV-MCNC: 5 MG/DL (ref 6–20)
BUN/CREAT BLD: ABNORMAL (ref 9–20)
CALCIUM SERPL-MCNC: 9.3 MG/DL (ref 8.6–10.4)
CHLORIDE BLD-SCNC: 97 MMOL/L (ref 98–107)
CO2: 25 MMOL/L (ref 20–31)
CREAT SERPL-MCNC: 0.44 MG/DL (ref 0.7–1.2)
DIFFERENTIAL TYPE: ABNORMAL
EOSINOPHILS RELATIVE PERCENT: 6 % (ref 1–4)
GFR AFRICAN AMERICAN: >60 ML/MIN
GFR NON-AFRICAN AMERICAN: >60 ML/MIN
GFR SERPL CREATININE-BSD FRML MDRD: ABNORMAL ML/MIN/{1.73_M2}
GFR SERPL CREATININE-BSD FRML MDRD: ABNORMAL ML/MIN/{1.73_M2}
GLUCOSE BLD-MCNC: 105 MG/DL (ref 70–99)
HCT VFR BLD CALC: 38.6 % (ref 40.7–50.3)
HEMOGLOBIN: 12 G/DL (ref 13–17)
IMMATURE GRANULOCYTES: 0 %
LYMPHOCYTES # BLD: 21 % (ref 24–43)
MCH RBC QN AUTO: 27.4 PG (ref 25.2–33.5)
MCHC RBC AUTO-ENTMCNC: 31.1 G/DL (ref 28.4–34.8)
MCV RBC AUTO: 88.1 FL (ref 82.6–102.9)
MONOCYTES # BLD: 9 % (ref 3–12)
NRBC AUTOMATED: 0 PER 100 WBC
PDW BLD-RTO: 13.7 % (ref 11.8–14.4)
PLATELET # BLD: 346 K/UL (ref 138–453)
PLATELET ESTIMATE: ABNORMAL
PMV BLD AUTO: 9.6 FL (ref 8.1–13.5)
POTASSIUM SERPL-SCNC: 3.4 MMOL/L (ref 3.7–5.3)
RBC # BLD: 4.38 M/UL (ref 4.21–5.77)
RBC # BLD: ABNORMAL 10*6/UL
SEG NEUTROPHILS: 63 % (ref 36–65)
SEGMENTED NEUTROPHILS ABSOLUTE COUNT: 4.96 K/UL (ref 1.5–8.1)
SODIUM BLD-SCNC: 136 MMOL/L (ref 135–144)
WBC # BLD: 7.8 K/UL (ref 3.5–11.3)
WBC # BLD: ABNORMAL 10*3/UL

## 2021-08-25 PROCEDURE — 99214 OFFICE O/P EST MOD 30 MIN: CPT | Performed by: FAMILY MEDICINE

## 2021-08-25 PROCEDURE — 93005 ELECTROCARDIOGRAM TRACING: CPT | Performed by: FAMILY MEDICINE

## 2021-08-25 RX ORDER — LORATADINE 10 MG/1
10 TABLET ORAL 2 TIMES DAILY
COMMUNITY
End: 2022-02-10

## 2021-08-25 RX ORDER — GABAPENTIN 400 MG/1
400 CAPSULE ORAL 3 TIMES DAILY
Qty: 90 CAPSULE | Refills: 5 | Status: SHIPPED | OUTPATIENT
Start: 2021-08-25 | End: 2022-02-28

## 2021-08-25 ASSESSMENT — ENCOUNTER SYMPTOMS
SHORTNESS OF BREATH: 0
NAUSEA: 0
EYE REDNESS: 0
ABDOMINAL PAIN: 0
SORE THROAT: 0
VOMITING: 0
WHEEZING: 0
EYE DISCHARGE: 0
COUGH: 0
RHINORRHEA: 0
DIARRHEA: 1

## 2021-08-25 NOTE — PROGRESS NOTES
[place]    Left eye injury     BUNGY CORD STRAP BUSTED AND INJURED LEFT EYE    Muscle spasm     LOWER RIGHT BACK    Nocturia     Obesity     Prolonged emergence from general anesthesia     PATIENT STATES HIS B/P WOULD DROP WITH INTUBATION, GO UP AFTER ET TUBE REMOVED.  PVC's (premature ventricular contractions)     PVC'S.  NO CARDIOLOGIST, PCP DR Prince Chelle topete to see rheumatoid arthritis  workup for lupus    Sjogren's syndrome ([facility] [address]     Snores     mild, denies apnea, does \"grind\" his teeth    SOB (shortness of breath)     used to see Dr. Wing Colunga, borderline COPD/ never followed up / never filled scripts for inhalers    Use of cane as ambulatory aid     Vertigo     Dr. Sal Andres Wears glasses     Wears partial dentures     LOWER      Past Surgical History:   Procedure Laterality Date    BACK SURGERY  2006    cage    COLONOSCOPY      CYST REMOVAL Right     index finger    HEMORRHOID SURGERY  2017    HERNIA REPAIR      UMBILICAL    NOSE SURGERY      REALIGNED NOSE    OTHER SURGICAL HISTORY  2020    MRI cervical and thoracic spine without contrast . CT of lung , all under general anesthesia    MS COLSC FLX W/REMOVAL LESION BY HOT BX FORCEPS N/A 2017    COLONOSCOPY POLYPECTOMY HOT BIOPSY performed by Gina Talbot MD at [address]  2021    [id number]    TOE SURGERY Right     2nd toe    TONSILLECTOMY      AS A CHILD    VASECTOMY         Family History   Problem Relation Age of Onset    Coronary Art Dis Father         premature-- at 52    Diabetes Father     High Blood Pressure Father     Other Father         COPD, EMPHYSEMA    Coronary Art Dis Paternal Uncle         premature    Heart Failure Paternal Uncle     Diabetes Mother        Social History     Tobacco Use    Smoking status: Former Smoker     Packs/day: 1.50     Years: 23.00     Pack years: 34.50     Types: Cigarettes     Start date: 1983     Quit date: 2006     Years since quitting: 15.2    Smokeless tobacco: Former User     Types: Chew     Quit date: 9/10/2019    Tobacco comment: quit 14 years ago   Substance Use Topics    Alcohol use: Not Currently     Alcohol/week: 0.0 standard drinks     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      Current Outpatient Medications   Medication Sig Dispense Refill    loratadine (CLARITIN) 10 MG tablet Take 10 mg by mouth 2 times daily      gabapentin (NEURONTIN) 400 MG capsule Take 1 capsule by mouth 3 times daily for 30 days. 90 capsule 5    oxyCODONE (ROXICODONE) 5 MG immediate release tablet Take 5 mg by mouth every 4 hours as needed.  docusate (COLACE, DULCOLAX) 100 MG CAPS Take 100 mg by mouth 2 times daily      metoprolol tartrate (LOPRESSOR) 50 MG tablet TAKE 1 TABLET BY MOUTH TWO TIMES A  tablet 3    meclizine (ANTIVERT) 25 MG tablet TAKE 1 TABLET BY MOUTH THREE TIMES A DAY AS NEEDED FOR DIZZINESS 45 tablet 0    fluticasone (FLONASE) 50 MCG/ACT nasal spray USE 2 SPRAYS IN EACH NOSTRIL ONCE DAILY. NEED TO USE REGULARLY FOR BENEFIT      furosemide (LASIX) 40 MG tablet Take 1 tablet by mouth daily 90 tablet 3    potassium chloride (KLOR-CON M) 20 MEQ extended release tablet Take 1 tablet by mouth daily 90 tablet 3    hydroxychloroquine (PLAQUENIL) 200 MG tablet Take 1 tablet by mouth 2 times daily 180 tablet 3    celecoxib (CELEBREX) 200 MG capsule Take 1 capsule by mouth 2 times daily 180 capsule 3    hydrocortisone (WESTCORT) 0.2 % cream Apply topically 2 times daily. (Patient taking differently: Apply topically 2 times daily. , eczema) 45 g 1    Multiple Vitamins-Minerals (THERAPEUTIC MULTIVITAMIN-MINERALS) tablet Take 1 tablet by mouth daily      Sod Fluoride-Potassium Nitrate (PREVIDENT 5000 SENSITIVE) 1.1-5 % PSTE Place onto teeth      Magnesium Cl-Calcium Carbonate (SLOW-MAG PO) Take 1 tablet by mouth 2 times daily      VITAMIN D PO Take 1,000 Units by mouth daily      Acetaminophen (TYLENOL ARTHRITIS PAIN PO) Take 325 mg by mouth every 6 hours as needed       glucosamine-chondroitin 500-400 MG tablet Take 1 tablet by mouth daily      aspirin 81 MG tablet Take 81 mg by mouth daily       omeprazole (PRILOSEC) 20 MG capsule Take 20 mg by mouth nightly        No current facility-administered medications for this visit. Allergies   Allergen Reactions    Metronidazole Other (See Comments)     \" caused a prickly feeling in my legs \"    Cetyl Alcohol Hives     Localized reaction by allergy testing    Cetearyl alcohol    Adhesive Tape Other (See Comments)     opsite and paper tape ok, bandaid ok  REDNESS AND TAKES SKIN OFF. PAPER TAPE OK. opsite and paper tape ok, bandaid ok    Cymbalta [Duloxetine Hcl] Nausea And Vomiting    Neosporin [Neomycin-Polymyxin-Gramicidin] Rash       Health Maintenance   Topic Date Due    HIV screen  Never done    Shingles Vaccine (2 of 3) 06/01/2017    Flu vaccine (1) 09/01/2021    Creatinine monitoring  05/24/2022    Annual Wellness Visit (AWV)  06/02/2022    Potassium monitoring  06/07/2022    DTaP/Tdap/Td vaccine (2 - Td or Tdap) 01/02/2023    Lipid screen  02/10/2023    Diabetes screen  01/14/2024    Colon cancer screen colonoscopy  07/12/2027    Pneumococcal 0-64 years Vaccine (2 of 2 - PPSV23) 10/24/2029    COVID-19 Vaccine  Completed    Hepatitis C screen  Completed    Hepatitis A vaccine  Aged Out    Hepatitis B vaccine  Aged Out    Hib vaccine  Aged Out    Meningococcal (ACWY) vaccine  Aged Out       Subjective:      Review of Systems   Constitutional: Negative for chills and fever. HENT: Negative for rhinorrhea and sore throat. Eyes: Negative for discharge and redness. Respiratory: Negative for cough, shortness of breath and wheezing. Cardiovascular: Negative for chest pain and palpitations. Gastrointestinal: Positive for diarrhea. Negative for abdominal pain, nausea and vomiting. Genitourinary: Negative for dysuria and frequency.    Musculoskeletal: Negative for arthralgias and myalgias. Neurological: Negative for dizziness, light-headedness and headaches. Psychiatric/Behavioral: Negative for sleep disturbance. Objective:     BP 96/66   Pulse 85   Ht 5' 9\" (1.753 m)   Wt 253 lb 3.2 oz (114.9 kg)   SpO2 97%   BMI 37.39 kg/m²   Physical Exam  Vitals and nursing note reviewed. Constitutional:       General: He is not in acute distress. Appearance: He is well-developed. He is not ill-appearing. HENT:      Head: Normocephalic and atraumatic. Right Ear: External ear normal.      Left Ear: External ear normal.   Eyes:      General: No scleral icterus. Right eye: No discharge. Left eye: No discharge. Conjunctiva/sclera: Conjunctivae normal.      Pupils: Pupils are equal, round, and reactive to light. Neck:      Thyroid: No thyromegaly. Trachea: No tracheal deviation. Cardiovascular:      Rate and Rhythm: Normal rate and regular rhythm. Heart sounds: Normal heart sounds. Pulmonary:      Effort: Pulmonary effort is normal. No respiratory distress. Breath sounds: Normal breath sounds. No wheezing. Lymphadenopathy:      Cervical: No cervical adenopathy. Skin:     General: Skin is warm. Findings: No rash. Neurological:      Mental Status: He is alert and oriented to person, place, and time. Psychiatric:         Mood and Affect: Mood normal.         Behavior: Behavior normal.         Thought Content: Thought content normal.         Assessment:       Diagnosis Orders   1. Hypotension, unspecified hypotension type  EKG 12 Lead    CBC With Auto Differential    Basic Metabolic Panel   2. Diarrhea, unspecified type     3. Benign essential hypertension     4. Kyphosis of thoracolumbar region, unspecified kyphosis type          Plan:    DC lisinopril. EKG done. Add probiotic for diarrhea. Blood work today. If potassium is high, can discontinue KCl.   May need to decrease lopressor in future     Return in about 3 months (around 11/25/2021). Orders Placed This Encounter   Procedures    CBC With Auto Differential     Standing Status:   Future     Standing Expiration Date:   8/25/2022    Basic Metabolic Panel     Standing Status:   Future     Standing Expiration Date:   8/25/2022    EKG 12 Lead     Order Specific Question:   Reason for Exam?     Answer:   Hypotension     Orders Placed This Encounter   Medications    gabapentin (NEURONTIN) 400 MG capsule     Sig: Take 1 capsule by mouth 3 times daily for 30 days. Dispense:  90 capsule     Refill:  5       Patient given educational materials - see patient instructions. Discussed use, benefit, and side effects of prescribed medications. All patient questions answered. Pt voiced understanding. Reviewed health maintenance. Instructed to continue current medications, diet andexercise. Patient agreed with treatment plan. Follow up as directed.      Electronicallysigned by Thalia Song MD on 8/25/2021 at 1:35 PM

## 2021-10-01 ENCOUNTER — TELEPHONE (OUTPATIENT)
Dept: PRIMARY CARE CLINIC | Age: 56
End: 2021-10-01

## 2021-10-01 NOTE — TELEPHONE ENCOUNTER
----- Message from Maryanne Jessica sent at 10/1/2021  1:09 PM EDT -----  Subject: Message to Provider    QUESTIONS  Information for Provider? Wanted Dr. Danielle Juan to know that stomach is not   feeling any better after the surgery. Still not passing gas, still have   diarrhea, and wants to know what next steps are. Still hearing sounds in   the stomach. Wants to know if there is a gastro  that he is needing to   see.  ---------------------------------------------------------------------------  --------------  [address]  What is the best way for the office to contact you? OK to leave message on   voicemail  Preferred Call Back Phone Number? [phone]  ---------------------------------------------------------------------------  --------------  SCRIPT ANSWERS  Relationship to Patient?  Self

## 2021-10-04 DIAGNOSIS — M48.062 SPINAL STENOSIS OF LUMBAR REGION WITH NEUROGENIC CLAUDICATION: Primary | ICD-10-CM

## 2021-10-04 RX ORDER — OXYCODONE HYDROCHLORIDE 5 MG/1
5 TABLET ORAL EVERY 4 HOURS PRN
Qty: 42 TABLET | Refills: 0 | Status: SHIPPED | OUTPATIENT
Start: 2021-10-04 | End: 2021-10-11

## 2021-10-04 NOTE — TELEPHONE ENCOUNTER
----- Message from Danielle Soliman sent at 10/4/2021 10:06 AM EDT -----  Subject: Refill Request    QUESTIONS  Name of Medication? Other - Oxycodone   Patient-reported dosage and instructions? Oxycodone HCI 5 mg every 8 hours  How many days do you have left? 2  Preferred Pharmacy? [id number]  Pharmacy phone number (if available)? [phone]  Additional Information for Provider? pt wants dr to call in script for   Oxycodone 5 mg every 8 hours as the surgeon from [address]   will not longer prescribe it and told him to call PCP. He is still having   issues with his bowels as well  ---------------------------------------------------------------------------  --------------  CALL BACK INFO  What is the best way for the office to contact you? OK to leave message on   voicemail  Preferred Call Back Phone Number?  [phone]

## 2021-10-11 ENCOUNTER — HOSPITAL ENCOUNTER (OUTPATIENT)
Age: 56
Discharge: HOME | End: 2021-10-13
Payer: MEDICARE

## 2021-10-11 ENCOUNTER — HOSPITAL ENCOUNTER (OUTPATIENT)
Dept: GENERAL RADIOLOGY | Age: 56
Discharge: HOME | End: 2021-10-13
Payer: MEDICARE

## 2021-10-11 ENCOUNTER — TELEPHONE (OUTPATIENT)
Dept: PRIMARY CARE CLINIC | Age: 56
End: 2021-10-11

## 2021-10-11 DIAGNOSIS — M41.00 INFANTILE IDIOPATHIC SCOLIOSIS, UNSPECIFIED SPINAL REGION: ICD-10-CM

## 2021-10-11 DIAGNOSIS — K56.7 ILEUS (HCC): Primary | ICD-10-CM

## 2021-10-11 PROCEDURE — 72081 X-RAY EXAM ENTIRE SPI 1 VW: CPT

## 2021-10-12 ENCOUNTER — HOSPITAL ENCOUNTER (OUTPATIENT)
Age: 56
Discharge: HOME | End: 2021-10-14
Payer: MEDICARE

## 2021-10-12 ENCOUNTER — HOSPITAL ENCOUNTER (OUTPATIENT)
Dept: GENERAL RADIOLOGY | Age: 56
Discharge: HOME | End: 2021-10-14
Payer: MEDICARE

## 2021-10-12 DIAGNOSIS — K56.7 ILEUS (HCC): ICD-10-CM

## 2021-10-12 PROCEDURE — 74018 RADEX ABDOMEN 1 VIEW: CPT

## 2021-10-13 ENCOUNTER — OFFICE VISIT (OUTPATIENT)
Dept: PRIMARY CARE CLINIC | Age: 56
End: 2021-10-13
Payer: MEDICARE

## 2021-10-13 VITALS
SYSTOLIC BLOOD PRESSURE: 110 MMHG | TEMPERATURE: 97.2 F | WEIGHT: 225 LBS | DIASTOLIC BLOOD PRESSURE: 68 MMHG | HEIGHT: 69 IN | HEART RATE: 83 BPM | OXYGEN SATURATION: 98 % | BODY MASS INDEX: 33.33 KG/M2

## 2021-10-13 DIAGNOSIS — K56.7 ILEUS (HCC): Primary | ICD-10-CM

## 2021-10-13 PROCEDURE — 99214 OFFICE O/P EST MOD 30 MIN: CPT | Performed by: FAMILY MEDICINE

## 2021-10-13 RX ORDER — OXYCODONE HYDROCHLORIDE 5 MG/1
5 TABLET ORAL
COMMUNITY
Start: 2021-09-24 | End: 2021-10-27

## 2021-10-13 RX ORDER — METHOCARBAMOL 750 MG/1
TABLET, FILM COATED ORAL
COMMUNITY
Start: 2021-08-25 | End: 2021-11-29

## 2021-10-13 NOTE — PATIENT INSTRUCTIONS
Patient Education        Learning About Ileus  What is ileus? Ileus (say \"ILL-ee-us\") is a blockage of the bowel (intestines) that happens when the intestines don't work as they should. Normally, muscles in the intestines squeeze to push food and waste along. When this process stops, the intestines stop digesting food and moving waste out of the body. This may also be called paralytic ileus. Ileus is not the same as a mechanical bowel obstruction. In a mechanical bowel obstruction, something is actually blocking the intestine, like scar tissue or a tumor. That is not true in ileus. Ileus sometimes happens after surgery to the belly. But it can also be caused by other things, such as some medicines, certain diseases or infections, and nerve problems. The doctor may do a number of tests. These tests may include X-rays, blood tests, and a CT scan. Testing can help the doctor be sure that nothing is blocking the intestines. Most people who have ileus need to be treated in the hospital.  What are the symptoms? Symptoms may include:  · Cramping belly pain. · Bloating. · Nausea or vomiting. · Not passing stool or gas. How is ileus treated? You will need to avoid eating solid food until you are better. Instead, you will get fluids and nutrition through a vein (IV). This helps prevent dehydration. It also lets your bowel rest.  You may have a tube that goes through your nose and into your stomach. This can help ease pain and bloating. You may get other treatments, depending on what caused ileus. For example, a medicine might be stopped if it is affecting your bowel. The intestines will often start working again in a few days. Signs of this include being able to pass gas or have a bowel movement. As your intestines start working, you will switch slowly from a liquid diet back to solid foods. Follow-up care is a key part of your treatment and safety.  Be sure to make and go to all appointments, and call your doctor if you are having problems. It's also a good idea to know your test results and keep a list of the medicines you take. Where can you learn more? Go to https://[email or web address]. org and sign in to your [employer] account. Enter P830 in the [employer] box to learn more about \"Learning About Ileus. \"     If you do not have an account, please click on the \"Sign Up Now\" link. Current as of: February 10, 2021               Content Version: 13.0  © 8024-1544 [employer]wise, Incorporated. Care instructions adapted under license by [facility] ([facility]). If you have questions about a medical condition or this instruction, always ask your healthcare professional. [address] any warranty or liability for your use of this information.

## 2021-10-13 NOTE — PROGRESS NOTES
Larissa Boyer is a 63 y.o. Mormyroni Reaper presents today for his medical conditions/complaints as noted below. Chief Complaint   Patient presents with    Abdominal Pain     since July bloated passes gas when bowel movement         HPI:     HPI  Had back surgery 3 months ago and had started vomiting after surgery. Had an ileus for several days in the hospital.    No N/V now. Drinks a lot of water. Diarrhea x 3 months, 3 x a day to 2 x a day, watery with chunks, brown to grey. No blood. Passes flatus with BM: explosive flatus. No fever or chills  Will have rectal incontinence since surgery juan c at night, has to wear a diaper. Colonoscopy last year: was normal. Dr Josias Swan office appt next Friday. Umbilical hernia repair in past.     Has appt with [facility] : phone visit for back issues. Had kyphoplasty surgery there 3 months ago. Current Outpatient Medications   Medication Sig Dispense Refill    oxyCODONE (ROXICODONE) 5 MG immediate release tablet Take 5 mg by mouth.  loratadine (CLARITIN) 10 MG tablet Take 10 mg by mouth 2 times daily      metoprolol tartrate (LOPRESSOR) 50 MG tablet TAKE 1 TABLET BY MOUTH TWO TIMES A  tablet 3    fluticasone (FLONASE) 50 MCG/ACT nasal spray USE 2 SPRAYS IN EACH NOSTRIL ONCE DAILY. NEED TO USE REGULARLY FOR BENEFIT      furosemide (LASIX) 40 MG tablet Take 1 tablet by mouth daily 90 tablet 3    potassium chloride (KLOR-CON M) 20 MEQ extended release tablet Take 1 tablet by mouth daily 90 tablet 3    hydroxychloroquine (PLAQUENIL) 200 MG tablet Take 1 tablet by mouth 2 times daily 180 tablet 3    hydrocortisone (WESTCORT) 0.2 % cream Apply topically 2 times daily. (Patient taking differently: Apply topically 2 times daily. , eczema) 45 g 1    Multiple Vitamins-Minerals (THERAPEUTIC MULTIVITAMIN-MINERALS) tablet Take 1 tablet by mouth daily      Sod Fluoride-Potassium Nitrate (PREVIDENT 5000 SENSITIVE) 1.1-5 % PSTE Place onto teeth      Magnesium Cl-Calcium Carbonate (SLOW-MAG PO) Take 1 tablet by mouth 2 times daily      VITAMIN D PO Take 1,000 Units by mouth daily      Acetaminophen (TYLENOL ARTHRITIS PAIN PO) Take 325 mg by mouth every 6 hours as needed       glucosamine-chondroitin 500-400 MG tablet Take 1 tablet by mouth daily      aspirin 81 MG tablet Take 81 mg by mouth daily       omeprazole (PRILOSEC) 20 MG capsule Take 20 mg by mouth nightly       methocarbamol (ROBAXIN) 750 MG tablet TAKE 1 TABLET BY MOUTH THREE TIMES A DAY      gabapentin (NEURONTIN) 400 MG capsule Take 1 capsule by mouth 3 times daily for 30 days. 90 capsule 5    docusate (COLACE, DULCOLAX) 100 MG CAPS Take 100 mg by mouth 2 times daily      meclizine (ANTIVERT) 25 MG tablet TAKE 1 TABLET BY MOUTH THREE TIMES A DAY AS NEEDED FOR DIZZINESS 45 tablet 0     No current facility-administered medications for this visit. Allergies   Allergen Reactions    Metronidazole Other (See Comments)     \" caused a prickly feeling in my legs \"    Cetyl Alcohol Hives     Localized reaction by allergy testing    Cetearyl alcohol    Adhesive Tape Other (See Comments)     opsite and paper tape ok, bandaid ok  REDNESS AND TAKES SKIN OFF. PAPER TAPE OK. opsite and paper tape ok, bandaid ok    Cymbalta [Duloxetine Hcl] Nausea And Vomiting    Neosporin [Neomycin-Polymyxin-Gramicidin] Rash       Subjective:     Review of Systems    Objective:     /68   Pulse 83   Temp 97.2 °F (36.2 °C) (Temporal)   Ht 5' 9\" (1.753 m)   Wt 225 lb (102.1 kg)   SpO2 98%   BMI 33.23 kg/m²   Physical Exam  Vitals and nursing note reviewed. Constitutional:       General: He is not in acute distress. Appearance: He is well-developed. He is not ill-appearing. HENT:      Head: Normocephalic and atraumatic. Right Ear: External ear normal.      Left Ear: External ear normal.   Eyes:      General: No scleral icterus. Right eye: No discharge. Left eye: No discharge. Conjunctiva/sclera: Conjunctivae normal.   Neck:      Thyroid: No thyromegaly. Trachea: No tracheal deviation. Cardiovascular:      Rate and Rhythm: Normal rate and regular rhythm. Heart sounds: Normal heart sounds. Pulmonary:      Effort: Pulmonary effort is normal. No respiratory distress. Breath sounds: Normal breath sounds. No wheezing. Abdominal:      Palpations: Abdomen is soft. Tenderness: There is no abdominal tenderness. There is no guarding or rebound. Hernia: A hernia is present. Comments: Abdomen : ventral hernia  High pitched BS right upper quadrant and slightly LUQ  Normal BS lower quadrants  Ventral hernia   Musculoskeletal:      Right lower leg: No edema. Left lower leg: No edema. Lymphadenopathy:      Cervical: No cervical adenopathy. Skin:     General: Skin is warm. Findings: No rash. Neurological:      Mental Status: He is alert and oriented to person, place, and time. Psychiatric:         Mood and Affect: Mood normal.         Behavior: Behavior normal.         Thought Content: Thought content normal.         Assessment:       Diagnosis Orders   1. Ileus (HCC)  XR ABDOMEN (KUB) (SINGLE AP VIEW)        Plan:      No follow-ups on file. Try off muscle relaxer   Try metamucil daily. See GI   Push fluids ok for normal diet. Call if any N/V or pain    Orders Placed This Encounter   Procedures    XR ABDOMEN (KUB) (SINGLE AP VIEW)     Standing Status:   Future     Standing Expiration Date:   10/13/2022     No orders of the defined types were placed in this encounter. Reviewed medications and possibleside effects.        Electronically signed by Kartik Casillas MD on 10/13/2021 at 8:54 AM

## 2021-10-20 ENCOUNTER — HOSPITAL ENCOUNTER (OUTPATIENT)
Dept: GENERAL RADIOLOGY | Age: 56
Discharge: HOME | End: 2021-10-22
Payer: MEDICARE

## 2021-10-20 ENCOUNTER — HOSPITAL ENCOUNTER (OUTPATIENT)
Age: 56
Discharge: HOME | End: 2021-10-22
Payer: MEDICARE

## 2021-10-20 DIAGNOSIS — K56.7 ILEUS (HCC): ICD-10-CM

## 2021-10-20 DIAGNOSIS — K56.600 PARTIAL INTESTINAL OBSTRUCTION, UNSPECIFIED CAUSE (HCC): ICD-10-CM

## 2021-10-20 DIAGNOSIS — K56.7 ILEUS (HCC): Primary | ICD-10-CM

## 2021-10-20 PROCEDURE — 74018 RADEX ABDOMEN 1 VIEW: CPT

## 2021-10-25 ENCOUNTER — HOSPITAL ENCOUNTER (OUTPATIENT)
Age: 56
Setting detail: SPECIMEN
Discharge: HOME | End: 2021-10-25
Payer: MEDICARE

## 2021-10-25 PROCEDURE — 87507 IADNA-DNA/RNA PROBE TQ 12-25: CPT

## 2021-10-25 PROCEDURE — 83520 IMMUNOASSAY QUANT NOS NONAB: CPT

## 2021-10-25 PROCEDURE — 83993 ASSAY FOR CALPROTECTIN FECAL: CPT

## 2021-10-27 ENCOUNTER — OFFICE VISIT (OUTPATIENT)
Dept: PRIMARY CARE CLINIC | Age: 56
End: 2021-10-27
Payer: MEDICARE

## 2021-10-27 VITALS
WEIGHT: 220.6 LBS | BODY MASS INDEX: 32.67 KG/M2 | SYSTOLIC BLOOD PRESSURE: 136 MMHG | DIASTOLIC BLOOD PRESSURE: 74 MMHG | OXYGEN SATURATION: 96 % | HEART RATE: 79 BPM | HEIGHT: 69 IN

## 2021-10-27 DIAGNOSIS — L21.9 SEBORRHEIC DERMATITIS: ICD-10-CM

## 2021-10-27 DIAGNOSIS — K56.600 PARTIAL INTESTINAL OBSTRUCTION, UNSPECIFIED CAUSE (HCC): Primary | ICD-10-CM

## 2021-10-27 LAB
CALPROTECTIN, FECAL: 20 UG/G
FECAL PANCREATIC ELASTASE-1: 253 UG/G
MISCELLANEOUS LAB TEST RESULT: NORMAL
TEST NAME: NORMAL

## 2021-10-27 PROCEDURE — 99213 OFFICE O/P EST LOW 20 MIN: CPT | Performed by: FAMILY MEDICINE

## 2021-10-27 RX ORDER — OXYCODONE HYDROCHLORIDE 5 MG/1
5 TABLET ORAL EVERY 4 HOURS PRN
Qty: 84 TABLET | Refills: 0 | Status: SHIPPED | OUTPATIENT
Start: 2021-10-27 | End: 2021-11-10

## 2021-10-27 RX ORDER — FLUOCINONIDE TOPICAL SOLUTION USP, 0.05% 0.5 MG/ML
SOLUTION TOPICAL
Qty: 60 ML | Refills: 0 | Status: SHIPPED | OUTPATIENT
Start: 2021-10-27

## 2021-10-27 RX ORDER — DIAZEPAM 10 MG/1
10 TABLET ORAL ONCE
Qty: 1 TABLET | Refills: 0 | Status: SHIPPED | OUTPATIENT
Start: 2021-10-27 | End: 2021-10-27

## 2021-10-27 ASSESSMENT — PATIENT HEALTH QUESTIONNAIRE - PHQ9
SUM OF ALL RESPONSES TO PHQ QUESTIONS 1-9: 2
2. FEELING DOWN, DEPRESSED OR HOPELESS: 1
SUM OF ALL RESPONSES TO PHQ9 QUESTIONS 1 & 2: 2
SUM OF ALL RESPONSES TO PHQ QUESTIONS 1-9: 2
1. LITTLE INTEREST OR PLEASURE IN DOING THINGS: 1
SUM OF ALL RESPONSES TO PHQ QUESTIONS 1-9: 2

## 2021-10-27 ASSESSMENT — ENCOUNTER SYMPTOMS
ABDOMINAL PAIN: 0
NAUSEA: 0
EYE DISCHARGE: 0
SORE THROAT: 0
SHORTNESS OF BREATH: 0
DIARRHEA: 0
BLOOD IN STOOL: 0
ABDOMINAL DISTENTION: 1
WHEEZING: 0
EYE REDNESS: 0
RHINORRHEA: 0
COUGH: 0
VOMITING: 0

## 2021-10-27 NOTE — PROGRESS NOTES
[address] [facility]  [address] [facility] [address]  Dept: [phone]    Dann Burr is a 61 y.o. male Established patient, who presents today for his medical conditions/complaints as noted below. Chief Complaint   Patient presents with    Medication Check     Back pain/ surgery       HPI:     HPI  Pt states was having issues with his bowels. Had xray done, showed ileus. Pt has ct scan abdominal and pelvis ordered. Seen GI as well. Having bowel movements, loose and watery. Pt states loose bowels since July 12th. Reviewed prior notes None  Reviewed previous Labs and Hospital Records    LDL Cholesterol (mg/dL)   Date Value   02/10/2018 94   07/23/2017 98       (goal LDL is <100)   AST (U/L)   Date Value   01/02/2021 16     ALT (U/L)   Date Value   01/02/2021 13     BUN (mg/dL)   Date Value   10/12/2021 4 (L)     Hemoglobin A1C (%)   Date Value   02/10/2018 5.5     TSH (mIU/L)   Date Value   01/06/2020 0.97     BP Readings from Last 3 Encounters:   10/27/21 136/74   10/13/21 110/68   08/25/21 96/66          (goal 120/80)    Past Medical History:   Diagnosis Date    Angular cheilitis     Arthritis     BACK AND FINGERS     Bilateral lower extremity edema 03/2021    started on Lasix per PCP    Chronic back pain     dr Bela [facility], scheduled for OR 7/12/2021 at [facility]    Depression     Dizziness     Eczema     Ex-smoker     quit in 2006, smoked for 23 years    GERD (gastroesophageal reflux disease)     H/O chest pain     H/O dermatitis     [place] (hard of hearing)     LEFT EAR WORSE THAN RIGHT.  getting hearing aides eloisa, Dr. Gadiel Baeza    Hypertension     DR Renay Ma PCP    Kyphosis     [facility] OR 7/12/2021 with Dr. Adrianne Caruso Left eye injury     [address] LEFT EYE    Muscle spasm     LOWER RIGHT BACK    Nocturia     Obesity     Prolonged emergence from general anesthesia     PATIENT STATES HIS B/P WOULD DROP WITH INTUBATION, GO UP AFTER ET TUBE REMOVED.  PVC's (premature ventricular contractions)     PVC'S.  NO CARDIOLOGIST, PCP DR Lauren Anna     occas to see rheumatoid arthritis  workup for lupus    Sjogren's syndrome ([facility] [address]     Snores     mild, denies apnea, does \"grind\" his teeth    SOB (shortness of breath)     used to see Dr. Coley Mention, borderline COPD/ never followed up / never filled scripts for inhalers    Use of cane as ambulatory aid     Vertigo     Dr. Radha Bruce Wears glasses     Wears partial dentures     LOWER      Past Surgical History:   Procedure Laterality Date    BACK SURGERY  2006    cage    COLONOSCOPY      CYST REMOVAL Right     index finger    HEMORRHOID SURGERY  2017    HERNIA REPAIR      UMBILICAL    NOSE SURGERY      REALIGNED NOSE    OTHER SURGICAL HISTORY  2020    MRI cervical and thoracic spine without contrast . CT of lung , all under general anesthesia    [place] COLSC FLX W/REMOVAL LESION BY HOT BX FORCEPS N/A 2017    COLONOSCOPY POLYPECTOMY HOT BIOPSY performed by Wilhemina Olszewski, MD at [address]  2021    t7-L2    TOE SURGERY Right     2nd toe    TONSILLECTOMY      AS A CHILD    VASECTOMY         Family History   Problem Relation Age of Onset    Coronary Art Dis Father         premature-- at 52    Diabetes Father     High Blood Pressure Father     Other Father         COPD, EMPHYSEMA    Coronary Art Dis Paternal Uncle         premature    Heart Failure Paternal Uncle     Diabetes Mother        Social History     Tobacco Use    Smoking status: Former Smoker     Packs/day: 1.50     Years: 23.00     Pack years: 34.50     Types: Cigarettes     Start date: 1983     Quit date: 2006     Years since quitting: 15.4    Smokeless tobacco: Former User     Types: Chew     Quit date: 9/10/2019    Tobacco comment: quit 14 years ago   Substance Use Topics    Alcohol use: Not Currently Alcohol/week: 0.0 standard drinks     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      Current Outpatient Medications   Medication Sig Dispense Refill    Simethicone (GAS-X PO) Take by mouth      oxyCODONE (ROXICODONE) 5 MG immediate release tablet Take 1 tablet by mouth every 4 hours as needed for Pain for up to 14 days. 84 tablet 0    diazePAM (VALIUM) 10 MG tablet Take 1 tablet by mouth once for 1 dose. 1 tablet 0    fluocinonide (LIDEX) 0.05 % external solution Apply topically 2 times daily. 60 mL 0    methocarbamol (ROBAXIN) 750 MG tablet TAKE 1 TABLET BY MOUTH THREE TIMES A DAY      loratadine (CLARITIN) 10 MG tablet Take 10 mg by mouth 2 times daily      docusate (COLACE, DULCOLAX) 100 MG CAPS Take 100 mg by mouth 2 times daily      metoprolol tartrate (LOPRESSOR) 50 MG tablet TAKE 1 TABLET BY MOUTH TWO TIMES A  tablet 3    meclizine (ANTIVERT) 25 MG tablet TAKE 1 TABLET BY MOUTH THREE TIMES A DAY AS NEEDED FOR DIZZINESS 45 tablet 0    fluticasone (FLONASE) 50 MCG/ACT nasal spray USE 2 SPRAYS IN EACH NOSTRIL ONCE DAILY. NEED TO USE REGULARLY FOR BENEFIT      furosemide (LASIX) 40 MG tablet Take 1 tablet by mouth daily 90 tablet 3    potassium chloride (KLOR-CON M) 20 MEQ extended release tablet Take 1 tablet by mouth daily 90 tablet 3    hydroxychloroquine (PLAQUENIL) 200 MG tablet Take 1 tablet by mouth 2 times daily 180 tablet 3    hydrocortisone (WESTCORT) 0.2 % cream Apply topically 2 times daily. (Patient taking differently: Apply topically 2 times daily. , eczema) 45 g 1    Multiple Vitamins-Minerals (THERAPEUTIC MULTIVITAMIN-MINERALS) tablet Take 1 tablet by mouth daily      Sod Fluoride-Potassium Nitrate (PREVIDENT 5000 SENSITIVE) 1.1-5 % PSTE Place onto teeth      Magnesium Cl-Calcium Carbonate (SLOW-MAG PO) Take 1 tablet by mouth 2 times daily      VITAMIN D PO Take 1,000 Units by mouth daily      Acetaminophen (TYLENOL ARTHRITIS PAIN PO) Take 325 mg by mouth every 6 hours as needed       glucosamine-chondroitin 500-400 MG tablet Take 1 tablet by mouth daily      aspirin 81 MG tablet Take 81 mg by mouth daily       omeprazole (PRILOSEC) 20 MG capsule Take 20 mg by mouth nightly       gabapentin (NEURONTIN) 400 MG capsule Take 1 capsule by mouth 3 times daily for 30 days. 90 capsule 5     No current facility-administered medications for this visit. Allergies   Allergen Reactions    Metronidazole Other (See Comments)     \" caused a prickly feeling in my legs \"    Cetyl Alcohol Hives     Localized reaction by allergy testing    Cetearyl alcohol    Adhesive Tape Other (See Comments)     opsite and paper tape ok, bandaid ok  REDNESS AND TAKES SKIN OFF. PAPER TAPE OK. opsite and paper tape ok, bandaid ok    Cymbalta [Duloxetine Hcl] Nausea And Vomiting    Neosporin [Neomycin-Polymyxin-Gramicidin] Rash       Health Maintenance   Topic Date Due    HIV screen  Never done    Shingles Vaccine (2 of 3) 06/01/2017    Flu vaccine (1) 09/01/2021    Annual Wellness Visit (AWV)  06/02/2022    Potassium monitoring  10/12/2022    Creatinine monitoring  10/12/2022    DTaP/Tdap/Td vaccine (2 - Td or Tdap) 01/02/2023    Lipid screen  02/10/2023    Diabetes screen  10/12/2024    Colon cancer screen colonoscopy  07/12/2027    Pneumococcal 0-64 years Vaccine (2 of 2 - PPSV23) 10/24/2029    COVID-19 Vaccine  Completed    Hepatitis C screen  Completed    Hepatitis A vaccine  Aged Out    Hepatitis B vaccine  Aged Out    Hib vaccine  Aged Out    Meningococcal (ACWY) vaccine  Aged Out       Subjective:      Review of Systems   Constitutional: Negative for chills and fever. HENT: Negative for rhinorrhea and sore throat. Eyes: Negative for discharge and redness. Respiratory: Negative for cough, shortness of breath and wheezing. Cardiovascular: Negative for chest pain and palpitations. Gastrointestinal: Positive for abdominal distention.  Negative for abdominal pain, blood in stool, diarrhea, nausea and vomiting. Genitourinary: Negative for dysuria and frequency. Musculoskeletal: Negative for arthralgias and myalgias. Neurological: Negative for dizziness, light-headedness and headaches. Psychiatric/Behavioral: Negative for sleep disturbance. Objective:     /74   Pulse 79   Ht 5' 9\" (1.753 m)   Wt 220 lb 9.6 oz (100.1 kg)   SpO2 96%   BMI 32.58 kg/m²   Physical Exam  Vitals and nursing note reviewed. Constitutional:       General: He is not in acute distress. Appearance: He is well-developed. He is not ill-appearing. HENT:      Head: Normocephalic and atraumatic. Right Ear: External ear normal.      Left Ear: External ear normal.   Eyes:      General: No scleral icterus. Right eye: No discharge. Left eye: No discharge. Conjunctiva/sclera: Conjunctivae normal.      Pupils: Pupils are equal, round, and reactive to light. Neck:      Thyroid: No thyromegaly. Trachea: No tracheal deviation. Cardiovascular:      Rate and Rhythm: Normal rate and regular rhythm. Heart sounds: Normal heart sounds. Pulmonary:      Effort: Pulmonary effort is normal. No respiratory distress. Breath sounds: Normal breath sounds. No wheezing. Abdominal:      General: There is distension. Palpations: There is no mass. Tenderness: There is no abdominal tenderness. There is no guarding. Hernia: No hernia is present. Lymphadenopathy:      Cervical: No cervical adenopathy. Skin:     General: Skin is warm. Findings: No rash. Neurological:      Mental Status: He is alert and oriented to person, place, and time. Psychiatric:         Mood and Affect: Mood normal.         Behavior: Behavior normal.         Thought Content: Thought content normal.         Assessment:       Diagnosis Orders   1.  Partial intestinal obstruction, unspecified cause (HCC)  oxyCODONE (ROXICODONE) 5 MG immediate release tablet    diazePAM (VALIUM) 10 MG tablet 2. Seborrheic dermatitis  fluocinonide (LIDEX) 0.05 % external solution        Plan:    get progress note from GI  Await stool test  Await ct abdomen/ pelvis tomorrow     Return in about 3 months (around 1/27/2022). No orders of the defined types were placed in this encounter. Orders Placed This Encounter   Medications    oxyCODONE (ROXICODONE) 5 MG immediate release tablet     Sig: Take 1 tablet by mouth every 4 hours as needed for Pain for up to 14 days. Dispense:  84 tablet     Refill:  0     Reduce doses taken as pain becomes manageable    diazePAM (VALIUM) 10 MG tablet     Sig: Take 1 tablet by mouth once for 1 dose. Dispense:  1 tablet     Refill:  0     Take 30 minutes before CT scan    fluocinonide (LIDEX) 0.05 % external solution     Sig: Apply topically 2 times daily. Dispense:  60 mL     Refill:  0       Patient given educational materials - see patient instructions. Discussed use, benefit, and side effects of prescribed medications. All patient questions answered. Pt voiced understanding. Reviewed health maintenance. Instructed to continue current medications, diet andexercise. Patient agreed with treatment plan. Follow up as directed.      Electronicallysigned by Suri Guallpa MD on 10/27/2021 at 12:06 PM

## 2021-10-28 ENCOUNTER — HOSPITAL ENCOUNTER (OUTPATIENT)
Dept: CT IMAGING | Age: 56
Discharge: HOME | End: 2021-10-30
Payer: MEDICARE

## 2021-10-28 DIAGNOSIS — K56.600 PARTIAL INTESTINAL OBSTRUCTION, UNSPECIFIED CAUSE (HCC): ICD-10-CM

## 2021-10-28 DIAGNOSIS — K56.7 ILEUS (HCC): ICD-10-CM

## 2021-10-28 PROCEDURE — 6360000004 HC RX CONTRAST MEDICATION: Performed by: FAMILY MEDICINE

## 2021-10-28 PROCEDURE — 74177 CT ABD & PELVIS W/CONTRAST: CPT

## 2021-10-28 PROCEDURE — 2580000003 HC RX 258: Performed by: FAMILY MEDICINE

## 2021-10-28 RX ORDER — SODIUM CHLORIDE 0.9 % (FLUSH) 0.9 %
10 SYRINGE (ML) INJECTION PRN
Status: DISCONTINUED | OUTPATIENT
Start: 2021-10-28 | End: 2021-10-31 | Stop reason: HOSPADM

## 2021-10-28 RX ORDER — 0.9 % SODIUM CHLORIDE 0.9 %
80 INTRAVENOUS SOLUTION INTRAVENOUS ONCE
Status: COMPLETED | OUTPATIENT
Start: 2021-10-28 | End: 2021-10-28

## 2021-10-28 RX ADMIN — IOPAMIDOL 75 ML: 755 INJECTION, SOLUTION INTRAVENOUS at 10:36

## 2021-10-28 RX ADMIN — SODIUM CHLORIDE, PRESERVATIVE FREE 10 ML: 5 INJECTION INTRAVENOUS at 10:36

## 2021-10-28 RX ADMIN — SODIUM CHLORIDE 80 ML: 9 INJECTION, SOLUTION INTRAVENOUS at 10:36

## 2021-10-29 NOTE — RESULT ENCOUNTER NOTE
Advise patient still showing ileus. If it should worsen, may require bowel rest and IV fluids in the hospital.  Patient also showing gallstones.   Recommend follow back up with gastroenterology

## 2021-11-04 ENCOUNTER — HOSPITAL ENCOUNTER (OUTPATIENT)
Dept: GENERAL RADIOLOGY | Age: 56
Discharge: HOME | End: 2021-11-06
Payer: MEDICARE

## 2021-11-04 ENCOUNTER — HOSPITAL ENCOUNTER (OUTPATIENT)
Age: 56
Discharge: HOME | End: 2021-11-04
Payer: MEDICARE

## 2021-11-04 ENCOUNTER — HOSPITAL ENCOUNTER (OUTPATIENT)
Age: 56
Discharge: HOME | End: 2021-11-06
Payer: MEDICARE

## 2021-11-04 DIAGNOSIS — R19.7 DIARRHEA, UNSPECIFIED TYPE: ICD-10-CM

## 2021-11-04 LAB
ALBUMIN SERPL-MCNC: 4.3 G/DL (ref 3.5–5.2)
ALBUMIN/GLOBULIN RATIO: ABNORMAL (ref 1–2.5)
ALP BLD-CCNC: 115 U/L (ref 40–129)
ALT SERPL-CCNC: 8 U/L (ref 5–41)
ANION GAP SERPL CALCULATED.3IONS-SCNC: 14 MMOL/L (ref 9–17)
AST SERPL-CCNC: 17 U/L
BILIRUB SERPL-MCNC: 0.26 MG/DL (ref 0.3–1.2)
BUN BLDV-MCNC: 6 MG/DL (ref 6–20)
BUN/CREAT BLD: ABNORMAL (ref 9–20)
CALCIUM SERPL-MCNC: 9.5 MG/DL (ref 8.6–10.4)
CHLORIDE BLD-SCNC: 97 MMOL/L (ref 98–107)
CO2: 28 MMOL/L (ref 20–31)
CREAT SERPL-MCNC: 0.53 MG/DL (ref 0.7–1.2)
GFR AFRICAN AMERICAN: >60 ML/MIN
GFR NON-AFRICAN AMERICAN: >60 ML/MIN
GFR SERPL CREATININE-BSD FRML MDRD: ABNORMAL ML/MIN/{1.73_M2}
GFR SERPL CREATININE-BSD FRML MDRD: ABNORMAL ML/MIN/{1.73_M2}
GLUCOSE BLD-MCNC: 109 MG/DL (ref 70–99)
HCT VFR BLD CALC: 42.6 % (ref 41–53)
HEMOGLOBIN: 13.9 G/DL (ref 13.5–17.5)
LACTIC ACID: 1.5 MMOL/L (ref 0.5–2.2)
MCH RBC QN AUTO: 26.5 PG (ref 26–34)
MCHC RBC AUTO-ENTMCNC: 32.7 G/DL (ref 31–37)
MCV RBC AUTO: 81 FL (ref 80–100)
NRBC AUTOMATED: ABNORMAL PER 100 WBC
PDW BLD-RTO: 16.4 % (ref 11.5–14.9)
PLATELET # BLD: 234 K/UL (ref 150–450)
PMV BLD AUTO: 7.6 FL (ref 6–12)
POTASSIUM SERPL-SCNC: 3.8 MMOL/L (ref 3.7–5.3)
RBC # BLD: 5.27 M/UL (ref 4.5–5.9)
SODIUM BLD-SCNC: 139 MMOL/L (ref 135–144)
TOTAL PROTEIN: 7.7 G/DL (ref 6.4–8.3)
TSH SERPL DL<=0.05 MIU/L-ACNC: 1.07 MIU/L (ref 0.3–5)
WBC # BLD: 9.1 K/UL (ref 3.5–11)

## 2021-11-04 PROCEDURE — 74018 RADEX ABDOMEN 1 VIEW: CPT

## 2021-11-04 PROCEDURE — 84443 ASSAY THYROID STIM HORMONE: CPT

## 2021-11-04 PROCEDURE — 36415 COLL VENOUS BLD VENIPUNCTURE: CPT

## 2021-11-04 PROCEDURE — 80053 COMPREHEN METABOLIC PANEL: CPT

## 2021-11-04 PROCEDURE — 83605 ASSAY OF LACTIC ACID: CPT

## 2021-11-04 PROCEDURE — 85027 COMPLETE CBC AUTOMATED: CPT

## 2021-11-08 ENCOUNTER — TELEPHONE (OUTPATIENT)
Dept: PRIMARY CARE CLINIC | Age: 56
End: 2021-11-08

## 2021-11-08 NOTE — TELEPHONE ENCOUNTER
Pt wants to speak to you personally. States that it is very important. Would like a call back asap. Offered to help. But wants to speak with you.

## 2021-11-09 PROBLEM — K56.7 ILEUS (HCC): Status: ACTIVE | Noted: 2021-11-09

## 2021-11-09 NOTE — TELEPHONE ENCOUNTER
Spoke with patient and Dr. Tony Hunter. Direct admit done for [facility]. Patient notified will be called when bed is available may not be today. DX: michele.

## 2021-11-10 ENCOUNTER — TELEPHONE (OUTPATIENT)
Dept: PRIMARY CARE CLINIC | Age: 56
End: 2021-11-10

## 2021-11-10 NOTE — TELEPHONE ENCOUNTER
Patient in office for nurse visit. Patient notified.    You ordered direct admit for him yesterday. Mayda from the transfer team called to let you know they still do not have a bed. [facility] and [address] [place] [address] are both full [facility] are on bypass right now. Will try to get him a bed today but they can't guarantee anything.

## 2021-11-12 NOTE — TELEPHONE ENCOUNTER
Mayda called back wanting to let Dr. Bhavana Lawrence know patient still has not been assigned a bed yet.      Mayda call back number [phone]

## 2021-11-16 ENCOUNTER — TELEPHONE (OUTPATIENT)
Dept: PRIMARY CARE CLINIC | Age: 56
End: 2021-11-16

## 2021-11-16 NOTE — TELEPHONE ENCOUNTER
----- Message from Madison Carrizales sent at 11/16/2021  7:45 AM EST -----  Subject: Message to Provider    QUESTIONS  Information for Provider? [facility] from [address] [facility] calling to let   provider know that patient still doesn't have a bed yet, but will inform   when patient does get one. This is just a FYI per [facility]. If any   questions please contact her at [phone]. Please advise  ---------------------------------------------------------------------------  --------------  CALL BACK INFO  What is the best way for the office to contact you? Do not leave any   message, patient will call back for answer  Preferred Call Back Phone Number? [phone]  ---------------------------------------------------------------------------  --------------  SCRIPT ANSWERS  Relationship to Patient? Third Party  Representative Name?  [facility]

## 2021-11-24 ENCOUNTER — TELEPHONE (OUTPATIENT)
Dept: PRIMARY CARE CLINIC | Age: 56
End: 2021-11-24

## 2021-11-24 NOTE — TELEPHONE ENCOUNTER
Cony Goldberg with [facility] called to speak with rounding doctor. Per Dr. Young Schroeder she wanted the office to contact Dr. Linda Coker to see if this was still needed. [address]  contacted Dr. Linda Coker, he states direct admit not needed any more and to follow up in office. I called [facility] back and cancelled the bed. Lalo How, please contact pt and schedule him per Macrina.     [place] [phone] [facility]

## 2021-11-29 ENCOUNTER — APPOINTMENT (OUTPATIENT)
Dept: GENERAL RADIOLOGY | Age: 56
End: 2021-11-29
Payer: MEDICARE

## 2021-11-29 ENCOUNTER — HOSPITAL ENCOUNTER (EMERGENCY)
Age: 56
Discharge: HOME | End: 2021-11-29
Attending: EMERGENCY MEDICINE
Payer: MEDICARE

## 2021-11-29 ENCOUNTER — APPOINTMENT (OUTPATIENT)
Dept: CT IMAGING | Age: 56
End: 2021-11-29
Payer: MEDICARE

## 2021-11-29 VITALS
RESPIRATION RATE: 14 BRPM | OXYGEN SATURATION: 99 % | SYSTOLIC BLOOD PRESSURE: 110 MMHG | HEART RATE: 84 BPM | DIASTOLIC BLOOD PRESSURE: 80 MMHG | TEMPERATURE: 97.9 F

## 2021-11-29 DIAGNOSIS — K56.7 ILEUS (HCC): Primary | ICD-10-CM

## 2021-11-29 LAB
ABSOLUTE EOS #: 0.4 K/UL (ref 0–0.4)
ABSOLUTE IMMATURE GRANULOCYTE: ABNORMAL K/UL (ref 0–0.3)
ABSOLUTE LYMPH #: 1.3 K/UL (ref 1–4.8)
ABSOLUTE MONO #: 0.6 K/UL (ref 0.1–1.3)
ALBUMIN SERPL-MCNC: 3.9 G/DL (ref 3.5–5.2)
ALBUMIN/GLOBULIN RATIO: ABNORMAL (ref 1–2.5)
ALP BLD-CCNC: 112 U/L (ref 40–129)
ALT SERPL-CCNC: 7 U/L (ref 5–41)
ANION GAP SERPL CALCULATED.3IONS-SCNC: 12 MMOL/L (ref 9–17)
AST SERPL-CCNC: 19 U/L
BASOPHILS # BLD: 1 % (ref 0–2)
BASOPHILS ABSOLUTE: 0.1 K/UL (ref 0–0.2)
BILIRUB SERPL-MCNC: 0.33 MG/DL (ref 0.3–1.2)
BUN BLDV-MCNC: 5 MG/DL (ref 6–20)
BUN/CREAT BLD: ABNORMAL (ref 9–20)
C-REACTIVE PROTEIN: 10.7 MG/L (ref 0–5)
CALCIUM SERPL-MCNC: 9.5 MG/DL (ref 8.6–10.4)
CHLORIDE BLD-SCNC: 100 MMOL/L (ref 98–107)
CO2: 30 MMOL/L (ref 20–31)
CREAT SERPL-MCNC: <0.4 MG/DL (ref 0.7–1.2)
DIFFERENTIAL TYPE: ABNORMAL
EOSINOPHILS RELATIVE PERCENT: 6 % (ref 0–4)
GFR AFRICAN AMERICAN: ABNORMAL ML/MIN
GFR NON-AFRICAN AMERICAN: ABNORMAL ML/MIN
GFR SERPL CREATININE-BSD FRML MDRD: ABNORMAL ML/MIN/{1.73_M2}
GFR SERPL CREATININE-BSD FRML MDRD: ABNORMAL ML/MIN/{1.73_M2}
GLUCOSE BLD-MCNC: 134 MG/DL (ref 70–99)
HCT VFR BLD CALC: 44.6 % (ref 41–53)
HEMOGLOBIN: 14.5 G/DL (ref 13.5–17.5)
IMMATURE GRANULOCYTES: ABNORMAL %
LIPASE: 19 U/L (ref 13–60)
LYMPHOCYTES # BLD: 17 % (ref 24–44)
MAGNESIUM: 1.8 MG/DL (ref 1.6–2.6)
MCH RBC QN AUTO: 26.5 PG (ref 26–34)
MCHC RBC AUTO-ENTMCNC: 32.5 G/DL (ref 31–37)
MCV RBC AUTO: 81.6 FL (ref 80–100)
MONOCYTES # BLD: 7 % (ref 1–7)
NRBC AUTOMATED: ABNORMAL PER 100 WBC
PDW BLD-RTO: 18.2 % (ref 11.5–14.9)
PLATELET # BLD: 212 K/UL (ref 150–450)
PLATELET ESTIMATE: ABNORMAL
PMV BLD AUTO: 7.8 FL (ref 6–12)
POTASSIUM SERPL-SCNC: 3.6 MMOL/L (ref 3.7–5.3)
RBC # BLD: 5.46 M/UL (ref 4.5–5.9)
RBC # BLD: ABNORMAL 10*6/UL
SEDIMENTATION RATE, ERYTHROCYTE: 20 MM (ref 0–20)
SEG NEUTROPHILS: 69 % (ref 36–66)
SEGMENTED NEUTROPHILS ABSOLUTE COUNT: 5.4 K/UL (ref 1.3–9.1)
SODIUM BLD-SCNC: 142 MMOL/L (ref 135–144)
TOTAL PROTEIN: 7.6 G/DL (ref 6.4–8.3)
WBC # BLD: 7.7 K/UL (ref 3.5–11)
WBC # BLD: ABNORMAL 10*3/UL

## 2021-11-29 PROCEDURE — 99284 EMERGENCY DEPT VISIT MOD MDM: CPT | Performed by: INTERNAL MEDICINE

## 2021-11-29 PROCEDURE — 85652 RBC SED RATE AUTOMATED: CPT

## 2021-11-29 PROCEDURE — 86038 ANTINUCLEAR ANTIBODIES: CPT

## 2021-11-29 PROCEDURE — 36415 COLL VENOUS BLD VENIPUNCTURE: CPT

## 2021-11-29 PROCEDURE — 80053 COMPREHEN METABOLIC PANEL: CPT

## 2021-11-29 PROCEDURE — 86140 C-REACTIVE PROTEIN: CPT

## 2021-11-29 PROCEDURE — 86225 DNA ANTIBODY NATIVE: CPT

## 2021-11-29 PROCEDURE — 83690 ASSAY OF LIPASE: CPT

## 2021-11-29 PROCEDURE — 96374 THER/PROPH/DIAG INJ IV PUSH: CPT

## 2021-11-29 PROCEDURE — 99283 EMERGENCY DEPT VISIT LOW MDM: CPT

## 2021-11-29 PROCEDURE — 85025 COMPLETE CBC W/AUTO DIFF WBC: CPT

## 2021-11-29 PROCEDURE — 83735 ASSAY OF MAGNESIUM: CPT

## 2021-11-29 PROCEDURE — 74177 CT ABD & PELVIS W/CONTRAST: CPT

## 2021-11-29 PROCEDURE — 6360000004 HC RX CONTRAST MEDICATION: Performed by: EMERGENCY MEDICINE

## 2021-11-29 PROCEDURE — 6360000002 HC RX W HCPCS: Performed by: EMERGENCY MEDICINE

## 2021-11-29 PROCEDURE — 2580000003 HC RX 258: Performed by: EMERGENCY MEDICINE

## 2021-11-29 PROCEDURE — 74018 RADEX ABDOMEN 1 VIEW: CPT

## 2021-11-29 RX ORDER — OXYCODONE HYDROCHLORIDE 5 MG/1
5 TABLET ORAL EVERY 4 HOURS PRN
COMMUNITY
End: 2021-12-01

## 2021-11-29 RX ORDER — 0.9 % SODIUM CHLORIDE 0.9 %
80 INTRAVENOUS SOLUTION INTRAVENOUS ONCE
Status: COMPLETED | OUTPATIENT
Start: 2021-11-29 | End: 2021-11-29

## 2021-11-29 RX ORDER — ACETAMINOPHEN 325 MG/1
650 TABLET ORAL EVERY 6 HOURS PRN
COMMUNITY

## 2021-11-29 RX ORDER — SODIUM CHLORIDE 0.9 % (FLUSH) 0.9 %
10 SYRINGE (ML) INJECTION PRN
Status: DISCONTINUED | OUTPATIENT
Start: 2021-11-29 | End: 2021-11-29 | Stop reason: HOSPADM

## 2021-11-29 RX ADMIN — SODIUM CHLORIDE 80 ML: 9 INJECTION, SOLUTION INTRAVENOUS at 13:03

## 2021-11-29 RX ADMIN — IOPAMIDOL 75 ML: 755 INJECTION, SOLUTION INTRAVENOUS at 13:03

## 2021-11-29 RX ADMIN — HYDROMORPHONE HYDROCHLORIDE 1 MG: 1 INJECTION, SOLUTION INTRAMUSCULAR; INTRAVENOUS; SUBCUTANEOUS at 12:46

## 2021-11-29 RX ADMIN — SODIUM CHLORIDE, PRESERVATIVE FREE 10 ML: 5 INJECTION INTRAVENOUS at 13:03

## 2021-11-29 ASSESSMENT — ENCOUNTER SYMPTOMS
VOICE CHANGE: 0
COUGH: 0
SHORTNESS OF BREATH: 0
TROUBLE SWALLOWING: 0
ABDOMINAL PAIN: 1
CONSTIPATION: 0
BACK PAIN: 0
ANAL BLEEDING: 0
CHOKING: 0
VOMITING: 0
WHEEZING: 0
RECTAL PAIN: 0
DIARRHEA: 1
DIARRHEA: 0
NAUSEA: 0
SORE THROAT: 0
ABDOMINAL DISTENTION: 1
APNEA: 0
BLOOD IN STOOL: 0

## 2021-11-29 ASSESSMENT — PAIN SCALES - GENERAL: PAINLEVEL_OUTOF10: 4

## 2021-11-29 NOTE — TELEPHONE ENCOUNTER
Spoke to the patients wife, The patient is currently in [facility] ERLiss Ren states she will call our office if she needs anything.

## 2021-11-29 NOTE — ED PROVIDER NOTES
[address]  EMERGENCY DEPARTMENT ENCOUNTER      Pt Name: Carrie Cerna  MRN: [id number]  [place] 1965  Date of evaluation: 11/29/21      CHIEF COMPLAINT       Chief Complaint   Patient presents with    Abdominal Pain     HISTORY OF PRESENT ILLNESS   HPI 61 y.o. male presents with c/o abdominal pain. Symptoms started 5 months ago after back surgery. Had an ileus. Has been having persistent problems since. Pain is described as \"aching\" in character, moderate in severity (rating it 5 / 10). The pain is located primarily in the umbilical area with radiation across his abdomen. The pain has been intermittent in course, occurs daily, lasts 10-20 minutes, will resolve, and occur a few minutes later or a few hours later. Night time symptoms that wake him from sleep every day. Eating less. About 80lb weight loss in the last 5 months. The patient tried increasing his fluids, gasx, and a priobitc prior to arrival with no relief of symptoms. Ct scan on 10/28/21 - showed distended gas filled / fluid filled bowel loops conssitent with a ileus. 11/4/ xr showed signs of an ileus. Saw a gastroenterologist who thought the patient might have olgilvies. REVIEW OF SYSTEMS       Review of Systems   Constitutional: Negative for fever. HENT: Negative for congestion. Eyes: Negative for visual disturbance. Respiratory: Negative for cough. Cardiovascular: Negative for chest pain. Gastrointestinal: Positive for abdominal distention, abdominal pain, diarrhea and nausea. Negative for vomiting. Genitourinary: Negative for decreased urine volume. Musculoskeletal: Positive for back pain (chronic). Negative for arthralgias. Skin: Negative for rash. Neurological: Negative for weakness.        PAST MEDICAL HISTORY     Past Medical History:   Diagnosis Date    Angular cheilitis     Arthritis     BACK AND FINGERS     Bilateral lower extremity edema 03/2021    started on Lasix per PCP    Medication List as of 11/29/2021  5:05 PM      CONTINUE these medications which have NOT CHANGED    Details   acetaminophen (TYLENOL) 325 MG tablet Take 650 mg by mouth every 6 hours as needed for PainHistorical Med      vitamin D (CHOLECALCIFEROL) 25 MCG (1000 UT) TABS tablet Take 1,000 Units by mouth nightlyHistorical Med      Lactobacillus (PROBIOTIC ACIDOPHILUS PO) Take 1 capsule by mouth dailyHistorical Med      oxyCODONE (ROXICODONE) 5 MG immediate release tablet Take 5 mg by mouth every 4 hours as needed for Pain. Historical Med      hydrocortisone (WESTCORT) 0.2 % cream Apply topically 2 times daily as needed, Topical, 2 TIMES DAILY PRN, Historical Med      Simethicone (GAS-X PO) Take 1 capsule by mouth 2 times daily as needed Historical Med      fluocinonide (LIDEX) 0.05 % external solution Apply topically 2 times daily. , Disp-60 mL, R-0, Normal      loratadine (CLARITIN) 10 MG tablet Take 10 mg by mouth 2 times dailyHistorical Med      gabapentin (NEURONTIN) 400 MG capsule Take 1 capsule by mouth 3 times daily for 30 days. , Disp-90 capsule, R-5Normal      metoprolol tartrate (LOPRESSOR) 50 MG tablet TAKE 1 TABLET BY MOUTH TWO TIMES A DAY, Disp-180 tablet, R-3Normal      fluticasone (FLONASE) 50 MCG/ACT nasal spray USE 2 SPRAYS IN EACH NOSTRIL ONCE DAILY.  NEED TO USE REGULARLY FOR BENEFITHistorical Med      furosemide (LASIX) 40 MG tablet Take 1 tablet by mouth daily, Disp-90 tablet, R-3Normal      potassium chloride (KLOR-CON M) 20 MEQ extended release tablet Take 1 tablet by mouth daily, Disp-90 tablet, R-3Normal      hydroxychloroquine (PLAQUENIL) 200 MG tablet Take 1 tablet by mouth 2 times daily, Disp-180 tablet, R-3Normal      Multiple Vitamins-Minerals (THERAPEUTIC MULTIVITAMIN-MINERALS) tablet Take 1 tablet by mouth dailyHistorical Med      Sod Fluoride-Potassium Nitrate (PREVIDENT 5000 SENSITIVE) 1.1-5 % PSTE Place onto teethHistorical Med      Magnesium Cl-Calcium Carbonate (SLOW-MAG PO) Take 1 tablet by mouth 2 times dailyHistorical Med      aspirin 81 MG EC tablet Take 81 mg by mouth daily Historical Med      omeprazole (PRILOSEC) 20 MG capsule Take 20 mg by mouth nightly Historical Med             ALLERGIES     is allergic to metronidazole, cetyl alcohol, adhesive tape, cymbalta [duloxetine hcl], and neosporin [neomycin-polymyxin-gramicidin]. FAMILY HISTORY     He indicated that his mother is alive. He indicated that his father is . SOCIAL HISTORY      reports that he quit smoking about 15 years ago. His smoking use included cigarettes. He started smoking about 38 years ago. He has a 34.50 pack-year smoking history. He quit smokeless tobacco use about 2 years ago. His smokeless tobacco use included chew. He reports previous alcohol use. He reports that he does not use drugs. PHYSICAL EXAM     INITIAL VITALS: /80   Pulse 84   Temp 97.9 °F (36.6 °C)   Resp 14   SpO2 99%   Gen: nad  Head: Normocephalic, atraumatic  Eye: Pupils equal round reactive to light, no conjunctivitis  ENT: MMM  Neck: no adenopathy  Heart: Regular rate and rhythm no murmurs  Lungs: Clear to auscultation bilaterally, no respiratory distress  Abdomen: Soft, nontender, nondistended, with no peritoneal signs  MSK: No cva ttp. Neurologic: Patient is alert and oriented x3, motor and sensation is intact in all 4 extremities, fluent speech  Extremities: no edema    MEDICAL DECISION MAKING:     MDM  61 y.o. male presenting with abdominal pain, distension,and chronic ileus. Discussed with Dr. Tara Angela on call for Dr. Rock Cueto. She'd like the patient to have a repeat CT scan and then consult to GI. Emergency Department course:    ED Course as of 21 1048      1613 Dr. Abi Gonzalez saw pt. Does not think he needs to be admitted to hospital.  He thinks that the patient might need some motility studies that he states would need to be done at [facility].    I have a page out to Neurosurgery at [facility] to d/w the patient's surgeon. I am also paging Dr. Iza Natarajan, GI who the patient had seen in the office. [KW]   [id number] Spoke with Dr. Ya Patel on call for Dr. Chetan Leal at [facility]; hospital closed to transfer. Will coordinate FU with  GI.    [KW]   4737 I spoke with the patient's gastroenterologist, They want the patient admitted at [facility] and plan to do a rectal tube or colonoscopy to help with his symptoms. I spoke with the patient, I offered to do a direct admission for him. He declines, and states that he prefers to follow up in Dr. Tony Hunter and Dr. Lam Shock office. D/w pt and his wife the results, treatment plan, warning precautions for prompt ED return and importance of close OP FU, they verbalizes understanding and agrees with the treatment plan. [KW]      ED Course User Index  [KW] Katya Pappas MD         DIAGNOSTIC RESULTS   RADIOLOGY:All plain film, CT, MRI, and formal ultrasound images (except ED bedside ultrasound) are read by the radiologist and the images and interpretations are directly viewed by the emergency physician. CT ABDOMEN PELVIS W IV CONTRAST Additional Contrast? None   Final Result   Distended gas and fluid-filled large bowel suggesting residual or recurrent   ileus. Cholelithiasis. Degenerative and post-surgical changes of the lumbar spine and of the      visualized lower thoracic spine. XR ABDOMEN (KUB) (SINGLE AP VIEW)   Final Result   Similar significant gaseous distension both small and large bowel not   significantly changed from prior may represent advanced ileus although   obstruction not excluded. Consider CT follow-up if indicated. LABS: All lab results were reviewed by myself, and all abnormals are listed below.   Labs Reviewed   CBC WITH AUTO DIFFERENTIAL - Abnormal; Notable for the following components:       Result Value    RDW 18.2 (*)     Seg Neutrophils 69 (*)     Lymphocytes 17 (*)     Eosinophils % 6 (*)     All other components within normal limits   COMPREHENSIVE METABOLIC PANEL - Abnormal; Notable for the following components:    Glucose 134 (*)     BUN 5 (*)     CREATININE <0.40 (*)     Potassium 3.6 (*)     All other components within normal limits   C-REACTIVE PROTEIN - Abnormal; Notable for the following components:    CRP 10.7 (*)     All other components within normal limits   LIPASE   MAGNESIUM   SEDIMENTATION RATE   CONRADO       EMERGENCY DEPARTMENT COURSE:   Vitals:    Vitals:    11/29/21 0908 11/29/21 1241 11/29/21 1706 11/29/21 1707   BP: 100/69 116/87 110/80    Pulse: 80 80 84    Resp: 16 16 14    Temp: 97.9 °F (36.6 °C)      SpO2: 98% 99%  99%       The patient was given the following medications while in the emergency department:  Orders Placed This Encounter   Medications    HYDROmorphone (DILAUDID) injection 1 mg    0.9 % sodium chloride bolus    iopamidol (ISOVUE-370) 76 % injection 75 mL    DISCONTD: sodium chloride flush 0.9 % injection 10 mL     -------------------------  CRITICAL CARE:   CONSULTS: IP CONSULT TO PRIMARY CARE PROVIDER  IP CONSULT TO GI  IP CONSULT TO NEUROSURGERY  PROCEDURES: Procedures     FINAL IMPRESSION      1. Ile[facility])          DISPOSITION/PLAN   DISPOSITION Decision To Discharge 11/29/2021 05:05:02 PM      PATIENT REFERRED TO:  Yashira Jones MD  [address]   [facility]  [phone]    In 1 day      [facility] ED  [address] [address]  [phone]    If symptoms worsen      DISCHARGE MEDICATIONS:  Discharge Medication List as of 11/29/2021  5:05 PM            Hoang Richardson MD  Attending Emergency Physician                      Hoang Richardson MD  11/30/21 [phone]

## 2021-11-29 NOTE — PLAN OF CARE
PRE-CONSULT ROUNDING NOTE    HPI    54-year-old male who presents with complaint of abdominal pain. Onset of symptoms approximately 5 months ago after undergoing extensive back surgery. Patient states following back surgery he developed ileus. He remained in the hospital for approximately 10 days following surgery. Once patient tolerated diet he was sent home. Following this patient has had recurrent abdominal pain described as aching pains. Pain primarily in the umbilical area. Typically lasts 10-20 minutes. May occur several times a day. No associated nausea, vomiting. Tolerating diet. Reports having bowel movements and passing flatus but not regularly. Patient has tried Probiotics and Gas-X with no relief. He does take 1-2 oxycodone daily for back pain. Reportedly lost 60 pounds since July. Reports colonoscopy 2 years ago and was negative. Patient has had multiple KUB and CT abd/pelvis that have consistently showed ileus. Patient recently saw Dr. 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 His PCP advised him to come to the ED for evaluation. Review of Systems   Constitutional: Negative for appetite change, fatigue and fever. HENT: Negative for mouth sores, sore throat, trouble swallowing and voice change. Eyes:        No ictirus   Respiratory: Negative for apnea, cough, choking, shortness of breath and wheezing. Cardiovascular: Negative for chest pain, palpitations and leg swelling. Gastrointestinal: Positive for abdominal distention and abdominal pain. Negative for anal bleeding, blood in stool, constipation, diarrhea, nausea, rectal pain and vomiting. Endocrine: Negative for polydipsia, polyphagia and polyuria. Genitourinary: Negative for frequency, hematuria and urgency. Musculoskeletal: Negative for arthralgias, back pain, gait problem and joint swelling. Skin: Negative for pallor and rash. Allergic/Immunologic: Negative for food allergies.    Neurological: Negative for dizziness, seizures, weakness and headaches. Hematological: Negative for adenopathy. Does not bruise/bleed easily. Psychiatric/Behavioral: Negative for sleep disturbance. The patient is not nervous/anxious. Physical Exam  Vitals and nursing note reviewed. Constitutional:       General: He is not in acute distress. Appearance: He is well-developed. HENT:      Head: Normocephalic and atraumatic. Mouth/Throat:      Pharynx: No oropharyngeal exudate. Eyes:      General: No scleral icterus. Conjunctiva/sclera: Conjunctivae normal.      Pupils: Pupils are equal, round, and reactive to light. Neck:      Thyroid: No thyromegaly. Trachea: No tracheal deviation. Cardiovascular:      Rate and Rhythm: Normal rate and regular rhythm. Heart sounds: Normal heart sounds. No murmur heard. Pulmonary:      Effort: Pulmonary effort is normal. No respiratory distress. Breath sounds: Normal breath sounds. No wheezing or rales. Abdominal:      General: Bowel sounds are normal. There is no distension. Palpations: Abdomen is soft. There is no hepatomegaly or mass. Tenderness: There is no abdominal tenderness. There is no guarding or rebound. Hernia: No hernia is present. Genitourinary:     Rectum: Normal.   Musculoskeletal:      Cervical back: Normal range of motion and neck supple. Lymphadenopathy:      Cervical: No cervical adenopathy. Skin:     General: Skin is warm and dry. Findings: No erythema or rash. Neurological:      Mental Status: He is alert and oriented to person, place, and time. Cranial Nerves: No cranial nerve deficit. Psychiatric:         Thought Content:  Thought content normal.         ASSESSMENT/PLAN    Abdominal pain  Ileus on CT  Major back surgery June 2021    -Will discuss CT with radiologist  -May have general diet  -No acute intervention at this time  -Patient is stable

## 2021-11-29 NOTE — ED TRIAGE NOTES
Mode of arrival (squad #, walk in, police, etc) : walk in        Chief complaint(s): ABD pain        Arrival Note (brief scenario, treatment PTA, etc). : Pt states he was diagnosed with an ileus a few months ago and was to be admitted at that time. Pt states he has been unable to be directly admitted. C= \"Have you ever felt that you should Cut down on your drinking? \"  No  A= \"Have people Annoyed you by criticizing your drinking? \"  No  G= \"Have you ever felt bad or Guilty about your drinking? \"  No  E= \"Have you ever had a drink as an Eye-opener first thing in the morning to steady your nerves or to help a hangover? \"  No      Deferred []      Reason for deferring: N/A    *If yes to two or more: probable alcohol abuse. *

## 2021-11-29 NOTE — PROGRESS NOTES
Medication History completed:    New medications: lactobacillus, oxycodone    Medications discontinued: docusate sodium, glucosamine-chondroitin, meclizine, methocarbamol    Changes to dosing:  Acetaminophen changed to 650 mg every 6 hours as needed for pain  Hydrocortisone cream changed to 2 times daily as needed    Stated allergies: As listed    Other pertinent information: Confirmed medication list with Megha Prather. Oxycodone filled for 14 day supply on 10/27/21 according to [facility].      Candida Reis, PharmD Candidate [id number]

## 2021-11-29 NOTE — CONSULTS
Consult Note            Date:11/29/2021        Patient Name:Ramakrishna Armando     YOB: 1965     Age:56 y.o. Consults abnormal imaging of the abdomen    Chief Complaint     Chief Complaint   Patient presents with    Abdominal Pain          History Obtained From   patient, electronic medical record, nursing staff, emergency room physician    History of Present Illness   Patient seen at [facility] emergency department around 2:30 PM.    Patient indicated to me that he had some imaging studies done as an outpatient, and subsequently he was directed to come to the hospital.  Looks like patient was supposed to be admitted to the hospital as a direct admission, as there were no beds, he was directed to come to the emergency department. On further discussion patient indicated that he has minimal abdominal bloating. No significant abdominal pain, nausea vomiting. He was tolerating diet. However, intermittently he was feeling some bloating and minimal discomfort in the right flank. Patient has these symptoms in the last 3 to 4 months. Patient was doing well until July 2021. At that time he had back surgery at [facility]. Looks like the surgical procedure plastered for about 10 hours. Following that surgery he was in the hospital for almost 10 days. He stated  that his bowels did not work and went to [facility] following the surgery. However, at the time of discharge he was tolerating diet and had bowel movements. In the last 3 to 4 months he was seen by a gastroenterologist and had several imaging studies done which revealed dilated colon. He did not have this dilated colon prior to the surgery. At present he denies nausea vomiting. He is tolerating diet. Apparently he had imaging studies done as an outpatient which revealed a dilated colon and he was directed to come to the hospital.  Again he had a CT scan done today which revealed dilated colon.   I did review the CT scan with the radiologist and entire colon is moderately dilated. Labs reviewed appears to be nonspecific. No signs of sepsis basing on the labs. He has good appetite. He also has involuntary weight loss up to about 70 to 80 pounds in the last few years. Bowel movements satisfactory with intermittent loose bowels. No melena hematochezia. He has history of colonoscopy couple of years ago and according to the patient that was normal.    Past Medical History     Past Medical History:   Diagnosis Date    Angular cheilitis     Arthritis     BACK AND FINGERS     Bilateral lower extremity edema 03/2021    started on Lasix per PCP    Chronic back pain     dr Gissell Salcedo [facility], scheduled for OR 7/12/2021 at [facility]    Depression     Dizziness     Eczema     Ex-smoker     quit in 2006, smoked for 23 years    GERD (gastroesophageal reflux disease)     H/O chest pain     H/O dermatitis     [place] (hard of hearing)     LEFT EAR WORSE THAN RIGHT. getting hearing aides eloisa, Dr. Moni Murdock Hypertension     DR Burnette Figures PCP    Kyphosis     [facility] OR 7/12/2021 with Dr. Campbell [place] Left eye injury     [address]    Muscle spasm     LOWER RIGHT BACK    Nocturia     Obesity     Prolonged emergence from general anesthesia     PATIENT STATES HIS B/P WOULD DROP WITH INTUBATION, GO UP AFTER ET TUBE REMOVED.  PVC's (premature ventricular contractions)     PVC'S.  NO CARDIOLOGIST, PCP DR Oneil Anna     occas to see rheumatoid arthritis  workup for lupus    Sjogren's syndrome ([facility] [address]     Snores     mild, denies apnea, does \"grind\" his teeth    SOB (shortness of breath)     used to see Dr. Meng Bowels, borderline COPD/ never followed up / never filled scripts for inhalers    Use of cane as ambulatory aid     Vertigo     Dr. Moni Murdock Wears glasses     Wears partial dentures     LOWER        Past Surgical History     Past Surgical History: Procedure Laterality Date    BACK SURGERY  2006    cage    COLONOSCOPY      CYST REMOVAL Right     index finger    HEMORRHOID SURGERY  2017    HERNIA REPAIR      UMBILICAL    NOSE SURGERY      REALIGNED NOSE    OTHER SURGICAL HISTORY  02/21/2020    MRI cervical and thoracic spine without contrast . CT of lung , all under general anesthesia    CT COLSC FLX W/REMOVAL LESION BY HOT BX FORCEPS N/A 07/12/2017    COLONOSCOPY POLYPECTOMY HOT BIOPSY performed by Kelli Suazo MD at [address]  07/12/2021    [id number]    TOE SURGERY Right     2nd toe    TONSILLECTOMY      AS A CHILD    VASECTOMY          Medications     Prior to Admission medications    Medication Sig Start Date End Date Taking? Authorizing Provider   acetaminophen (TYLENOL) 325 MG tablet Take 650 mg by mouth every 6 hours as needed for Pain   Yes Historical Provider, MD   vitamin D (CHOLECALCIFEROL) 25 MCG (1000 UT) TABS tablet Take 1,000 Units by mouth nightly   Yes Historical Provider, MD   Lactobacillus (PROBIOTIC ACIDOPHILUS PO) Take 1 capsule by mouth daily   Yes Historical Provider, MD   oxyCODONE (ROXICODONE) 5 MG immediate release tablet Take 5 mg by mouth every 4 hours as needed for Pain. Yes Historical Provider, MD   hydrocortisone (WESTCORT) 0.2 % cream Apply topically 2 times daily as needed   Yes Historical Provider, MD   Simethicone (GAS-X PO) Take 1 capsule by mouth 2 times daily as needed    Yes Historical Provider, MD   fluocinonide (LIDEX) 0.05 % external solution Apply topically 2 times daily. 10/27/21  Yes Dannie Mccloud MD   loratadine (CLARITIN) 10 MG tablet Take 10 mg by mouth 2 times daily   Yes Historical Provider, MD   gabapentin (NEURONTIN) 400 MG capsule Take 1 capsule by mouth 3 times daily for 30 days.  8/25/21 11/29/21 Yes Dannie Mccloud MD   metoprolol tartrate (LOPRESSOR) 50 MG tablet TAKE 1 TABLET BY MOUTH TWO TIMES A DAY 7/27/21  Yes Dannie Mccloud MD   fluticasone [facility]) 50 MCG/ACT nasal spray USE 2 SPRAYS IN EACH NOSTRIL ONCE DAILY.  NEED TO USE REGULARLY FOR BENEFIT 4/30/21  Yes Historical Provider, MD   furosemide (LASIX) 40 MG tablet Take 1 tablet by mouth daily 4/20/21  Yes America Zayas MD   potassium chloride (KLOR-CON M) 20 MEQ extended release tablet Take 1 tablet by mouth daily 4/20/21  Yes America Zayas MD   hydroxychloroquine (PLAQUENIL) 200 MG tablet Take 1 tablet by mouth 2 times daily 12/23/20  Yes America Zayas MD   Multiple Vitamins-Minerals (THERAPEUTIC MULTIVITAMIN-MINERALS) tablet Take 1 tablet by mouth daily   Yes Historical Provider, MD   Sod Fluoride-Potassium Nitrate (PREVIDENT 5000 SENSITIVE) 1.1-5 % PSTE Place onto teeth   Yes Historical Provider, MD   Magnesium Cl-Calcium Carbonate (SLOW-MAG PO) Take 1 tablet by mouth 2 times daily   Yes Historical Provider, MD   aspirin 81 MG tablet Take 81 mg by mouth daily    Yes Historical Provider, MD   omeprazole (PRILOSEC) 20 MG capsule Take 20 mg by mouth nightly    Yes Historical Provider, MD        sodium chloride flush 0.9 % injection 10 mL, PRN        Allergies   Metronidazole, Cetyl alcohol, Adhesive tape, Cymbalta [duloxetine hcl], and Neosporin [neomycin-polymyxin-gramicidin]    Social History     Social History     Tobacco History     Smoking Status  Former Smoker Smoking Start Date  1/1/1983 Quit date  5/28/2006 Smoking Frequency  1.5 packs/day for 23 years (34.5 pk yrs)    Smoking Tobacco Type  Cigarettes    Smokeless Tobacco Use  Former User Quit date  9/10/2019 Smokeless Tobacco Type  Chew    Tobacco Comment  quit 14 years ago          Alcohol History     Alcohol Use Status  Not Currently Drinks/Week  0 Glasses of wine, 0 Cans of beer, 0 Shots of liquor, 0 Standard drinks or equivalent per week Amount  0.0 standard drinks of alcohol/wk Comment  2017 recovery          Drug Use     Drug Use Status  No          Sexual Activity     Sexually Active  Not Asked                Family History Family History   Problem Relation Age of Onset    Coronary Art Dis Father         premature-- at 52    Diabetes Father     High Blood Pressure Father     Other Father         COPD, EMPHYSEMA    Coronary Art Dis Paternal Uncle         premature    Heart Failure Paternal Uncle     Diabetes Mother        Review of Systems   Review of Systems   Constitutional: Positive for fatigue. Negative for appetite change and fever. HENT: Negative for mouth sores, sore throat, trouble swallowing and voice change. Eyes:        No ictirus   Respiratory: Negative for apnea, cough, choking, shortness of breath and wheezing. Cardiovascular: Negative for chest pain, palpitations and leg swelling. Gastrointestinal: Positive for diarrhea. Minimal abdominal bloating and intermittent discomfort   Endocrine: Negative for polydipsia, polyphagia and polyuria. Genitourinary: Negative for frequency, hematuria and urgency. Musculoskeletal: Negative for arthralgias, back pain, gait problem and joint swelling. Skin: Negative for pallor and rash. Allergic/Immunologic: Negative for food allergies. Neurological: Negative for dizziness, seizures, weakness and headaches. Hematological: Negative for adenopathy. Does not bruise/bleed easily. Psychiatric/Behavioral: Positive for behavioral problems. Negative for sleep disturbance. The patient is nervous/anxious. Physical Exam   /87   Pulse 80   Temp 97.9 °F (36.6 °C)   Resp 16   SpO2 99%      Physical Exam  Vitals and nursing note reviewed. Constitutional:       General: He is not in acute distress. Appearance: He is well-developed. HENT:      Head: Normocephalic and atraumatic. Mouth/Throat:      Pharynx: No oropharyngeal exudate. Eyes:      General: No scleral icterus. Conjunctiva/sclera: Conjunctivae normal.      Pupils: Pupils are equal, round, and reactive to light. Neck:      Thyroid: No thyromegaly.       Trachea: No tracheal deviation. Cardiovascular:      Rate and Rhythm: Normal rate and regular rhythm. Heart sounds: Normal heart sounds. No murmur heard. Pulmonary:      Effort: Pulmonary effort is normal. No respiratory distress. Breath sounds: Normal breath sounds. No wheezing or rales. Abdominal:      General: Bowel sounds are normal. There is no distension. Palpations: Abdomen is soft. There is no hepatomegaly or mass. Tenderness: There is no abdominal tenderness. There is no guarding. Hernia: No hernia is present. Comments: No peripheral signs of ch. Liver disease   Genitourinary:     Rectum: Normal.   Musculoskeletal:      Cervical back: Normal range of motion and neck supple. Lymphadenopathy:      Cervical: No cervical adenopathy. Skin:     General: Skin is warm and dry. Findings: No erythema or rash. Neurological:      Mental Status: He is alert and oriented to person, place, and time. Cranial Nerves: No cranial nerve deficit. Psychiatric:         Thought Content: Thought content normal.         Labs    CBC:  Recent Labs     11/29/21  1046   WBC 7.7   RBC 5.46   HGB 14.5   HCT 44.6   MCV 81.6   RDW 18.2*        CHEMISTRIES:  Recent Labs     11/29/21  1046      K 3.6*      CO2 30   BUN 5*   CREATININE <0.40*   GLUCOSE 134*   MG 1.8     PT/INR:No results for input(s): PROTIME, INR in the last 72 hours. APTT:No results for input(s): APTT in the last 72 hours. LIVER PROFILE:  Recent Labs     11/29/21  1046   AST 19   ALT 7   BILITOT 0.33   ALKPHOS 112       Imaging/Diagnostics   XR ABDOMEN (KUB) (SINGLE AP VIEW)    Result Date: 11/29/2021  Similar significant gaseous distension both small and large bowel not significantly changed from prior may represent advanced ileus although obstruction not excluded. Consider CT follow-up if indicated.      CT ABDOMEN PELVIS W IV CONTRAST Additional Contrast? None    Result Date: 11/29/2021  Distended gas and fluid-filled large bowel suggesting residual or recurrent ileus. Cholelithiasis. Degenerative and post-surgical changes of the lumbar spine and of the visualized lower thoracic spine. Assessment    Chronic colonic dilation  Status post back surgery      Plan   1. At present patient is stable. Colonic dilation appears to be chronic. He developed colonic inertia may be he has some neurological issue/damage. At present symptomatic management may be initiated. Diet as tolerated. May try promotility agent such as Reglan for couple of days. May be followed as an outpatient. If he is admitted to the hospital we will reevaluate him tomorrow.     Electronically signed by Viri Rangel MD on 11/29/21 at 3:32 PM EST

## 2021-11-30 ASSESSMENT — ENCOUNTER SYMPTOMS
BACK PAIN: 1
DIARRHEA: 1
NAUSEA: 1
ABDOMINAL PAIN: 1
COUGH: 0
ABDOMINAL DISTENTION: 1
VOMITING: 0

## 2021-12-01 ENCOUNTER — OFFICE VISIT (OUTPATIENT)
Dept: PRIMARY CARE CLINIC | Age: 56
End: 2021-12-01
Payer: MEDICARE

## 2021-12-01 VITALS
HEART RATE: 71 BPM | WEIGHT: 207.8 LBS | BODY MASS INDEX: 30.69 KG/M2 | DIASTOLIC BLOOD PRESSURE: 78 MMHG | SYSTOLIC BLOOD PRESSURE: 110 MMHG | OXYGEN SATURATION: 99 %

## 2021-12-01 DIAGNOSIS — K56.600 PARTIAL INTESTINAL OBSTRUCTION, UNSPECIFIED CAUSE (HCC): ICD-10-CM

## 2021-12-01 DIAGNOSIS — K80.20 CALCULUS OF GALLBLADDER WITHOUT CHOLECYSTITIS WITHOUT OBSTRUCTION: Primary | ICD-10-CM

## 2021-12-01 LAB
ANTI DNA DOUBLE STRANDED: 4.5 IU/ML
ANTI-NUCLEAR ANTIBODY (ANA): POSITIVE
ENA ANTIBODIES SCREEN: 22 U/ML

## 2021-12-01 PROCEDURE — 99213 OFFICE O/P EST LOW 20 MIN: CPT | Performed by: FAMILY MEDICINE

## 2021-12-01 RX ORDER — OXYCODONE HYDROCHLORIDE 5 MG/1
5 TABLET ORAL EVERY 4 HOURS PRN
Qty: 42 TABLET | Refills: 0 | Status: SHIPPED | OUTPATIENT
Start: 2021-12-01 | End: 2021-12-08

## 2021-12-01 RX ORDER — DICYCLOMINE HCL 20 MG
20 TABLET ORAL 4 TIMES DAILY
Qty: 120 TABLET | Refills: 2 | Status: SHIPPED | OUTPATIENT
Start: 2021-12-01 | End: 2022-05-12

## 2021-12-01 ASSESSMENT — PATIENT HEALTH QUESTIONNAIRE - PHQ9
1. LITTLE INTEREST OR PLEASURE IN DOING THINGS: 0
SUM OF ALL RESPONSES TO PHQ QUESTIONS 1-9: 0
SUM OF ALL RESPONSES TO PHQ QUESTIONS 1-9: 0
2. FEELING DOWN, DEPRESSED OR HOPELESS: 0
SUM OF ALL RESPONSES TO PHQ9 QUESTIONS 1 & 2: 0
SUM OF ALL RESPONSES TO PHQ QUESTIONS 1-9: 0

## 2021-12-01 ASSESSMENT — ENCOUNTER SYMPTOMS
SHORTNESS OF BREATH: 0
DIARRHEA: 0
RHINORRHEA: 0
EYE DISCHARGE: 0
NAUSEA: 0
EYE REDNESS: 0
COUGH: 0
SORE THROAT: 0
ABDOMINAL PAIN: 1
WHEEZING: 0
VOMITING: 0

## 2021-12-01 NOTE — PROGRESS NOTES
[address] [place] [address] PRIMARY CARE  [address] [facility] [address]  Dept: [phone]    Annie Mauro is a 61 y.o. male Established patient, who presents today for his medical conditions/complaints as noted below. Chief Complaint   Patient presents with    GI Problem     Pt having GI issues for about 4.5 mos. HPI:     HPI  Patient continues to have diarrhea averaging 3 bowel movements per day. Denies any melena or hematochezia. Weight is now noted to be down 74 pounds. GI note from Dr. Francisco Rivera as well as Dr. Gayathri Menchaca were  Reviewed. Note from ER physician reviewed as well. Recommendation was for patient to follow-up at [facility] gastroenterology for consideration for motility study. Patient's CAT scan did show gallstones. No signs of obstruction. Patient continues to complain mostly of the severe discomfort. States using his pain medication mostly at night. Averaging 3 diarrhea stools per day. Patient states has been having the bowel issues ever since his back surgery at [facility].     Reviewed prior notes None  Reviewed previous Labs and Imaging    LDL Cholesterol (mg/dL)   Date Value   02/10/2018 94   07/23/2017 98       (goal LDL is <100)   AST (U/L)   Date Value   11/29/2021 19     ALT (U/L)   Date Value   11/29/2021 7     BUN (mg/dL)   Date Value   11/29/2021 5 (L)     Hemoglobin A1C (%)   Date Value   02/10/2018 5.5     TSH (mIU/L)   Date Value   11/04/2021 1.07     BP Readings from Last 3 Encounters:   12/01/21 110/78   11/29/21 110/80   10/27/21 136/74          (goal 120/80)    Past Medical History:   Diagnosis Date    Angular cheilitis     Arthritis     BACK AND FINGERS     Bilateral lower extremity edema 03/2021    started on Lasix per PCP    Chronic back pain     dr Ann-Marie Saavedra [facility], [facility]    Depression     Dizziness     Eczema     Ex-smoker     quit in 2006, smoked for 23 years    GERD (gastroesophageal reflux disease)     H/O chest pain     H/O dermatitis     [place] (hard of hearing)     LEFT EAR WORSE THAN RIGHT. getting hearing aides eloisa, Dr. Angela Man Hypertension     DR Alicia Veras PCP    Kyphosis     [facility] OR 2021 with Dr. Claritza Mclean Left eye injury     [address]    Muscle spasm     LOWER RIGHT BACK    Nocturia     Obesity     Prolonged emergence from general anesthesia     PATIENT STATES HIS B/P WOULD DROP WITH INTUBATION, GO UP AFTER ET TUBE REMOVED.  PVC's (premature ventricular contractions)     PVC'S.  NO CARDIOLOGIST, PCP DR Arue Anna     occas to see rheumatoid arthritis  workup for lupus    Sjogren's syndrome ([facility] [address]     Snores     mild, denies apnea, does \"grind\" his teeth    SOB (shortness of breath)     used to see Dr. Manuel Bruno, borderline COPD/ never followed up / never filled scripts for inhalers    Use of cane as ambulatory aid     Vertigo     Dr. Angela Man Wears glasses     Wears partial dentures     LOWER      Past Surgical History:   Procedure Laterality Date    BACK SURGERY  2006    cage    COLONOSCOPY      CYST REMOVAL Right     index finger    HEMORRHOID SURGERY  2017    HERNIA REPAIR      UMBILICAL    NOSE SURGERY      REALIGNED NOSE    OTHER SURGICAL HISTORY  2020    MRI cervical and thoracic spine without contrast . CT of lung , all under general anesthesia    [place] COLSC FLX W/REMOVAL LESION BY HOT BX FORCEPS N/A 2017    COLONOSCOPY POLYPECTOMY HOT BIOPSY performed by Rebekah Kiser MD at [address]  2021    t7-L2    TOE SURGERY Right     2nd toe    TONSILLECTOMY      AS A CHILD    VASECTOMY         Family History   Problem Relation Age of Onset    Coronary Art Dis Father         premature-- at 52    Diabetes Father     High Blood Pressure Father     Other Father         COPD, EMPHYSEMA    Coronary Art Dis Paternal Uncle premature    Heart Failure Paternal Uncle     Diabetes Mother        Social History     Tobacco Use    Smoking status: Former Smoker     Packs/day: 1.50     Years: 23.00     Pack years: 34.50     Types: Cigarettes     Start date: 1/1/1983     Quit date: 2006     Years since quitting: 15.5    Smokeless tobacco: Former User     Types: [address] date: 9/10/2019    Tobacco comment: quit 14 years ago   Substance Use Topics    Alcohol use: Not Currently     Alcohol/week: 0.0 standard drinks     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      Current Outpatient Medications   Medication Sig Dispense Refill    dicyclomine (BENTYL) 20 MG tablet Take 1 tablet by mouth 4 times daily 120 tablet 2    oxyCODONE (ROXICODONE) 5 MG immediate release tablet Take 1 tablet by mouth every 4 hours as needed for Pain for up to 7 days. 42 tablet 0    acetaminophen (TYLENOL) 325 MG tablet Take 650 mg by mouth every 6 hours as needed for Pain      vitamin D (CHOLECALCIFEROL) 25 MCG (1000 UT) TABS tablet Take 1,000 Units by mouth nightly      Lactobacillus (PROBIOTIC ACIDOPHILUS PO) Take 1 capsule by mouth daily      hydrocortisone (WESTCORT) 0.2 % cream Apply topically 2 times daily as needed      Simethicone (GAS-X PO) Take 1 capsule by mouth 2 times daily as needed       fluocinonide (LIDEX) 0.05 % external solution Apply topically 2 times daily. 60 mL 0    loratadine (CLARITIN) 10 MG tablet Take 10 mg by mouth 2 times daily      metoprolol tartrate (LOPRESSOR) 50 MG tablet TAKE 1 TABLET BY MOUTH TWO TIMES A  tablet 3    fluticasone (FLONASE) 50 MCG/ACT nasal spray USE 2 SPRAYS IN EACH NOSTRIL ONCE DAILY.  NEED TO USE REGULARLY FOR BENEFIT      furosemide (LASIX) 40 MG tablet Take 1 tablet by mouth daily 90 tablet 3    potassium chloride (KLOR-CON M) 20 MEQ extended release tablet Take 1 tablet by mouth daily 90 tablet 3    hydroxychloroquine (PLAQUENIL) 200 MG tablet Take 1 tablet by mouth 2 times daily 180 tablet 3    Gastroenterology    oxyCODONE (ROXICODONE) 5 MG immediate release tablet   2. Partial intestinal obstruction, unspecified cause (HCC)  oxyCODONE (ROXICODONE) 5 MG immediate release tablet        Plan:    HIDA scan ordered to evaluate gallbladder function with known stones  Trial of Bentyl 20 mg 4 times daily for abdominal pain  Referral to [facility] gastroenterology per neurosurgery's request.  May require motility study. Rule out nerve damage from surgery as cause of chronic diarrhea and significant weight loss    Return in about 4 weeks (around 12/29/2021). Orders Placed This Encounter   Procedures    NM HEPATOBILIARY SCAN W EJECTION FRACTION     Standing Status:   Future     Standing Expiration Date:   12/1/2022     Order Specific Question:   Reason for exam:     Answer:   r/o biliary dyskinesia    External Referral To Gastroenterology     Referral Priority:   Routine     Referral Type:   Consult for Advice and Opinion     Referral Reason:   Specialty Services Required     Requested Specialty:   Gastroenterology     Number of Visits Requested:   1     Orders Placed This Encounter   Medications    dicyclomine (BENTYL) 20 MG tablet     Sig: Take 1 tablet by mouth 4 times daily     Dispense:  120 tablet     Refill:  2    oxyCODONE (ROXICODONE) 5 MG immediate release tablet     Sig: Take 1 tablet by mouth every 4 hours as needed for Pain for up to 7 days. Dispense:  42 tablet     Refill:  0     Reduce doses taken as pain becomes manageable       Patient given educational materials - see patient instructions. Discussed use, benefit, and side effects of prescribed medications. All patient questions answered. Pt voiced understanding. Reviewed health maintenance. Instructed to continue current medications, diet andexercise. Patient agreed with treatment plan. Follow up as directed.      Electronicallysigned by Lexie Vargas MD on 12/1/2021 at 2:01 PM

## 2021-12-06 ENCOUNTER — TELEPHONE (OUTPATIENT)
Dept: PRIMARY CARE CLINIC | Age: 56
End: 2021-12-06

## 2021-12-06 DIAGNOSIS — J06.9 UPPER RESPIRATORY TRACT INFECTION, UNSPECIFIED TYPE: Primary | ICD-10-CM

## 2021-12-06 RX ORDER — GUAIFENESIN AND CODEINE PHOSPHATE 100; 10 MG/5ML; MG/5ML
10 SOLUTION ORAL EVERY 4 HOURS PRN
Qty: 236 ML | Refills: 0 | Status: SHIPPED | OUTPATIENT
Start: 2021-12-06 | End: 2021-12-20

## 2021-12-06 NOTE — TELEPHONE ENCOUNTER
Pt started with a cough on Sat due to his spouse having a bd cough. Not getting any sleep due to coughing so much. Dry cough. Asking, if you would send in something to [place] on [address] listed? No other symptoms.

## 2021-12-24 DIAGNOSIS — M19.90 INFLAMMATORY ARTHRITIS: ICD-10-CM

## 2021-12-27 ENCOUNTER — TELEPHONE (OUTPATIENT)
Dept: PRIMARY CARE CLINIC | Age: 56
End: 2021-12-27

## 2021-12-27 DIAGNOSIS — R10.11 RUQ PAIN: Primary | ICD-10-CM

## 2021-12-27 NOTE — TELEPHONE ENCOUNTER
Pt calling. Asking, if you would put in a new order for the NM Hepatobiliary Scan and note that pt needs Anesthesia? Pt will get it done at [facility].

## 2021-12-30 RX ORDER — HYDROXYCHLOROQUINE SULFATE 200 MG/1
TABLET, FILM COATED ORAL
Qty: 180 TABLET | Refills: 0 | Status: SHIPPED | OUTPATIENT
Start: 2021-12-30 | End: 2022-03-31

## 2022-01-10 ENCOUNTER — HOSPITAL ENCOUNTER (OUTPATIENT)
Dept: NUCLEAR MEDICINE | Age: 57
Discharge: HOME | End: 2022-01-12
Payer: MEDICARE

## 2022-01-10 DIAGNOSIS — R10.11 RUQ PAIN: ICD-10-CM

## 2022-01-10 PROCEDURE — 6360000002 HC RX W HCPCS: Performed by: FAMILY MEDICINE

## 2022-01-10 PROCEDURE — 78226 HEPATOBILIARY SYSTEM IMAGING: CPT

## 2022-01-10 PROCEDURE — A9537 TC99M MEBROFENIN: HCPCS | Performed by: FAMILY MEDICINE

## 2022-01-10 PROCEDURE — 3430000000 HC RX DIAGNOSTIC RADIOPHARMACEUTICAL: Performed by: FAMILY MEDICINE

## 2022-01-10 RX ADMIN — SINCALIDE 1.75 MCG: 5 INJECTION, POWDER, LYOPHILIZED, FOR SOLUTION INTRAVENOUS at 09:30

## 2022-01-10 RX ADMIN — Medication 3 MILLICURIE: at 08:15

## 2022-01-12 ENCOUNTER — TELEPHONE (OUTPATIENT)
Dept: PRIMARY CARE CLINIC | Age: 57
End: 2022-01-12

## 2022-01-12 NOTE — TELEPHONE ENCOUNTER
----- Message from Campbell Krystyna sent at 1/11/2022  1:40 PM EST -----  Subject: Appointment Request    Reason for Call: Urgent Abdominal Pain    QUESTIONS  Type of Appointment? Established Patient  Reason for appointment request? No appointments available during search  Additional Information for Provider? Patient needs to be seen asap about   gallbladder problems. Has been seen by GI. Appt not available. Please call   to advise and schedule. Patient screened green. ---------------------------------------------------------------------------  --------------  Mark JOSHUA  What is the best way for the office to contact you? OK to leave message on   voicemail  Preferred Call Back Phone Number? [phone]  ---------------------------------------------------------------------------  --------------  SCRIPT ANSWERS  Relationship to Patient? Other  Representative Name? wife  Additional information verified (besides Name and Date of Birth)? Address  Do you have pain that has started or worsened within the past 24 hours? No  Are you vomiting blood or have bloody or black stool? No  Have you recently (14 days) seen a provider for this pain? No  Have you been diagnosed with, awaiting test results for, or told that you   are suspected of having COVID-19 (Coronavirus)? (If patient has tested   negative or was tested as a requirement for work, school, or travel and   not based on symptoms, answer no)? No  Within the past two weeks have you developed any of the following symptoms   (answer no if symptoms have been present longer than 2 weeks or began   more than 2 weeks ago)? Fever or Chills, Cough, Shortness of breath or   difficulty breathing, Loss of taste or smell, Sore throat, Nasal   congestion, Sneezing or runny nose, Fatigue or generalized body aches   (answer no if pain is specific to a body part e.g. back pain), Diarrhea,   Headache? No  Have you had close contact with someone with COVID-19 in the last 14 days? No  (Service Expert  click yes below to proceed with [employer]   Scheduling)?  Yes

## 2022-01-12 NOTE — TELEPHONE ENCOUNTER
Discussed with Rakesh Calix and Dr. Inez Antunez - patient scheduled for next Wednesday with Dr. nIez Antunez.

## 2022-01-19 ENCOUNTER — OFFICE VISIT (OUTPATIENT)
Dept: PRIMARY CARE CLINIC | Age: 57
End: 2022-01-19
Payer: MEDICARE

## 2022-01-19 VITALS
HEART RATE: 66 BPM | OXYGEN SATURATION: 100 % | BODY MASS INDEX: 29.38 KG/M2 | DIASTOLIC BLOOD PRESSURE: 68 MMHG | SYSTOLIC BLOOD PRESSURE: 112 MMHG | HEIGHT: 69 IN | WEIGHT: 198.4 LBS

## 2022-01-19 DIAGNOSIS — F43.21 ADJUSTMENT DISORDER WITH DEPRESSED MOOD: ICD-10-CM

## 2022-01-19 DIAGNOSIS — R14.0 GASEOUS ABDOMINAL DISTENTION: Primary | ICD-10-CM

## 2022-01-19 DIAGNOSIS — M45.4 ANKYLOSING SPONDYLITIS OF THORACIC REGION (HCC): ICD-10-CM

## 2022-01-19 DIAGNOSIS — J44.9 CHRONIC OBSTRUCTIVE PULMONARY DISEASE, UNSPECIFIED COPD TYPE (HCC): ICD-10-CM

## 2022-01-19 PROCEDURE — 99214 OFFICE O/P EST MOD 30 MIN: CPT | Performed by: FAMILY MEDICINE

## 2022-01-19 RX ORDER — FLUTICASONE PROPIONATE 0.05 %
CREAM (GRAM) TOPICAL
COMMUNITY
Start: 2021-12-20

## 2022-01-19 RX ORDER — CITALOPRAM 20 MG/1
20 TABLET ORAL DAILY
Qty: 30 TABLET | Refills: 5 | Status: SHIPPED | OUTPATIENT
Start: 2022-01-19 | End: 2022-07-20

## 2022-01-19 SDOH — ECONOMIC STABILITY: FOOD INSECURITY: WITHIN THE PAST 12 MONTHS, THE FOOD YOU BOUGHT JUST DIDN'T LAST AND YOU DIDN'T HAVE MONEY TO GET MORE.: NEVER TRUE

## 2022-01-19 SDOH — ECONOMIC STABILITY: FOOD INSECURITY: WITHIN THE PAST 12 MONTHS, YOU WORRIED THAT YOUR FOOD WOULD RUN OUT BEFORE YOU GOT MONEY TO BUY MORE.: NEVER TRUE

## 2022-01-19 ASSESSMENT — ENCOUNTER SYMPTOMS
EYE REDNESS: 0
WHEEZING: 0
RHINORRHEA: 0
EYE DISCHARGE: 0
ABDOMINAL PAIN: 1
DIARRHEA: 0
VOMITING: 0
SORE THROAT: 0
NAUSEA: 0
COUGH: 0
SHORTNESS OF BREATH: 0
ABDOMINAL DISTENTION: 1

## 2022-01-19 ASSESSMENT — SOCIAL DETERMINANTS OF HEALTH (SDOH): HOW HARD IS IT FOR YOU TO PAY FOR THE VERY BASICS LIKE FOOD, HOUSING, MEDICAL CARE, AND HEATING?: NOT HARD AT ALL

## 2022-01-19 NOTE — PROGRESS NOTES
[address] [facility] PRIMARY CARE  [address]  [facility] [address]  Dept: [phone]    Bev Paniagua is a 61 y.o. male Established patient, who presents today for his medical conditions/complaints as noted below. Chief Complaint   Patient presents with    GI Problem     Pt here today because he states his stomach issues are not getting any better       HPI:     HPI  Patient continues to have abdominal distention. States is having bowel movements. Is not been vomiting. Patient states appetite is fair. His overall weight is noted to be down 100 pounds. This all started after having back surgery. Patient developed an ileus after the back surgery at [facility] but has never been right since. According to records, [facility] states this is not related to his back surgery and is something totally separate. Patient feeling down. Patient feeling irritable. No thoughts of hurting self or others. Still having some back pain. It is improved with an occasional Percocet.     Reviewed prior notes neurosurgery, GI   Reviewed previous Labs and Imaging    LDL Cholesterol (mg/dL)   Date Value   02/10/2018 94   07/23/2017 98       (goal LDL is <100)   AST (U/L)   Date Value   11/29/2021 19     ALT (U/L)   Date Value   11/29/2021 7     BUN (mg/dL)   Date Value   11/29/2021 5 (L)     Hemoglobin A1C (%)   Date Value   02/10/2018 5.5     TSH (mIU/L)   Date Value   11/04/2021 1.07     BP Readings from Last 3 Encounters:   01/19/22 112/68   12/01/21 110/78   11/29/21 110/80          (goal 120/80)    Past Medical History:   Diagnosis Date    Angular cheilitis     Arthritis     BACK AND FINGERS     Bilateral lower extremity edema 03/2021    started on Lasix per PCP    Chronic back pain     dr Ezzie Cogan of [facility], [facility] 7[facility]12/2021 at [facility]    Depression     Dizziness     Eczema     Ex-smoker     quit in 2006, smoked for 23 years    GERD (gastroesophageal reflux disease)     H/O chest pain     H/O dermatitis     [place] (hard of hearing)     LEFT EAR WORSE THAN RIGHT. getting hearing aides eloisa, Dr. Uche Bateman Hypertension     DR Rosario  PCP    Kyphosis     [facility] OR 2021 with Dr. Jaimie Álvarez Left eye injury     [address]    Muscle spasm     LOWER RIGHT BACK    Nocturia     Obesity     Prolonged emergence from general anesthesia     PATIENT STATES HIS B/P WOULD DROP WITH INTUBATION, GO UP AFTER ET TUBE REMOVED.  PVC's (premature ventricular contractions)     PVC'S.  NO CARDIOLOGIST, PCP DR Rachel Anna     occas to see rheumatoid arthritis  workup for lupus    Sjogren's syndrome ([facility] [address]     Snores     mild, denies apnea, does \"grind\" his teeth    SOB (shortness of breath)     used to see Dr. Richar Belle, borderline COPD/ never followed up / never filled scripts for inhalers    Use of cane as ambulatory aid     Vertigo     Dr. Uche Bateman Wears glasses     Wears partial dentures     LOWER      Past Surgical History:   Procedure Laterality Date    BACK SURGERY  2006    cage    COLONOSCOPY      CYST REMOVAL Right     index finger    HEMORRHOID SURGERY  2017    HERNIA REPAIR      UMBILICAL    NOSE SURGERY      REALIGNED NOSE    OTHER SURGICAL HISTORY  2020    MRI cervical and thoracic spine without contrast . CT of lung , all under general anesthesia    [place] COLSC FLX W/REMOVAL LESION BY HOT BX FORCEPS N/A 2017    COLONOSCOPY POLYPECTOMY HOT BIOPSY performed by Ivy Inman MD at [address]  2021    t7-L2    TOE SURGERY Right     2nd toe    TONSILLECTOMY      AS A CHILD    VASECTOMY         Family History   Problem Relation Age of Onset    Coronary Art Dis Father         premature-- at 52    Diabetes Father     High Blood Pressure Father     Other Father         COPD, EMPHYSEMA    Coronary Art Dis Paternal Uncle premature    Heart Failure Paternal Uncle     Diabetes Mother        Social History     Tobacco Use    Smoking status: Former Smoker     Packs/day: 1.50     Years: 23.00     Pack years: 34.50     Types: Cigarettes     Start date: 1/1/1983     Quit date: 2006     Years since quitting: 15.6    Smokeless tobacco: Former User     Types: Chew     Quit date: 9/10/2019    Tobacco comment: quit 14 years ago   Substance Use Topics    Alcohol use: Not Currently     Alcohol/week: 0.0 standard drinks     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      Current Outpatient Medications   Medication Sig Dispense Refill    fluticasone (CUTIVATE) 0.05 % cream APPLY A SMALL AMOUNT TO OUTER EAR CANALS TWO TIMES A DAY FOR 1 WEEK THEN AS NEEDED FOR ITCHING THEREAFTER      citalopram (CELEXA) 20 MG tablet Take 1 tablet by mouth daily 30 tablet 5    hydroxychloroquine (PLAQUENIL) 200 MG tablet TAKE 1 TABLET BY MOUTH TWO TIMES A  tablet 0    dicyclomine (BENTYL) 20 MG tablet Take 1 tablet by mouth 4 times daily 120 tablet 2    acetaminophen (TYLENOL) 325 MG tablet Take 650 mg by mouth every 6 hours as needed for Pain      vitamin D (CHOLECALCIFEROL) 25 MCG (1000 UT) TABS tablet Take 1,000 Units by mouth nightly      Lactobacillus (PROBIOTIC ACIDOPHILUS PO) Take 1 capsule by mouth daily      hydrocortisone (WESTCORT) 0.2 % cream Apply topically 2 times daily as needed      Simethicone (GAS-X PO) Take 1 capsule by mouth 2 times daily as needed       fluocinonide (LIDEX) 0.05 % external solution Apply topically 2 times daily. 60 mL 0    loratadine (CLARITIN) 10 MG tablet Take 10 mg by mouth 2 times daily      gabapentin (NEURONTIN) 400 MG capsule Take 1 capsule by mouth 3 times daily for 30 days. 90 capsule 5    metoprolol tartrate (LOPRESSOR) 50 MG tablet TAKE 1 TABLET BY MOUTH TWO TIMES A  tablet 3    fluticasone (FLONASE) 50 MCG/ACT nasal spray USE 2 SPRAYS IN EACH NOSTRIL ONCE DAILY.  NEED TO USE REGULARLY FOR BENEFIT      furosemide (LASIX) 40 MG tablet Take 1 tablet by mouth daily 90 tablet 3    potassium chloride (KLOR-CON M) 20 MEQ extended release tablet Take 1 tablet by mouth daily 90 tablet 3    Multiple Vitamins-Minerals (THERAPEUTIC MULTIVITAMIN-MINERALS) tablet Take 1 tablet by mouth daily      Sod Fluoride-Potassium Nitrate (PREVIDENT 5000 SENSITIVE) 1.1-5 % PSTE Place onto teeth      Magnesium Cl-Calcium Carbonate (SLOW-MAG PO) Take 1 tablet by mouth 2 times daily      aspirin 81 MG EC tablet Take 81 mg by mouth daily       omeprazole (PRILOSEC) 20 MG capsule Take 20 mg by mouth nightly        No current facility-administered medications for this visit. Allergies   Allergen Reactions    Metronidazole Other (See Comments)     \" caused a prickly feeling in my legs \"    Cetyl Alcohol Hives     Localized reaction by allergy testing    Cetearyl alcohol    Adhesive Tape Other (See Comments)     opsite and paper tape ok, bandaid ok  REDNESS AND TAKES SKIN OFF. PAPER TAPE OK. opsite and paper tape ok, bandaid ok    Cymbalta [Duloxetine Hcl] Nausea And Vomiting    Neosporin [Neomycin-Polymyxin-Gramicidin] Rash       Health Maintenance   Topic Date Due    HIV screen  Never done    Shingles Vaccine (3 of 3) 12/03/2021    Annual Wellness Visit (AWV)  06/02/2022    Potassium monitoring  11/29/2022    Creatinine monitoring  11/29/2022    Depression Monitoring  12/01/2022    DTaP/Tdap/Td vaccine (2 - Td or Tdap) 01/02/2023    Lipid screen  02/10/2023    Diabetes screen  10/12/2024    Colon cancer screen colonoscopy  07/12/2027    Pneumococcal 0-64 years Vaccine (2 of 2 - PPSV23) 10/24/2029    Flu vaccine  Completed    COVID-19 Vaccine  Completed    Hepatitis C screen  Completed    Hepatitis A vaccine  Aged Out    Hepatitis B vaccine  Aged Out    Hib vaccine  Aged Out    Meningococcal (ACWY) vaccine  Aged Out       Subjective:      Review of Systems   Constitutional: Negative for chills and fever.    HENT: Negative for rhinorrhea and sore throat. Eyes: Negative for discharge and redness. Respiratory: Negative for cough, shortness of breath and wheezing. Cardiovascular: Negative for chest pain and palpitations. Gastrointestinal: Positive for abdominal distention and abdominal pain. Negative for diarrhea, nausea and vomiting. Genitourinary: Negative for dysuria and frequency. Musculoskeletal: Negative for arthralgias and myalgias. Neurological: Negative for dizziness, light-headedness and headaches. Psychiatric/Behavioral: Negative for sleep disturbance. Objective:     /68   Pulse 66   Ht 5' 9\" (1.753 m)   Wt 198 lb 6.4 oz (90 kg)   SpO2 100%   BMI 29.30 kg/m²   Physical Exam  Vitals and nursing note reviewed. Constitutional:       General: He is not in acute distress. Appearance: He is well-developed. He is not ill-appearing. HENT:      Head: Normocephalic and atraumatic. Right Ear: External ear normal.      Left Ear: External ear normal.   Eyes:      General: No scleral icterus. Right eye: No discharge. Left eye: No discharge. Conjunctiva/sclera: Conjunctivae normal.      Pupils: Pupils are equal, round, and reactive to light. Neck:      Thyroid: No thyromegaly. Trachea: No tracheal deviation. Cardiovascular:      Rate and Rhythm: Normal rate and regular rhythm. Heart sounds: Normal heart sounds. Pulmonary:      Effort: Pulmonary effort is normal. No respiratory distress. Breath sounds: Normal breath sounds. No wheezing. Abdominal:      General: There is distension. Lymphadenopathy:      Cervical: No cervical adenopathy. Skin:     General: Skin is warm. Findings: No rash. Neurological:      Mental Status: He is alert and oriented to person, place, and time. Psychiatric:         Mood and Affect: Mood normal.         Behavior: Behavior normal.         Thought Content:  Thought content normal.         Assessment: Diagnosis Orders   1. Gaseous abdominal distention     2. Ankylosing spondylitis of thoracic region ([facility] [address]     3. Chronic obstructive pulmonary disease, unspecified COPD type ([facility] [address]     4. Adjustment disorder with depressed mood  citalopram (CELEXA) 20 MG tablet        Plan:    Referral back to GI on an urgent basis. Option of possible need for colostomy was discussed  If no acceptable answers or diagnosis is made, may consider referral to [facility] clinic for third opinion  Blood work and stool studies reviewed  Start Celexa 20 mg daily for depression    Return in about 2 months (around 3/19/2022). No orders of the defined types were placed in this encounter. Orders Placed This Encounter   Medications    citalopram (CELEXA) 20 MG tablet     Sig: Take 1 tablet by mouth daily     Dispense:  30 tablet     Refill:  5       Patient given educational materials - see patient instructions. Discussed use, benefit, and side effects of prescribed medications. All patient questions answered. Pt voiced understanding. Reviewed health maintenance. Instructed to continue current medications, diet andexercise. Patient agreed with treatment plan. Follow up as directed.      Electronicallysigned by Caroline Araujo MD on 1/19/2022 at 3:26 PM

## 2022-02-10 ENCOUNTER — OFFICE VISIT (OUTPATIENT)
Dept: PRIMARY CARE CLINIC | Age: 57
End: 2022-02-10
Payer: MEDICARE

## 2022-02-10 VITALS
BODY MASS INDEX: 28.82 KG/M2 | WEIGHT: 194.6 LBS | HEART RATE: 69 BPM | HEIGHT: 69 IN | SYSTOLIC BLOOD PRESSURE: 110 MMHG | DIASTOLIC BLOOD PRESSURE: 70 MMHG | OXYGEN SATURATION: 96 %

## 2022-02-10 DIAGNOSIS — I10 BENIGN ESSENTIAL HYPERTENSION: ICD-10-CM

## 2022-02-10 DIAGNOSIS — I10 BENIGN ESSENTIAL HYPERTENSION: Primary | ICD-10-CM

## 2022-02-10 DIAGNOSIS — K59.81 OGILVIE'S SYNDROME: ICD-10-CM

## 2022-02-10 DIAGNOSIS — M40.205 KYPHOSIS OF THORACOLUMBAR REGION, UNSPECIFIED KYPHOSIS TYPE: ICD-10-CM

## 2022-02-10 DIAGNOSIS — M48.10 DIFFUSE IDIOPATHIC SKELETAL HYPEROSTOSIS: ICD-10-CM

## 2022-02-10 LAB
ANION GAP SERPL CALCULATED.3IONS-SCNC: 13 MMOL/L (ref 9–17)
BUN BLDV-MCNC: 12 MG/DL (ref 6–20)
BUN/CREAT BLD: ABNORMAL (ref 9–20)
CALCIUM SERPL-MCNC: 9.3 MG/DL (ref 8.6–10.4)
CHLORIDE BLD-SCNC: 101 MMOL/L (ref 98–107)
CO2: 28 MMOL/L (ref 20–31)
CREAT SERPL-MCNC: 0.41 MG/DL (ref 0.7–1.2)
GFR AFRICAN AMERICAN: >60 ML/MIN
GFR NON-AFRICAN AMERICAN: >60 ML/MIN
GFR SERPL CREATININE-BSD FRML MDRD: ABNORMAL ML/MIN/{1.73_M2}
GFR SERPL CREATININE-BSD FRML MDRD: ABNORMAL ML/MIN/{1.73_M2}
GLUCOSE BLD-MCNC: 88 MG/DL (ref 70–99)
POTASSIUM SERPL-SCNC: 5.7 MMOL/L (ref 3.7–5.3)
SODIUM BLD-SCNC: 142 MMOL/L (ref 135–144)

## 2022-02-10 PROCEDURE — 1111F DSCHRG MED/CURRENT MED MERGE: CPT | Performed by: FAMILY MEDICINE

## 2022-02-10 PROCEDURE — 99214 OFFICE O/P EST MOD 30 MIN: CPT | Performed by: FAMILY MEDICINE

## 2022-02-10 ASSESSMENT — ENCOUNTER SYMPTOMS
EYE DISCHARGE: 0
EYE REDNESS: 0
SORE THROAT: 0
COUGH: 0
NAUSEA: 0
WHEEZING: 0
RHINORRHEA: 0
ABDOMINAL PAIN: 0
SHORTNESS OF BREATH: 0
VOMITING: 0
DIARRHEA: 0
BACK PAIN: 1

## 2022-02-10 NOTE — PATIENT INSTRUCTIONS
Patient Education        Potassium-Rich Diet: Care Instructions  Your Care Instructions     Potassium is a mineral. It helps keep the right mix of fluids in your body. It also helps your nerves and muscles work as they should. You'll find it in milk and meats. It's also in all fresh foods, including fruits and vegetables. Most adults need about 5 grams of potassium a day. The foods you eat should supply all that you need. Some health conditions can cause a loss of potassium. For example, kidney problems and stomach problems with vomiting and diarrhea can cause you to lose this mineral. Some medicines, such as water pills (diuretics), can cause low potassium. If you can't get enough potassium from what you eat, your doctor may advise you to take supplements. Follow-up care is a key part of your treatment and safety. Be sure to make and go to all appointments, and call your doctor if you are having problems. It's also a good idea to know your test results and keep a list of the medicines you take. How can you care for yourself at home? · Plan your diet around foods that are rich in potassium. Fresh, unprocessed whole foods have the most. These foods include:  ? Milk and other dairy products. ? Vegetables, especially broccoli, cooked dry beans, tomatoes, potatoes, artichokes, winter squash, and spinach. ? Fruits, especially citrus fruits, bananas, and apricots. Dried apricots contain more potassium than fresh apricots. ? Meat, poultry, and fish. · Ask your doctor about using a salt substitute or \"light\" salt. These often contain potassium. Where can you learn more? Go to https://[employer]ol[employer] org and sign in to your [employer] account. Enter H315 in the [facility] box to learn more about \"Potassium-Rich Diet: Care Instructions. \"     If you do not have an account, please click on the \"Sign Up Now\" link.   Current as of: September 8, 2021               Content Version: 13.1  © 2210-8481 [employer]. Care instructions adapted under license by [facility] ([facility]). If you have questions about a medical condition or this instruction, always ask your healthcare professional. [address] any warranty or liability for your use of this information.

## 2022-02-10 NOTE — PROGRESS NOTES
Post-Discharge Transitional Care Management Services or Hospital Follow Up      Danna Bermudez   YOB: 1965    Date of Office Visit:  2/10/2022  Date of Hospital Admission: 11/29/21  Date of Hospital Discharge: 11/29/21  Readmission Risk Score(high >=14%. Medium >=10%):No data recorded    Care management risk score Rising risk (score 2-5) and Complex Care (Scores >=6): 2     Non face to face  following discharge, date last encounter closed (first attempt may have been earlier): *No documented post hospital discharge outreach found in the last 14 days *No documented post hospital discharge outreach found in the last 14 days    Call initiated 2 business days of discharge: *No response recorded in the last 14 days     Patient Active Problem List   Diagnosis    Anxiety disorder    Benign essential hypertension    Chronic obstructive pulmonary disease, unspecified COPD type ([address]    Esophageal reflux    Hypogonadism male    Inflammatory arthritis    Lumbar radicular pain    Pulmonary granuloma ([address]    Male erectile disorder    Sensorineural hearing loss    Tinnitus of both ears    Vertigo    Psoriasis    Hip impingement syndrome    Seborrheic keratosis    Kyphosis of thoracolumbar region    Eczema    Sjogren's syndrome ([address]    Spinal stenosis of lumbar region with neurogenic claudication    Lumbosacral spondylosis without myelopathy    Morbidly obese ([address]    Diffuse idiopathic skeletal hyperostosis    History of lumbar fusion    S/P fusion of thoracic spine    Ileus ([address]    De Witt's syndrome       Allergies   Allergen Reactions    Metronidazole Other (See Comments)     \" caused a prickly feeling in my legs \"    Cetyl Alcohol Hives     Localized reaction by allergy testing    Cetearyl alcohol    Adhesive Tape Other (See Comments)     opsite and paper tape ok, bandaid ok  REDNESS AND TAKES SKIN OFF. PAPER TAPE OK.   opsite and paper tape ok, bandaid ok    Cymbalta [Duloxetine Hcl] Nausea And Vomiting    Neosporin [Neomycin-Polymyxin-Gramicidin] Rash       Medications listed as ordered at the time of discharge from hospital     Medication List          Accurate as of February 10, 2022 10:27 AM. If you have any questions, ask your nurse or doctor. CHANGE how you take these medications    metoprolol tartrate 25 MG tablet  Commonly known as: LOPRESSOR  TAKE 1 TABLET BY MOUTH TWO TIMES A DAY  What changed: medication strength  Changed by: Tamia Dunham MD        CONTINUE taking these medications    acetaminophen 325 MG tablet  Commonly known as: TYLENOL     aspirin 81 MG EC tablet     citalopram 20 MG tablet  Commonly known as: CELEXA  Take 1 tablet by mouth daily     dicyclomine 20 MG tablet  Commonly known as: BENTYL  Take 1 tablet by mouth 4 times daily     fluocinonide 0.05 % external solution  Commonly known as: LIDEX  Apply topically 2 times daily. fluticasone 0.05 % cream  Commonly known as: CUTIVATE     fluticasone 50 MCG/ACT nasal spray  Commonly known as: FLONASE     furosemide 40 MG tablet  Commonly known as: Lasix  Take 1 tablet by mouth daily     gabapentin 400 MG capsule  Commonly known as: NEURONTIN  Take 1 capsule by mouth 3 times daily for 30 days.      GAS-X PO     hydrocortisone 0.2 % cream  Commonly known as: WESTCORT     hydroxychloroquine 200 MG tablet  Commonly known as: PLAQUENIL  TAKE 1 TABLET BY MOUTH TWO TIMES A DAY     omeprazole 20 MG delayed release capsule  Commonly known as: PRILOSEC     potassium chloride 20 MEQ extended release tablet  Commonly known as: KLOR-CON M  Take 1 tablet by mouth daily     PreviDent 5000 Sensitive 1.1-5 % Pste  Generic drug: Sod Fluoride-Potassium Nitrate     PROBIOTIC ACIDOPHILUS PO     SLOW-MAG PO     therapeutic multivitamin-minerals tablet     vitamin D 25 MCG (1000 UT) Tabs tablet  Commonly known as: CHOLECALCIFEROL        STOP taking these medications    loratadine 10 MG tablet  Commonly known as: David Betancourt by: Chris Leach MD           Where to Get Your Medications      These medications were sent to [address], [facility] [phone] [address] [place] [phone]  [address] [address]    Phone: [phone]   · metoprolol tartrate 25 MG tablet           Medications marked \"taking\" at this time  Outpatient Medications Marked as Taking for the 2/10/22 encounter (Office Visit) with Chris Leach MD   Medication Sig Dispense Refill    metoprolol tartrate (LOPRESSOR) 25 MG tablet TAKE 1 TABLET BY MOUTH TWO TIMES A  tablet 3    fluticasone (CUTIVATE) 0.05 % cream APPLY A SMALL AMOUNT TO OUTER EAR CANALS TWO TIMES A DAY FOR 1 WEEK THEN AS NEEDED FOR ITCHING THEREAFTER      citalopram (CELEXA) 20 MG tablet Take 1 tablet by mouth daily 30 tablet 5    hydroxychloroquine (PLAQUENIL) 200 MG tablet TAKE 1 TABLET BY MOUTH TWO TIMES A  tablet 0    dicyclomine (BENTYL) 20 MG tablet Take 1 tablet by mouth 4 times daily 120 tablet 2    acetaminophen (TYLENOL) 325 MG tablet Take 650 mg by mouth every 6 hours as needed for Pain      vitamin D (CHOLECALCIFEROL) 25 MCG (1000 UT) TABS tablet Take 1,000 Units by mouth nightly      Lactobacillus (PROBIOTIC ACIDOPHILUS PO) Take 1 capsule by mouth daily      hydrocortisone (WESTCORT) 0.2 % cream Apply topically 2 times daily as needed      Simethicone (GAS-X PO) Take 1 capsule by mouth 2 times daily as needed       fluocinonide (LIDEX) 0.05 % external solution Apply topically 2 times daily. 60 mL 0    fluticasone (FLONASE) 50 MCG/ACT nasal spray USE 2 SPRAYS IN EACH NOSTRIL ONCE DAILY.  NEED TO USE REGULARLY FOR BENEFIT      furosemide (LASIX) 40 MG tablet Take 1 tablet by mouth daily 90 tablet 3    potassium chloride (KLOR-CON M) 20 MEQ extended release tablet Take 1 tablet by mouth daily 90 tablet 3    Multiple Vitamins-Minerals (THERAPEUTIC MULTIVITAMIN-MINERALS) tablet Take 1 tablet by mouth daily      Sod Fluoride-Potassium Nitrate (PREVIDENT 5000 SENSITIVE) 1.1-5 % PSTE Place onto teeth      Magnesium Cl-Calcium Carbonate (SLOW-MAG PO) Take 1 tablet by mouth 2 times daily      aspirin 81 MG EC tablet Take 81 mg by mouth daily       omeprazole (PRILOSEC) 20 MG capsule Take 20 mg by mouth nightly           Medications patient taking as of now reconciled against medications ordered at time of hospital discharge: Yes    Chief Complaint   Patient presents with    Other     [facility] abdominal pain       HPI    Inpatient course: Discharge summary reviewed- see chart. Interval history/Current status:   Patient had hospitalization at [place] [address]. Patient underwent decompression with a rectal tube as well as an NG. Patient states this was for approximately 24 hours. Patient states his abdominal distention has tremendously resolved. Patient was noted to be hypokalemic. Had repeat blood test done about 3 weeks ago. No results available. Patient states was noted to be hypotensive. Patient has been taking his Lopressor at 50 mg twice daily. Patient denies any chest pain. No shortness of breath. Otherwise unremarkable. Review of Systems   Constitutional: Negative for chills and fever. HENT: Negative for rhinorrhea and sore throat. Eyes: Negative for discharge and redness. Respiratory: Negative for cough, shortness of breath and wheezing. Cardiovascular: Negative for chest pain and palpitations. Gastrointestinal: Negative for abdominal pain, diarrhea, nausea and vomiting. Genitourinary: Negative for dysuria and frequency. Musculoskeletal: Positive for back pain. Negative for arthralgias and myalgias. Neurological: Negative for dizziness, light-headedness and headaches. Psychiatric/Behavioral: Negative for sleep disturbance.        Vitals:    02/10/22 0955   BP: 110/70   Pulse: 69   SpO2: 96%   Weight: 194 lb 9.6 oz (88.3 kg)   Height: 5' 9\" (1.753 m)     Body mass index is 28.74 kg/m². Wt Readings from Last 3 Encounters:   02/10/22 194 lb 9.6 oz (88.3 kg)   01/19/22 198 lb 6.4 oz (90 kg)   12/01/21 207 lb 12.8 oz (94.3 kg)     BP Readings from Last 3 Encounters:   02/10/22 110/70   01/19/22 112/68   12/01/21 110/78       Physical Exam  Vitals and nursing note reviewed. Constitutional:       General: He is not in acute distress. Appearance: He is well-developed. He is not ill-appearing. HENT:      Head: Normocephalic and atraumatic. Right Ear: External ear normal.      Left Ear: External ear normal.   Eyes:      General: No scleral icterus. Right eye: No discharge. Left eye: No discharge. Conjunctiva/sclera: Conjunctivae normal.      Pupils: Pupils are equal, round, and reactive to light. Neck:      Thyroid: No thyromegaly. Trachea: No tracheal deviation. Cardiovascular:      Rate and Rhythm: Normal rate and regular rhythm. Heart sounds: Normal heart sounds. Pulmonary:      Effort: Pulmonary effort is normal. No respiratory distress. Breath sounds: Normal breath sounds. No wheezing. Abdominal:      General: There is no distension. Palpations: There is no mass. Tenderness: There is no abdominal tenderness. Lymphadenopathy:      Cervical: No cervical adenopathy. Skin:     General: Skin is warm. Findings: No rash. Neurological:      Mental Status: He is alert and oriented to person, place, and time. Psychiatric:         Mood and Affect: Mood normal.         Behavior: Behavior normal.         Thought Content: Thought content normal.             Assessment/Plan:  1. [place]'s syndrome  Improved  Keep follow-up with gastroenterology  - [place] DISCHARGE MEDS RECONCILED W/ CURRENT OUTPATIENT MED LIST    2. Diffuse idiopathic skeletal hyperostosis  Stable. Continue medications as is  - metoprolol tartrate (LOPRESSOR) 25 MG tablet; TAKE 1 TABLET BY MOUTH TWO TIMES A DAY  Dispense: 180 tablet; Refill: 3    3. Kyphosis of thoracolumbar region, unspecified kyphosis type  Stable. Continue medications as is  - metoprolol tartrate (LOPRESSOR) 25 MG tablet; TAKE 1 TABLET BY MOUTH TWO TIMES A DAY  Dispense: 180 tablet; Refill: 3    4. Benign essential hypertension  Decrease metoprolol to 25 mg twice daily  Repeat BMP  - Basic Metabolic Panel; Future        Medical Decision Making: moderate complexity    Scheduled for shave biopsy of lesion on right shoulder.

## 2022-02-11 DIAGNOSIS — E87.5 SERUM POTASSIUM ELEVATED: Primary | ICD-10-CM

## 2022-02-28 RX ORDER — GABAPENTIN 400 MG/1
CAPSULE ORAL
Qty: 90 CAPSULE | Refills: 5 | Status: SHIPPED | OUTPATIENT
Start: 2022-02-28 | End: 2022-05-12

## 2022-03-03 ENCOUNTER — PROCEDURE VISIT (OUTPATIENT)
Dept: PRIMARY CARE CLINIC | Age: 57
End: 2022-03-03
Payer: MEDICARE

## 2022-03-03 VITALS
HEART RATE: 75 BPM | WEIGHT: 205 LBS | BODY MASS INDEX: 30.36 KG/M2 | DIASTOLIC BLOOD PRESSURE: 70 MMHG | OXYGEN SATURATION: 98 % | HEIGHT: 69 IN | SYSTOLIC BLOOD PRESSURE: 126 MMHG

## 2022-03-03 DIAGNOSIS — L82.0 SEBORRHEIC KERATOSES, INFLAMED: Primary | ICD-10-CM

## 2022-03-03 DIAGNOSIS — E87.5 SERUM POTASSIUM ELEVATED: ICD-10-CM

## 2022-03-03 LAB
ANION GAP SERPL CALCULATED.3IONS-SCNC: 11 MMOL/L (ref 9–17)
BUN BLDV-MCNC: 15 MG/DL (ref 6–20)
BUN/CREAT BLD: ABNORMAL (ref 9–20)
CALCIUM SERPL-MCNC: 9.1 MG/DL (ref 8.6–10.4)
CHLORIDE BLD-SCNC: 98 MMOL/L (ref 98–107)
CO2: 30 MMOL/L (ref 20–31)
CREAT SERPL-MCNC: 0.39 MG/DL (ref 0.7–1.2)
GFR AFRICAN AMERICAN: >60 ML/MIN
GFR NON-AFRICAN AMERICAN: >60 ML/MIN
GFR SERPL CREATININE-BSD FRML MDRD: ABNORMAL ML/MIN/{1.73_M2}
GFR SERPL CREATININE-BSD FRML MDRD: ABNORMAL ML/MIN/{1.73_M2}
GLUCOSE BLD-MCNC: 88 MG/DL (ref 70–99)
POTASSIUM SERPL-SCNC: 4.7 MMOL/L (ref 3.7–5.3)
SODIUM BLD-SCNC: 139 MMOL/L (ref 135–144)

## 2022-03-03 PROCEDURE — 17110 DESTRUCTION B9 LES UP TO 14: CPT | Performed by: FAMILY MEDICINE

## 2022-03-03 ASSESSMENT — ENCOUNTER SYMPTOMS
DIARRHEA: 0
NAUSEA: 0
RHINORRHEA: 0
EYE REDNESS: 0
SORE THROAT: 0
VOMITING: 0
EYE DISCHARGE: 0
COUGH: 0
SHORTNESS OF BREATH: 0
WHEEZING: 0
ABDOMINAL PAIN: 0

## 2022-03-03 NOTE — PROGRESS NOTES
[facility] PRIMARY CARE  [address] [address] [facility] [address]  Dept: [phone]    Suyapa Leone is a 61 y.o. male Established patient, who presents today for his medical conditions/complaints as noted below. Chief Complaint   Patient presents with    Other     Mole removal right shoulder       HPI:     HPI  Patient here with complaint of lesion on his right shoulder. States is been itching and burning. Occasionally bleeds. Feels like is getting larger. Reviewed prior notes None  Reviewed previous     LDL Cholesterol (mg/dL)   Date Value   02/10/2018 94   07/23/2017 98       (goal LDL is <100)   AST (U/L)   Date Value   11/29/2021 19     ALT (U/L)   Date Value   11/29/2021 7     BUN (mg/dL)   Date Value   02/10/2022 12     Hemoglobin A1C (%)   Date Value   02/10/2018 5.5     TSH (mIU/L)   Date Value   11/04/2021 1.07     BP Readings from Last 3 Encounters:   03/03/22 126/70   02/10/22 110/70   01/19/22 112/68          (goal 120/80)    Past Medical History:   Diagnosis Date    Angular cheilitis     Arthritis     BACK AND FINGERS     Bilateral lower extremity edema 03/2021    started on Lasix per PCP    Chronic back pain     dr Ivy Ramirez of [facility], [facility]     Depression     Dizziness     Eczema     Ex-smoker     quit in 2006, smoked for 23 years    GERD (gastroesophageal reflux disease)     H/O chest pain     H/O dermatitis     [place] (hard of hearing)     LEFT EAR WORSE THAN RIGHT. getting hearing aides eloisa, Dr. Uche Bateman Hypertension     DR Rosario  PCP    Kyphosis     [facility] OR 7/12/2021 with Dr. Jaimie Álvarez Left eye injury     [address]    Muscle spasm     LOWER RIGHT BACK    Nocturia     Obesity     Prolonged emergence from general anesthesia     PATIENT STATES HIS B/P WOULD DROP WITH INTUBATION, GO UP AFTER ET TUBE REMOVED.     PVC's (premature ventricular contractions)     PVC'S.  NO CARDIOLOGIST, PCP DR Yina topete to see rheumatoid arthritis drLiss workup for lupus    Sjogren's syndrome [address]     Snores     mild, denies apnea, does \"grind\" his teeth    SOB (shortness of breath)     used to see Dr. Candie Shah, borderline COPD/ never followed up / never filled scripts for inhalers    Use of cane as ambulatory aid     Vertigo     Dr. Patricio Montemayor Wears glasses     Wears partial dentures     LOWER      Past Surgical History:   Procedure Laterality Date    BACK SURGERY  2006    cage    COLONOSCOPY      CYST REMOVAL Right     index finger    HEMORRHOID SURGERY  2017    HERNIA REPAIR      UMBILICAL    NOSE SURGERY      REALIGNED NOSE    OTHER SURGICAL HISTORY  2020    MRI cervical and thoracic spine without contrast . CT of lung , all under general anesthesia    [place] COLSC FLX W/REMOVAL LESION BY HOT BX FORCEPS N/A 2017    COLONOSCOPY POLYPECTOMY HOT BIOPSY performed by Keya Wang MD at [address]  2021    [id number]    TOE SURGERY Right     2nd toe    TONSILLECTOMY      AS A CHILD    VASECTOMY         Family History   Problem Relation Age of Onset    Coronary Art Dis Father         premature-- at 52    Diabetes Father     High Blood Pressure Father     Other Father         COPD, EMPHYSEMA    Coronary Art Dis Paternal Uncle         premature    Heart Failure Paternal Uncle     Diabetes Mother        Social History     Tobacco Use    Smoking status: Former Smoker     Packs/day: 1.50     Years: 23.00     Pack years: 34.50     Types: Cigarettes     Start date: 1983     Quit date: 2006     Years since quitting: 15.7    Smokeless tobacco: Former User     Types: Chew     Quit date: 9/10/2019    Tobacco comment: quit 14 years ago   Substance Use Topics    Alcohol use: Not Currently     Alcohol/week: 0.0 standard drinks     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      Current Outpatient Medications   Medication Sig Dispense Refill    gabapentin (NEURONTIN) 400 MG capsule TAKE 1 CAPSULE BY MOUTH THREE TIMES A DAY 90 capsule 5    metoprolol tartrate (LOPRESSOR) 25 MG tablet TAKE 1 TABLET BY MOUTH TWO TIMES A  tablet 3    fluticasone (CUTIVATE) 0.05 % cream APPLY A SMALL AMOUNT TO OUTER EAR CANALS TWO TIMES A DAY FOR 1 WEEK THEN AS NEEDED FOR ITCHING THEREAFTER      citalopram (CELEXA) 20 MG tablet Take 1 tablet by mouth daily 30 tablet 5    hydroxychloroquine (PLAQUENIL) 200 MG tablet TAKE 1 TABLET BY MOUTH TWO TIMES A  tablet 0    dicyclomine (BENTYL) 20 MG tablet Take 1 tablet by mouth 4 times daily 120 tablet 2    acetaminophen (TYLENOL) 325 MG tablet Take 650 mg by mouth every 6 hours as needed for Pain      vitamin D (CHOLECALCIFEROL) 25 MCG (1000 UT) TABS tablet Take 1,000 Units by mouth nightly      Lactobacillus (PROBIOTIC ACIDOPHILUS PO) Take 1 capsule by mouth daily      hydrocortisone (WESTCORT) 0.2 % cream Apply topically 2 times daily as needed      Simethicone (GAS-X PO) Take 1 capsule by mouth 2 times daily as needed       fluocinonide (LIDEX) 0.05 % external solution Apply topically 2 times daily. 60 mL 0    fluticasone (FLONASE) 50 MCG/ACT nasal spray USE 2 SPRAYS IN EACH NOSTRIL ONCE DAILY.  NEED TO USE REGULARLY FOR BENEFIT      furosemide (LASIX) 40 MG tablet Take 1 tablet by mouth daily 90 tablet 3    Multiple Vitamins-Minerals (THERAPEUTIC MULTIVITAMIN-MINERALS) tablet Take 1 tablet by mouth daily      Sod Fluoride-Potassium Nitrate (PREVIDENT 5000 SENSITIVE) 1.1-5 % PSTE Place onto teeth      Magnesium Cl-Calcium Carbonate (SLOW-MAG PO) Take 1 tablet by mouth 2 times daily      aspirin 81 MG EC tablet Take 81 mg by mouth daily       omeprazole (PRILOSEC) 20 MG capsule Take 20 mg by mouth nightly       potassium chloride (KLOR-CON M) 20 MEQ extended release tablet Take 1 tablet by mouth daily (Patient not taking: Reported on 3/3/2022) 90 tablet 3     No current facility-administered medications for this visit. Allergies   Allergen Reactions    Metronidazole Other (See Comments)     \" caused a prickly feeling in my legs \"    Cetyl Alcohol Hives     Localized reaction by allergy testing    Cetearyl alcohol    Adhesive Tape Other (See Comments)     opsite and paper tape ok, bandaid ok  REDNESS AND TAKES SKIN OFF. PAPER TAPE OK. opsite and paper tape ok, bandaid ok    Cymbalta [Duloxetine Hcl] Nausea And Vomiting    Neosporin [Neomycin-Polymyxin-Gramicidin] Rash       Health Maintenance   Topic Date Due    HIV screen  Never done    Annual Wellness Visit (AWV)  06/02/2022    Depression Monitoring  12/01/2022    DTaP/Tdap/Td vaccine (2 - Td or Tdap) 01/02/2023    Lipid screen  02/10/2023    Potassium monitoring  02/10/2023    Creatinine monitoring  02/10/2023    Colorectal Cancer Screen  07/12/2027    Pneumococcal 0-64 years Vaccine (2 of 2 - PPSV23) 10/24/2029    Flu vaccine  Completed    Shingles Vaccine  Completed    COVID-19 Vaccine  Completed    Hepatitis C screen  Completed    Hepatitis A vaccine  Aged Out    Hepatitis B vaccine  Aged Out    Hib vaccine  Aged Out    Meningococcal (ACWY) vaccine  Aged Out       Subjective:      Review of Systems   Constitutional: Negative for chills and fever. HENT: Negative for rhinorrhea and sore throat. Eyes: Negative for discharge and redness. Respiratory: Negative for cough, shortness of breath and wheezing. Cardiovascular: Negative for chest pain and palpitations. Gastrointestinal: Negative for abdominal pain, diarrhea, nausea and vomiting. Genitourinary: Negative for dysuria and frequency. Musculoskeletal: Negative for arthralgias and myalgias. Neurological: Negative for dizziness, light-headedness and headaches. Psychiatric/Behavioral: Negative for sleep disturbance.        Objective:     /70   Pulse 75   Ht 5' 9\" (1.753 m)   Wt 205 lb (93 kg)   SpO2 98%   BMI 30.27 kg/m² Physical Exam  Vitals and nursing note reviewed. Constitutional:       General: He is not in acute distress. Appearance: He is well-developed. He is not ill-appearing. HENT:      Head: Normocephalic and atraumatic. Right Ear: External ear normal.      Left Ear: External ear normal.   Eyes:      General: No scleral icterus. Right eye: No discharge. Left eye: No discharge. Conjunctiva/sclera: Conjunctivae normal.      Pupils: Pupils are equal, round, and reactive to light. Neck:      Thyroid: No thyromegaly. Trachea: No tracheal deviation. Cardiovascular:      Rate and Rhythm: Normal rate and regular rhythm. Heart sounds: Normal heart sounds. Pulmonary:      Effort: Pulmonary effort is normal. No respiratory distress. Breath sounds: Normal breath sounds. No wheezing. Lymphadenopathy:      Cervical: No cervical adenopathy. Skin:     General: Skin is warm. Findings: No rash. Comments: Raised dropped on appearance lesion measuring 1.2 cm. Neurological:      Mental Status: He is alert and oriented to person, place, and time. Psychiatric:         Mood and Affect: Mood normal.         Behavior: Behavior normal.         Thought Content: Thought content normal.       Lesion was anesthetized with 2% lidocaine with epinephrine. Was removed with Derm curettage. Hemostasis was obtained by radial cautery. Wound care instructions were given. Patient tolerated procedure well without complication. Assessment:       Diagnosis Orders   1. Seborrheic keratoses, inflamed  09702 - ID DESTRUCTION BENIGN LESIONS UP TO 14        Plan:    Removal of lesion as above    Return if symptoms worsen or fail to improve. Orders Placed This Encounter   Procedures    66691 - ID DESTRUCTION BENIGN LESIONS UP TO 14     No orders of the defined types were placed in this encounter. Patient given educational materials - see patient instructions.   Discussed use, benefit, and side effects of prescribed medications. All patient questions answered. Pt voiced understanding. Reviewed health maintenance. Instructed to continue current medications, diet andexercise. Patient agreed with treatment plan. Follow up as directed.      Electronicallysigned by Aga Gray MD on 3/3/2022 at 10:24 AM

## 2022-03-15 ENCOUNTER — TELEPHONE (OUTPATIENT)
Dept: ONCOLOGY | Age: 57
End: 2022-03-15

## 2022-03-15 DIAGNOSIS — Z87.891 PERSONAL HISTORY OF NICOTINE DEPENDENCE: Primary | ICD-10-CM

## 2022-03-15 NOTE — TELEPHONE ENCOUNTER
Our records indicate that your patient is due for their annual lung cancer screening follow up testing. For your convenience, we have pended the order for the scan for you. If you do not agree with the need for the test, please cancel the order and let us know. Sincerely,    [address] [place] [address] Screening Program    Auto printed reminder letter sent to patient.

## 2022-03-31 DIAGNOSIS — M19.90 INFLAMMATORY ARTHRITIS: ICD-10-CM

## 2022-03-31 RX ORDER — HYDROXYCHLOROQUINE SULFATE 200 MG/1
TABLET, FILM COATED ORAL
Qty: 180 TABLET | Refills: 0 | Status: SHIPPED | OUTPATIENT
Start: 2022-03-31 | End: 2022-06-23

## 2022-05-04 DIAGNOSIS — R60.0 LOCALIZED EDEMA: ICD-10-CM

## 2022-05-05 RX ORDER — FUROSEMIDE 40 MG/1
TABLET ORAL
Qty: 90 TABLET | Refills: 0 | Status: SHIPPED | OUTPATIENT
Start: 2022-05-05 | End: 2022-08-01

## 2022-05-12 ENCOUNTER — OFFICE VISIT (OUTPATIENT)
Dept: PRIMARY CARE CLINIC | Age: 57
End: 2022-05-12
Payer: MEDICARE

## 2022-05-12 VITALS
HEIGHT: 69 IN | DIASTOLIC BLOOD PRESSURE: 60 MMHG | OXYGEN SATURATION: 98 % | BODY MASS INDEX: 31.93 KG/M2 | HEART RATE: 70 BPM | WEIGHT: 215.6 LBS | SYSTOLIC BLOOD PRESSURE: 100 MMHG

## 2022-05-12 DIAGNOSIS — Z13.6 ENCOUNTER FOR LIPID SCREENING FOR CARDIOVASCULAR DISEASE: ICD-10-CM

## 2022-05-12 DIAGNOSIS — R27.0 ATAXIA: ICD-10-CM

## 2022-05-12 DIAGNOSIS — G25.81 RLS (RESTLESS LEGS SYNDROME): ICD-10-CM

## 2022-05-12 DIAGNOSIS — Z00.00 ANNUAL PHYSICAL EXAM: ICD-10-CM

## 2022-05-12 DIAGNOSIS — Z12.5 SCREENING PSA (PROSTATE SPECIFIC ANTIGEN): ICD-10-CM

## 2022-05-12 DIAGNOSIS — M35.00 SJOGREN'S SYNDROME, WITH UNSPECIFIED ORGAN INVOLVEMENT (HCC): ICD-10-CM

## 2022-05-12 DIAGNOSIS — I10 BENIGN ESSENTIAL HYPERTENSION: Primary | ICD-10-CM

## 2022-05-12 DIAGNOSIS — M40.205 KYPHOSIS OF THORACOLUMBAR REGION, UNSPECIFIED KYPHOSIS TYPE: ICD-10-CM

## 2022-05-12 DIAGNOSIS — Z13.220 ENCOUNTER FOR LIPID SCREENING FOR CARDIOVASCULAR DISEASE: ICD-10-CM

## 2022-05-12 DIAGNOSIS — M48.10 DIFFUSE IDIOPATHIC SKELETAL HYPEROSTOSIS: ICD-10-CM

## 2022-05-12 PROBLEM — E66.811 CLASS 1 OBESITY WITHOUT SERIOUS COMORBIDITY WITH BODY MASS INDEX (BMI) OF 31.0 TO 31.9 IN ADULT: Status: ACTIVE | Noted: 2020-08-18

## 2022-05-12 PROBLEM — E66.9 CLASS 1 OBESITY WITHOUT SERIOUS COMORBIDITY WITH BODY MASS INDEX (BMI) OF 31.0 TO 31.9 IN ADULT: Status: ACTIVE | Noted: 2020-08-18

## 2022-05-12 PROCEDURE — 99214 OFFICE O/P EST MOD 30 MIN: CPT | Performed by: FAMILY MEDICINE

## 2022-05-12 ASSESSMENT — ENCOUNTER SYMPTOMS
DIARRHEA: 0
EYE DISCHARGE: 0
RHINORRHEA: 0
EYE REDNESS: 0
NAUSEA: 0
SHORTNESS OF BREATH: 0
ABDOMINAL PAIN: 0
COUGH: 0
VOMITING: 0
BACK PAIN: 1
WHEEZING: 0
SORE THROAT: 0

## 2022-05-12 NOTE — PROGRESS NOTES
[address] [facility] PRIMARY CARE  [address] [facility] [address]  Dept: [phone]    Gurpreet Cabrera is a 61 y.o. male Established patient, who presents today for his medical conditions/complaints as noted below. Chief Complaint   Patient presents with    Hypertension     Medication check       HPI:     HPI  Pt still having back pain, going back to [facility] in July. Pt states abdominal pain staying away, no distension. Bowels moving on regular basis. No diarrhea. Pt states still having pain around the \"hump\" in his back. Having trouble standing for long periods due to pain. Blood pressure running 100/60 today. Weight up 10 pounds. Pt states still having unsteady gait, balance. Patient also complaining and being unsteady. States when he closes his eyes can come off balance and needs to hold onto things. States this occurs with walking as well. Is been going on for some time. States will also occur when he is sitting or lying down. Patient also complaining of difficulty sleeping at night. States he feels like his legs have to move constantly. Denies any chest heaviness. No pressure sensations. No nausea or vomiting. Patient continues to take his Plaquenil for Sjogren's syndromes. States has not really noticed much difference.     Reviewed prior notes None  Reviewed previous Labs    LDL Cholesterol (mg/dL)   Date Value   02/10/2018 94   07/23/2017 98       (goal LDL is <100)   AST (U/L)   Date Value   11/29/2021 19     ALT (U/L)   Date Value   11/29/2021 7     BUN (mg/dL)   Date Value   03/03/2022 15     Hemoglobin A1C (%)   Date Value   02/10/2018 5.5     TSH (mIU/L)   Date Value   11/04/2021 1.07     BP Readings from Last 3 Encounters:   05/12/22 100/60   03/03/22 126/70   02/10/22 110/70          (goal 120/80)    Past Medical History:   Diagnosis Date    Angular cheilitis     Arthritis     BACK AND FINGERS     Bilateral lower extremity edema 03/2021    started on Lasix per PCP    Chronic back pain     dr Ulises [facility]era [facility], scheduled for OR 7/12/2021 at [facility]    Depression     Dizziness     Eczema     Ex-smoker     quit in 2006, smoked for 23 years    GERD (gastroesophageal reflux disease)     H/O chest pain     H/O dermatitis     [place] [employer] (hard of hearing)     LEFT EAR WORSE THAN RIGHT. getting hearing aides eloisa, Dr. Jose Ramos Hypertension     DR Arias Repress PCP    Kyphosis     [facility] OR 7/12/2021 with Dr. Pina Mcguire Left eye injury     [address]    Muscle spasm     LOWER RIGHT BACK    Nocturia     Obesity     Prolonged emergence from general anesthesia     PATIENT STATES HIS B/P WOULD DROP WITH INTUBATION, GO UP AFTER ET TUBE REMOVED.  PVC's (premature ventricular contractions)     PVC'S.  NO CARDIOLOGIST, PCP DR Niki Hughes    Rash     occas to see rheumatoid arthritis  workup for lupus    Sjogren's syndrome ([facility] [address]     Snores     mild, denies apnea, does \"grind\" his teeth    SOB (shortness of breath)     used to see Dr. Anneliese Ferguson, borderline COPD/ never followed up / never filled scripts for inhalers    Use of cane as ambulatory aid     Vertigo     Dr. Jose Ramos Wears glasses     Wears partial dentures     LOWER      Past Surgical History:   Procedure Laterality Date    BACK SURGERY  2006    cage    COLONOSCOPY      CYST REMOVAL Right     index finger    HEMORRHOID SURGERY  2017    HERNIA REPAIR      UMBILICAL    NOSE SURGERY      REALIGNED NOSE    OTHER SURGICAL HISTORY  02/21/2020    MRI cervical and thoracic spine without contrast . CT of lung , all under general anesthesia    RI COLSC FLX W/REMOVAL LESION BY HOT BX FORCEPS N/A 07/12/2017    COLONOSCOPY POLYPECTOMY HOT BIOPSY performed by Ivy Kwon MD at [address]  07/12/2021    [id number]    TOE SURGERY Right     2nd toe    TONSILLECTOMY      AS A CHILD    VASECTOMY         Family History Problem Relation Age of Onset    Coronary Art Dis Father         premature-- at 52    Diabetes Father     High Blood Pressure Father     Other Father         COPD, EMPHYSEMA    Coronary Art Dis Paternal Uncle         premature    Heart Failure Paternal Uncle     Diabetes Mother        Social History     Tobacco Use    Smoking status: Former Smoker     Packs/day: 1.50     Years: 23.00     Pack years: 34.50     Types: Cigarettes     Start date: 1983     Quit date: 2006     Years since quitting: 15.9    Smokeless tobacco: Former User     Types: Chew     Quit date: 9/10/2019    Tobacco comment: quit 14 years ago   Substance Use Topics    Alcohol use: Not Currently     Alcohol/week: 0.0 standard drinks     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      Current Outpatient Medications   Medication Sig Dispense Refill    metoprolol tartrate (LOPRESSOR) 25 MG tablet Take 0.5 tablets by mouth 2 times daily TAKE 1 TABLET BY MOUTH TWO TIMES A DAY 90 tablet 3    carbidopa-levodopa (SINEMET)  MG per tablet Take 1 tablet by mouth at bedtime 90 tablet 3    furosemide (LASIX) 40 MG tablet TAKE 1 TABLET BY MOUTH EVERY DAY 90 tablet 0    hydroxychloroquine (PLAQUENIL) 200 MG tablet TAKE 1 TABLET BY MOUTH TWO TIMES A  tablet 0    fluticasone (CUTIVATE) 0.05 % cream APPLY A SMALL AMOUNT TO OUTER EAR CANALS TWO TIMES A DAY FOR 1 WEEK THEN AS NEEDED FOR ITCHING THEREAFTER      citalopram (CELEXA) 20 MG tablet Take 1 tablet by mouth daily 30 tablet 5    acetaminophen (TYLENOL) 325 MG tablet Take 650 mg by mouth every 6 hours as needed for Pain      vitamin D (CHOLECALCIFEROL) 25 MCG (1000 UT) TABS tablet Take 1,000 Units by mouth nightly      hydrocortisone (WESTCORT) 0.2 % cream Apply topically 2 times daily as needed      fluocinonide (LIDEX) 0.05 % external solution Apply topically 2 times daily. 60 mL 0    fluticasone (FLONASE) 50 MCG/ACT nasal spray USE 2 SPRAYS IN EACH NOSTRIL ONCE DAILY.  NEED TO USE REGULARLY FOR BENEFIT      potassium chloride (KLOR-CON M) 20 MEQ extended release tablet Take 1 tablet by mouth daily 90 tablet 3    Multiple Vitamins-Minerals (THERAPEUTIC MULTIVITAMIN-MINERALS) tablet Take 1 tablet by mouth daily      Sod Fluoride-Potassium Nitrate (PREVIDENT 5000 SENSITIVE) 1.1-5 % PSTE Place onto teeth      Magnesium Cl-Calcium Carbonate (SLOW-MAG PO) Take 1 tablet by mouth 2 times daily      aspirin 81 MG EC tablet Take 81 mg by mouth daily       omeprazole (PRILOSEC) 20 MG capsule Take 20 mg by mouth nightly        No current facility-administered medications for this visit. Allergies   Allergen Reactions    Metronidazole Other (See Comments)     \" caused a prickly feeling in my legs \"    Cetyl Alcohol Hives     Localized reaction by allergy testing    Cetearyl alcohol    Adhesive Tape Other (See Comments)     opsite and paper tape ok, bandaid ok  REDNESS AND TAKES SKIN OFF. PAPER TAPE OK. opsite and paper tape ok, bandaid ok    Cymbalta [Duloxetine Hcl] Nausea And Vomiting    Neosporin [Neomycin-Polymyxin-Gramicidin] Rash       Health Maintenance   Topic Date Due    HIV screen  Never done    Annual Wellness Visit (AWV)  06/02/2022    Depression Monitoring  12/01/2022    DTaP/Tdap/Td vaccine (2 - Td or Tdap) 01/02/2023    Lipids  02/10/2023    Colorectal Cancer Screen  07/12/2027    Pneumococcal 0-64 years Vaccine (3 - PPSV23 or PCV20) 10/24/2029    Flu vaccine  Completed    Shingles vaccine  Completed    COVID-19 Vaccine  Completed    Hepatitis C screen  Completed    Hepatitis A vaccine  Aged Out    Hepatitis B vaccine  Aged Out    Hib vaccine  Aged Out    Meningococcal (ACWY) vaccine  Aged Out       Subjective:      Review of Systems   Constitutional: Negative for chills and fever. HENT: Negative for rhinorrhea and sore throat. Eyes: Negative for discharge and redness. Respiratory: Negative for cough, shortness of breath and wheezing.     Cardiovascular: Negative for chest pain and palpitations. Gastrointestinal: Negative for abdominal pain, diarrhea, nausea and vomiting. Genitourinary: Negative for dysuria and frequency. Musculoskeletal: Positive for back pain. Negative for arthralgias and myalgias. Neurological: Negative for dizziness, light-headedness and headaches. Psychiatric/Behavioral: Negative for sleep disturbance. Objective:     /60   Pulse 70   Ht 5' 9\" (1.753 m)   Wt 215 lb 9.6 oz (97.8 kg)   SpO2 98%   BMI 31.84 kg/m²   Physical Exam  Vitals and nursing note reviewed. Constitutional:       General: He is not in acute distress. Appearance: He is well-developed. He is not ill-appearing. HENT:      Head: Normocephalic and atraumatic. Right Ear: External ear normal.      Left Ear: External ear normal.   Eyes:      General: No scleral icterus. Right eye: No discharge. Left eye: No discharge. Conjunctiva/sclera: Conjunctivae normal.   Neck:      Thyroid: No thyromegaly. Trachea: No tracheal deviation. Cardiovascular:      Rate and Rhythm: Normal rate and regular rhythm. Heart sounds: Normal heart sounds. Pulmonary:      Effort: Pulmonary effort is normal. No respiratory distress. Breath sounds: Normal breath sounds. No wheezing. Lymphadenopathy:      Cervical: No cervical adenopathy. Skin:     General: Skin is warm. Findings: No rash. Neurological:      Mental Status: He is alert and oriented to person, place, and time. Psychiatric:         Mood and Affect: Mood normal.         Behavior: Behavior normal.         Thought Content: Thought content normal.         Assessment:       Diagnosis Orders   1. Benign essential hypertension     2. Diffuse idiopathic skeletal hyperostosis  metoprolol tartrate (LOPRESSOR) 25 MG tablet   3. Kyphosis of thoracolumbar region, unspecified kyphosis type  metoprolol tartrate (LOPRESSOR) 25 MG tablet   4.  Encounter for lipid screening for cardiovascular disease  Lipid, Fasting   5. Annual physical exam  Basic Metabolic Panel, Fasting    Hepatic Function Panel   6. Screening PSA (prostate specific antigen)  PSA Screening   7. Eliane Ly MD, Neurology, [facility]   8. RLS (restless legs syndrome)  carbidopa-levodopa (SINEMET)  MG per tablet   9. Sjogren's syndrome, with unspecified organ involvement ([address]          Plan:    labs ordered  Decrease metoprolol to 12.5mg bid  Patient to keep follow-up appointment at [facility]  Referral to neurology for opinion regarding ataxia  Start Sinemet 25/100 at nighttime for restless leg syndrome by history    Return in about 2 months (around 7/12/2022), or medicare wellness. Orders Placed This Encounter   Procedures    Lipid, Fasting     Standing Status:   Future     Standing Expiration Date:   8/12/2022    Basic Metabolic Panel, Fasting     Standing Status:   Future     Standing Expiration Date:   8/12/2022    Hepatic Function Panel     Standing Status:   Future     Standing Expiration Date:   8/12/2022    PSA Screening     Standing Status:   Future     Standing Expiration Date:   8/12/2022   Hodges Leyden, MD, Neurology, [facility]     Referral Priority:   Routine     Referral Type:   Eval and Treat     Referral Reason:   Specialty Services Required     Referred to Provider:   Chiquita Capellan MD     Requested Specialty:   Neurology     Number of Visits Requested:   1     Orders Placed This Encounter   Medications    metoprolol tartrate (LOPRESSOR) 25 MG tablet     Sig: Take 0.5 tablets by mouth 2 times daily TAKE 1 TABLET BY MOUTH TWO TIMES A DAY     Dispense:  90 tablet     Refill:  3    carbidopa-levodopa (SINEMET)  MG per tablet     Sig: Take 1 tablet by mouth at bedtime     Dispense:  90 tablet     Refill:  3       Patient given educational materials - see patient instructions. Discussed use, benefit, and side effects of prescribed medications. All patient questions answered. Pt voiced understanding. Reviewed health maintenance. Instructed to continue current medications, diet andexercise. Patient agreed with treatment plan. Follow up as directed.      Electronicallysigned by Juan Canales MD on 5/12/2022 at 10:24 AM

## 2022-05-16 ENCOUNTER — TELEPHONE (OUTPATIENT)
Dept: PRIMARY CARE CLINIC | Age: 57
End: 2022-05-16

## 2022-05-16 DIAGNOSIS — M48.10 DIFFUSE IDIOPATHIC SKELETAL HYPEROSTOSIS: ICD-10-CM

## 2022-05-16 DIAGNOSIS — M40.205 KYPHOSIS OF THORACOLUMBAR REGION, UNSPECIFIED KYPHOSIS TYPE: ICD-10-CM

## 2022-05-16 NOTE — TELEPHONE ENCOUNTER
Mary Garcia asking for a new rx for Metoprolol to be sent over. The one sent, has 2 sets of directions. Mary Garcia on wheeling listed.

## 2022-06-06 DIAGNOSIS — Z13.220 ENCOUNTER FOR LIPID SCREENING FOR CARDIOVASCULAR DISEASE: ICD-10-CM

## 2022-06-06 DIAGNOSIS — Z12.5 SCREENING PSA (PROSTATE SPECIFIC ANTIGEN): ICD-10-CM

## 2022-06-06 DIAGNOSIS — Z00.00 ANNUAL PHYSICAL EXAM: ICD-10-CM

## 2022-06-06 DIAGNOSIS — Z13.6 ENCOUNTER FOR LIPID SCREENING FOR CARDIOVASCULAR DISEASE: ICD-10-CM

## 2022-06-06 LAB
ALBUMIN SERPL-MCNC: 4.5 G/DL (ref 3.5–5.2)
ALBUMIN/GLOBULIN RATIO: 1.5 (ref 1–2.5)
ALP BLD-CCNC: 98 U/L (ref 40–129)
ALT SERPL-CCNC: 15 U/L (ref 5–41)
ANION GAP SERPL CALCULATED.3IONS-SCNC: 10 MMOL/L (ref 9–17)
AST SERPL-CCNC: 20 U/L
BILIRUB SERPL-MCNC: 0.23 MG/DL (ref 0.3–1.2)
BILIRUBIN DIRECT: <0.08 MG/DL
BILIRUBIN, INDIRECT: ABNORMAL MG/DL (ref 0–1)
BUN BLDV-MCNC: 11 MG/DL (ref 6–20)
BUN/CREAT BLD: ABNORMAL (ref 9–20)
CALCIUM SERPL-MCNC: 9.3 MG/DL (ref 8.6–10.4)
CHLORIDE BLD-SCNC: 100 MMOL/L (ref 98–107)
CHOLESTEROL, FASTING: 177 MG/DL
CHOLESTEROL/HDL RATIO: 4.9
CO2: 28 MMOL/L (ref 20–31)
CREAT SERPL-MCNC: 0.52 MG/DL (ref 0.7–1.2)
GFR AFRICAN AMERICAN: >60 ML/MIN
GFR NON-AFRICAN AMERICAN: >60 ML/MIN
GFR SERPL CREATININE-BSD FRML MDRD: ABNORMAL ML/MIN/{1.73_M2}
GFR SERPL CREATININE-BSD FRML MDRD: ABNORMAL ML/MIN/{1.73_M2}
GLOBULIN: ABNORMAL G/DL (ref 1.5–3.8)
GLUCOSE FASTING: 102 MG/DL (ref 70–99)
HDLC SERPL-MCNC: 36 MG/DL
LDL CHOLESTEROL: 121 MG/DL (ref 0–130)
POTASSIUM SERPL-SCNC: 5.1 MMOL/L (ref 3.7–5.3)
PROSTATE SPECIFIC ANTIGEN: 0.62 NG/ML
SODIUM BLD-SCNC: 138 MMOL/L (ref 135–144)
TOTAL PROTEIN: 7.5 G/DL (ref 6.4–8.3)
TRIGLYCERIDE, FASTING: 102 MG/DL
VLDLC SERPL CALC-MCNC: ABNORMAL MG/DL (ref 1–30)

## 2022-06-23 DIAGNOSIS — M19.90 INFLAMMATORY ARTHRITIS: ICD-10-CM

## 2022-06-23 RX ORDER — HYDROXYCHLOROQUINE SULFATE 200 MG/1
TABLET, FILM COATED ORAL
Qty: 180 TABLET | Refills: 0 | Status: SHIPPED | OUTPATIENT
Start: 2022-06-23

## 2022-07-18 SDOH — HEALTH STABILITY: PHYSICAL HEALTH: ON AVERAGE, HOW MANY DAYS PER WEEK DO YOU ENGAGE IN MODERATE TO STRENUOUS EXERCISE (LIKE A BRISK WALK)?: 0 DAYS

## 2022-07-18 SDOH — HEALTH STABILITY: PHYSICAL HEALTH: ON AVERAGE, HOW MANY MINUTES DO YOU ENGAGE IN EXERCISE AT THIS LEVEL?: 0 MIN

## 2022-07-18 ASSESSMENT — PATIENT HEALTH QUESTIONNAIRE - PHQ9
2. FEELING DOWN, DEPRESSED OR HOPELESS: 1
SUM OF ALL RESPONSES TO PHQ QUESTIONS 1-9: 2
SUM OF ALL RESPONSES TO PHQ QUESTIONS 1-9: 2
SUM OF ALL RESPONSES TO PHQ9 QUESTIONS 1 & 2: 2
1. LITTLE INTEREST OR PLEASURE IN DOING THINGS: 1
SUM OF ALL RESPONSES TO PHQ QUESTIONS 1-9: 2
SUM OF ALL RESPONSES TO PHQ QUESTIONS 1-9: 2

## 2022-07-18 ASSESSMENT — LIFESTYLE VARIABLES
HOW OFTEN DO YOU HAVE A DRINK CONTAINING ALCOHOL: 1
HOW MANY STANDARD DRINKS CONTAINING ALCOHOL DO YOU HAVE ON A TYPICAL DAY: 0
HOW OFTEN DO YOU HAVE A DRINK CONTAINING ALCOHOL: NEVER
HOW OFTEN DO YOU HAVE SIX OR MORE DRINKS ON ONE OCCASION: 1
HOW MANY STANDARD DRINKS CONTAINING ALCOHOL DO YOU HAVE ON A TYPICAL DAY: PATIENT DOES NOT DRINK

## 2022-07-20 ENCOUNTER — OFFICE VISIT (OUTPATIENT)
Dept: PRIMARY CARE CLINIC | Age: 57
End: 2022-07-20
Payer: MEDICARE

## 2022-07-20 VITALS
BODY MASS INDEX: 32.94 KG/M2 | SYSTOLIC BLOOD PRESSURE: 116 MMHG | DIASTOLIC BLOOD PRESSURE: 68 MMHG | OXYGEN SATURATION: 97 % | WEIGHT: 222.4 LBS | HEIGHT: 69 IN | HEART RATE: 75 BPM

## 2022-07-20 DIAGNOSIS — Z00.00 MEDICARE ANNUAL WELLNESS VISIT, SUBSEQUENT: Primary | ICD-10-CM

## 2022-07-20 DIAGNOSIS — F51.01 PRIMARY INSOMNIA: ICD-10-CM

## 2022-07-20 DIAGNOSIS — M19.90 INFLAMMATORY ARTHRITIS: ICD-10-CM

## 2022-07-20 DIAGNOSIS — E66.9 OBESITY (BMI 30.0-34.9): ICD-10-CM

## 2022-07-20 DIAGNOSIS — F43.21 ADJUSTMENT DISORDER WITH DEPRESSED MOOD: ICD-10-CM

## 2022-07-20 PROBLEM — E66.811 CLASS 1 OBESITY WITHOUT SERIOUS COMORBIDITY WITH BODY MASS INDEX (BMI) OF 31.0 TO 31.9 IN ADULT: Status: RESOLVED | Noted: 2020-08-18 | Resolved: 2022-07-20

## 2022-07-20 PROBLEM — R53.81 PHYSICAL DECONDITIONING: Status: ACTIVE | Noted: 2022-07-19

## 2022-07-20 PROCEDURE — G0439 PPPS, SUBSEQ VISIT: HCPCS | Performed by: FAMILY MEDICINE

## 2022-07-20 RX ORDER — TRAZODONE HYDROCHLORIDE 50 MG/1
50 TABLET ORAL NIGHTLY
Qty: 90 TABLET | Refills: 1 | Status: SHIPPED | OUTPATIENT
Start: 2022-07-20 | End: 2022-10-21

## 2022-07-20 RX ORDER — HYDROXYCHLOROQUINE SULFATE 200 MG/1
200 TABLET, FILM COATED ORAL 2 TIMES DAILY
Qty: 180 TABLET | Refills: 3
Start: 2022-07-20 | End: 2022-10-05 | Stop reason: SDUPTHER

## 2022-07-20 RX ORDER — CITALOPRAM 20 MG/1
TABLET ORAL
Qty: 30 TABLET | Refills: 0 | Status: SHIPPED | OUTPATIENT
Start: 2022-07-20 | End: 2022-08-01

## 2022-07-20 RX ORDER — CIPROFLOXACIN HYDROCHLORIDE 3.5 MG/ML
1 SOLUTION/ DROPS TOPICAL
Qty: 5 ML | Refills: 0 | Status: SHIPPED | OUTPATIENT
Start: 2022-07-20 | End: 2022-07-30

## 2022-07-20 ASSESSMENT — PATIENT HEALTH QUESTIONNAIRE - PHQ9
SUM OF ALL RESPONSES TO PHQ9 QUESTIONS 1 & 2: 2
3. TROUBLE FALLING OR STAYING ASLEEP: 1
9. THOUGHTS THAT YOU WOULD BE BETTER OFF DEAD, OR OF HURTING YOURSELF: 0
10. IF YOU CHECKED OFF ANY PROBLEMS, HOW DIFFICULT HAVE THESE PROBLEMS MADE IT FOR YOU TO DO YOUR WORK, TAKE CARE OF THINGS AT HOME, OR GET ALONG WITH OTHER PEOPLE: 1
SUM OF ALL RESPONSES TO PHQ QUESTIONS 1-9: 5
8. MOVING OR SPEAKING SO SLOWLY THAT OTHER PEOPLE COULD HAVE NOTICED. OR THE OPPOSITE, BEING SO FIGETY OR RESTLESS THAT YOU HAVE BEEN MOVING AROUND A LOT MORE THAN USUAL: 0
SUM OF ALL RESPONSES TO PHQ QUESTIONS 1-9: 5
7. TROUBLE CONCENTRATING ON THINGS, SUCH AS READING THE NEWSPAPER OR WATCHING TELEVISION: 1
4. FEELING TIRED OR HAVING LITTLE ENERGY: 1
SUM OF ALL RESPONSES TO PHQ QUESTIONS 1-9: 5
SUM OF ALL RESPONSES TO PHQ QUESTIONS 1-9: 5
5. POOR APPETITE OR OVEREATING: 0
2. FEELING DOWN, DEPRESSED OR HOPELESS: 1
1. LITTLE INTEREST OR PLEASURE IN DOING THINGS: 1
6. FEELING BAD ABOUT YOURSELF - OR THAT YOU ARE A FAILURE OR HAVE LET YOURSELF OR YOUR FAMILY DOWN: 0

## 2022-07-20 NOTE — PROGRESS NOTES
Medicare Annual Wellness Visit    Larissa Boyer is here for Medicare AWV    Assessment & Plan   Medicare annual wellness visit, subsequent    Recommendations for Preventive Services Due: see orders and patient instructions/AVS.  Recommended screening schedule for the next 5-10 years is provided to the patient in written form: see Patient Instructions/AVS.     Return for Medicare Annual Wellness Visit in 1 year. Subjective   The following acute and/or chronic problems were also addressed today: Morbid obesity  Spinal stenosis   Hypertension      Patient's complete Health Risk Assessment and screening values have been reviewed and are found in Flowsheets. The following problems were reviewed today and where indicated follow up appointments were made and/or referrals ordered.     Positive Risk Factor Screenings with Interventions:    Fall Risk:  Do you feel unsteady or are you worried about falling? : (!) yes  2 or more falls in past year?: no  Fall with injury in past year?: no   Fall Risk Interventions:    Patient declines any further evaluation/treatment for this issue     Depression:  PHQ-2 Score: 2  PHQ-9 Total Score: 5    Severity:1-4 = minimal depression, 5-9 = mild depression, 10-14 = moderate depression, 15-19 = moderately severe depression, 20-27 = severe depression  Depression Interventions:  Regular exercise recommended- 3-5 times per week, 30-45 minutes per session          General Health and ACP:  General  In general, how would you say your health is?: Fair  In the past 7 days, have you experienced any of the following: New or Increased Pain, New or Increased Fatigue, Loneliness, Social Isolation, Stress or Anger?: No  Do you get the social and emotional support that you need?: Yes  Do you have a Living Will?: Yes    Advance Directives       Power of  Living Will ACP-Advance Directive ACP-Power of     Not on File Not on File Not on File Not on [address] [facility] Interventions:  Has living will     Health Habits/Nutrition:  Physical Activity: Inactive    Days of Exercise per Week: 0 days    Minutes of Exercise per Session: 0 min     Have you lost any weight without trying in the past 3 months?: No  Body mass index: (!) 32.84  Have you seen the dentist within the past year?: Yes  Health Habits/Nutrition Interventions:  Up to date       ADLs:  In the past 7 days, did you need help from others to perform any of the following everyday activities: Eating, dressing, grooming, bathing, toileting, or walking/balance?: (!) Yes  Select all that apply: (!) Walking/Balance  In the past 7 days, did you need help from others to take care of any of the following: Laundry, housekeeping, banking/finances, shopping, telephone use, food preparation, transportation, or taking medications?: (!) Yes  Select all that apply: [employer], Housekeeping  ADL Interventions:  Patient declines any further evaluation/treatment for this issue          Objective   Vitals:    07/20/22 0933   BP: 116/68   Pulse: 75   SpO2: 97%   Weight: 222 lb 6.4 oz (100.9 kg)   Height: 5' 9\" (1.753 m)      Body mass index is 32.84 kg/m².       General Appearance: alert and oriented to person, place and time, well developed and well- nourished, in no acute distress  Skin: warm and dry, no rash or erythema  Head: normocephalic and atraumatic  Eyes: pupils equal, round, and reactive to light, extraocular eye movements intact, conjunctivae normal  ENT: tympanic membrane, external ear and ear canal normal bilaterally, nose without deformity, nasal mucosa and turbinates normal without polyps  Neck: supple and non-tender without mass, no thyromegaly or thyroid nodules, no cervical lymphadenopathy  Pulmonary/Chest: clear to auscultation bilaterally- no wheezes, rales or rhonchi, normal air movement, no respiratory distress  Cardiovascular: normal rate, regular rhythm, normal S1 and S2, no murmurs, rubs, clicks, or gallops, distal pulses intact, no carotid bruits  Abdomen: soft, non-tender, non-distended, normal bowel sounds, no masses or organomegaly  Extremities: no cyanosis, clubbing or edema  Musculoskeletal: normal range of motion, no joint swelling, deformity or tenderness  Neurologic: reflexes normal and symmetric, no cranial nerve deficit, gait, coordination and speech normal       Allergies   Allergen Reactions    Metronidazole Other (See Comments)     \" caused a prickly feeling in my legs \"    Cetyl Alcohol Hives     Localized reaction by allergy testing    Cetearyl alcohol    Adhesive Tape Other (See Comments)     opsite and paper tape ok, bandaid ok  REDNESS AND TAKES SKIN OFF. PAPER TAPE OK. opsite and paper tape ok, bandaid ok    Cymbalta [Duloxetine Hcl] Nausea And Vomiting    Neosporin [Neomycin-Polymyxin-Gramicidin] Rash     Prior to Visit Medications    Medication Sig Taking?  Authorizing Provider   hydroxychloroquine (PLAQUENIL) 200 MG tablet TAKE 1 TABLET BY MOUTH TWO TIMES A DAY Yes Johnny Rodriguez MD   metoprolol tartrate (LOPRESSOR) 25 MG tablet Take 0.5 tablets by mouth 2 times daily Yes Johnny Rodriguez MD   carbidopa-levodopa (SINEMET)  MG per tablet Take 1 tablet by mouth at bedtime Yes Johnny Rodriguez MD   furosemide (LASIX) 40 MG tablet TAKE 1 TABLET BY MOUTH EVERY DAY Yes Vaibhav Hinson PA-C   fluticasone (CUTIVATE) 0.05 % cream APPLY A SMALL AMOUNT TO OUTER EAR CANALS TWO TIMES A DAY FOR 1 WEEK THEN AS NEEDED FOR ITCHING THEREAFTER Yes Historical Provider, MD   citalopram (CELEXA) 20 MG tablet Take 1 tablet by mouth daily Yes Johnny Rodriguez MD   acetaminophen (TYLENOL) 325 MG tablet Take 650 mg by mouth every 6 hours as needed for Pain Yes Historical Provider, MD   vitamin D (CHOLECALCIFEROL) 25 MCG (1000 UT) TABS tablet Take 1,000 Units by mouth nightly Yes Historical Provider, MD   hydrocortisone (WESTCORT) 0.2 % cream Apply topically 2 times daily as needed Yes Historical Provider, MD   fluocinonide (LIDEX) 0.05 % external solution Apply topically 2 times daily. Yes Kishan Aranda MD   fluticasone (FLONASE) 50 MCG/ACT nasal spray USE 2 SPRAYS IN EACH NOSTRIL ONCE DAILY. NEED TO USE REGULARLY FOR BENEFIT Yes Historical Provider, MD   Multiple Vitamins-Minerals (THERAPEUTIC MULTIVITAMIN-MINERALS) tablet Take 1 tablet by mouth daily Yes Historical Provider, MD   Sod Fluoride-Potassium Nitrate (PREVIDENT 5000 SENSITIVE) 1.1-5 % PSTE Place onto teeth Yes Historical Provider, MD   Magnesium Cl-Calcium Carbonate (SLOW-MAG PO) Take 1 tablet by mouth 2 times daily Yes Historical Provider, MD   aspirin 81 MG EC tablet Take 81 mg by mouth daily  Yes Historical Provider, MD   omeprazole (PRILOSEC) 20 MG capsule Take 20 mg by mouth nightly  Yes Historical Provider, MD [place]ouch (Including outside providers/suppliers regularly involved in providing care):   Patient Care Team:  Kishan Aranda MD as PCP - General (Family Medicine)  Kishan Aranda MD as PCP - REHABILITATION [facility] Empaneled Provider     Reviewed and updated this visit:  Allergies  Meds  Med Hx  Surg Hx  Soc Hx  Fam Hx          Patient tested 28 out of 30 on [facility]. Currently taking his Plaquenil for his stroke and syndrome    Still having vertigo. Has upcoming appointment with ENT but not until September    Patient complaining of still having difficulty sleeping.   We will give trial of trazodone 50 mg at bedtime    Renew medications    Weight Loss discussed

## 2022-07-20 NOTE — PATIENT INSTRUCTIONS
Personalized Preventive Plan for Harlee Mortimer - 7/20/2022  Medicare offers a range of preventive health benefits. Some of the tests and screenings are paid in full while other may be subject to a deductible, co-insurance, and/or copay. Some of these benefits include a comprehensive review of your medical history including lifestyle, illnesses that may run in your family, and various assessments and screenings as appropriate. After reviewing your medical record and screening and assessments performed today your provider may have ordered immunizations, labs, imaging, and/or referrals for you. A list of these orders (if applicable) as well as your Preventive Care list are included within your After Visit Summary for your review. Other Preventive Recommendations:    A preventive eye exam performed by an eye specialist is recommended every 1-2 years to screen for glaucoma; cataracts, macular degeneration, and other eye disorders. A preventive dental visit is recommended every 6 months. Try to get at least 150 minutes of exercise per week or 10,000 steps per day on a pedometer . Order or download the FREE \"Exercise & Physical Activity: Your Everyday Guide\" from [employer]. Call 6-405.292.9050 or search [employer] online. You need 3765-8835 mg of calcium and 5136-9099 IU of vitamin D per day. It is possible to meet your calcium requirement with diet alone, but a vitamin D supplement is usually necessary to meet this goal.  When exposed to the sun, use a sunscreen that protects against both UVA and UVB radiation with an SPF of 30 or greater. Reapply every 2 to 3 hours or after sweating, drying off with a towel, or swimming. Always wear a seat belt when traveling in a car. Always wear a helmet when riding a bicycle or motorcycle.

## 2022-07-31 DIAGNOSIS — R60.0 LOCALIZED EDEMA: ICD-10-CM

## 2022-08-01 DIAGNOSIS — F43.21 ADJUSTMENT DISORDER WITH DEPRESSED MOOD: ICD-10-CM

## 2022-08-01 RX ORDER — CITALOPRAM 20 MG/1
TABLET ORAL
Qty: 30 TABLET | Refills: 0 | Status: SHIPPED | OUTPATIENT
Start: 2022-08-01 | End: 2022-08-29

## 2022-08-01 RX ORDER — FUROSEMIDE 40 MG/1
TABLET ORAL
Qty: 90 TABLET | Refills: 0 | Status: SHIPPED | OUTPATIENT
Start: 2022-08-01 | End: 2022-08-11

## 2022-08-11 DIAGNOSIS — R60.0 LOCALIZED EDEMA: ICD-10-CM

## 2022-08-11 RX ORDER — FUROSEMIDE 40 MG/1
TABLET ORAL
Qty: 90 TABLET | Refills: 0 | Status: SHIPPED | OUTPATIENT
Start: 2022-08-11

## 2022-08-28 DIAGNOSIS — F43.21 ADJUSTMENT DISORDER WITH DEPRESSED MOOD: ICD-10-CM

## 2022-08-28 NOTE — TELEPHONE ENCOUNTER
LAST VISIT:   7/20/2022     Future Appointments   Date Time Provider Lilli Acunai   9/6/2022 11:00 AM Katie Carrasquillo MD [address]   1/16/2023  2:00 PM Torie Villalpando MD [facility] Delfina Trotter

## 2022-08-29 RX ORDER — CITALOPRAM 20 MG/1
TABLET ORAL
Qty: 30 TABLET | Refills: 0 | Status: SHIPPED | OUTPATIENT
Start: 2022-08-29 | End: 2022-10-21

## 2022-09-06 DIAGNOSIS — R27.0 ATAXIA: ICD-10-CM

## 2022-09-06 DIAGNOSIS — M54.16 LUMBAR RADICULAR PAIN: Primary | ICD-10-CM

## 2022-09-06 DIAGNOSIS — M47.817 LUMBOSACRAL SPONDYLOSIS WITHOUT MYELOPATHY: ICD-10-CM

## 2022-10-05 DIAGNOSIS — M19.90 INFLAMMATORY ARTHRITIS: ICD-10-CM

## 2022-10-05 RX ORDER — HYDROXYCHLOROQUINE SULFATE 200 MG/1
200 TABLET, FILM COATED ORAL 2 TIMES DAILY
Qty: 180 TABLET | Refills: 3 | Status: SHIPPED | OUTPATIENT
Start: 2022-10-05

## 2022-10-14 ENCOUNTER — OFFICE VISIT (OUTPATIENT)
Dept: PRIMARY CARE CLINIC | Age: 57
End: 2022-10-14
Payer: MEDICARE

## 2022-10-14 VITALS
BODY MASS INDEX: 35.1 KG/M2 | SYSTOLIC BLOOD PRESSURE: 118 MMHG | WEIGHT: 237 LBS | DIASTOLIC BLOOD PRESSURE: 80 MMHG | OXYGEN SATURATION: 97 % | HEART RATE: 70 BPM | HEIGHT: 69 IN

## 2022-10-14 DIAGNOSIS — R10.31 RLQ ABDOMINAL PAIN: Primary | ICD-10-CM

## 2022-10-14 DIAGNOSIS — M54.2 NECK PAIN: ICD-10-CM

## 2022-10-14 DIAGNOSIS — H43.391 FLOATER, VITREOUS, RIGHT: ICD-10-CM

## 2022-10-14 PROCEDURE — 99214 OFFICE O/P EST MOD 30 MIN: CPT | Performed by: PHYSICIAN ASSISTANT

## 2022-10-14 RX ORDER — CYCLOBENZAPRINE HCL 10 MG
10 TABLET ORAL 3 TIMES DAILY PRN
Qty: 30 TABLET | Refills: 0 | Status: SHIPPED | OUTPATIENT
Start: 2022-10-14 | End: 2022-10-24

## 2022-10-14 RX ORDER — PREDNISONE 20 MG/1
20 TABLET ORAL 2 TIMES DAILY
Qty: 10 TABLET | Refills: 0 | Status: SHIPPED | OUTPATIENT
Start: 2022-10-14 | End: 2022-10-19

## 2022-10-14 ASSESSMENT — ENCOUNTER SYMPTOMS
NAUSEA: 1
DIARRHEA: 0
ABDOMINAL DISTENTION: 1
BACK PAIN: 1
VOMITING: 0
ABDOMINAL PAIN: 1
CONSTIPATION: 0

## 2022-10-14 NOTE — PROGRESS NOTES
[address] [facility] PRIMARY CARE  [address] [place] [address]  Dept: [phone]    Salo Sigala is a 61 y.o. male Established patient, who presents today for his medical conditions/complaints as noted below. Chief Complaint   Patient presents with    Abdominal Pain     Patient is here today c/o right side abdominal pain, back pain and shoulder pain     Back Pain    Shoulder Pain    Eye Problem     Floater       HPI:     HPI: The patient has had on and off pain since Sunday in RLQ. This morning still hurting has been coming and going for 30 min or so tender to touch. No change with BM. Had olgavi syndrome. Since January has had gas increase after that procedure to correct that. Still passing gas. Still has appendix. No fevers. Some stomach upset. No change with food. No blood in stool. Has pain in back and shoulder. Whole side gets worse with laying down. Has had back surgery at [facility] for kyphosis. Needs to follow back up with specialist but spine sugeon has moved locations. Has been getting worse. Was seeing spine surgeon and needs new one. Still having pain in back and spasming. Having floaters in right eye. Waiting on MRI of brain for ataxia seeing neurology. Has not seen othalmology. Started as a dot has gotten slightly bigger and looks like string of fabric floating now. Burning and pain in neck down to shoulder on right side. Worse when getting out of bed or trying to set up. Does not shoot down arm. No numbness or tinging. Has been months. Has gotten worse. Burning pain.      Reviewed prior notes None  Reviewed previous Labs    LDL Cholesterol (mg/dL)   Date Value   06/06/2022 121   02/10/2018 94   07/23/2017 98       (goal LDL is <100)   AST (U/L)   Date Value   06/06/2022 20     ALT (U/L)   Date Value   06/06/2022 15     BUN (mg/dL)   Date Value   06/06/2022 11     Hemoglobin A1C (%)   Date Value   02/10/2018 5.5     TSH (mIU/L)   Date Value   11/04/2021 1.07     BP Readings from Last 3 Encounters:   10/14/22 118/80   07/20/22 116/68   05/12/22 100/60          (goal 120/80)  Hemoglobin A1C   Date Value Ref Range Status   02/10/2018 5.5 4.0 - 6.0 % Final     Past Medical History:   Diagnosis Date    Angular cheilitis     Arthritis     BACK AND FINGERS     Bilateral lower extremity edema 03/2021    started on Lasix per PCP    Chronic back pain     [facility] Debi [facility], scheduled for OR 7/12/2021 at [facility]    Depression     Dizziness     Eczema     Ex-smoker     quit in 2006, smoked for 23 years    GERD (gastroesophageal reflux disease)     H/O chest pain     H/O dermatitis     [place] (hard of hearing)     LEFT EAR WORSE THAN RIGHT. getting hearing aides [place], Dr. Jona Davies    Hypertension     DR Chelsey Montoya PCP    Kyphosis     [facility] OR 7/12/2021 with Dr. Jimenez Saucedo    Left eye injury     [address]    Muscle spasm     LOWER RIGHT BACK    Nocturia     Obesity     Prolonged emergence from general anesthesia     PATIENT STATES HIS B/P WOULD DROP WITH INTUBATION, GO UP AFTER ET TUBE REMOVED. PVC's (premature ventricular contractions)     PVC'S.  NO CARDIOLOGIST, PCP DR Aaron Clarke    Rash     occas to see rheumatoid arthritis  workup for lupus    Sjogren's syndrome ([facility] [address]     Snores     mild, denies apnea, does \"grind\" his teeth    SOB (shortness of breath)     used to see Dr. Ragini Marlow, borderline COPD/ never followed up / never filled scripts for inhalers    Use of cane as ambulatory aid     Vertigo     Dr. Jona Davies    Wears glasses     Wears partial dentures     LOWER      Past Surgical History:   Procedure Laterality Date    BACK SURGERY  2006    cage    COLONOSCOPY      CYST REMOVAL Right     index finger    HEMORRHOID SURGERY  2017    HERNIA REPAIR      UMBILICAL    NOSE SURGERY      REALIGNED NOSE    OTHER SURGICAL HISTORY  02/21/2020    MRI cervical and thoracic spine without contrast . CT of lung , all under general anesthesia    SC COLSC FLX W/REMOVAL LESION BY HOT BX FORCEPS N/A 2017    COLONOSCOPY POLYPECTOMY HOT BIOPSY performed by Duane Joseph MD at [address]  2021    [id number]    TOE SURGERY Right     2nd toe    TONSILLECTOMY      AS A CHILD    VASECTOMY         Family History   Problem Relation Age of Onset    Coronary Art Dis Father         premature-- at 52    Diabetes Father     High Blood Pressure Father     Other Father         COPD, EMPHYSEMA    Coronary Art Dis Paternal Uncle         premature    Heart Failure Paternal Uncle     Diabetes Mother        Social History     Tobacco Use    Smoking status: Former     Packs/day: 1.50     Years: 23.00     Pack years: 34.50     Types: Cigarettes     Start date: 1983     Quit date: 2006     Years since quittin.3    Smokeless tobacco: Former     Types: Chew     Quit date: 9/10/2019    Tobacco comments:     quit 14 years ago   Substance Use Topics    Alcohol use: Not Currently     Alcohol/week: 0.0 standard drinks     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      Current Outpatient Medications   Medication Sig Dispense Refill    cyclobenzaprine (FLEXERIL) 10 MG tablet Take 1 tablet by mouth 3 times daily as needed for Muscle spasms 30 tablet 0    predniSONE (DELTASONE) 20 MG tablet Take 1 tablet by mouth 2 times daily for 5 days 10 tablet 0    hydroxychloroquine (PLAQUENIL) 200 MG tablet Take 1 tablet by mouth 2 times daily 180 tablet 3    citalopram (CELEXA) 20 MG tablet TAKE 1 TABLET BY MOUTH EVERY DAY 30 tablet 0    furosemide (LASIX) 40 MG tablet TAKE 1 TABLET BY MOUTH EVERY DAY 90 tablet 0    traZODone (DESYREL) 50 MG tablet Take 1 tablet by mouth nightly 90 tablet 1    hydroxychloroquine (PLAQUENIL) 200 MG tablet TAKE 1 TABLET BY MOUTH TWO TIMES A  tablet 0    metoprolol tartrate (LOPRESSOR) 25 MG tablet Take 0.5 tablets by mouth 2 times daily 90 tablet 3    carbidopa-levodopa (SINEMET)  MG per tablet Take 1 tablet by mouth at bedtime 90 tablet 3    fluticasone (CUTIVATE) 0.05 % cream APPLY A SMALL AMOUNT TO OUTER EAR CANALS TWO TIMES A DAY FOR 1 WEEK THEN AS NEEDED FOR ITCHING THEREAFTER      acetaminophen (TYLENOL) 325 MG tablet Take 650 mg by mouth every 6 hours as needed for Pain      vitamin D (CHOLECALCIFEROL) 25 MCG (1000 UT) TABS tablet Take 1,000 Units by mouth nightly      hydrocortisone (WESTCORT) 0.2 % cream Apply topically 2 times daily as needed      fluocinonide (LIDEX) 0.05 % external solution Apply topically 2 times daily. 60 mL 0    fluticasone (FLONASE) 50 MCG/ACT nasal spray USE 2 SPRAYS IN EACH NOSTRIL ONCE DAILY. NEED TO USE REGULARLY FOR BENEFIT      Multiple Vitamins-Minerals (THERAPEUTIC MULTIVITAMIN-MINERALS) tablet Take 1 tablet by mouth daily      Sod Fluoride-Potassium Nitrate (PREVIDENT 5000 SENSITIVE) 1.1-5 % PSTE Place onto teeth      Magnesium Cl-Calcium Carbonate (SLOW-MAG PO) Take 1 tablet by mouth 2 times daily      aspirin 81 MG EC tablet Take 81 mg by mouth daily       omeprazole (PRILOSEC) 20 MG capsule Take 20 mg by mouth nightly        No current facility-administered medications for this visit. Allergies   Allergen Reactions    Metronidazole Other (See Comments)     \" caused a prickly feeling in my legs \"    Cetyl Alcohol Hives     Localized reaction by allergy testing    Cetearyl alcohol    Adhesive Tape Other (See Comments)     opsite and paper tape ok, bandaid ok  REDNESS AND TAKES SKIN OFF. PAPER TAPE OK.   opsite and paper tape ok, bandaid ok    Cymbalta [Duloxetine Hcl] Nausea And Vomiting    Neosporin [Neomycin-Polymyxin-Gramicidin] Rash       Health Maintenance   Topic Date Due    HIV screen  Never done    COVID-19 Vaccine (4 - Booster for Moderna series) 04/04/2022    Flu vaccine (1) 08/01/2022    DTaP/Tdap/Td vaccine (2 - Td or Tdap) 01/02/2023    Depression Screen  07/20/2023    Annual Wellness Visit (AWV)  07/21/2023    Lipids  06/06/2027    Colorectal Cancer Screen 07/12/2027    Pneumococcal 0-64 years Vaccine (3 - PPSV23 or PCV20) 10/24/2029    Shingles vaccine  Completed    Hepatitis C screen  Completed    Hepatitis A vaccine  Aged Out    Hib vaccine  Aged Out    Meningococcal (ACWY) vaccine  Aged Out       Subjective:      Review of Systems   Constitutional:  Negative for chills and fever. Eyes:  Positive for visual disturbance (flaoter). Gastrointestinal:  Positive for abdominal distention, abdominal pain and nausea. Negative for constipation, diarrhea and vomiting. Musculoskeletal:  Positive for arthralgias, back pain and neck pain. Skin:  Negative for rash. Objective:     /80   Pulse 70   Ht 5' 9\" (1.753 m)   Wt 237 lb (107.5 kg)   SpO2 97%   BMI 35.00 kg/m²   Physical Exam  Constitutional:       General: He is not in acute distress. Appearance: Normal appearance. He is not ill-appearing. HENT:      Head: Normocephalic and atraumatic. Right Ear: External ear normal.      Left Ear: External ear normal.      Nose: Nose normal.      Mouth/Throat:      Mouth: Mucous membranes are moist.   Eyes:      Extraocular Movements: Extraocular movements intact. Neck:      Vascular: No carotid bruit. Cardiovascular:      Rate and Rhythm: Normal rate and regular rhythm. Pulses: Normal pulses. Heart sounds: Normal heart sounds. Pulmonary:      Effort: Pulmonary effort is normal. No respiratory distress. Breath sounds: Normal breath sounds. Abdominal:      General: Bowel sounds are normal. There is no distension. Tenderness: There is abdominal tenderness (RLQ). Musculoskeletal:         General: Normal range of motion. Cervical back: Normal range of motion and neck supple. Comments: Kyposisis    Lymphadenopathy:      Cervical: No cervical adenopathy. Skin:     General: Skin is dry. Neurological:      Mental Status: He is alert and oriented to person, place, and time.    Psychiatric:         Behavior: Behavior normal.       Assessment and Plan:          1. RLQ abdominal pain  -     CT ABDOMEN PELVIS W IV CONTRAST Additional Contrast? None; Future  -     CBC with Auto Differential; Future  -     Comprehensive Metabolic Panel; Future  2. Floater, vitreous, right  -     External Referral To Ophthalmology  3. Neck pain  -     XR CERVICAL SPINE (2-3 VIEWS); Future  -     cyclobenzaprine (FLEXERIL) 10 MG tablet; Take 1 tablet by mouth 3 times daily as needed for Muscle spasms, Disp-30 tablet, R-0Normal  -     predniSONE (DELTASONE) 20 MG tablet; Take 1 tablet by mouth 2 times daily for 5 days, Disp-10 tablet, R-0Normal     Patient is educated to continue with physical therapy. Patient should follow-up with spine surgeon office at [facility].  X-ray cervical spine along with muscle relaxer and prednisone for possible cervical radiculopathy. Patient may need MRI if symptoms persist after conservative management. Would like to add this on during MRI of brain if possible. Abdominal pain seems to be most bothersome and no symptoms for patient. 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 We will get CT abdomen pelvis to further investigate this. No follow-ups on file. Patient given educational materials - see patient instructions. Discussed use, benefit, and side effects of prescribed medications. All patient questions answered. Pt voiced understanding. Reviewed health maintenance. Instructed to continue current medications, diet and exercise. Patient agreed with treatment plan. Follow up as directed.      Electronically signed by REGAN Mota on 10/14/2022 at 12:19 PM

## 2022-10-18 DIAGNOSIS — R10.31 RLQ ABDOMINAL PAIN: ICD-10-CM

## 2022-10-18 LAB
ABSOLUTE EOS #: <0.03 K/UL (ref 0–0.44)
ABSOLUTE IMMATURE GRANULOCYTE: 0.03 K/UL (ref 0–0.3)
ABSOLUTE LYMPH #: 1.23 K/UL (ref 1.1–3.7)
ABSOLUTE MONO #: 0.67 K/UL (ref 0.1–1.2)
BASOPHILS # BLD: 0 % (ref 0–2)
BASOPHILS ABSOLUTE: 0.05 K/UL (ref 0–0.2)
EOSINOPHILS RELATIVE PERCENT: 0 % (ref 1–4)
HCT VFR BLD CALC: 44.5 % (ref 40.7–50.3)
HEMOGLOBIN: 14 G/DL (ref 13–17)
IMMATURE GRANULOCYTES: 0 %
LYMPHOCYTES # BLD: 10 % (ref 24–43)
MCH RBC QN AUTO: 27.3 PG (ref 25.2–33.5)
MCHC RBC AUTO-ENTMCNC: 31.5 G/DL (ref 28.4–34.8)
MCV RBC AUTO: 86.7 FL (ref 82.6–102.9)
MONOCYTES # BLD: 6 % (ref 3–12)
NRBC AUTOMATED: 0 PER 100 WBC
PDW BLD-RTO: 13.5 % (ref 11.8–14.4)
PLATELET # BLD: 225 K/UL (ref 138–453)
PMV BLD AUTO: 10.3 FL (ref 8.1–13.5)
RBC # BLD: 5.13 M/UL (ref 4.21–5.77)
SEG NEUTROPHILS: 84 % (ref 36–65)
SEGMENTED NEUTROPHILS ABSOLUTE COUNT: 9.92 K/UL (ref 1.5–8.1)
WBC # BLD: 11.9 K/UL (ref 3.5–11.3)

## 2022-10-19 ENCOUNTER — TRANSCRIBE ORDERS (OUTPATIENT)
Dept: ADMINISTRATIVE | Age: 57
End: 2022-10-19

## 2022-10-19 DIAGNOSIS — R26.89 IMBALANCE: ICD-10-CM

## 2022-10-19 DIAGNOSIS — R42 DIZZINESS: ICD-10-CM

## 2022-10-19 DIAGNOSIS — R27.0 ATAXIA: Primary | ICD-10-CM

## 2022-10-19 LAB
ALBUMIN SERPL-MCNC: 4.4 G/DL (ref 3.5–5.2)
ALBUMIN/GLOBULIN RATIO: 1.2 (ref 1–2.5)
ALP BLD-CCNC: 104 U/L (ref 40–129)
ALT SERPL-CCNC: 19 U/L (ref 5–41)
ANION GAP SERPL CALCULATED.3IONS-SCNC: 15 MMOL/L (ref 9–17)
AST SERPL-CCNC: 18 U/L
BILIRUB SERPL-MCNC: 0.2 MG/DL (ref 0.3–1.2)
BUN BLDV-MCNC: 13 MG/DL (ref 6–20)
CALCIUM SERPL-MCNC: 9.2 MG/DL (ref 8.6–10.4)
CHLORIDE BLD-SCNC: 102 MMOL/L (ref 98–107)
CO2: 27 MMOL/L (ref 20–31)
CREAT SERPL-MCNC: 0.58 MG/DL (ref 0.7–1.2)
GFR SERPL CREATININE-BSD FRML MDRD: >60 ML/MIN/1.73M2
GLUCOSE BLD-MCNC: 96 MG/DL (ref 70–99)
POTASSIUM SERPL-SCNC: 4.5 MMOL/L (ref 3.7–5.3)
SODIUM BLD-SCNC: 144 MMOL/L (ref 135–144)
TOTAL PROTEIN: 8 G/DL (ref 6.4–8.3)

## 2022-10-19 NOTE — RESULT ENCOUNTER NOTE
Call and advise patient that the results showed elevated WBC count which can be from steroid but may also be from infectious process.  Watch for any signs of infection and complete testing as ordered if symptoms persist.

## 2022-10-21 DIAGNOSIS — F51.01 PRIMARY INSOMNIA: ICD-10-CM

## 2022-10-21 DIAGNOSIS — F43.21 ADJUSTMENT DISORDER WITH DEPRESSED MOOD: ICD-10-CM

## 2022-10-21 RX ORDER — TRAZODONE HYDROCHLORIDE 50 MG/1
TABLET ORAL
Qty: 90 TABLET | Refills: 0 | Status: SHIPPED | OUTPATIENT
Start: 2022-10-21 | End: 2022-11-02 | Stop reason: SDUPTHER

## 2022-10-21 RX ORDER — CITALOPRAM 20 MG/1
TABLET ORAL
Qty: 30 TABLET | Refills: 0 | Status: SHIPPED | OUTPATIENT
Start: 2022-10-21

## 2022-10-28 DIAGNOSIS — F43.21 ADJUSTMENT DISORDER WITH DEPRESSED MOOD: ICD-10-CM

## 2022-10-31 RX ORDER — CITALOPRAM 20 MG/1
TABLET ORAL
Qty: 30 TABLET | Refills: 0 | OUTPATIENT
Start: 2022-10-31

## 2022-11-02 DIAGNOSIS — F51.01 PRIMARY INSOMNIA: ICD-10-CM

## 2022-11-02 RX ORDER — TRAZODONE HYDROCHLORIDE 50 MG/1
50 TABLET ORAL 2 TIMES DAILY
Qty: 180 TABLET | Refills: 3 | Status: SHIPPED | OUTPATIENT
Start: 2022-11-02

## 2022-11-02 NOTE — TELEPHONE ENCOUNTER
Patient has increased to twice daily, prescription was sent as once daily. Please send twice daily to [facility].

## 2022-11-07 DIAGNOSIS — R60.0 LOCALIZED EDEMA: ICD-10-CM

## 2022-11-07 DIAGNOSIS — F43.21 ADJUSTMENT DISORDER WITH DEPRESSED MOOD: ICD-10-CM

## 2022-11-07 RX ORDER — FUROSEMIDE 40 MG/1
TABLET ORAL
Qty: 90 TABLET | Refills: 0 | Status: SHIPPED | OUTPATIENT
Start: 2022-11-07

## 2022-11-07 RX ORDER — CITALOPRAM 20 MG/1
TABLET ORAL
Qty: 30 TABLET | Refills: 0 | OUTPATIENT
Start: 2022-11-07

## 2022-11-17 RX ORDER — LORAZEPAM 1 MG/1
1 TABLET ORAL EVERY 6 HOURS PRN
COMMUNITY

## 2022-11-17 RX ORDER — LORATADINE 10 MG/1
10 TABLET ORAL DAILY
COMMUNITY

## 2022-11-18 ENCOUNTER — ANESTHESIA EVENT (OUTPATIENT)
Dept: MRI IMAGING | Age: 57
End: 2022-11-18

## 2022-11-18 ENCOUNTER — HOSPITAL ENCOUNTER (OUTPATIENT)
Dept: MRI IMAGING | Age: 57
Discharge: HOME | End: 2022-11-20
Payer: MEDICARE

## 2022-11-18 ENCOUNTER — HOSPITAL ENCOUNTER (OUTPATIENT)
Dept: CT IMAGING | Age: 57
Discharge: HOME | End: 2022-11-20
Payer: MEDICARE

## 2022-11-18 ENCOUNTER — ANESTHESIA (OUTPATIENT)
Dept: MRI IMAGING | Age: 57
End: 2022-11-18

## 2022-11-18 VITALS
HEART RATE: 72 BPM | WEIGHT: 234.57 LBS | SYSTOLIC BLOOD PRESSURE: 131 MMHG | RESPIRATION RATE: 16 BRPM | OXYGEN SATURATION: 99 % | BODY MASS INDEX: 34.74 KG/M2 | HEIGHT: 69 IN | DIASTOLIC BLOOD PRESSURE: 68 MMHG | TEMPERATURE: 96.8 F

## 2022-11-18 DIAGNOSIS — R26.89 IMBALANCE: ICD-10-CM

## 2022-11-18 DIAGNOSIS — R10.31 RLQ ABDOMINAL PAIN: ICD-10-CM

## 2022-11-18 DIAGNOSIS — R27.0 ATAXIA: ICD-10-CM

## 2022-11-18 DIAGNOSIS — R42 DIZZINESS: ICD-10-CM

## 2022-11-18 LAB
EKG ATRIAL RATE: 71 BPM
EKG P AXIS: 31 DEGREES
EKG P-R INTERVAL: 168 MS
EKG Q-T INTERVAL: 424 MS
EKG QRS DURATION: 104 MS
EKG QTC CALCULATION (BAZETT): 460 MS
EKG R AXIS: 12 DEGREES
EKG T AXIS: 31 DEGREES
EKG VENTRICULAR RATE: 71 BPM

## 2022-11-18 PROCEDURE — 7100000010 HC PHASE II RECOVERY - FIRST 15 MIN

## 2022-11-18 PROCEDURE — 3700000000 HC ANESTHESIA ATTENDED CARE

## 2022-11-18 PROCEDURE — 6360000004 HC RX CONTRAST MEDICATION: Performed by: PHYSICIAN ASSISTANT

## 2022-11-18 PROCEDURE — 2580000003 HC RX 258: Performed by: NURSE ANESTHETIST, CERTIFIED REGISTERED

## 2022-11-18 PROCEDURE — 93010 ELECTROCARDIOGRAM REPORT: CPT | Performed by: INTERNAL MEDICINE

## 2022-11-18 PROCEDURE — 6360000004 HC RX CONTRAST MEDICATION: Performed by: PSYCHIATRY & NEUROLOGY

## 2022-11-18 PROCEDURE — 93005 ELECTROCARDIOGRAM TRACING: CPT | Performed by: STUDENT IN AN ORGANIZED HEALTH CARE EDUCATION/TRAINING PROGRAM

## 2022-11-18 PROCEDURE — 6360000002 HC RX W HCPCS: Performed by: NURSE ANESTHETIST, CERTIFIED REGISTERED

## 2022-11-18 PROCEDURE — 3700000001 HC ADD 15 MINUTES (ANESTHESIA)

## 2022-11-18 PROCEDURE — 2500000003 HC RX 250 WO HCPCS: Performed by: NURSE ANESTHETIST, CERTIFIED REGISTERED

## 2022-11-18 PROCEDURE — 7100000011 HC PHASE II RECOVERY - ADDTL 15 MIN

## 2022-11-18 PROCEDURE — 2580000003 HC RX 258: Performed by: PHYSICIAN ASSISTANT

## 2022-11-18 PROCEDURE — 70553 MRI BRAIN STEM W/O & W/DYE: CPT

## 2022-11-18 PROCEDURE — 74177 CT ABD & PELVIS W/CONTRAST: CPT

## 2022-11-18 PROCEDURE — A9579 GAD-BASE MR CONTRAST NOS,1ML: HCPCS | Performed by: PSYCHIATRY & NEUROLOGY

## 2022-11-18 RX ORDER — SODIUM CHLORIDE 0.9 % (FLUSH) 0.9 %
10 SYRINGE (ML) INJECTION PRN
Status: DISCONTINUED | OUTPATIENT
Start: 2022-11-18 | End: 2022-11-21 | Stop reason: HOSPADM

## 2022-11-18 RX ORDER — PHENYLEPHRINE HCL IN 0.9% NACL 0.5 MG/5ML
SYRINGE (ML) INTRAVENOUS PRN
Status: DISCONTINUED | OUTPATIENT
Start: 2022-11-18 | End: 2022-11-18 | Stop reason: SDUPTHER

## 2022-11-18 RX ORDER — SODIUM CHLORIDE, SODIUM LACTATE, POTASSIUM CHLORIDE, CALCIUM CHLORIDE 600; 310; 30; 20 MG/100ML; MG/100ML; MG/100ML; MG/100ML
INJECTION, SOLUTION INTRAVENOUS CONTINUOUS PRN
Status: DISCONTINUED | OUTPATIENT
Start: 2022-11-18 | End: 2022-11-18 | Stop reason: SDUPTHER

## 2022-11-18 RX ORDER — SODIUM CHLORIDE 0.9 % (FLUSH) 0.9 %
10 SYRINGE (ML) INJECTION 2 TIMES DAILY
Status: DISCONTINUED | OUTPATIENT
Start: 2022-11-18 | End: 2022-11-21 | Stop reason: HOSPADM

## 2022-11-18 RX ORDER — FENTANYL CITRATE 50 UG/ML
100 INJECTION, SOLUTION INTRAMUSCULAR; INTRAVENOUS ONCE
Status: DISCONTINUED | OUTPATIENT
Start: 2022-11-18 | End: 2022-11-21 | Stop reason: HOSPADM

## 2022-11-18 RX ORDER — MIDAZOLAM HYDROCHLORIDE 2 MG/2ML
2 INJECTION, SOLUTION INTRAMUSCULAR; INTRAVENOUS ONCE
Status: DISCONTINUED | OUTPATIENT
Start: 2022-11-18 | End: 2022-11-21 | Stop reason: HOSPADM

## 2022-11-18 RX ORDER — LIDOCAINE HYDROCHLORIDE 10 MG/ML
INJECTION, SOLUTION EPIDURAL; INFILTRATION; INTRACAUDAL; PERINEURAL PRN
Status: DISCONTINUED | OUTPATIENT
Start: 2022-11-18 | End: 2022-11-18 | Stop reason: SDUPTHER

## 2022-11-18 RX ORDER — 0.9 % SODIUM CHLORIDE 0.9 %
100 INTRAVENOUS SOLUTION INTRAVENOUS ONCE
Status: COMPLETED | OUTPATIENT
Start: 2022-11-18 | End: 2022-11-18

## 2022-11-18 RX ORDER — PROPOFOL 10 MG/ML
INJECTION, EMULSION INTRAVENOUS CONTINUOUS PRN
Status: DISCONTINUED | OUTPATIENT
Start: 2022-11-18 | End: 2022-11-18 | Stop reason: SDUPTHER

## 2022-11-18 RX ORDER — KETAMINE HYDROCHLORIDE 100 MG/ML
INJECTION, SOLUTION INTRAMUSCULAR; INTRAVENOUS PRN
Status: DISCONTINUED | OUTPATIENT
Start: 2022-11-18 | End: 2022-11-18 | Stop reason: SDUPTHER

## 2022-11-18 RX ORDER — MIDAZOLAM HYDROCHLORIDE 1 MG/ML
INJECTION INTRAMUSCULAR; INTRAVENOUS PRN
Status: DISCONTINUED | OUTPATIENT
Start: 2022-11-18 | End: 2022-11-18 | Stop reason: SDUPTHER

## 2022-11-18 RX ADMIN — MIDAZOLAM 2 MG: 1 INJECTION INTRAMUSCULAR; INTRAVENOUS at 12:20

## 2022-11-18 RX ADMIN — Medication 200 MCG: at 12:50

## 2022-11-18 RX ADMIN — KETAMINE HYDROCHLORIDE 70 MG: 100 INJECTION, SOLUTION, CONCENTRATE INTRAMUSCULAR; INTRAVENOUS at 12:20

## 2022-11-18 RX ADMIN — GADOTERIDOL 20 ML: 279.3 INJECTION, SOLUTION INTRAVENOUS at 13:33

## 2022-11-18 RX ADMIN — IOPAMIDOL 75 ML: 755 INJECTION, SOLUTION INTRAVENOUS at 15:39

## 2022-11-18 RX ADMIN — Medication 200 MCG: at 12:58

## 2022-11-18 RX ADMIN — MIDAZOLAM 2 MG: 1 INJECTION INTRAMUSCULAR; INTRAVENOUS at 12:10

## 2022-11-18 RX ADMIN — Medication 100 MCG: at 12:46

## 2022-11-18 RX ADMIN — LIDOCAINE HYDROCHLORIDE 50 MG: 10 INJECTION, SOLUTION EPIDURAL; INFILTRATION; INTRACAUDAL; PERINEURAL at 12:20

## 2022-11-18 RX ADMIN — Medication 10 ML: at 15:40

## 2022-11-18 RX ADMIN — SODIUM CHLORIDE 100 ML: 9 INJECTION, SOLUTION INTRAVENOUS at 15:39

## 2022-11-18 RX ADMIN — SODIUM CHLORIDE, POTASSIUM CHLORIDE, SODIUM LACTATE AND CALCIUM CHLORIDE: 600; 310; 30; 20 INJECTION, SOLUTION INTRAVENOUS at 11:35

## 2022-11-18 RX ADMIN — PROPOFOL 125 MCG/KG/MIN: 10 INJECTION, EMULSION INTRAVENOUS at 12:20

## 2022-11-18 ASSESSMENT — PAIN - FUNCTIONAL ASSESSMENT
PAIN_FUNCTIONAL_ASSESSMENT: 0-10
PAIN_FUNCTIONAL_ASSESSMENT: PREVENTS OR INTERFERES WITH MANY ACTIVE NOT PASSIVE ACTIVITIES

## 2022-11-18 ASSESSMENT — PAIN SCALES - GENERAL
PAINLEVEL_OUTOF10: 0
PAINLEVEL_OUTOF10: 6
PAINLEVEL_OUTOF10: 0

## 2022-11-18 ASSESSMENT — ENCOUNTER SYMPTOMS: SHORTNESS OF BREATH: 1

## 2022-11-18 ASSESSMENT — PAIN DESCRIPTION - LOCATION: LOCATION: BACK

## 2022-11-18 NOTE — H&P
History and Physical    Pt Name: Chelsey Tovar  MRN: [id number]  YOB: 1965  Date of evaluation: 11/18/2022  Primary Care Physician: Graciela Lewis MD    SUBJECTIVE:   History of Chief Complaint:    Chelsey Tovar is a 61 y.o. male who is scheduled today for MRI with ANESTHESIA. Patient reports history of back surgery about 1.5 years ago with continued back pain. He states his limitation is limited, walking is difficult due to back pain. He follows with  spine team, last visit 7/2022. Allergies  is allergic to cetyl alcohol, metronidazole, adhesive tape, cymbalta [duloxetine hcl], and neosporin [neomycin-polymyxin-gramicidin]. Medications  Prior to Admission medications    Medication Sig Start Date End Date Taking? Authorizing Provider   furosemide (LASIX) 40 MG tablet TAKE 1 TABLET BY MOUTH EVERY DAY 11/7/22   Graciela Lewis MD   LORazepam (ATIVAN) 1 MG tablet Take 1 mg by mouth every 6 hours as needed for Anxiety.     Historical Provider, MD   loratadine (CLARITIN) 10 MG tablet Take 10 mg by mouth daily    Historical Provider, MD   Acetaminophen (TYLENOL ARTHRITIS PAIN PO) Take 1 tablet by mouth every 6 hours as needed    Historical Provider, MD   traZODone (DESYREL) 50 MG tablet Take 1 tablet by mouth in the morning and at bedtime  Patient taking differently: Take 100 mg by mouth nightly 11/2/22   Graciela Lewis MD   citalopram (CELEXA) 20 MG tablet TAKE 1 TABLET BY MOUTH EVERY DAY 10/21/22   Graciela Lewis MD   hydroxychloroquine (PLAQUENIL) 200 MG tablet Take 1 tablet by mouth 2 times daily 10/5/22   Graciela Lewis MD   metoprolol tartrate (LOPRESSOR) 25 MG tablet Take 0.5 tablets by mouth 2 times daily 5/16/22   Graciela Lewis MD   carbidopa-levodopa (SINEMET)  MG per tablet Take 1 tablet by mouth at bedtime 5/12/22   Graciela Lewis MD   vitamin D (CHOLECALCIFEROL) 25 MCG (1000 UT) TABS tablet Take 1,000 Units by mouth nightly    Historical Provider, MD   hydrocortisone (WESTCORT) 0.2 % cream Apply topically 2 times daily as needed    Historical Provider, MD   fluticasone (FLONASE) 50 MCG/ACT nasal spray USE 2 SPRAYS IN EACH NOSTRIL ONCE DAILY. NEED TO USE REGULARLY FOR BENEFIT 4/30/21   Historical Provider, MD   Multiple Vitamins-Minerals (THERAPEUTIC MULTIVITAMIN-MINERALS) tablet Take 1 tablet by mouth daily    Historical Provider, MD   Sod Fluoride-Potassium Nitrate (PREVIDENT 5000 SENSITIVE) 1.1-5 % PSTE Place onto teeth    Historical Provider, MD   Magnesium Cl-Calcium Carbonate (SLOW-MAG PO) Take 1 tablet by mouth 2 times daily    Historical Provider, MD   aspirin 81 MG EC tablet Take 81 mg by mouth daily     Historical Provider, MD   omeprazole (PRILOSEC) 20 MG capsule Take 20 mg by mouth nightly     Historical Provider, MD     Past Medical History    has a past medical history of Angular cheilitis, Arthritis, Bilateral lower extremity edema, Chronic back pain, Depression, Dizziness, Eczema, Ex-smoker, Floaters in visual field, bilateral, GERD (gastroesophageal reflux disease), H/O chest pain, H/O dermatitis, History of blood transfusion, History of panic attacks, [place] (hard of hearing), Hypertension, Impaired mobility, Kyphosis, Left eye injury, Muscle spasm, Nocturia, Obesity, Prolonged emergence from general anesthesia, PVC's (premature ventricular contractions), Rash, Sjogren's syndrome ([facility] [address] Snores, SOB (shortness of breath), Use of cane as ambulatory aid, Vertigo, Wears glasses, and Wears partial dentures. Past Surgical History   has a past surgical history that includes Colonoscopy; Hemorrhoid surgery (2017); Nose surgery; Vasectomy; Toe Surgery (Right); cyst removal (Right); pr colsc flx w/removal lesion by hot bx forceps (N/A, 07/12/2017); back surgery (2006); hernia repair; Tonsillectomy; other surgical history (02/21/2020); and Spinal fusion (07/12/2021). Social History   reports that he quit smoking about 16 years ago.  His smoking MOUTH/THROAT:  Mucous membranes pink and moist, uvula midline, teeth appear to be intact. NECK: Supple, no lymphadenopathy. LUNGS: Respirations even and non-labored. Clear to auscultation bilaterally, no wheezes, rales, or rhonchi. CARDIOVASCULAR: Regular rate and rhythm, no murmurs/rubs/gallops. ABDOMEN: soft, non-tender and non-distended, bowel sounds active x 4. EXTREMITIES: No edema to bilateral lower extremities. No varicosities to bilateral lower extremities. NEUROLOGIC: CN II-XII are grossly intact. Gait not assessed  IMPRESSIONS:   Ataxia   PLANS:   MRI with ANESTHESIA.      JYOTSNA Rivera CNP   Electronically signed 11/18/2022 at 10:33 AM

## 2022-11-18 NOTE — ADDENDUM NOTE
Addendum  created 11/18/22 1440 by Mell Barker MD    Order list changed, Pharmacy for encounter modified

## 2022-11-18 NOTE — ANESTHESIA POSTPROCEDURE EVALUATION
POST- ANESTHESIA EVALUATION       Pt Name: Gretchen Melissa  MRN: [id number]  [place]: 1965  Date of evaluation: 11/18/2022  Time:  1:52 PM      /74   Pulse 71   Temp 97 °F (36.1 °C) (Temporal)   Resp 20   Ht 5' 9\" (1.753 m)   Wt 234 lb 9.1 oz (106.4 kg)   SpO2 96%   BMI 34.64 kg/m²      Consciousness Level  Awake  Cardiopulmonary Status  Stable  Pain Adequately Treated YES  Nausea / Vomiting  NO  Adequate Hydration  YES  Anesthesia Related Complications NONE      Electronically signed by Alyssa Iraheta MD on 11/18/2022 at 1:52 PM       Department of Anesthesiology  Postprocedure Note    Patient: Gretchen Melissa  MRN: [id number]  [place]: 1965  Date of evaluation: 11/18/2022      Procedure Summary     Date: 11/18/22 Room / Location: [address] [place] [address]    Anesthesia Start: 1210 Anesthesia Stop:     Procedure: MRI BRAIN W WO CONTRAST Diagnosis:       Ataxia      Dizziness      Imbalance    Scheduled Providers:  Responsible Provider: Alyssa Iraheta MD    Anesthesia Type: MAC, general ASA Status: 3          Anesthesia Type: No value filed.     Marci Phase I:      Marci Phase II:        Anesthesia Post Evaluation

## 2022-11-18 NOTE — ANESTHESIA PRE PROCEDURE
Department of Anesthesiology  Preprocedure Note       Name:  Donny Peer   Age:  61 y.o.  :  1965                                          MRN:  [id number]         Date:  2022      Surgeon: * No surgeons listed *    Procedure: * No procedures listed *    Medications prior to admission:   Prior to Admission medications    Medication Sig Start Date End Date Taking? Authorizing Provider   furosemide (LASIX) 40 MG tablet TAKE 1 TABLET BY MOUTH EVERY DAY 22   Yane Ramírez MD   LORazepam (ATIVAN) 1 MG tablet Take 1 mg by mouth every 6 hours as needed for Anxiety. Historical Provider, MD   loratadine (CLARITIN) 10 MG tablet Take 10 mg by mouth daily    Historical Provider, MD   Acetaminophen (TYLENOL ARTHRITIS PAIN PO) Take 1 tablet by mouth every 6 hours as needed    Historical Provider, MD   traZODone (DESYREL) 50 MG tablet Take 1 tablet by mouth in the morning and at bedtime  Patient taking differently: Take 100 mg by mouth nightly 22   Yane Ramírez MD   citalopram (CELEXA) 20 MG tablet TAKE 1 TABLET BY MOUTH EVERY DAY 10/21/22   Yane Ramírez MD   hydroxychloroquine (PLAQUENIL) 200 MG tablet Take 1 tablet by mouth 2 times daily 10/5/22   Yane Ramírez MD   metoprolol tartrate (LOPRESSOR) 25 MG tablet Take 0.5 tablets by mouth 2 times daily 22   Yane Ramírez MD   carbidopa-levodopa (SINEMET)  MG per tablet Take 1 tablet by mouth at bedtime 22   Yane Ramírez MD   vitamin D (CHOLECALCIFEROL) 25 MCG (1000 UT) TABS tablet Take 1,000 Units by mouth nightly    Historical Provider, MD   hydrocortisone (WESTCORT) 0.2 % cream Apply topically 2 times daily as needed    Historical Provider, MD   fluticasone (FLONASE) 50 MCG/ACT nasal spray USE 2 SPRAYS IN EACH NOSTRIL ONCE DAILY.  NEED TO USE REGULARLY FOR BENEFIT 21   Historical Provider, MD   Multiple Vitamins-Minerals (THERAPEUTIC MULTIVITAMIN-MINERALS) tablet Take 1 tablet by mouth daily    Historical Provider, MD   Sod Fluoride-Potassium Nitrate (PREVIDENT 5000 SENSITIVE) 1.1-5 % PSTE Place onto teeth    Historical Provider, MD   Magnesium Cl-Calcium Carbonate (SLOW-MAG PO) Take 1 tablet by mouth 2 times daily    Historical Provider, MD   aspirin 81 MG EC tablet Take 81 mg by mouth daily     Historical Provider, MD   omeprazole (PRILOSEC) 20 MG capsule Take 20 mg by mouth nightly     Historical Provider, MD       Current medications:    Current Outpatient Medications   Medication Sig Dispense Refill    furosemide (LASIX) 40 MG tablet TAKE 1 TABLET BY MOUTH EVERY DAY 90 tablet 0    LORazepam (ATIVAN) 1 MG tablet Take 1 mg by mouth every 6 hours as needed for Anxiety.  loratadine (CLARITIN) 10 MG tablet Take 10 mg by mouth daily      Acetaminophen (TYLENOL ARTHRITIS PAIN PO) Take 1 tablet by mouth every 6 hours as needed      traZODone (DESYREL) 50 MG tablet Take 1 tablet by mouth in the morning and at bedtime (Patient taking differently: Take 100 mg by mouth nightly) 180 tablet 3    citalopram (CELEXA) 20 MG tablet TAKE 1 TABLET BY MOUTH EVERY DAY 30 tablet 0    hydroxychloroquine (PLAQUENIL) 200 MG tablet Take 1 tablet by mouth 2 times daily 180 tablet 3    metoprolol tartrate (LOPRESSOR) 25 MG tablet Take 0.5 tablets by mouth 2 times daily 90 tablet 3    carbidopa-levodopa (SINEMET)  MG per tablet Take 1 tablet by mouth at bedtime 90 tablet 3    vitamin D (CHOLECALCIFEROL) 25 MCG (1000 UT) TABS tablet Take 1,000 Units by mouth nightly      hydrocortisone (WESTCORT) 0.2 % cream Apply topically 2 times daily as needed      fluticasone (FLONASE) 50 MCG/ACT nasal spray USE 2 SPRAYS IN EACH NOSTRIL ONCE DAILY.  NEED TO USE REGULARLY FOR BENEFIT      Multiple Vitamins-Minerals (THERAPEUTIC MULTIVITAMIN-MINERALS) tablet Take 1 tablet by mouth daily      Sod Fluoride-Potassium Nitrate (PREVIDENT 5000 SENSITIVE) 1.1-5 % PSTE Place onto teeth      Magnesium Cl-Calcium Carbonate (SLOW-MAG PO) Take 1 tablet by mouth 2 times daily      aspirin 81 MG EC tablet Take 81 mg by mouth daily       omeprazole (PRILOSEC) 20 MG capsule Take 20 mg by mouth nightly        No current facility-administered medications for this encounter. Allergies: Allergies   Allergen Reactions    Cetyl Alcohol Hives     Localized reaction by allergy testing    Cetearyl alcohol    Metronidazole Other (See Comments)     \" caused a prickly feeling in my legs \"    Adhesive Tape Other (See Comments)     opsite and paper tape ok, bandaid ok  REDNESS AND TAKES SKIN OFF. PAPER TAPE OK.   opsite and paper tape ok, bandaid ok    Cymbalta [Duloxetine Hcl] Nausea And Vomiting    Neosporin [Neomycin-Polymyxin-Gramicidin] Rash       Problem List:    Patient Active Problem List   Diagnosis Code    Anxiety disorder F41.9    Benign essential hypertension I10    Chronic obstructive pulmonary disease, unspecified COPD type ([address] J44.9    Esophageal reflux K21.9    Hypogonadism male E29.1    Inflammatory arthritis M19.90    Lumbar radicular pain M54.16    Pulmonary granuloma ([address] J84.10    Male erectile disorder N52.9    Sensorineural hearing loss H90.5    Tinnitus of both ears H93.13    Vertigo R42    Psoriasis L40.9    Hip impingement syndrome M25.859    Seborrheic keratosis L82.1    Kyphosis of thoracolumbar region M40.205    Eczema L30.9    Sjogren's syndrome ([address] M35.00    Spinal stenosis of lumbar region with neurogenic claudication M48.062    Lumbosacral spondylosis without myelopathy M47.817    Diffuse idiopathic skeletal hyperostosis M48.10    History of lumbar fusion Z98.1    S/P fusion of thoracic spine Z98.1    Ileus (HCC) K56.7    [place]'s syndrome K59.81    Physical deconditioning R53.81       Past Medical History:        Diagnosis Date    Angular cheilitis     Arthritis     BACK AND FINGERS     Bilateral lower extremity edema 03/2021    started on Lasix per PCP    Chronic back pain     dr Pam Pino of [address], scheduled [facility] at [facility]    Depression     Dizziness     Eczema     Ex-smoker     quit in 2006, smoked for 23 years    Floaters in visual field, bilateral     GERD (gastroesophageal reflux disease)     H/O chest pain     H/O dermatitis     History of blood transfusion     History of panic attacks     [place] (hard of hearing)     LEFT EAR WORSE THAN RIGHT. getting hearing aides [place], Dr. Joshua Nunez Hypertension     DR Shavon Garcia PCP    Impaired mobility     occas uses cane    Kyphosis     [facility] OR 7/12/2021 with Dr. Kaleigh Lee cant lie flat,severe back pain    Left eye injury     [address] [facility] [address] LEFT EYE    Muscle spasm     LOWER RIGHT BACK    Nocturia     Obesity     Prolonged emergence from general anesthesia     PATIENT STATES HIS B/P WOULD DROP WITH INTUBATION, GO UP AFTER ET TUBE REMOVED.  PVC's (premature ventricular contractions)     PVC'S.  NO CARDIOLOGIST, PCP DR Medhat Anna     occas to see rheumatoid arthritis  workup for lupus    S[facility]ogren's syndrome ([facility]     Snores     mild, denies apnea, does \"grind\" his teeth    SOB (shortness of breath)     used to see Dr. Ness Headley, borderline COPD/ never followed up / never filled scripts for inhalers    Use of cane as ambulatory aid     Vertigo     Dr. Joshua Nunez Wears glasses     Wears partial dentures     LOWER       Past Surgical History:        Procedure Laterality Date    BACK SURGERY  2006    cage    COLONOSCOPY      CYST REMOVAL Right     index finger    HEMORRHOID SURGERY  2017    HERNIA REPAIR      UMBILICAL    NOSE SURGERY      REALIGNED NOSE    OTHER SURGICAL HISTORY  02/21/2020    MRI cervical and thoracic spine without contrast . CT of lung , all under general anesthesia    HI COLSC FLX W/REMOVAL LESION BY HOT BX FORCEPS N/A 07/12/2017    COLONOSCOPY POLYPECTOMY HOT BIOPSY performed by Jonny Herrera MD at [facility] 10/18/2022 03:21 PM    ALT 19 10/18/2022 03:21 PM       POC Tests: No results for input(s): POCGLU, POCNA, POCK, POCCL, POCBUN, POCHEMO, POCHCT in the last 72 hours. Coags: No results found for: PROTIME, INR, APTT    HCG (If Applicable): No results found for: PREGTESTUR, PREGSERUM, HCG, HCGQUANT     ABGs: No results found for: PHART, PO2ART, ZPR2RQH, AQL8LHQ, BEART, I2QWQQKU     Type & Screen (If Applicable):  No results found for: LABABO, LABRH    Drug/Infectious Status (If Applicable):  Lab Results   Component Value Date/Time    HEPCAB NONREACTIVE 07/23/2017 09:57 AM       COVID-19 Screening (If Applicable):   Lab Results   Component Value Date/Time    COVID19 Not Detected 03/05/2021 02:45 AM           Anesthesia Evaluation  Patient summary reviewed and Nursing notes reviewed no history of anesthetic complications:   Airway: Mallampati: IV  TM distance: >3 FB   Neck ROM: limited  Mouth opening: > = 3 FB   Dental:    (+) partials      Pulmonary:normal exam    (+) COPD:  shortness of breath:                            ROS comment: 34.5 pack year smoker quit May 2006   Cardiovascular:    (+) hypertension:,                   Neuro/Psych:   (+) depression/anxiety              ROS comment: Chronic back pain GI/Hepatic/Renal:   (+) GERD:,           Endo/Other:    (+) : arthritis:., .                  ROS comment: Sjogren's  Psoriasis Abdominal:             Vascular: Other Findings:           Anesthesia Plan      MAC and general     ASA 3       Induction: intravenous. MIPS: Postoperative opioids intended and Prophylactic antiemetics administered. Anesthetic plan and risks discussed with patient. Plan discussed with CRNA.                     Niya Clancy MD   11/18/2022

## 2022-11-21 DIAGNOSIS — F43.21 ADJUSTMENT DISORDER WITH DEPRESSED MOOD: ICD-10-CM

## 2022-11-21 NOTE — RESULT ENCOUNTER NOTE
Call and advise patient that the results showed gallstones and possible blood in bladder. Please see if patient having pain in abd after meals and if he is having blood in the urine. I will place order for urinalysis for patient.

## 2022-11-22 RX ORDER — CITALOPRAM 20 MG/1
TABLET ORAL
Qty: 30 TABLET | Refills: 0 | Status: SHIPPED | OUTPATIENT
Start: 2022-11-22

## 2022-11-25 ENCOUNTER — HOSPITAL ENCOUNTER (OUTPATIENT)
Dept: ULTRASOUND IMAGING | Age: 57
Discharge: HOME | End: 2022-11-27
Payer: MEDICARE

## 2022-11-25 DIAGNOSIS — R10.31 RLQ ABDOMINAL PAIN: ICD-10-CM

## 2022-11-25 PROCEDURE — 76770 US EXAM ABDO BACK WALL COMP: CPT

## 2022-11-28 NOTE — RESULT ENCOUNTER NOTE
No need for U/S galbladder as patient had CT already which showed galstones. Only needs to see surgery if having RUQ pain after meals with this.

## 2022-12-02 DIAGNOSIS — F43.21 ADJUSTMENT DISORDER WITH DEPRESSED MOOD: ICD-10-CM

## 2022-12-02 RX ORDER — CITALOPRAM 20 MG/1
TABLET ORAL
Qty: 30 TABLET | Refills: 0 | OUTPATIENT
Start: 2022-12-02

## 2022-12-05 NOTE — H&P
HISTORY and [facility] [address] [address]       NAME:  Brigid Rodriguez  MRN: [id number]   YOB: 1965   Date: 12/6/2022   Age: 61 y.o. Gender: male       COMPLAINT AND PRESENT HISTORY:     Brigid Rodriguez is 61 y.o.,  male, here for HAMMERTOE LEFT FOOT 2ND, 3RD & 4TH TOES. Patient will be having:  HAMMERTOE CORRECTION 2ND, 3RD & 4TH TOES- LEFT    Patient states that he has been dealing with hammer toes since he was a kid, and sought into getting help in his late 19's,  pt is s/p hammer toe correction done in 1994. Patient states that it hurts just to wear to shoes. Pt states that he was prescribed rubber toe sleeves,  pt states they soon wore out and ripped. Patient had manifestations of toes that doesn't flatten and more susceptible to irritation with shoes d/t constant rubbing at joints . Pt states even his bed covers irritate him. Patient states that walking also aggravates his sxs. Pt complains of pain at 6/10, that is constant . Patient has tried rubber sleeves and just taking his shoes and socks off help with sxs. No recent falls or trauma. Pt reports  redness and swelling through out the day    Pt denies any other symptoms. Significant medical history:   Hypertension, PVC, SOB     Associated medications:  Lopressor. Patient does admits to SOB, and ocassional fluttering of heart    Patient voices feeling well today. Denies any recent fever or chills, chest pain or SOB. Functional Capacity per patient:              1. Patient is not able to walk 2 city blocks on level ground without SOB. 2. Patient is not able to climb 2 flights of stairs without SOB. Patient denies any personal history of blood clots. Patient has hx of prolonged emergence from general anesthesia.        PAST MEDICAL HISTORY     Past Medical History:   Diagnosis Date    Angular cheilitis     Arthritis     BACK AND FINGERS     Bilateral lower extremity edema 03/2021    started on Lasix per PCP    Chronic back pain     dr Rebecca Chapin of [facility], scheduled for OR 7/12/2021 at [facility]    Depression     Dizziness     Eczema     Ex-smoker     quit in 2006, smoked for 23 years    Floaters in visual field, bilateral     GERD (gastroesophageal reflux disease)     H/O chest pain     H/O dermatitis     History of blood transfusion     History of panic attacks     [place] (hard of hearing)     LEFT EAR WORSE THAN RIGHT. getting hearing aides eloisa, Dr. Kevin Escalante    Hypertension     DR Medina Kruger PCP    Impaired mobility     occas uses cane    Kyphosis     [facility] OR 7/12/2021 with Dr. Autumn Coulter cant lie flat,severe back pain    Left eye injury     [address] LEFT EYE    Muscle spasm     LOWER RIGHT BACK    Nocturia     Obesity     [place]'s syndrome     \"bowels not working\"    Prolonged emergence from general anesthesia     PATIENT STATES HIS B/P WOULD DROP WITH INTUBATION, GO UP AFTER ET TUBE REMOVED. PVC's (premature ventricular contractions)     PVC'S.  NO CARDIOLOGIST, PCP DR Juan Walker    Rash     occas to see rheumatoid arthritis  workup for lupus    Sjogren's syndrome (HCC)     Snores     mild, denies apnea, does \"grind\" his teeth    SOB (shortness of breath)     used to see Dr. Esmer Naylor, borderline COPD/ never followed up / never filled scripts for inhalers    Use of cane as ambulatory aid     Vertigo     Dr. Kevin Escalante    Wears glasses     Wears partial dentures     LOWER         SURGICAL HISTORY       Past Surgical History:   Procedure Laterality Date    BACK SURGERY  2006    cage    COLONOSCOPY      COLONOSCOPY      \"reversed colonoscopy to suck out air/gas\" [place] syndrome    CYST REMOVAL Right     index finger    HEMORRHOID SURGERY  2017    HERNIA REPAIR      UMBILICAL    NOSE SURGERY      REALIGNED NOSE    OTHER SURGICAL HISTORY  02/21/2020    MRI cervical and thoracic spine without contrast . CT of lung , all under general anesthesia    SC COLSC FLX W/REMOVAL LESION BY HOT BX FORCEPS N/A 2017    COLONOSCOPY POLYPECTOMY HOT BIOPSY performed by Brayden Keane MD at [address] [facility]  2021    [id number]    TOE SURGERY Right     2nd toe    TONSILLECTOMY      AS A CHILD    VASECTOMY         FAMILY HISTORY       Family History   Problem Relation Age of Onset    Coronary Art Dis Father         premature-- at 52    Diabetes Father     High Blood Pressure Father     Other Father         COPD, EMPHYSEMA    Coronary Art Dis Paternal Uncle         premature    Heart Failure Paternal Uncle     Diabetes Mother        SOCIAL HISTORY       Social History     Socioeconomic History    Marital status:      Spouse name: None    Number of children: None    Years of education: None    Highest education level: None   Tobacco Use    Smoking status: Former     Packs/day: 1.50     Years: 23.00     Pack years: 34.50     Types: Cigarettes     Start date: 1983     Quit date:      Years since quittin.5    Smokeless tobacco: Former     Types: Chew     Quit date: 9/10/2019    Tobacco comments:     quit 14 years ago   Vaping Use    Vaping Use: Former    Start date: 2019    Quit date: 9/10/2019    Substances: Always   Substance and Sexual Activity    Alcohol use: Not Currently     Alcohol/week: 0.0 standard drinks     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    Drug use: No     Social Determinants of Health     Financial Resource Strain: Low Risk     Difficulty of Paying Living Expenses: Not hard at all   Food Insecurity: No Food Insecurity    Worried About Running Out of Food in the Last Year: Never true    Ran Out of Food in the Last Year: Never true   Physical Activity: Inactive    Days of Exercise per Week: 0 days    Minutes of Exercise per Session: 0 min           REVIEW OF SYSTEMS      Allergies   Allergen Reactions    Cetyl Alcohol Hives     Localized reaction by allergy testing    Cetearyl alcohol    Metronidazole Other (See Comments) \" caused a prickly feeling in my legs \"    Adhesive Tape Other (See Comments)     opsite and paper tape ok, bandaid ok  REDNESS AND TAKES SKIN OFF. PAPER TAPE OK. opsite and paper tape ok, bandaid ok    Cymbalta [Duloxetine Hcl] Nausea And Vomiting    Neosporin [Neomycin-Polymyxin-Gramicidin] Rash       Current Outpatient Medications on File Prior to Encounter   Medication Sig Dispense Refill    furosemide (LASIX) 40 MG tablet TAKE 1 TABLET BY MOUTH EVERY DAY 90 tablet 0    citalopram (CELEXA) 20 MG tablet TAKE 1 TABLET BY MOUTH EVERY DAY 30 tablet 0    LORazepam (ATIVAN) 1 MG tablet Take 1 mg by mouth every 6 hours as needed for Anxiety. loratadine (CLARITIN) 10 MG tablet Take 10 mg by mouth daily      Acetaminophen (TYLENOL ARTHRITIS PAIN PO) Take 1 tablet by mouth every 6 hours as needed      traZODone (DESYREL) 50 MG tablet Take 1 tablet by mouth in the morning and at bedtime (Patient taking differently: Take 100 mg by mouth nightly) 180 tablet 3    hydroxychloroquine (PLAQUENIL) 200 MG tablet Take 1 tablet by mouth 2 times daily 180 tablet 3    metoprolol tartrate (LOPRESSOR) 25 MG tablet Take 0.5 tablets by mouth 2 times daily 90 tablet 3    carbidopa-levodopa (SINEMET)  MG per tablet Take 1 tablet by mouth at bedtime 90 tablet 3    vitamin D (CHOLECALCIFEROL) 25 MCG (1000 UT) TABS tablet Take 1,000 Units by mouth nightly      hydrocortisone (WESTCORT) 0.2 % cream Apply topically 2 times daily as needed      fluticasone (FLONASE) 50 MCG/ACT nasal spray USE 2 SPRAYS IN EACH NOSTRIL ONCE DAILY.  NEED TO USE REGULARLY FOR BENEFIT      Multiple Vitamins-Minerals (THERAPEUTIC MULTIVITAMIN-MINERALS) tablet Take 1 tablet by mouth daily      Sod Fluoride-Potassium Nitrate (PREVIDENT 5000 SENSITIVE) 1.1-5 % PSTE Place onto teeth      Magnesium Cl-Calcium Carbonate (SLOW-MAG PO) Take 1 tablet by mouth 2 times daily      aspirin 81 MG EC tablet Take 81 mg by mouth daily       omeprazole (PRILOSEC) 20 MG capsule Take 20 mg by mouth nightly        No current facility-administered medications on file prior to encounter. General health:  Fairly good. No fever or chills. Skin:  No itching, redness or rash. HEENT:  No headache, epistaxis or sore throat. Neck:  No pain, stiffness or masses. Cardiovascular/Respiratory system:  No chest pain, palpitation or shortness of breath. Gastrointestinal tract: No abdominal pain, Dysphagia, nausea, vomiting, diarrhea or constipation. Genitourinary:  No burning on micturition. No hesitancy, urgency, frequency or discoloration of urine. Locomotor:  No bone or joint pains. No swelling. Neuropsychiatric:  No referable complaints. GENERAL PHYSICAL EXAM:     Vitals: /64   Pulse 76   Temp 98.2 °F (36.8 °C)   Resp 18   Ht 5' 9\" (1.753 m)   Wt 235 lb (106.6 kg)   SpO2 97%   BMI 34.70 kg/m²  Body mass index is 34.7 kg/m². GENERAL APPEARANCE:   Shanda Gusman is 61 y.o., male, moderately obese, nourished, conscious, alert. Does not appear to be distress or pain at this time. SKIN:  Warm, dry, no cyanosis or jaundice. HEAD:  Normocephalic, atraumatic, no swelling or tenderness. EYES:  Pupils equal, reactive to light. EARS:  No discharge, no marked hearing loss. NOSE:  No rhinorrhea, epistaxis or septal deformity. THROAT:  Not congested. No ulceration bleeding or discharge. Pt has removal lower partials. NECK:  No stiffness, trachea central.                  CHEST:  Symmetrical and equal on expansion. HEART:  RRR S1 > S2. No audible murmurs or gallops. LUNGS:  Equal on expansion, normal breath sounds. No adventitious sounds. ABDOMEN:  Obese. Soft on palpation.   No localized tenderness. No guarding or rigidity. LYMPHATICS:  No palpable cervical lymphadenopathy. LOCOMOTOR, BACK AND SPINE:  No tenderness or deformities. EXTREMITIES:  Symmetrical, no pretibial edema. No calf tenderness. No discoloration or ulcerations. Noted on 2nd,3rd, 4th toes mild erythema and irritation, knuckle joint flexed and unable to extend, tender to touch. NEUROLOGIC:  The patient is conscious, alert, oriented,Cranial nerve II-XII intact, taste and smell were not examined. No apparent focal sensory or motor deficits. Patient has essential tremors to his hands.                                                                  LAB REVIEW            Lab Results   Component Value Date    WBC 6.9 12/06/2022    HGB 13.5 12/06/2022    HCT 41.9 12/06/2022    MCV 83.9 12/06/2022     12/06/2022     Lab Results   Component Value Date/Time     12/06/2022 08:40 AM    K 4.7 12/06/2022 08:40 AM     12/06/2022 08:40 AM    CO2 33 12/06/2022 08:40 AM    BUN 11 12/06/2022 08:40 AM    CREATININE 0.48 12/06/2022 08:40 AM    GLUCOSE 115 12/06/2022 08:40 AM    CALCIUM 8.9 12/06/2022 08:40 AM                                              EKG REVIEW        EKG  today reads Normal Sinus Rhythm            PROVISIONAL DIAGNOSES / SURGERY:      HAMMERTOE CORRECTION 2ND, 3RD & 4TH TOES    HAMMERTOE LEFT FOOT 2ND, 3RD & 4TH TOES      Patient Active Problem List    Diagnosis Date Noted    Physical deconditioning 07/19/2022    [place]'s syndrome 02/10/2022    Ileus ([facility] [address] 11/09/2021    History of lumbar fusion 05/20/2021    S/P fusion of thoracic spine 05/20/2021    Diffuse idiopathic skeletal hyperostosis 09/24/2020    Lumbosacral spondylosis without myelopathy 06/05/2020    Spinal stenosis of lumbar region with neurogenic claudication 06/02/2020    Sjogren's syndrome ([address] 05/20/2020    Eczema 04/13/2020    Kyphosis of thoracolumbar region 02/13/2020    Seborrheic keratosis 01/15/2019    Hip impingement syndrome 10/11/2016    Psoriasis 03/24/2016    Anxiety disorder 01/12/2016    Benign essential hypertension 01/12/2016    Chronic obstructive pulmonary disease, unspecified COPD type ([facility] [address] 01/12/2016    Esophageal reflux 01/12/2016    Hypogonadism male 01/12/2016    Inflammatory arthritis 01/12/2016    Lumbar radicular pain 01/12/2016    Pulmonary granuloma ([facility] 01/12/2016    Male erectile disorder 01/12/2016    Sensorineural hearing loss 01/12/2016    Tinnitus of both ears 01/12/2016    Vertigo 01/12/2016       CLEARANCE:   Based on my personal evaluation of patient including review of patient's chart, no clearance required for scheduled surgery.      YUE IGLESIAS, APRN - CNP on 12/6/2022 at 10:48 AM    Total time spent on encounter- PAT provider minutes: 21-30 minutes

## 2022-12-06 ENCOUNTER — HOSPITAL ENCOUNTER (OUTPATIENT)
Dept: PREADMISSION TESTING | Age: 57
Discharge: HOME | End: 2022-12-10
Attending: PODIATRIST | Admitting: PODIATRIST
Payer: MEDICARE

## 2022-12-06 VITALS
TEMPERATURE: 98.2 F | DIASTOLIC BLOOD PRESSURE: 64 MMHG | SYSTOLIC BLOOD PRESSURE: 121 MMHG | RESPIRATION RATE: 18 BRPM | WEIGHT: 235 LBS | BODY MASS INDEX: 34.8 KG/M2 | OXYGEN SATURATION: 97 % | HEIGHT: 69 IN | HEART RATE: 76 BPM

## 2022-12-06 LAB
ABSOLUTE EOS #: 0.2 K/UL (ref 0–0.4)
ABSOLUTE LYMPH #: 1.5 K/UL (ref 1–4.8)
ABSOLUTE MONO #: 0.6 K/UL (ref 0.1–1.3)
ANION GAP SERPL CALCULATED.3IONS-SCNC: 5 MMOL/L (ref 9–17)
BASOPHILS # BLD: 1 % (ref 0–2)
BASOPHILS ABSOLUTE: 0 K/UL (ref 0–0.2)
BUN BLDV-MCNC: 11 MG/DL (ref 6–20)
CALCIUM SERPL-MCNC: 8.9 MG/DL (ref 8.6–10.4)
CHLORIDE BLD-SCNC: 102 MMOL/L (ref 98–107)
CO2: 33 MMOL/L (ref 20–31)
CREAT SERPL-MCNC: 0.48 MG/DL (ref 0.7–1.2)
EOSINOPHILS RELATIVE PERCENT: 3 % (ref 0–4)
GFR SERPL CREATININE-BSD FRML MDRD: >60 ML/MIN/1.73M2
GLUCOSE BLD-MCNC: 115 MG/DL (ref 70–99)
HCT VFR BLD CALC: 41.9 % (ref 41–53)
HEMOGLOBIN: 13.5 G/DL (ref 13.5–17.5)
LYMPHOCYTES # BLD: 22 % (ref 24–44)
MCH RBC QN AUTO: 27 PG (ref 26–34)
MCHC RBC AUTO-ENTMCNC: 32.1 G/DL (ref 31–37)
MCV RBC AUTO: 83.9 FL (ref 80–100)
MONOCYTES # BLD: 9 % (ref 1–7)
PDW BLD-RTO: 14.6 % (ref 11.5–14.9)
PLATELET # BLD: 193 K/UL (ref 150–450)
PMV BLD AUTO: 7.4 FL (ref 6–12)
POTASSIUM SERPL-SCNC: 4.7 MMOL/L (ref 3.7–5.3)
RBC # BLD: 4.99 M/UL (ref 4.5–5.9)
SEG NEUTROPHILS: 65 % (ref 36–66)
SEGMENTED NEUTROPHILS ABSOLUTE COUNT: 4.5 K/UL (ref 1.3–9.1)
SODIUM BLD-SCNC: 140 MMOL/L (ref 135–144)
WBC # BLD: 6.9 K/UL (ref 3.5–11)

## 2022-12-06 PROCEDURE — 36415 COLL VENOUS BLD VENIPUNCTURE: CPT

## 2022-12-06 PROCEDURE — 80048 BASIC METABOLIC PNL TOTAL CA: CPT

## 2022-12-06 PROCEDURE — 85025 COMPLETE CBC W/AUTO DIFF WBC: CPT

## 2022-12-06 PROCEDURE — APPSS45 APP SPLIT SHARED TIME 31-45 MINUTES: Performed by: NURSE PRACTITIONER

## 2022-12-06 NOTE — DISCHARGE INSTRUCTIONS
Pre-op Instructions For Out-Patient Surgery    Medication Instructions:  Please stop herbs and any supplements now (includes vitamins and minerals). Please contact your surgeon and prescribing physician for pre-op instructions for any blood thinners. Stop Aspirin as directed    If you have inhalers/aerosol treatments at home, please use them the morning of your surgery and bring the inhalers with you to the hospital.    Please take the following medications the morning of your surgery with a sip of water:    Metoprolol, Ativan if needed    Surgery Instructions:  After midnight before surgery:  Do not eat or drink anything, including water, mints, gum, and hard candy. You may brush your teeth without swallowing. No smoking, chewing tobacco, or street drugs. Please shower or bathe before surgery. If you were given Surgical Scrub Chlorhexidine Gluconate Liquid (CHG), please shower the night before and the morning of your surgery following the detailed instructions you received during your pre-admission visit. Please do not wear any cologne, lotion, powder, deodorant, jewelry, piercings, perfume, makeup, nail polish, hair accessories, or hair spray on the day of surgery. Wear loose comfortable clothing. Leave your valuables at home. Bring a storage case for any glasses/contacts. An adult who is responsible for you MUST drive you home and should be with you for the first 24 hours after surgery. If having out-patient knee and foot surgeries, please arrange for planned crutches, walker, or wheelchair before arriving to the hospital.    The Day of Surgery:  Arrive at [address] [place] [address] Surgery Entrance at the time directed by your surgeon and check in at the desk. If you have a living will or healthcare power of , please bring a copy. You will be taken to the pre-op holding area where you will be prepared for surgery.   A physical assessment will be performed by a nurse practitioner or house officer. Your IV will be started and you will meet your anesthesiologist.    When you go to surgery, your family will be directed to the surgical waiting room, where the doctor should speak with them after your surgery. After surgery, you will be taken to the recovery room then when you are awake and stable you will go to the short stay unit for preparation to be discharged. If you use a Bi-PAP or C-PAP machine, please bring it with you and leave it in the car in case it is needed in recovery room.

## 2022-12-19 ENCOUNTER — TELEPHONE (OUTPATIENT)
Dept: PRIMARY CARE CLINIC | Age: 57
End: 2022-12-19

## 2022-12-19 NOTE — TELEPHONE ENCOUNTER
Pt tested positive for Covid on Saturday. SX started on Thursday. Pt requesting antiviral and something to help with cough.     Alejandro Young on S wheeling 3-4 days

## 2022-12-23 DIAGNOSIS — F43.21 ADJUSTMENT DISORDER WITH DEPRESSED MOOD: ICD-10-CM

## 2022-12-27 RX ORDER — CITALOPRAM 20 MG/1
TABLET ORAL
Qty: 30 TABLET | Refills: 0 | Status: SHIPPED | OUTPATIENT
Start: 2022-12-27

## 2023-01-24 ENCOUNTER — OFFICE VISIT (OUTPATIENT)
Dept: PRIMARY CARE CLINIC | Age: 58
End: 2023-01-24
Payer: MEDICARE

## 2023-01-24 VITALS
WEIGHT: 248.2 LBS | OXYGEN SATURATION: 98 % | HEIGHT: 69 IN | DIASTOLIC BLOOD PRESSURE: 62 MMHG | HEART RATE: 68 BPM | BODY MASS INDEX: 36.76 KG/M2 | SYSTOLIC BLOOD PRESSURE: 132 MMHG

## 2023-01-24 DIAGNOSIS — E66.01 SEVERE OBESITY (BMI 35.0-39.9) WITH COMORBIDITY (HCC): ICD-10-CM

## 2023-01-24 DIAGNOSIS — R60.0 LOCALIZED EDEMA: ICD-10-CM

## 2023-01-24 DIAGNOSIS — M54.16 LUMBAR RADICULAR PAIN: Primary | ICD-10-CM

## 2023-01-24 DIAGNOSIS — M48.10 DIFFUSE IDIOPATHIC SKELETAL HYPEROSTOSIS: ICD-10-CM

## 2023-01-24 DIAGNOSIS — J44.9 CHRONIC OBSTRUCTIVE PULMONARY DISEASE, UNSPECIFIED COPD TYPE (HCC): ICD-10-CM

## 2023-01-24 DIAGNOSIS — F51.01 PRIMARY INSOMNIA: ICD-10-CM

## 2023-01-24 DIAGNOSIS — M40.205 KYPHOSIS OF THORACOLUMBAR REGION, UNSPECIFIED KYPHOSIS TYPE: ICD-10-CM

## 2023-01-24 DIAGNOSIS — M35.00 SJOGREN'S SYNDROME, WITH UNSPECIFIED ORGAN INVOLVEMENT (HCC): ICD-10-CM

## 2023-01-24 DIAGNOSIS — G25.0 BENIGN ESSENTIAL TREMOR SYNDROME: ICD-10-CM

## 2023-01-24 DIAGNOSIS — F43.21 ADJUSTMENT DISORDER WITH DEPRESSED MOOD: ICD-10-CM

## 2023-01-24 PROCEDURE — 3078F DIAST BP <80 MM HG: CPT | Performed by: FAMILY MEDICINE

## 2023-01-24 PROCEDURE — 99214 OFFICE O/P EST MOD 30 MIN: CPT | Performed by: FAMILY MEDICINE

## 2023-01-24 PROCEDURE — 3075F SYST BP GE 130 - 139MM HG: CPT | Performed by: FAMILY MEDICINE

## 2023-01-24 RX ORDER — PRIMIDONE 50 MG/1
50 TABLET ORAL NIGHTLY
Qty: 90 TABLET | Refills: 3 | Status: SHIPPED | OUTPATIENT
Start: 2023-01-24

## 2023-01-24 RX ORDER — CITALOPRAM 20 MG/1
20 TABLET ORAL DAILY
Qty: 90 TABLET | Refills: 3 | Status: SHIPPED | OUTPATIENT
Start: 2023-01-24

## 2023-01-24 RX ORDER — TRAZODONE HYDROCHLORIDE 50 MG/1
100 TABLET ORAL NIGHTLY
Qty: 180 TABLET | Refills: 3 | Status: SHIPPED | OUTPATIENT
Start: 2023-01-24

## 2023-01-24 RX ORDER — FUROSEMIDE 40 MG/1
40 TABLET ORAL DAILY
Qty: 90 TABLET | Refills: 3 | Status: SHIPPED | OUTPATIENT
Start: 2023-01-24

## 2023-01-24 SDOH — ECONOMIC STABILITY: FOOD INSECURITY: WITHIN THE PAST 12 MONTHS, YOU WORRIED THAT YOUR FOOD WOULD RUN OUT BEFORE YOU GOT MONEY TO BUY MORE.: NEVER TRUE

## 2023-01-24 SDOH — ECONOMIC STABILITY: FOOD INSECURITY: WITHIN THE PAST 12 MONTHS, THE FOOD YOU BOUGHT JUST DIDN'T LAST AND YOU DIDN'T HAVE MONEY TO GET MORE.: NEVER TRUE

## 2023-01-24 ASSESSMENT — ENCOUNTER SYMPTOMS
COUGH: 0
SHORTNESS OF BREATH: 0
RHINORRHEA: 0
VOMITING: 0
NAUSEA: 0
EYE REDNESS: 0
WHEEZING: 0
SORE THROAT: 0
DIARRHEA: 0
EYE DISCHARGE: 0
ABDOMINAL PAIN: 0

## 2023-01-24 ASSESSMENT — PATIENT HEALTH QUESTIONNAIRE - PHQ9
2. FEELING DOWN, DEPRESSED OR HOPELESS: 0
SUM OF ALL RESPONSES TO PHQ QUESTIONS 1-9: 0
SUM OF ALL RESPONSES TO PHQ9 QUESTIONS 1 & 2: 0
SUM OF ALL RESPONSES TO PHQ QUESTIONS 1-9: 0
1. LITTLE INTEREST OR PLEASURE IN DOING THINGS: 0
SUM OF ALL RESPONSES TO PHQ QUESTIONS 1-9: 0
SUM OF ALL RESPONSES TO PHQ QUESTIONS 1-9: 0

## 2023-01-24 ASSESSMENT — SOCIAL DETERMINANTS OF HEALTH (SDOH): HOW HARD IS IT FOR YOU TO PAY FOR THE VERY BASICS LIKE FOOD, HOUSING, MEDICAL CARE, AND HEATING?: NOT HARD AT ALL

## 2023-02-06 ENCOUNTER — HOSPITAL ENCOUNTER (OUTPATIENT)
Dept: PREADMISSION TESTING | Age: 58
Discharge: HOME | End: 2023-02-10

## 2023-02-06 VITALS — HEIGHT: 69 IN | BODY MASS INDEX: 36.73 KG/M2 | WEIGHT: 248 LBS

## 2023-02-06 NOTE — PROGRESS NOTES
Pre-op Instructions For Out-Patient Surgery    Medication Instructions:  Please stop herbs and any supplements now (includes vitamins and minerals). Please contact your surgeon and prescribing physician for pre-op instructions for any blood thinners. If you have inhalers/aerosol treatments at home, please use them the morning of your surgery and bring the inhalers with you to the hospital.    Please take the following medications the morning of your surgery with a sip of water:    Metoprolol, Omeprazole. Surgery Instructions:  After midnight before surgery:  Do not eat or drink anything, including water, mints, gum, and hard candy. You may brush your teeth without swallowing. No smoking, chewing tobacco, or street drugs. Please shower or bathe before surgery. If you were given Surgical Scrub Chlorhexidine Gluconate Liquid (CHG), please shower the night before and the morning of your surgery following the detailed instructions you received during your pre-admission visit. Please do not wear any cologne, lotion, powder, deodorant, jewelry, piercings, perfume, makeup, nail polish, hair accessories, or hair spray on the day of surgery. Wear loose comfortable clothing. Leave your valuables at home. Bring a storage case for any glasses/contacts. An adult who is responsible for you MUST drive you home and should be with you for the first 24 hours after surgery. If having out-patient knee and foot surgeries, please arrange for planned crutches, walker, or wheelchair before arriving to the hospital.    The Day of Surgery:  Arrive at [facility] AT [facility] Entrance at the time directed by your surgeon and check in at the desk. If you have a living will or healthcare power of , please bring a copy. You will be taken to the pre-op holding area where you will be prepared for surgery.   A physical assessment will be performed by a nurse practitioner or house officer. Your IV will be started and you will meet your anesthesiologist.    When you go to surgery, your family will be directed to the surgical waiting room, where the doctor should speak with them after your surgery. After surgery, you will be taken to the recovery room then when you are awake and stable you will go to the short stay unit for preparation to be discharged. If you use a Bi-PAP or C-PAP machine, please bring it with you and leave it in the car in case it is needed in recovery room. Instructions read to Leyla Harper and understanding verbalized.

## 2023-02-17 ENCOUNTER — ANESTHESIA EVENT (OUTPATIENT)
Dept: OPERATING ROOM | Age: 58
End: 2023-02-17
Payer: MEDICARE

## 2023-02-17 NOTE — PRE-PROCEDURE INSTRUCTIONS
Nothing to eat after midnight. Y  Are you taking any blood thinners? When was the last day? STOPPED MONDAY  Make sure to use Hibiclens prior to surgery. Y  Remove any jewelry and body piercings. Y  Do you wear glasses? If so, please bring a case to store them in. Are you having any Covid symptoms? N  Do you have any new rashes, infections, etc. that we should be aware of?N  Do you have a ride home the day of surgery? It cannot be a cab or medical transportation.  Y  Verify surgery time and what time to arrive at hospital. [id number]

## 2023-02-20 ENCOUNTER — APPOINTMENT (OUTPATIENT)
Dept: GENERAL RADIOLOGY | Age: 58
End: 2023-02-20
Attending: PODIATRIST
Payer: MEDICARE

## 2023-02-20 ENCOUNTER — HOSPITAL ENCOUNTER (OUTPATIENT)
Age: 58
Setting detail: OUTPATIENT SURGERY
Discharge: HOME | End: 2023-02-20
Attending: PODIATRIST | Admitting: PODIATRIST
Payer: MEDICARE

## 2023-02-20 ENCOUNTER — ANESTHESIA (OUTPATIENT)
Dept: OPERATING ROOM | Age: 58
End: 2023-02-20
Payer: MEDICARE

## 2023-02-20 VITALS
HEART RATE: 68 BPM | TEMPERATURE: 97.2 F | BODY MASS INDEX: 36.73 KG/M2 | OXYGEN SATURATION: 99 % | DIASTOLIC BLOOD PRESSURE: 74 MMHG | HEIGHT: 69 IN | WEIGHT: 248 LBS | SYSTOLIC BLOOD PRESSURE: 126 MMHG | RESPIRATION RATE: 16 BRPM

## 2023-02-20 DIAGNOSIS — Z98.890 POST-OPERATIVE STATE: Primary | ICD-10-CM

## 2023-02-20 PROCEDURE — 2580000003 HC RX 258: Performed by: ANESTHESIOLOGY

## 2023-02-20 PROCEDURE — 6360000002 HC RX W HCPCS: Performed by: NURSE ANESTHETIST, CERTIFIED REGISTERED

## 2023-02-20 PROCEDURE — 7100000030 HC ASPR PHASE II RECOVERY - FIRST 15 MIN: Performed by: PODIATRIST

## 2023-02-20 PROCEDURE — 2500000003 HC RX 250 WO HCPCS: Performed by: NURSE ANESTHETIST, CERTIFIED REGISTERED

## 2023-02-20 PROCEDURE — 7100000010 HC PHASE II RECOVERY - FIRST 15 MIN: Performed by: PODIATRIST

## 2023-02-20 PROCEDURE — 7100000001 HC PACU RECOVERY - ADDTL 15 MIN: Performed by: PODIATRIST

## 2023-02-20 PROCEDURE — 3600000002 HC SURGERY LEVEL 2 BASE: Performed by: PODIATRIST

## 2023-02-20 PROCEDURE — 2500000003 HC RX 250 WO HCPCS: Performed by: PODIATRIST

## 2023-02-20 PROCEDURE — 7100000031 HC ASPR PHASE II RECOVERY - ADDTL 15 MIN: Performed by: PODIATRIST

## 2023-02-20 PROCEDURE — 2500000003 HC RX 250 WO HCPCS: Performed by: ANESTHESIOLOGY

## 2023-02-20 PROCEDURE — 3700000000 HC ANESTHESIA ATTENDED CARE: Performed by: PODIATRIST

## 2023-02-20 PROCEDURE — 7100000011 HC PHASE II RECOVERY - ADDTL 15 MIN: Performed by: PODIATRIST

## 2023-02-20 PROCEDURE — 6370000000 HC RX 637 (ALT 250 FOR IP): Performed by: ANESTHESIOLOGY

## 2023-02-20 PROCEDURE — 6360000002 HC RX W HCPCS: Performed by: ANESTHESIOLOGY

## 2023-02-20 PROCEDURE — 6360000002 HC RX W HCPCS: Performed by: PODIATRIST

## 2023-02-20 PROCEDURE — 7100000000 HC PACU RECOVERY - FIRST 15 MIN: Performed by: PODIATRIST

## 2023-02-20 PROCEDURE — 3700000001 HC ADD 15 MINUTES (ANESTHESIA): Performed by: PODIATRIST

## 2023-02-20 PROCEDURE — 3600000012 HC SURGERY LEVEL 2 ADDTL 15MIN: Performed by: PODIATRIST

## 2023-02-20 PROCEDURE — 73630 X-RAY EXAM OF FOOT: CPT

## 2023-02-20 PROCEDURE — 2709999900 HC NON-CHARGEABLE SUPPLY: Performed by: PODIATRIST

## 2023-02-20 DEVICE — K WIRE FIX L6IN DIA0.045IN 1600645] MICROAIRE SURGICAL INSTRUMENTS INC]: Type: IMPLANTABLE DEVICE | Site: TOES | Status: FUNCTIONAL

## 2023-02-20 RX ORDER — GABAPENTIN 300 MG/1
300 CAPSULE ORAL ONCE
Status: COMPLETED | OUTPATIENT
Start: 2023-02-20 | End: 2023-02-20

## 2023-02-20 RX ORDER — MIDAZOLAM HYDROCHLORIDE 1 MG/ML
INJECTION INTRAMUSCULAR; INTRAVENOUS PRN
Status: DISCONTINUED | OUTPATIENT
Start: 2023-02-20 | End: 2023-02-20 | Stop reason: SDUPTHER

## 2023-02-20 RX ORDER — OXYCODONE HYDROCHLORIDE AND ACETAMINOPHEN 5; 325 MG/1; MG/1
1 TABLET ORAL ONCE
Status: COMPLETED | OUTPATIENT
Start: 2023-02-20 | End: 2023-02-20

## 2023-02-20 RX ORDER — FENTANYL CITRATE 50 UG/ML
INJECTION, SOLUTION INTRAMUSCULAR; INTRAVENOUS PRN
Status: DISCONTINUED | OUTPATIENT
Start: 2023-02-20 | End: 2023-02-20 | Stop reason: SDUPTHER

## 2023-02-20 RX ORDER — LIDOCAINE HYDROCHLORIDE 10 MG/ML
1 INJECTION, SOLUTION EPIDURAL; INFILTRATION; INTRACAUDAL; PERINEURAL
Status: COMPLETED | OUTPATIENT
Start: 2023-02-20 | End: 2023-02-20

## 2023-02-20 RX ORDER — EPHEDRINE SULFATE/0.9% NACL/PF 50 MG/5 ML
SYRINGE (ML) INTRAVENOUS PRN
Status: DISCONTINUED | OUTPATIENT
Start: 2023-02-20 | End: 2023-02-20 | Stop reason: SDUPTHER

## 2023-02-20 RX ORDER — SODIUM CHLORIDE 9 MG/ML
INJECTION, SOLUTION INTRAVENOUS PRN
Status: DISCONTINUED | OUTPATIENT
Start: 2023-02-20 | End: 2023-02-20 | Stop reason: HOSPADM

## 2023-02-20 RX ORDER — SODIUM CHLORIDE 0.9 % (FLUSH) 0.9 %
5-40 SYRINGE (ML) INJECTION EVERY 12 HOURS SCHEDULED
Status: DISCONTINUED | OUTPATIENT
Start: 2023-02-20 | End: 2023-02-20 | Stop reason: HOSPADM

## 2023-02-20 RX ORDER — DIPHENHYDRAMINE HYDROCHLORIDE 50 MG/ML
12.5 INJECTION INTRAMUSCULAR; INTRAVENOUS
Status: DISCONTINUED | OUTPATIENT
Start: 2023-02-20 | End: 2023-02-20 | Stop reason: HOSPADM

## 2023-02-20 RX ORDER — SODIUM CHLORIDE 0.9 % (FLUSH) 0.9 %
5-40 SYRINGE (ML) INJECTION PRN
Status: DISCONTINUED | OUTPATIENT
Start: 2023-02-20 | End: 2023-02-20 | Stop reason: HOSPADM

## 2023-02-20 RX ORDER — ACETAMINOPHEN 500 MG
1000 TABLET ORAL ONCE
Status: COMPLETED | OUTPATIENT
Start: 2023-02-20 | End: 2023-02-20

## 2023-02-20 RX ORDER — BUPIVACAINE HYDROCHLORIDE 5 MG/ML
INJECTION, SOLUTION EPIDURAL; INTRACAUDAL PRN
Status: DISCONTINUED | OUTPATIENT
Start: 2023-02-20 | End: 2023-02-20 | Stop reason: ALTCHOICE

## 2023-02-20 RX ORDER — LIDOCAINE HYDROCHLORIDE 10 MG/ML
INJECTION, SOLUTION INFILTRATION; PERINEURAL PRN
Status: DISCONTINUED | OUTPATIENT
Start: 2023-02-20 | End: 2023-02-20 | Stop reason: ALTCHOICE

## 2023-02-20 RX ORDER — FENTANYL CITRATE 0.05 MG/ML
25 INJECTION, SOLUTION INTRAMUSCULAR; INTRAVENOUS EVERY 5 MIN PRN
Status: DISCONTINUED | OUTPATIENT
Start: 2023-02-20 | End: 2023-02-20 | Stop reason: HOSPADM

## 2023-02-20 RX ORDER — PROPOFOL 10 MG/ML
INJECTION, EMULSION INTRAVENOUS PRN
Status: DISCONTINUED | OUTPATIENT
Start: 2023-02-20 | End: 2023-02-20 | Stop reason: SDUPTHER

## 2023-02-20 RX ORDER — ONDANSETRON 2 MG/ML
4 INJECTION INTRAMUSCULAR; INTRAVENOUS
Status: COMPLETED | OUTPATIENT
Start: 2023-02-20 | End: 2023-02-20

## 2023-02-20 RX ORDER — LIDOCAINE HYDROCHLORIDE 20 MG/ML
INJECTION, SOLUTION EPIDURAL; INFILTRATION; INTRACAUDAL; PERINEURAL PRN
Status: DISCONTINUED | OUTPATIENT
Start: 2023-02-20 | End: 2023-02-20 | Stop reason: SDUPTHER

## 2023-02-20 RX ORDER — METOCLOPRAMIDE HYDROCHLORIDE 5 MG/ML
10 INJECTION INTRAMUSCULAR; INTRAVENOUS
Status: DISCONTINUED | OUTPATIENT
Start: 2023-02-20 | End: 2023-02-20 | Stop reason: HOSPADM

## 2023-02-20 RX ORDER — SODIUM CHLORIDE, SODIUM LACTATE, POTASSIUM CHLORIDE, CALCIUM CHLORIDE 600; 310; 30; 20 MG/100ML; MG/100ML; MG/100ML; MG/100ML
INJECTION, SOLUTION INTRAVENOUS CONTINUOUS
Status: DISCONTINUED | OUTPATIENT
Start: 2023-02-20 | End: 2023-02-20 | Stop reason: HOSPADM

## 2023-02-20 RX ADMIN — ONDANSETRON 4 MG: 2 INJECTION INTRAMUSCULAR; INTRAVENOUS at 10:23

## 2023-02-20 RX ADMIN — LIDOCAINE HYDROCHLORIDE 80 MG: 20 INJECTION, SOLUTION EPIDURAL; INFILTRATION; INTRACAUDAL; PERINEURAL at 07:22

## 2023-02-20 RX ADMIN — Medication 10 MG: at 09:33

## 2023-02-20 RX ADMIN — ACETAMINOPHEN 1000 MG: 500 TABLET ORAL at 06:22

## 2023-02-20 RX ADMIN — Medication 5 MG: at 07:42

## 2023-02-20 RX ADMIN — Medication 5 MG: at 07:45

## 2023-02-20 RX ADMIN — PROPOFOL 50 MG: 10 INJECTION, EMULSION INTRAVENOUS at 07:22

## 2023-02-20 RX ADMIN — FENTANYL CITRATE 25 MCG: 50 INJECTION, SOLUTION INTRAMUSCULAR; INTRAVENOUS at 09:30

## 2023-02-20 RX ADMIN — FENTANYL CITRATE 25 MCG: 50 INJECTION, SOLUTION INTRAMUSCULAR; INTRAVENOUS at 07:57

## 2023-02-20 RX ADMIN — SODIUM CHLORIDE, POTASSIUM CHLORIDE, SODIUM LACTATE AND CALCIUM CHLORIDE: 600; 310; 30; 20 INJECTION, SOLUTION INTRAVENOUS at 06:20

## 2023-02-20 RX ADMIN — OXYCODONE HYDROCHLORIDE AND ACETAMINOPHEN 1 TABLET: 5; 325 TABLET ORAL at 11:16

## 2023-02-20 RX ADMIN — SODIUM CHLORIDE, POTASSIUM CHLORIDE, SODIUM LACTATE AND CALCIUM CHLORIDE: 600; 310; 30; 20 INJECTION, SOLUTION INTRAVENOUS at 09:30

## 2023-02-20 RX ADMIN — GABAPENTIN 300 MG: 300 CAPSULE ORAL at 06:22

## 2023-02-20 RX ADMIN — Medication 2000 MG: at 07:23

## 2023-02-20 RX ADMIN — LIDOCAINE HYDROCHLORIDE 1 ML: 10 INJECTION, SOLUTION EPIDURAL; INFILTRATION; INTRACAUDAL; PERINEURAL at 06:20

## 2023-02-20 RX ADMIN — Medication 10 MG: at 07:47

## 2023-02-20 RX ADMIN — HYDROMORPHONE HYDROCHLORIDE 0.5 MG: 1 INJECTION, SOLUTION INTRAMUSCULAR; INTRAVENOUS; SUBCUTANEOUS at 10:24

## 2023-02-20 RX ADMIN — PROPOFOL 125 MCG/KG/MIN: 10 INJECTION, EMULSION INTRAVENOUS at 07:23

## 2023-02-20 RX ADMIN — FENTANYL CITRATE 50 MCG: 50 INJECTION, SOLUTION INTRAMUSCULAR; INTRAVENOUS at 07:22

## 2023-02-20 RX ADMIN — MIDAZOLAM 2 MG: 1 INJECTION INTRAMUSCULAR; INTRAVENOUS at 07:16

## 2023-02-20 ASSESSMENT — PAIN DESCRIPTION - LOCATION
LOCATION: FOOT
LOCATION: FOOT

## 2023-02-20 ASSESSMENT — PAIN DESCRIPTION - ORIENTATION
ORIENTATION: LEFT
ORIENTATION: LEFT

## 2023-02-20 ASSESSMENT — PAIN DESCRIPTION - DESCRIPTORS
DESCRIPTORS: SORE
DESCRIPTORS: THROBBING
DESCRIPTORS: ACHING
DESCRIPTORS: ACHING

## 2023-02-20 ASSESSMENT — PAIN DESCRIPTION - ONSET
ONSET: PROGRESSIVE
ONSET: AWAKENED FROM SLEEP
ONSET: GRADUAL

## 2023-02-20 ASSESSMENT — PAIN - FUNCTIONAL ASSESSMENT: PAIN_FUNCTIONAL_ASSESSMENT: 0-10

## 2023-02-20 ASSESSMENT — PAIN SCALES - GENERAL
PAINLEVEL_OUTOF10: 4
PAINLEVEL_OUTOF10: 8
PAINLEVEL_OUTOF10: 4
PAINLEVEL_OUTOF10: 7
PAINLEVEL_OUTOF10: 6
PAINLEVEL_OUTOF10: 6
PAINLEVEL_OUTOF10: 0

## 2023-02-20 ASSESSMENT — COPD QUESTIONNAIRES: CAT_SEVERITY: NO INTERVAL CHANGE

## 2023-02-20 ASSESSMENT — PAIN DESCRIPTION - PAIN TYPE: TYPE: SURGICAL PAIN

## 2023-02-20 ASSESSMENT — ENCOUNTER SYMPTOMS: SHORTNESS OF BREATH: 1

## 2023-02-20 NOTE — H&P
HISTORY and [address]       NAME:  Brigid Rodriguez  MRN: [id number]   YOB: 1965   Date: 2/20/2023   Age: 61 y.o. Gender: male       COMPLAINT AND PRESENT HISTORY:     Brigid Rodriguez is 61 y.o.,  male, here for HAMMERTOE LEFT FOOT 2ND, 3RD & 4TH TOES. Patient will be having:  HAMMERTOE CORRECTION 2ND, 3RD & 4TH TOES- LEFT   patient surgery was put off for some time due to tracie COVID after pre admission testing. Patient states that he has been dealing with hammer toes since he was a kid, and sought into getting help in his late 19's,  pt is s/p hammer toe correction done in 1994. Patient states that it hurts just to wear to shoes. Pt states that he was prescribed rubber toe sleeves,  pt states they soon wore out and ripped. Patient had manifestations of toes that doesn't flatten and more susceptible to irritation with shoes d/t constant rubbing at joints . Pt states even his bed covers irritate him. Patient states that walking also aggravates his sxs. Pt complains of pain at 6/10, that is constant . Patient has tried rubber sleeves and just taking his shoes and socks off help with sxs. No recent falls or trauma. Pt reports  redness and swelling through out the day     Pt denies any other symptoms. Significant medical history:   Hypertension, PVC, SOB      Associated medications:  Lopressor. Patient does admits to SOB, and ocassional fluttering of heart     Patient voices feeling well today. Denies any recent fever or chills, chest pain or SOB. Functional Capacity per patient:              1. Patient is not able to walk 2 city blocks on level ground without SOB. 2. Patient is not able to climb 2 flights of stairs without SOB. Patient denies any personal history of blood clots. Patient has hx of prolonged emergence from general anesthesia. Patient has been NPO since midnight.  No blood thinners in the past 5-7 days(aspirin) Pt took his lopressor.       PAST MEDICAL HISTORY     Past Medical History:   Diagnosis Date    ADHD (attention deficit hyperactivity disorder)     Angular cheilitis     Anticoagulant long-term use     Anxiety     Arthritis     BACK AND FINGERS     Bilateral lower extremity edema 03/2021    started on Lasix per PCP    Chronic back pain     dr markham [facility], scheduled for OR 7/12/2021 at [facility]    COPD (chronic obstructive pulmonary disease) ([facility])     COVID-19 12/2022    cold symptoms    Depression     Dizziness     Eczema     Ex-smoker     quit in 2006, smoked for 23 years    Floaters in visual field, bilateral     GERD (gastroesophageal reflux disease)     H/O chest pain     H/O dermatitis     History of blood transfusion     History of panic attacks     [place] (hard of hearing)     LEFT EAR WORSE THAN RIGHT. getting hearing aides eloisa, Dr. Villalpando    Hypertension     DR MALCOLM PCP    Impaired mobility     occas uses cane    Kyphosis     [facility] OR 7/12/2021 with Dr. Jed Jeff cant lie flat,severe back pain    Left eye injury     BUNGY CORD STRAP BUSTED AND INJURED LEFT EYE    Muscle spasm     LOWER RIGHT BACK    Nocturia     Obesity     Andi's syndrome     \"bowels not working\"    Osteoarthritis     Prolonged emergence from general anesthesia     PATIENT STATES HIS B/P WOULD DROP WITH INTUBATION, GO UP AFTER ET TUBE REMOVED.    PVC's (premature ventricular contractions)     PVC'S. NO CARDIOLOGIST, PCP DR AFRICA MALCOLM    Rash     occas to see rheumatoid arthritis  workup for lupus    Restless legs syndrome     Sjogren's syndrome ([facility])     Snores     mild, denies apnea, does \"grind\" his teeth    SOB (shortness of breath)     used to see Dr. Coffey, borderline COPD/ never followed up / never filled scripts for inhalers    Use of cane as ambulatory aid     Vertigo     Dr. Villalpando    Wears glasses     Wears partial dentures     LOWER       SURGICAL HISTORY    Past Surgical History:   Procedure Laterality Date    BACK SURGERY  2006    cage    COLONOSCOPY      COLONOSCOPY      \"reversed colonoscopy to suck out air/gas\" Andi syndrome    CYST REMOVAL Right     index finger    HEMORRHOID SURGERY  2017    HERNIA REPAIR      UMBILICAL    NOSE SURGERY      REALIGNED NOSE    OTHER SURGICAL HISTORY  2020    MRI cervical and thoracic spine without contrast . CT of lung , all under general anesthesia    [place] COLSC FLX W/REMOVAL LESION BY HOT BX FORCEPS N/A 2017    COLONOSCOPY POLYPECTOMY HOT BIOPSY performed by Sydney Pineda MD at [address] [place] [address]  2021    [id number]    TOE SURGERY Right     2nd toe    TONSILLECTOMY      AS A CHILD    UMBILICAL HERNIA REPAIR      VASECTOMY         FAMILY HISTORY       Family History   Problem Relation Age of Onset    Coronary Art Dis Father         premature-- at 52    Diabetes Father     High Blood Pressure Father     Other Father         COPD, EMPHYSEMA    Coronary Art Dis Paternal Uncle         premature    Heart Failure Paternal Uncle     Diabetes Mother        SOCIAL HISTORY       Social History     Socioeconomic History    Marital status:      Spouse name: None    Number of children: None    Years of education: None    Highest education level: None   Tobacco Use    Smoking status: Former     Packs/day: 1.50     Years: 23.00     Pack years: 34.50     Types: Cigarettes     Start date: 1983     Quit date: 2006     Years since quittin.7    Smokeless tobacco: Former     Types: Chew     Quit date: 9/10/2019    Tobacco comments:     quit 14 years ago   Vaping Use    Vaping Use: Former    Start date: 2019    Quit date: 9/10/2019    Substances: Always   Substance and Sexual Activity    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    Drug use: No     Social Determinants of Health     Financial Resource Strain: Low Risk     Difficulty of Paying Living Expenses: Not hard at all   Food Insecurity: No Food Insecurity    Worried About Running Out of Food in the Last Year: Never true    Ran Out of Food in the Last Year: Never true   Physical Activity: Inactive    Days of Exercise per Week: 0 days    Minutes of Exercise per Session: 0 min           REVIEW OF SYSTEMS      Allergies   Allergen Reactions    Cetyl Alcohol Hives     Localized reaction by allergy testing    Cetearyl alcohol    Metronidazole Other (See Comments)     \" caused a prickly feeling in my legs \"    Adhesive Tape Other (See Comments)     opsite and paper tape ok, bandaid ok  REDNESS AND TAKES SKIN OFF. PAPER TAPE OK. opsite and paper tape ok, bandaid ok    Cymbalta [Duloxetine Hcl] Nausea And Vomiting    Neosporin [Neomycin-Polymyxin-Gramicidin] Rash       No current facility-administered medications on file prior to encounter. Current Outpatient Medications on File Prior to Encounter   Medication Sig Dispense Refill    LORazepam (ATIVAN) 1 MG tablet Take 1 mg by mouth every 6 hours as needed for Anxiety. loratadine (CLARITIN) 10 MG tablet Take 10 mg by mouth daily      Acetaminophen (TYLENOL ARTHRITIS PAIN PO) Take 1 tablet by mouth every 6 hours as needed      carbidopa-levodopa (SINEMET)  MG per tablet Take 1 tablet by mouth at bedtime 90 tablet 3    vitamin D (CHOLECALCIFEROL) 25 MCG (1000 UT) TABS tablet Take 1,000 Units by mouth nightly      hydrocortisone (WESTCORT) 0.2 % cream Apply topically 2 times daily as needed      fluticasone (FLONASE) 50 MCG/ACT nasal spray USE 2 SPRAYS IN EACH NOSTRIL ONCE DAILY.  NEED TO USE REGULARLY FOR BENEFIT      Multiple Vitamins-Minerals (THERAPEUTIC MULTIVITAMIN-MINERALS) tablet Take 1 tablet by mouth daily      Sod Fluoride-Potassium Nitrate (PREVIDENT 5000 SENSITIVE) 1.1-5 % PSTE Place onto teeth      Magnesium Cl-Calcium Carbonate (SLOW-MAG PO) Take 1 tablet by mouth 2 times daily      aspirin 81 MG EC tablet Take 81 mg by mouth daily       omeprazole (PRILOSEC) 20 MG capsule Take 20 mg by mouth nightly          Negative except for what is mentioned in the HPI. GENERAL PHYSICAL EXAM     Vitals :   See vital signs in RN flow sheet. GENERAL APPEARANCE:   Fermin Rogers is 61 y.o., male, moderately obese, nourished, conscious, alert. Does not appear to be distress or pain at this time. SKIN:  Warm, dry, no cyanosis or jaundice. HEAD:  Normocephalic, atraumatic, no swelling or tenderness. EYES:  Pupils equal, reactive to light. EARS:  No discharge, no marked hearing loss. NOSE:  No rhinorrhea, epistaxis or septal deformity. THROAT:  Not congested. No ulceration bleeding or discharge. NECK:  No stiffness, trachea central.  No palpable masses or L.N.                 CHEST:  Symmetrical and equal on expansion. HEART:  RRR . No audible murmurs or gallops. LUNGS:  Equal on expansion, normal breath sounds. No adventitious sounds. ABDOMEN:  Obese. Soft on palpation. No dysphagia, No localized tenderness. No guarding or rigidity. LYMPHATICS:  No palpable cervical lymphadenopathy. LOCOMOTOR, BACK AND SPINE:  No tenderness or deformities. EXTREMITIES:  Symmetrical, no pretibial edema. No discoloration or ulcerations. NEUROLOGIC:  The patient is conscious, alert, oriented,Cranial nerve II-XII intact, taste and smell were not examined. No apparent focal sensory or motor deficits.              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    Ileus ([facility] [address] 11/09/2021    History of lumbar fusion 05/20/2021    S/P fusion of thoracic spine 05/20/2021    Diffuse idiopathic skeletal hyperostosis 09/24/2020    Lumbosacral spondylosis without myelopathy 06/05/2020    Spinal stenosis of lumbar region with neurogenic claudication 06/02/2020    Sjogren's syndrome ([facility] [address] 05/20/2020    Eczema 04/13/2020    Kyphosis of thoracolumbar region 02/13/2020    Seborrheic keratosis 01/15/2019    Hip impingement syndrome 10/11/2016    Psoriasis 03/24/2016    Anxiety disorder 01/12/2016    Benign essential hypertension 01/12/2016    Chronic obstructive pulmonary disease, unspecified COPD type ([address] 01/12/2016    Esophageal reflux 01/12/2016    Hypogonadism male 01/12/2016    Inflammatory arthritis 01/12/2016    Lumbar radicular pain 01/12/2016    Pulmonary granuloma ([facility] [address] 01/12/2016    Male erectile disorder 01/12/2016    Sensorineural hearing loss 01/12/2016    Tinnitus of both ears 01/12/2016    Vertigo 01/12/2016           YUE Hanks, APRN - CNP on 2/20/2023 at 5:47 AM

## 2023-02-20 NOTE — DISCHARGE INSTRUCTIONS
Podiatric Post Operative Instructions: You have had a surgical procedure on your left foot. Fluids and Diet:  Begin with clear liquids, broth, dry toast, and crackers. If not nauseated then resume your regular pre-operative diet when you are ready    Medications: Take your prescriptions as directed  You are receiving new prescriptions for [place]  If your pain is not severe then you may take the non-prescription medication that you normally take for aches and pains  You may resume your regularly scheduled medications (unless otherwise directed)  If any side effects or adverse reactions occur, discontinue the medication and contact your doctor. Review the patient drug information that is provided before you take any medication    Ambulation and Activity:  You are advised to go directly home from the hospital  You may put weight on the operated foot. You should wear the surgical shoe at all times when awake. Avoid stairs if possible. Do not lift or move heavy objects  Do not drive until cleared by your physician    Bandage and Wound Care Instructions:  Keep bandage clean and dry  Do not shower or bathe the operative extremity  Do not remove the bandage (unless otherwise directed)   Do not attempt to put anything between the cast or dressing and your skin, some itching is normal.    Ice and Elevation:  Elevate operative extremity as much as possible to reduce swelling and discomfort. Elevate with 2 pillows at or above the level of the heart for the first 72 hours. Ice:  [facility] dispensed insulated ice bag over the bandage 20 minutes of every hour while awake for the first 72 hours. You may ice behind the knee as well. Special Instructions: Call your doctor immediately if you develop any of the following. Fever over 100.4 degrees Fahrenheit by mouth - take your temperature daily until your first follow up visit.   Pain not relieved by medication ordered  Swelling, increased redness, warmth, or hardness around operative area. Numb, tingling or cold toes. Toe(s) become white or bluish  Bandage becomes wet, soiled, or blood soaked (small amount of bleeding may be normal)  Increased or progressive drainage from surgical area. Follow up instructions: You will need to follow up with Dr. Adrian Pereyra DPM   Call when you get home to schedule or confirm your appointment. Call your Podiatrist office if you have any questions or concerns.       Next pain med at 3 PM if needed

## 2023-02-20 NOTE — INTERVAL H&P NOTE
Update History & Physical    The patient's History and Physical of February 20, History was reviewed with the patient and I examined the patient. There was no change. The surgical site was confirmed by the patient and me. Plan: The risks, benefits, expected outcome, and alternative to the recommended procedure have been discussed with the patient. Patient understands and wants to proceed with the procedure.      Electronically signed by Sterling Miguel DPM on 2/20/2023 at 7:17 AM

## 2023-02-20 NOTE — ADDENDUM NOTE
Addendum  created 02/20/23 1104 by Brenton Moore MD    Order list changed, Pharmacy for encounter modified

## 2023-02-20 NOTE — ANESTHESIA PRE PROCEDURE
Department of Anesthesiology  Preprocedure Note       Name:  Shanna Market   Age:  61 y.o.  :  1965                                          MRN:  [id number]         Date:  2023      Surgeon: Radha Tellez):  Danni Goff DPM    Procedure: Procedure(s):  HAMMERTOE CORRECTION 2ND, 3RD & 4TH TOES    Medications prior to admission:   Prior to Admission medications    Medication Sig Start Date End Date Taking? Authorizing Provider   LORazepam (ATIVAN) 1 MG tablet Take 1 mg by mouth every 6 hours as needed for Anxiety. Yes Historical Provider, MD   loratadine (CLARITIN) 10 MG tablet Take 10 mg by mouth daily   Yes Historical Provider, MD   Acetaminophen (TYLENOL ARTHRITIS PAIN PO) Take 1 tablet by mouth every 6 hours as needed   Yes Historical Provider, MD   citalopram (CELEXA) 20 MG tablet Take 1 tablet by mouth daily 23   Minnie Curry MD   metoprolol tartrate (LOPRESSOR) 25 MG tablet Take 0.5 tablets by mouth 2 times daily 23   Minnie Curry MD   furosemide (LASIX) 40 MG tablet Take 1 tablet by mouth daily 23   Minnie Curry MD   traZODone (DESYREL) 50 MG tablet Take 2 tablets by mouth nightly 23   Minnie Curry MD   primidone (MYSOLINE) 50 MG tablet Take 1 tablet by mouth nightly 23   Minnie Curry MD   hydroxychloroquine (PLAQUENIL) 200 MG tablet Take 1 tablet by mouth 2 times daily 10/5/22   Minnie Curry MD   carbidopa-levodopa (SINEMET)  MG per tablet Take 1 tablet by mouth at bedtime 22   Minnie Curry MD   vitamin D (CHOLECALCIFEROL) 25 MCG (1000 UT) TABS tablet Take 1,000 Units by mouth nightly    Historical Provider, MD   hydrocortisone (WESTCORT) 0.2 % cream Apply topically 2 times daily as needed    Historical Provider, MD   fluticasone (FLONASE) 50 MCG/ACT nasal spray USE 2 SPRAYS IN EACH NOSTRIL ONCE DAILY.  NEED TO USE REGULARLY FOR BENEFIT 21   Historical Provider, MD   Multiple Vitamins-Minerals (THERAPEUTIC MULTIVITAMIN-MINERALS) tablet Take 1 tablet by mouth daily    Historical Provider, MD   Sod Fluoride-Potassium Nitrate (PREVIDENT 5000 SENSITIVE) 1.1-5 % PSTE Place onto teeth    Historical Provider, MD   Magnesium Cl-Calcium Carbonate (SLOW-MAG PO) Take 1 tablet by mouth 2 times daily    Historical Provider, MD   aspirin 81 MG EC tablet Take 81 mg by mouth daily     Historical Provider, MD   omeprazole (PRILOSEC) 20 MG capsule Take 20 mg by mouth nightly     Historical Provider, MD       Current medications:    Current Facility-Administered Medications   Medication Dose Route Frequency Provider Last Rate Last Admin    lactated ringers IV soln infusion   IntraVENous Continuous Marizol Spear  mL/hr at 02/20/23 0620 New Bag at 02/20/23 0620    sodium chloride flush 0.9 % injection 5-40 mL  5-40 mL IntraVENous 2 times per day Marizol Spear MD        sodium chloride flush 0.9 % injection 5-40 mL  5-40 mL IntraVENous PRN Marizol Spear MD        0.9 % sodium chloride infusion   IntraVENous PRN Marizol Spear MD        ceFAZolin (ANCEF) 2000 mg in 0.9% sodium chloride 50 mL IVPB  2,000 mg IntraVENous Once Gadiel Contreras, DPM           Allergies: Allergies   Allergen Reactions    Cetyl Alcohol Hives     Localized reaction by allergy testing    Cetearyl alcohol    Metronidazole Other (See Comments)     \" caused a prickly feeling in my legs \"    Adhesive Tape Other (See Comments)     opsite and paper tape ok, bandaid ok  REDNESS AND TAKES SKIN OFF. PAPER TAPE OK.   opsite and paper tape ok, bandaid ok    Cymbalta [Duloxetine Hcl] Nausea And Vomiting    Neosporin [Neomycin-Polymyxin-Gramicidin] Rash       Problem List:    Patient Active Problem List   Diagnosis Code    Anxiety disorder F41.9    Benign essential hypertension I10    Chronic obstructive pulmonary disease, unspecified COPD type ([facility] J44.9    Esophageal reflux K21.9    Hypogonadism male E29.1    Inflammatory arthritis M19.90  Lumbar radicular pain M54.16    Pulmonary granuloma ([facility]) J84.10    Male erectile disorder N52.9    Sensorineural hearing loss H90.5    Tinnitus of both ears H93.13    Vertigo R42    Psoriasis L40.9    Hip impingement syndrome M25.859    Seborrheic keratosis L82.1    Kyphosis of thoracolumbar region M40.205    Eczema L30.9    Sjogren's syndrome ([facility]) M35.00    Spinal stenosis of lumbar region with neurogenic claudication M48.062    Lumbosacral spondylosis without myelopathy M47.817    Diffuse idiopathic skeletal hyperostosis M48.10    History of lumbar fusion Z98.1    S/P fusion of thoracic spine Z98.1    Ileus ([facility]) K56.7    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    Physical deconditioning R53.81       Past Medical History:        Diagnosis Date    ADHD (attention deficit hyperactivity disorder)     Angular cheilitis     Anticoagulant long-term use     Anxiety     Arthritis     BACK AND FINGERS     Bilateral lower extremity edema 03/2021    started on Lasix per PCP    Chronic back pain     dr Letitia Benavides [facility], scheduled for [facility] 7/12/2021 at [facility]    COPD (chronic obstructive pulmonary disease) ([facility]     COVID-19 12/2022    cold symptoms    Depression     Dizziness     Eczema     Ex-smoker     quit in 2006, smoked for 23 years    Floaters in visual field, bilateral     GERD (gastroesophageal reflux disease)     H/O chest pain     H/O dermatitis     History of blood transfusion     History of panic attacks     [place] (hard of hearing)     LEFT EAR WORSE THAN RIGHT.  getting hearing aides eloisa, Dr. Arlin Angelo    Hypertension     DR Helen Hernandez PCP    Impaired mobility     occas uses cane    Kyphosis     [facility] OR 7/12/2021 with Dr. Laurie Yun cant lie flat,severe back pain    Left eye injury     [address] LEFT EYE    Muscle spasm     LOWER RIGHT BACK    Nocturia     Obesity     Andi's syndrome     \"bowels not working\"    Osteoarthritis     Prolonged emergence from general anesthesia     PATIENT STATES HIS B/P WOULD DROP WITH INTUBATION, GO UP AFTER ET TUBE REMOVED.  PVC's (premature ventricular contractions)     PVC'S.  NO CARDIOLOGIST, PCP DR Ion Anna     occas to see rheumatoid arthritis  workup for lupus    Restless legs syndrome     Sjogren's syndrome ([facility]     Snores     mild, denies apnea, does \"grind\" his teeth    SOB (shortness of breath)     used to see Dr. Janis Akhtar, borderline COPD/ never followed up / never filled scripts for inhalers    Use of cane as ambulatory aid     Vertigo     Dr. Chantell Kaplan Wears glasses     Wears partial dentures     LOWER       Past Surgical History:        Procedure Laterality Date    BACK SURGERY  2006    cage    COLONOSCOPY      COLONOSCOPY      \"reversed colonoscopy to suck out air/gas\" Andi syndrome    CYST REMOVAL Right     index finger    HEMORRHOID SURGERY  2017    HERNIA REPAIR      UMBILICAL    NOSE SURGERY      REALIGNED NOSE    OTHER SURGICAL HISTORY  2020    MRI cervical and thoracic spine without contrast . CT of lung , all under general anesthesia    NH COLSC FLX W/REMOVAL LESION BY HOT BX FORCEPS N/A 2017    COLONOSCOPY POLYPECTOMY HOT BIOPSY performed by Beatriz John MD at [address]  2021    t7-L2    TOE SURGERY Right     2nd toe    TONSILLECTOMY      AS A CHILD    UMBILICAL HERNIA REPAIR      VASECTOMY         Social History:    Social History     Tobacco Use    Smoking status: Former     Packs/day: 1.50     Years: 23.00     Pack years: 34.50     Types: Cigarettes     Start date: 1983     Quit date: 2006     Years since quittin.7    Smokeless tobacco: Former     Types: Chew     Quit date: 9/10/2019    Tobacco comments:     quit 14 years ago   Substance Use Topics    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                                Counseling given: Not Answered  Tobacco comments: quit 14 years ago      Vital Signs (Current):   Vitals:    11/17/22 1013 02/20/23 0600   BP:  113/67   Pulse:  66   Resp:  16   Temp:  97.5 °F (36.4 °C)   TempSrc:  Infrared   SpO2:  96%   Weight: 235 lb (106.6 kg) 248 lb (112.5 kg)   Height: 5' 9\" (1.753 m) 5' 9\" (1.753 m)                                              BP Readings from Last 3 Encounters:   02/20/23 113/67   01/24/23 132/62   12/06/22 121/64       NPO Status: Time of last liquid consumption: 2030                        Time of last solid consumption: 1730                        Date of last liquid consumption: 02/19/23                        Date of last solid food consumption: 02/19/23    BMI:   Wt Readings from Last 3 Encounters:   02/20/23 248 lb (112.5 kg)   02/06/23 248 lb (112.5 kg)   01/24/23 248 lb 3.2 oz (112.6 kg)     Body mass index is 36.62 kg/m². CBC:   Lab Results   Component Value Date/Time    WBC 6.9 12/06/2022 08:40 AM    RBC 4.99 12/06/2022 08:40 AM    HGB 13.5 12/06/2022 08:40 AM    HCT 41.9 12/06/2022 08:40 AM    MCV 83.9 12/06/2022 08:40 AM    RDW 14.6 12/06/2022 08:40 AM     12/06/2022 08:40 AM       CMP:   Lab Results   Component Value Date/Time     12/06/2022 08:40 AM    K 4.7 12/06/2022 08:40 AM     12/06/2022 08:40 AM    CO2 33 12/06/2022 08:40 AM    BUN 11 12/06/2022 08:40 AM    CREATININE 0.48 12/06/2022 08:40 AM    GFRAA >60 06/06/2022 09:59 AM    LABGLOM >60 12/06/2022 08:40 AM    GLUCOSE 115 12/06/2022 08:40 AM    PROT 8.0 10/18/2022 03:21 PM    CALCIUM 8.9 12/06/2022 08:40 AM    BILITOT 0.2 10/18/2022 03:21 PM    ALKPHOS 104 10/18/2022 03:21 PM    AST 18 10/18/2022 03:21 PM    ALT 19 10/18/2022 03:21 PM       POC Tests: No results for input(s): POCGLU, POCNA, POCK, POCCL, POCBUN, POCHEMO, POCHCT in the last 72 hours.     Coags: No results found for: PROTIME, INR, APTT    HCG (If Applicable): No results found for: PREGTESTUR, PREGSERUM, HCG, HCGQUANT     ABGs: No results found for: PHART, PO2ART, ERH4PLU, YLI1WGR, BEART, Q0RXZPKH     Type & Screen (If Applicable):  No results found for: LABABO, LABRH    Drug/Infectious Status (If Applicable):  Lab Results   Component Value Date/Time    HEPCAB NONREACTIVE 07/23/2017 09:57 AM       COVID-19 Screening (If Applicable):   Lab Results   Component Value Date/Time    COVID19 Not Detected 03/05/2021 02:45 AM           Anesthesia Evaluation  Patient summary reviewed and Nursing notes reviewed no history of anesthetic complications:   Airway: Mallampati: III  TM distance: >3 FB   Neck ROM: full  Mouth opening: > = 3 FB   Dental:          Pulmonary: breath sounds clear to auscultation  (+) COPD: no interval change,  shortness of breath: chronic,                             Cardiovascular:    (+) hypertension: no interval change,       ECG reviewed  Rhythm: regular  Rate: normal                    Neuro/Psych:   (+) neuromuscular disease:, psychiatric history: stable with treatment            GI/Hepatic/Renal:   (+) GERD:,           Endo/Other:    (+) : arthritis: OA and no interval change. , . Abdominal:             Vascular: negative vascular ROS. Other Findings:           Anesthesia Plan      general and TIVA     ASA 3     (TIVA)      MIPS: Postoperative opioids intended and Prophylactic antiemetics administered. Anesthetic plan and risks discussed with patient. Plan discussed with CRNA.                     Asif Keller MD   2/20/2023

## 2023-02-20 NOTE — ANESTHESIA POSTPROCEDURE EVALUATION
POST- ANESTHESIA EVALUATION       Pt Name: Willis Swanson  MRN: [id number]  YOB: 1965  Date of evaluation: 2/20/2023  Time:  10:33 AM      /73   Pulse 50   Temp 97 °F (36.1 °C) (Infrared)   Resp 10   Ht 5' 9\" (1.753 m)   Wt 248 lb (112.5 kg)   SpO2 95%   BMI 36.62 kg/m²      Consciousness Level  Awake  Cardiopulmonary Status  Stable  Pain Adequately Treated YES  Nausea / Vomiting  NO  Adequate Hydration  YES  Anesthesia Related Complications NONE      Electronically signed by Oziel Haywood MD on 2/20/2023 at 10:33 AM       Department of Anesthesiology  Postprocedure Note    Patient: Willis Swanson  MRN: [id number]  YOB: 1965  Date of evaluation: 2/20/2023      Procedure Summary     Date: 02/20/23 Room / Location: [facility] [address] / [facility]: [facility]    Anesthesia Start: 2610 Anesthesia Stop: 5237    Procedure: HAMMERTOE CORRECTION 2ND, 3RD & 4TH TOES (Left: Foot) Diagnosis:       Hammertoe of left foot      (HAMMERTOE LEFT FOOT 2ND, 3RD & 4TH TOES)    Surgeons: Asiya Lee DPM Responsible Provider: Oziel Haywood MD    Anesthesia Type: general, TIVA ASA Status: 3          Anesthesia Type: No value filed.     Marci Phase I: Marci Score: 6    Marci Phase II:        Anesthesia Post Evaluation

## 2023-02-20 NOTE — BRIEF OP NOTE
PODIATRY BRIEF OP NOTE    PATIENT NAME: Marquez Townsend  YOB: 1965  -  61 y.o. male  MRN: [id number]  DATE: 2/20/2023  BILLING #: [id number]    Surgeon(s):  Myrna Archer DPM     ASSISTANTS: Edyta Connelly DPM PGY 1    PRE-OP DIAGNOSIS:   Hammertoes of digits 2 3 and 4, left foot  Left foot pain    POST-OP DIAGNOSIS: Same as above. PROCEDURE:   Second digit PIPJ arthrodesis, left foot  Third digit PIPJ arthrodesis, left foot  Fourth digit PIPJ arthroplasty, left foot  Second digit capsulotomy, left foot  Third digit capsulotomy, left foot  Fourth digit capsulotomy, left foot  Z-lengthening of extensor tendon, second left digit  Z-lengthening of extensor tendon, fourth left digit    ANESTHESIA: MAC and 10 cc of 1:1 mix of 0.5% marcaine plain and 1% lidocaine plain preoperatively    HEMOSTASIS: Electrocautery, pneumatic ankle tourniquet @250 mmHg for 126 minutes. ESTIMATED BLOOD LOSS: Minimal    MATERIALS:   Implant Name Type Inv. Item Serial No.  Lot No. LRB No. Used Action   K WIRE FIX L6IN DIA0.045IN [id number]] [facility] [employer]] - [id number]  K WIRE FIX L6IN DIA0.045IN [id number]] [employer]]  [facility] [employer]- [id number] Left 1 Implanted   K WIRE FIX L6IN DIA0.045IN [id number]] [facility] [employer]] - [id number]  K WIRE FIX L6IN DIA0.045IN [id number]] [employer]]  [facility] [employer]- [id number] Left 1 Implanted   K WIRE FIX L6IN DIA0.045IN [id number]] [employer]] - [id number]  K WIRE FIX L6IN DIA0.045IN [id number]] [employer]]  [facility] [employer]- [id number] Left 1 Implanted       INJECTABLES:   - 10 cc of 0.5% Marcaine plain postoperatively    SPECIMEN:   * No specimens in log *    COMPLICATIONS: None    FINDINGS:   Contracted hammertoe deformity noted to digits 2 3 and 4 on patient's foot.   Arthrodesis with capsulotomy and Z-lengthening of extensor tendon performed secondary. Arthrodesis with capsulotomy performed to third digit. Arthroplasty with capsulotomy and Z-lengthening performed to the fourth. Hammertoe correction held in place with a K wire. Adequate reduction of hammertoe deformity noted to digits 2 3 and 4 on patient's left foot.   Please see full op note for more details    Ketan Driver DPM   Podiatric Medicine & Surgery   2/20/2023 at 9:57 AM

## 2023-02-21 NOTE — OP NOTE
PODIATRY OP NOTE     PATIENT NAME: Yo Vital  YOB: 1965  -  61 y.o. male  MRN: [id number]  DATE: 2/20/2023  BILLING #: [id number]     Surgeon(s):  Meseret Alatorre DPM      ASSISTANTS: Dashawn Marroquin DPM PGY 1     PRE-OP DIAGNOSIS:   Hammertoes of digits 2 3 and 4, left foot  Left foot pain     POST-OP DIAGNOSIS: Same as above. PROCEDURE:   Second digit PIPJ arthrodesis, left foot  Third digit PIPJ arthrodesis, left foot  Fourth digit PIPJ arthroplasty, left foot  Second digit capsulotomy, left foot  Third digit capsulotomy, left foot  Fourth digit capsulotomy, left foot  Z-lengthening of extensor tendon, second left digit  Z-lengthening of extensor tendon, fourth left digit     ANESTHESIA: MAC and 10 cc of 1:1 mix of 0.5% marcaine plain and 1% lidocaine plain     HEMOSTASIS: Electrocautery, pneumatic ankle tourniquet @250 mmHg for 126 minutes. ESTIMATED BLOOD LOSS: Minimal     MATERIALS:   Implant Name Type Inv.  Item Serial No.  Lot No. LRB No. Used Action   K WIRE FIX L6IN DIA0.045IN [id number]] [facility] [employer]] - [id number]   K WIRE FIX L6IN DIA0.045IN [id number]] [employer]]   [facility] [employer]- [id number] Left 1 Implanted   K WIRE FIX L6IN DIA0.045IN [id number]] [employer]] - [id number]   K WIRE FIX L6IN DIA0.045IN [id number]] [employer]]   [facility] [employer]- [id number] Left 1 Implanted   K WIRE FIX L6IN DIA0.045IN [id number]] [employer]] - [id number]   K WIRE FIX L6IN DIA0.045IN [id number]] [employer]]   [facility] [employer]- [id number] Left 1 Implanted         INJECTABLES:   - 10 cc of 0.5% Marcaine plain postoperatively     SPECIMEN:   * No specimens in log *     COMPLICATIONS: None    INDICATION FOR PROCEDURE: The patient has had painfully contracted left 2nd, 3rd, 4th hammer toe deformities for an extended period of time. Conservative treatments have failed to offer relief from pain. Patient is requesting surgery at this time. Discussed risks and benefits of surgery not limited to infection, need for further surgery, wound complications, scar formation and nerve damage. Pt understands and requests to proceed. No guarantees made as to outcome, written or implied. Patient confirms NPO status since midnight. H&P and pertinent labs reviewed. There are no contraindications to surgery. Consent is signed and in chart. The surgical site was marked in the preoperative area. PROCEDURE IN DETAIL: The patient was brought to the operating room and transferred to the operating table. A time out was called to verify the patient's name, birth date, allergies and procedure. The patient was sedated and 10cc 1:1 mix 1% lidocaine plain and 0.5% marcaine plain was infiltrated proximal to the surgical site in local block fashion. A tourniquet was placed around the left ankle. The left foot and ankle were then scrubbed, prepped and draped in the usual aseptic manner. Incision planning was then done using a marking pen. The foot was exsanguinated using a sterile Esmarch and the tourniquet was inflated to 250mmHg. 2nd digit:  Attention was then directed to the left 2nd digit where a linear incision was made over the distal 2nd metatarsal, extending distal to the PIPJ. This was deepened until the extensor tendon and PIPJ were identified. The extensor tendon was transected just proximal to the PIPJ and reflected. A transverse incision was made through the capsule of the PIPJ dorsally, medially and laterally. The head of the proximal phalanx was identified and a transverse osteotomy was performed at the surgical neck. The articular surface of the middle phalanx base was then resected using an oscillating saw.  A dorsal and medial capsulotomy was done at the MTPJ, releasing the dorsiflexory and medial contracture of the toe.  The tendon was lengthened via Z-type tendon lengthening procedure and using 4-0 Vicryl. The surgical site was irrigated with normal saline. At this time a 0.45 Taiwo wire was retrograded from the base of the 2nd middle phalanx through the distal aspect of the toe. The Taiwo was then driven from the distal phalanx through the middle and proximal phalanges, across the MTPJ and into the distal metatarsal.  Mini C-arm fluoroscopy was utilized to assess anatomic alignment and appropriate position of hardware. There was noted to be good compression across the arthroplasty site with rectus alignment of the digit once hardware was in place. The wire was then bent and a safety cap was placed over the end. The skin was then closed in layers using 4-0 vicryl and nylon. 3rd digit:  Attention was then directed to the left 3rd digit where a linear incision was made over the distal 3rd metatarsal, extending distal to the PIPJ. This was deepened until the extensor tendon and PIPJ were identified. The extensor tendon was transected just proximal to the PIPJ and reflected. A transverse incision was made through the capsule of the PIPJ dorsally, medially and laterally. The head of the proximal phalanx was identified and a transverse osteotomy was performed at the surgical neck. The articular surface of the middle phalanx base was then resected using an oscillating saw. A dorsal and medial capsulotomy was done at the MTPJ, releasing the dorsiflexory and medial contracture of the toe. The surgical site was irrigated with normal saline. At this time a 0.45 Taiwo wire was retrograded from the base of the 3rd middle phalanx through the distal aspect of the toe. The Taiwo was then driven from the distal phalanx through the middle and proximal phalanges, across the MTPJ and into the distal metatarsal.  Mini C-arm fluoroscopy was utilized to assess anatomic alignment and appropriate position of hardware. There was noted to be good compression across the arthroplasty site with rectus alignment of the digit once hardware was in place. The wire was then bent and a safety cap was placed over the end. The skin was then closed in layers using 4-0 vicryl and nylon. 4th digit:  Attention was then directed to the left 4th digit where a linear incision was made over the distal 4th metatarsal, extending distal to the PIPJ. This was deepened until the extensor tendon and PIPJ were identified. The extensor tendon was transected just proximal to the PIPJ and reflected. A transverse incision was made through the capsule of the PIPJ dorsally, medially and laterally. The head of the proximal phalanx was identified and a transverse osteotomy was performed at the surgical neck. A dorsal and medial capsulotomy was done at the MTPJ, releasing the dorsiflexory and medial contracture of the toe. The tendon was lengthened via Z-type tendon lengthening procedure and using 4-0 Vicryl. The surgical site was irrigated with normal saline. At this time a 0.45 Taiwo wire was retrograded from the base of the 2nd middle phalanx through the distal aspect of the toe. The Taiwo was then driven from the distal phalanx through the middle and proximal phalanges, across the MTPJ and into the distal metatarsal.  Mini C-arm fluoroscopy was utilized to assess anatomic alignment and appropriate position of hardware. There was noted to be good compression across the arthroplasty site with rectus alignment of the digit once hardware was in place. The wire was then bent and a safety cap was placed over the end. The skin was then closed in layers using 4-0 vicryl and nylon. The pneumatic ankle tourniquet was released and immediate hyperemic flush was noted to all five digits of the left foot. Dressings consisted of adaptic, 4 x 4s, Kerlix and an Ace bandage. A surgical shoe was then applied postoperatively.      The patient tolerated the above procedure and anesthesia well without complications. The patient was transported from the operating room to the PACU with vital signs stable and vascular status intact to the left foot. The patient is to follow up in my office on 2/24.      Electronically signed by Jamie Laws DPM on 2/21/2023 at 10:33 AM

## 2023-03-07 ENCOUNTER — HOSPITAL ENCOUNTER (OUTPATIENT)
Age: 58
Discharge: HOME | End: 2023-03-07
Payer: MEDICARE

## 2023-03-07 ENCOUNTER — HOSPITAL ENCOUNTER (OUTPATIENT)
Dept: CT IMAGING | Age: 58
Discharge: HOME | End: 2023-03-09
Payer: MEDICARE

## 2023-03-07 DIAGNOSIS — R93.5 ABNORMAL ABDOMINAL ULTRASOUND: ICD-10-CM

## 2023-03-07 DIAGNOSIS — R10.9 STOMACH ACHE: ICD-10-CM

## 2023-03-07 DIAGNOSIS — R19.7 DIARRHEA OF PRESUMED INFECTIOUS ORIGIN: ICD-10-CM

## 2023-03-07 DIAGNOSIS — K59.81 SYMPATHICOTONIC COLON OBSTRUCTION SYNDROME: ICD-10-CM

## 2023-03-07 DIAGNOSIS — I10 HYPERTENSION, UNSPECIFIED TYPE: Primary | ICD-10-CM

## 2023-03-07 LAB
BUN SERPL-MCNC: 8 MG/DL (ref 6–20)
CREAT SERPL-MCNC: 0.61 MG/DL (ref 0.7–1.2)
GFR SERPL CREATININE-BSD FRML MDRD: >60 ML/MIN/1.73M2

## 2023-03-07 PROCEDURE — 84520 ASSAY OF UREA NITROGEN: CPT

## 2023-03-07 PROCEDURE — 2500000003 HC RX 250 WO HCPCS: Performed by: NURSE PRACTITIONER

## 2023-03-07 PROCEDURE — 6360000004 HC RX CONTRAST MEDICATION: Performed by: NURSE PRACTITIONER

## 2023-03-07 PROCEDURE — 74177 CT ABD & PELVIS W/CONTRAST: CPT

## 2023-03-07 RX ADMIN — IOPAMIDOL 75 ML: 755 INJECTION, SOLUTION INTRAVENOUS at 15:45

## 2023-03-07 RX ADMIN — BARIUM SULFATE 900 ML: 20 SUSPENSION ORAL at 15:46

## 2023-03-08 ENCOUNTER — OFFICE VISIT (OUTPATIENT)
Dept: PRIMARY CARE CLINIC | Age: 58
End: 2023-03-08

## 2023-03-08 VITALS
DIASTOLIC BLOOD PRESSURE: 80 MMHG | WEIGHT: 240.8 LBS | OXYGEN SATURATION: 99 % | HEART RATE: 78 BPM | BODY MASS INDEX: 35.66 KG/M2 | SYSTOLIC BLOOD PRESSURE: 118 MMHG | HEIGHT: 69 IN

## 2023-03-08 DIAGNOSIS — M45.4 ANKYLOSING SPONDYLITIS OF THORACIC REGION (HCC): ICD-10-CM

## 2023-03-08 DIAGNOSIS — S29.019D THORACIC MYOFASCIAL STRAIN, SUBSEQUENT ENCOUNTER: Primary | ICD-10-CM

## 2023-03-08 DIAGNOSIS — K59.81 OGILVIE'S SYNDROME: ICD-10-CM

## 2023-03-08 DIAGNOSIS — G25.81 RLS (RESTLESS LEGS SYNDROME): ICD-10-CM

## 2023-03-08 DIAGNOSIS — G25.0 BENIGN ESSENTIAL TREMOR SYNDROME: ICD-10-CM

## 2023-03-08 RX ORDER — PRIMIDONE 50 MG/1
50 TABLET ORAL 3 TIMES DAILY
Qty: 270 TABLET | Refills: 3 | Status: SHIPPED | OUTPATIENT
Start: 2023-03-08

## 2023-03-08 SDOH — ECONOMIC STABILITY: FOOD INSECURITY: WITHIN THE PAST 12 MONTHS, YOU WORRIED THAT YOUR FOOD WOULD RUN OUT BEFORE YOU GOT MONEY TO BUY MORE.: NEVER TRUE

## 2023-03-08 SDOH — ECONOMIC STABILITY: INCOME INSECURITY: HOW HARD IS IT FOR YOU TO PAY FOR THE VERY BASICS LIKE FOOD, HOUSING, MEDICAL CARE, AND HEATING?: NOT HARD AT ALL

## 2023-03-08 SDOH — ECONOMIC STABILITY: FOOD INSECURITY: WITHIN THE PAST 12 MONTHS, THE FOOD YOU BOUGHT JUST DIDN'T LAST AND YOU DIDN'T HAVE MONEY TO GET MORE.: NEVER TRUE

## 2023-03-08 SDOH — ECONOMIC STABILITY: HOUSING INSECURITY
IN THE LAST 12 MONTHS, WAS THERE A TIME WHEN YOU DID NOT HAVE A STEADY PLACE TO SLEEP OR SLEPT IN A SHELTER (INCLUDING NOW)?: NO

## 2023-03-08 ASSESSMENT — ENCOUNTER SYMPTOMS
SHORTNESS OF BREATH: 0
ABDOMINAL PAIN: 1
DIARRHEA: 1
EYE DISCHARGE: 0
BLOOD IN STOOL: 0
SORE THROAT: 0
COUGH: 1
RHINORRHEA: 0
EYE REDNESS: 0
NAUSEA: 1
WHEEZING: 0
VOMITING: 0

## 2023-03-08 NOTE — PROGRESS NOTES
[facility] PRIMARY CARE  [address] [address] [address]  Dept: [phone]    Carrie Cerna is a 61 y.o. male Established patient, who presents today for his medical conditions/complaints as noted below. Chief Complaint   Patient presents with    Follow-up     Patient is here today for f/u- patient states he has a few concerns he would like to discuss and restless legs        HPI:     HPI  Presenting for RLS, post-op for hammar toe correction 3wks ago, getting pins out in 2.5wks, Dr. Cherrie Rodriguez. RLS symptoms during sitting throughout day. Dr. Cherrie Rodriguez told him he was having jerking of his feet during surgery when under anesthesia. [place]gilvie Syndrome - last 2wks has had diarrhea, cramping with eating, cannot pass gas, lost 5lbs. CT scan yesterday of abdomen. Tomorrow or Friday doing decompression of bowels. Did not take Trazodone last night. Back pain began as neck pain with turning head to left, having spasms for past 3-4wks. Not taking any pain medication. States Tylenol did not help with gastric pain. Left foot no longer hurting. Prilosec for acid reflux    Tested neg  for COVID, had cold with onset of olgivie's symptoms again. States stool is yellow with bile.      Reviewed prior notes None  Reviewed previous Labs    LDL Cholesterol (mg/dL)   Date Value   06/06/2022 121   02/10/2018 94   07/23/2017 98       (goal LDL is <100)   AST (U/L)   Date Value   10/18/2022 18     ALT (U/L)   Date Value   10/18/2022 19     BUN (mg/dL)   Date Value   03/07/2023 8     Hemoglobin A1C (%)   Date Value   02/10/2018 5.5     TSH (mIU/L)   Date Value   11/04/2021 1.07     BP Readings from Last 3 Encounters:   03/08/23 118/80   02/20/23 126/74   01/24/23 132/62          (goal 120/80)    Past Medical History:   Diagnosis Date    ADHD (attention deficit hyperactivity disorder)     Angular cheilitis     Anticoagulant long-term use     Anxiety     Arthritis     BACK AND FINGERS     Bilateral lower extremity edema 03/2021    started on Lasix per PCP    Chronic back pain     dr Levi Hahn [facility], [facility] 7/12/20[facility] at [facility]    COPD (chronic obstructive pulmonary disease) ([facility] [address]     COVID-19 12/2022    cold symptoms    Depression     Dizziness     Eczema     Ex-smoker     quit in 2006, smoked for 23 years    Floaters in visual field, bilateral     GERD (gastroesophageal reflux disease)     H/O chest pain     H/O dermatitis     History of blood transfusion     History of panic attacks     [place] (hard of hearing)     LEFT EAR WORSE THAN RIGHT. getting hearing aides eloisa, Dr. Mehrdad Ponce    Hypertension     DR Ervin Murillo PCP    Impaired mobility     occas uses cane    Kyphosis     [facility] OR 7/12/2021 with Dr. Jimmy Wilson cant lie flat,severe back pain    Left eye injury     [address] LEFT EYE    Muscle spasm     LOWER RIGHT BACK    Nocturia     Obesity     Andi's syndrome     \"bowels not working\"    Osteoarthritis     Prolonged emergence from general anesthesia     PATIENT STATES HIS B/P WOULD DROP WITH INTUBATION, GO UP AFTER ET TUBE REMOVED. PVC's (premature ventricular contractions)     PVC'S.  NO CARDIOLOGIST, PCP DR Brenda Anna     occas to see rheumatoid arthritis  workup for lupus    Restless legs syndrome     Sjogren's syndrome (HCC)     Snores     mild, denies apnea, does \"grind\" his teeth    SOB (shortness of breath)     used to see Dr. Susan Tatum, borderline COPD/ never followed up / never filled scripts for inhalers    Use of cane as ambulatory aid     Vertigo     Dr. Mehrdad Ponce    Wears glasses     Wears partial dentures     LOWER      Past Surgical History:   Procedure Laterality Date    BACK SURGERY  2006    cage    COLONOSCOPY      COLONOSCOPY      \"reversed colonoscopy to suck out air/gas\" [place] syndrome    CYST REMOVAL Right     index finger    HAMMER TOE SURGERY Left 2/20/2023    HAMMERTOE CORRECTION 2ND, 3RD & 4TH TOES performed by Kirsty Matta DPM at [facility]  2017    HERNIA REPAIR      UMBILICAL    NOSE SURGERY      REALIGNED NOSE    OTHER SURGICAL HISTORY  2020    MRI cervical and thoracic spine without contrast . CT of lung , all under general anesthesia    PA COLSC FLX W/REMOVAL LESION BY HOT BX FORCEPS N/A 2017    COLONOSCOPY POLYPECTOMY HOT BIOPSY performed by Indu Martin MD at [address] [place] [address]  2021    t7-L2    TOE SURGERY Right     2nd toe    TONSILLECTOMY      AS A CHILD    UMBILICAL HERNIA REPAIR      VASECTOMY         Family History   Problem Relation Age of Onset    Coronary Art Dis Father         premature-- at 52    Diabetes Father     High Blood Pressure Father     Other Father         COPD, EMPHYSEMA    Coronary Art Dis Paternal Uncle         premature    Heart Failure Paternal Uncle     Diabetes Mother        Social History     Tobacco Use    Smoking status: Former     Packs/day: 1.50     Years: 23.00     Pack years: 34.50     Types: Cigarettes     Start date: 1983     Quit date: 2006     Years since quittin.7    Smokeless tobacco: Former     Types: Chew     Quit date: 9/10/2019    Tobacco comments:     quit 14 years ago   Substance Use Topics    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      Current Outpatient Medications   Medication Sig Dispense Refill    primidone (MYSOLINE) 50 MG tablet Take 1 tablet by mouth 3 times daily 270 tablet 3    carbidopa-levodopa (SINEMET)  MG per tablet Take 1 tablet by mouth at bedtime 90 tablet 3    citalopram (CELEXA) 20 MG tablet Take 1 tablet by mouth daily 90 tablet 3    metoprolol tartrate (LOPRESSOR) 25 MG tablet Take 0.5 tablets by mouth 2 times daily 90 tablet 3    furosemide (LASIX) 40 MG tablet Take 1 tablet by mouth daily 90 tablet 3    traZODone (DESYREL) 50 MG tablet Take 2 tablets by mouth nightly 180 tablet 3    LORazepam (ATIVAN) 1 MG tablet Take 1 mg by mouth every 6 hours as needed for Anxiety. loratadine (CLARITIN) 10 MG tablet Take 10 mg by mouth daily      Acetaminophen (TYLENOL ARTHRITIS PAIN PO) Take 1 tablet by mouth every 6 hours as needed      hydroxychloroquine (PLAQUENIL) 200 MG tablet Take 1 tablet by mouth 2 times daily 180 tablet 3    vitamin D (CHOLECALCIFEROL) 25 MCG (1000 UT) TABS tablet Take 1,000 Units by mouth nightly      hydrocortisone (WESTCORT) 0.2 % cream Apply topically 2 times daily as needed      fluticasone (FLONASE) 50 MCG/ACT nasal spray USE 2 SPRAYS IN EACH NOSTRIL ONCE DAILY. NEED TO USE REGULARLY FOR BENEFIT      Multiple Vitamins-Minerals (THERAPEUTIC MULTIVITAMIN-MINERALS) tablet Take 1 tablet by mouth daily      Sod Fluoride-Potassium Nitrate (PREVIDENT 5000 SENSITIVE) 1.1-5 % PSTE Place onto teeth      Magnesium Cl-Calcium Carbonate (SLOW-MAG PO) Take 1 tablet by mouth 2 times daily      aspirin 81 MG EC tablet Take 81 mg by mouth daily       omeprazole (PRILOSEC) 20 MG capsule Take 20 mg by mouth nightly        No current facility-administered medications for this visit. Allergies   Allergen Reactions    Cetyl Alcohol Hives     Localized reaction by allergy testing    Cetearyl alcohol    Metronidazole Other (See Comments)     \" caused a prickly feeling in my legs \"    Adhesive Tape Other (See Comments)     opsite and paper tape ok, bandaid ok  REDNESS AND TAKES SKIN OFF. PAPER TAPE OK.   opsite and paper tape ok, bandaid ok    Cymbalta [Duloxetine Hcl] Nausea And Vomiting    Neosporin [Neomycin-Polymyxin-Gramicidin] Rash       Health Maintenance   Topic Date Due    HIV screen  Never done    COVID-19 Vaccine (4 - Booster for Moderna series) 01/29/2022    Annual Wellness Visit (AWV)  07/21/2023    Depression Screen  01/24/2024    Diabetes screen  06/06/2025    Lipids  06/06/2027    Colorectal Cancer Screen  07/12/2027    Pneumococcal 0-64 years Vaccine (3 - PPSV23 if available, else PCV20) 10/24/2029    DTaP/Tdap/Td vaccine (3 - Td or Tdap) 01/24/2033    Flu vaccine  Completed    Shingles vaccine  Completed    Hepatitis C screen  Completed    Hepatitis A vaccine  Aged Out    Hib vaccine  Aged Out    Meningococcal (ACWY) vaccine  Aged Out       Subjective:      Review of Systems   Constitutional:  Negative for chills and fever. HENT:  Negative for rhinorrhea and sore throat. Eyes:  Negative for discharge and redness. Respiratory:  Positive for cough. Negative for shortness of breath and wheezing. Cardiovascular:  Negative for chest pain and palpitations. Gastrointestinal:  Positive for abdominal pain, diarrhea and nausea (with food intake). Negative for blood in stool and vomiting. Genitourinary:  Negative for dysuria and frequency. Musculoskeletal:  Negative for arthralgias and myalgias. Neurological:  Negative for dizziness, light-headedness and headaches. Psychiatric/Behavioral:  Negative for sleep disturbance. Objective:     /80   Pulse 78   Ht 5' 9\" (1.753 m)   Wt 240 lb 12.8 oz (109.2 kg)   SpO2 99%   BMI 35.56 kg/m²   Physical Exam  Vitals and nursing note reviewed. Constitutional:       General: He is not in acute distress. Appearance: He is well-developed. He is not ill-appearing. HENT:      Head: Normocephalic and atraumatic. Right Ear: External ear normal.      Left Ear: External ear normal.   Eyes:      General: No scleral icterus. Right eye: No discharge. Left eye: No discharge. Conjunctiva/sclera: Conjunctivae normal.   Neck:      Thyroid: No thyromegaly. Trachea: No tracheal deviation. Cardiovascular:      Rate and Rhythm: Normal rate and regular rhythm. Heart sounds: Normal heart sounds. Pulmonary:      Effort: Pulmonary effort is normal. No respiratory distress. Breath sounds: Normal breath sounds. No wheezing.    Musculoskeletal:      Comments: Back is tender to palpation in the right mid thoracic region   Lymphadenopathy: Cervical: No cervical adenopathy. Skin:     General: Skin is warm. Findings: No rash. Neurological:      Mental Status: He is alert and oriented to person, place, and time. Psychiatric:         Mood and Affect: Mood normal.         Behavior: Behavior normal.         Thought Content: Thought content normal.       Assessment:       Diagnosis Orders   1. Thoracic myofascial strain, subsequent encounter  XR THORACIC SPINE (3 VIEWS)      2. Benign essential tremor syndrome  primidone (MYSOLINE) 50 MG tablet      3. RLS (restless legs syndrome)  carbidopa-levodopa (SINEMET)  MG per tablet      4. Ankylosing spondylitis of thoracic region ([facility] [address]        5. Peytona's syndrome             Plan:    Blood due in June  Increase Mysoline to TID  Continue sinemet  Await decompression  Thoracic xray, await input [facility]     Return if symptoms worsen or fail to improve. Orders Placed This Encounter   Procedures    XR THORACIC SPINE (3 VIEWS)     Standing Status:   Future     Standing Expiration Date:   3/8/2024     Order Specific Question:   Reason for exam:     Answer:   back pain     Orders Placed This Encounter   Medications    primidone (MYSOLINE) 50 MG tablet     Sig: Take 1 tablet by mouth 3 times daily     Dispense:  270 tablet     Refill:  3    carbidopa-levodopa (SINEMET)  MG per tablet     Sig: Take 1 tablet by mouth at bedtime     Dispense:  90 tablet     Refill:  3       Patient given educational materials - see patient instructions. Discussed use, benefit, and side effects of prescribed medications. All patient questions answered. Pt voiced understanding. Reviewed health maintenance. Instructed to continue current medications, diet andexercise. Patient agreed with treatment plan. Follow up as directed.      Electronicallysigned by Wyatt Hernandez MD on 3/8/2023 at 12:02 PM

## 2023-03-10 NOTE — RESULT ENCOUNTER NOTE
Call and advise patient that the results showed gallstones which could be causing patient pain on right side. Also Dilated colon with possible stricture. Follow up with Dr. Danielle Juan to go over possible treatment options. No acute infection.

## 2023-03-14 ENCOUNTER — OFFICE VISIT (OUTPATIENT)
Dept: PRIMARY CARE CLINIC | Age: 58
End: 2023-03-14
Payer: MEDICARE

## 2023-03-14 VITALS
BODY MASS INDEX: 35.88 KG/M2 | SYSTOLIC BLOOD PRESSURE: 96 MMHG | DIASTOLIC BLOOD PRESSURE: 58 MMHG | WEIGHT: 243 LBS | HEART RATE: 70 BPM | OXYGEN SATURATION: 97 %

## 2023-03-14 DIAGNOSIS — M45.4 ANKYLOSING SPONDYLITIS OF THORACIC REGION (HCC): ICD-10-CM

## 2023-03-14 DIAGNOSIS — G25.0 BENIGN ESSENTIAL TREMOR SYNDROME: ICD-10-CM

## 2023-03-14 DIAGNOSIS — K59.81 OGILVIE'S SYNDROME: ICD-10-CM

## 2023-03-14 DIAGNOSIS — J32.4 CHRONIC PANSINUSITIS: Primary | ICD-10-CM

## 2023-03-14 PROCEDURE — 3078F DIAST BP <80 MM HG: CPT | Performed by: FAMILY MEDICINE

## 2023-03-14 PROCEDURE — 99213 OFFICE O/P EST LOW 20 MIN: CPT | Performed by: FAMILY MEDICINE

## 2023-03-14 PROCEDURE — 3074F SYST BP LT 130 MM HG: CPT | Performed by: FAMILY MEDICINE

## 2023-03-14 RX ORDER — AMOXICILLIN 500 MG/1
1000 CAPSULE ORAL 3 TIMES DAILY
Qty: 60 CAPSULE | Refills: 0 | Status: SHIPPED | OUTPATIENT
Start: 2023-03-14 | End: 2023-03-24

## 2023-03-14 ASSESSMENT — ENCOUNTER SYMPTOMS
EYE REDNESS: 0
WHEEZING: 0
NAUSEA: 0
DIARRHEA: 0
BACK PAIN: 1
VOMITING: 0
SHORTNESS OF BREATH: 0
SORE THROAT: 0
EYE DISCHARGE: 0
ABDOMINAL PAIN: 0
RHINORRHEA: 0
COUGH: 0

## 2023-03-14 NOTE — PROGRESS NOTES
[facility] PRIMARY CARE  [address] [address]  [facility] [address]  Dept: [phone]    Willis Swanson is a 61 y.o. male Established patient, who presents today for his medical conditions/complaints as noted below. Chief Complaint   Patient presents with    Abnormal Test Results     CT        HPI:     HPI  Pt had decompression at the hospital.  Had sigmoidoscopy done, no stricture noted. CT scan reviewed. Had hida scan done in 2022 which was normal.   States has been having sinus congestion for the past 3 weeks. Used a Z-Syed but without response. States still feeling plugged. Patient has appointment at [facility] tomorrow regarding his ongoing back pain. States becoming more uncontrollable.       Reviewed prior notes  gastroenterology  Reviewed previous Labs and Imaging    LDL Cholesterol (mg/dL)   Date Value   06/06/2022 121   02/10/2018 94   07/23/2017 98       (goal LDL is <100)   AST (U/L)   Date Value   10/18/2022 18     ALT (U/L)   Date Value   10/18/2022 19     BUN (mg/dL)   Date Value   03/07/2023 8     Hemoglobin A1C (%)   Date Value   02/10/2018 5.5     TSH (mIU/L)   Date Value   11/04/2021 1.07     BP Readings from Last 3 Encounters:   03/14/23 (!) 96/58   03/08/23 118/80   02/20/23 126/74          (goal 120/80)    Past Medical History:   Diagnosis Date    ADHD (attention deficit hyperactivity disorder)     Angular cheilitis     Anticoagulant long-term use     Anxiety     Arthritis     BACK AND FINGERS     Bilateral lower extremity edema 03/2021    started on Lasix per PCP    Chronic back pain     dr Florinda Dan [facility], [facility]     COPD (chronic obstructive pulmonary disease) ([facility] [address]     COVID-19 12/2022    cold symptoms    Depression     Dizziness     Eczema     Ex-smoker     quit in 2006, smoked for 23 years    Floaters in visual field, bilateral     GERD (gastroesophageal reflux disease)     H/O chest pain     H/O dermatitis     History of blood transfusion     History of panic attacks     [place] (hard of hearing)     LEFT EAR WORSE THAN RIGHT. getting hearing aides eloisa, Dr. Villalpando    Hypertension     DR MALCOLM PCP    Impaired mobility     occas uses cane    Kyphosis     [facility] OR 7/12/2021 with Dr. Jed Jeff cant lie flat,severe back pain    Left eye injury     BUNGY CORD STRAP BUSTED AND INJURED LEFT EYE    Muscle spasm     LOWER RIGHT BACK    Nocturia     Obesity     [place]'s syndrome     \"bowels not working\"    Osteoarthritis     Prolonged emergence from general anesthesia     PATIENT STATES HIS B/P WOULD DROP WITH INTUBATION, GO UP AFTER ET TUBE REMOVED.    PVC's (premature ventricular contractions)     PVC'S. NO CARDIOLOGIST, PCP DR AFRICA MALCOLM    Rash     occas to see rheumatoid arthritis  workup for lupus    Restless legs syndrome     Sjogren's syndrome (HCC)     Snores     mild, denies apnea, does \"grind\" his teeth    SOB (shortness of breath)     used to see Dr. Coffey, borderline COPD/ never followed up / never filled scripts for inhalers    Use of cane as ambulatory aid     Vertigo     Dr. Villalpando    Wears glasses     Wears partial dentures     LOWER      Past Surgical History:   Procedure Laterality Date    BACK SURGERY  2006    cage    COLONOSCOPY      COLONOSCOPY      \"reversed colonoscopy to suck out air/gas\" [place] syndrome    CYST REMOVAL Right     index finger    HAMMER TOE SURGERY Left 2/20/2023    HAMMERTOE CORRECTION 2ND, 3RD & 4TH TOES performed by Christian Yip DPM at [facility] OR    HEMORRHOID SURGERY  2017    HERNIA REPAIR      UMBILICAL    NOSE SURGERY      REALIGNED NOSE    OTHER SURGICAL HISTORY  02/21/2020    MRI cervical and thoracic spine without contrast . CT of lung , all under general anesthesia    [place] COLSC FLX W/REMOVAL LESION BY HOT BX FORCEPS N/A 07/12/2017    COLONOSCOPY POLYPECTOMY HOT BIOPSY performed by Roby Ramos MD at [facility] OR    SPINAL FUSION  07/12/2021  t7-L2    TOE SURGERY Right     2nd toe    TONSILLECTOMY      AS A CHILD    UMBILICAL HERNIA REPAIR      VASECTOMY         Family History   Problem Relation Age of Onset    Coronary Art Dis Father         premature-- at 52    Diabetes Father     High Blood Pressure Father     Other Father         COPD, EMPHYSEMA    Coronary Art Dis Paternal Uncle         premature    Heart Failure Paternal Uncle     Diabetes Mother        Social History     Tobacco Use    Smoking status: Former     Packs/day: 1.50     Years: 23.00     Pack years: 34.50     Types: Cigarettes     Start date: 1983     Quit date: 2006     Years since quittin.8    Smokeless tobacco: Former     Types: Chew     Quit date: 9/10/2019    Tobacco comments:     quit 14 years ago   Substance Use Topics    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      Current Outpatient Medications   Medication Sig Dispense Refill    amoxicillin (AMOXIL) 500 MG capsule Take 2 capsules by mouth 3 times daily for 10 days 60 capsule 0    primidone (MYSOLINE) 50 MG tablet Take 1 tablet by mouth 3 times daily 270 tablet 3    carbidopa-levodopa (SINEMET)  MG per tablet Take 1 tablet by mouth at bedtime 90 tablet 3    citalopram (CELEXA) 20 MG tablet Take 1 tablet by mouth daily 90 tablet 3    metoprolol tartrate (LOPRESSOR) 25 MG tablet Take 0.5 tablets by mouth 2 times daily 90 tablet 3    furosemide (LASIX) 40 MG tablet Take 1 tablet by mouth daily 90 tablet 3    traZODone (DESYREL) 50 MG tablet Take 2 tablets by mouth nightly 180 tablet 3    LORazepam (ATIVAN) 1 MG tablet Take 1 mg by mouth every 6 hours as needed for Anxiety.       loratadine (CLARITIN) 10 MG tablet Take 10 mg by mouth daily      Acetaminophen (TYLENOL ARTHRITIS PAIN PO) Take 1 tablet by mouth every 6 hours as needed      hydroxychloroquine (PLAQUENIL) 200 MG tablet Take 1 tablet by mouth 2 times daily 180 tablet 3    vitamin D (CHOLECALCIFEROL) 25 MCG (1000 UT) TABS tablet Take 1,000 Units by mouth nightly      hydrocortisone (WESTCORT) 0.2 % cream Apply topically 2 times daily as needed      fluticasone (FLONASE) 50 MCG/ACT nasal spray USE 2 SPRAYS IN EACH NOSTRIL ONCE DAILY. NEED TO USE REGULARLY FOR BENEFIT      Multiple Vitamins-Minerals (THERAPEUTIC MULTIVITAMIN-MINERALS) tablet Take 1 tablet by mouth daily      Sod Fluoride-Potassium Nitrate (PREVIDENT 5000 SENSITIVE) 1.1-5 % PSTE Place onto teeth      Magnesium Cl-Calcium Carbonate (SLOW-MAG PO) Take 1 tablet by mouth 2 times daily      aspirin 81 MG EC tablet Take 81 mg by mouth daily       omeprazole (PRILOSEC) 20 MG capsule Take 20 mg by mouth nightly        No current facility-administered medications for this visit. Allergies   Allergen Reactions    Cetyl Alcohol Hives     Localized reaction by allergy testing    Cetearyl alcohol    Metronidazole Other (See Comments)     \" caused a prickly feeling in my legs \"    Adhesive Tape Other (See Comments)     opsite and paper tape ok, bandaid ok  REDNESS AND TAKES SKIN OFF. PAPER TAPE OK. opsite and paper tape ok, bandaid ok    Cymbalta [Duloxetine Hcl] Nausea And Vomiting    Neosporin [Neomycin-Polymyxin-Gramicidin] Rash       Health Maintenance   Topic Date Due    HIV screen  Never done    COVID-19 Vaccine (4 - Booster for Moderna series) 01/29/2022    Annual Wellness Visit (AWV)  07/21/2023    Depression Screen  01/24/2024    Diabetes screen  06/06/2025    Lipids  06/06/2027    Colorectal Cancer Screen  03/09/2028    Pneumococcal 0-64 years Vaccine (3 - PPSV23 if available, else PCV20) 10/24/2029    DTaP/Tdap/Td vaccine (3 - Td or Tdap) 01/24/2033    Flu vaccine  Completed    Shingles vaccine  Completed    Hepatitis C screen  Completed    Hepatitis A vaccine  Aged Out    Hib vaccine  Aged Out    Meningococcal (ACWY) vaccine  Aged Out       Subjective:      Review of Systems   Constitutional:  Negative for chills and fever. HENT:  Positive for congestion.  Negative for rhinorrhea and sore throat. Eyes:  Negative for discharge and redness. Respiratory:  Negative for cough, shortness of breath and wheezing. Cardiovascular:  Negative for chest pain and palpitations. Gastrointestinal:  Negative for abdominal pain, diarrhea, nausea and vomiting. Genitourinary:  Negative for dysuria and frequency. Musculoskeletal:  Positive for back pain. Negative for arthralgias and myalgias. Neurological:  Negative for dizziness, light-headedness and headaches. Psychiatric/Behavioral:  Negative for sleep disturbance. Objective:     BP (!) 96/58   Pulse 70   Wt 243 lb (110.2 kg)   SpO2 97%   BMI 35.88 kg/m²   Physical Exam  Vitals and nursing note reviewed. Constitutional:       General: He is not in acute distress. Appearance: He is well-developed. He is not ill-appearing. HENT:      Head: Normocephalic and atraumatic. Right Ear: External ear normal.      Left Ear: External ear normal.   Eyes:      General: No scleral icterus. Right eye: No discharge. Left eye: No discharge. Conjunctiva/sclera: Conjunctivae normal.   Neck:      Thyroid: No thyromegaly. Trachea: No tracheal deviation. Cardiovascular:      Rate and Rhythm: Normal rate and regular rhythm. Heart sounds: Normal heart sounds. Pulmonary:      Effort: Pulmonary effort is normal. No respiratory distress. Breath sounds: Normal breath sounds. No wheezing. Lymphadenopathy:      Cervical: No cervical adenopathy. Skin:     General: Skin is warm. Findings: No rash. Neurological:      Mental Status: He is alert and oriented to person, place, and time. Psychiatric:         Mood and Affect: Mood normal.         Behavior: Behavior normal.         Thought Content: Thought content normal.       Assessment:       Diagnosis Orders   1. Chronic pansinusitis  amoxicillin (AMOXIL) 500 MG capsule      2. Benign essential tremor syndrome        3.  Andi's syndrome 4. Ankylosing spondylitis of thoracic region [facility])             Plan:    Copy of ct scan sent to gastroenterology. Pt already had sigmoidoscopy. Await input from [facility] tomorrow regarding patient's back pain  Amoxicillin 1000 mg 3 times daily for chronic sinus infection    Return if symptoms worsen or fail to improve. No orders of the defined types were placed in this encounter. Orders Placed This Encounter   Medications    amoxicillin (AMOXIL) 500 MG capsule     Sig: Take 2 capsules by mouth 3 times daily for 10 days     Dispense:  60 capsule     Refill:  0       Patient given educational materials - see patient instructions. Discussed use, benefit, and side effects of prescribed medications. All patient questions answered. Pt voiced understanding. Reviewed health maintenance. Instructed to continue current medications, diet andexercise. Patient agreed with treatment plan. Follow up as directed.      Electronicallysigned by Lv Bateman MD on 3/14/2023 at 10:35 AM

## 2023-03-24 ENCOUNTER — HOSPITAL ENCOUNTER (OUTPATIENT)
Age: 58
Setting detail: SPECIMEN
Discharge: HOME | End: 2023-03-24

## 2023-03-25 LAB
C DIFFICILE TOXINS, PCR: NORMAL
CAMPYLOBACTER DNA SPEC NAA+PROBE: NORMAL
ETEC ELTA+ESTB GENES STL QL NAA+PROBE: NORMAL
P SHIGELLOIDES DNA STL QL NAA+PROBE: NORMAL
SALMONELLA DNA SPEC QL NAA+PROBE: NORMAL
SHIGA TOXIN STX GENE SPEC NAA+PROBE: NORMAL
SHIGELLA DNA SPEC QL NAA+PROBE: NORMAL
SPECIMEN DESCRIPTION: NORMAL
SPECIMEN DESCRIPTION: NORMAL
V CHOL+PARA RFBL+TRKH+TNAA STL QL NAA+PR: NORMAL
Y ENTERO RECN STL QL NAA+PROBE: NORMAL

## 2023-03-28 LAB — CALPROTECTIN, FECAL: 27 UG/G

## 2023-05-01 ENCOUNTER — HOSPITAL ENCOUNTER (OUTPATIENT)
Age: 58
Setting detail: SPECIMEN
Discharge: HOME | End: 2023-05-01

## 2023-05-02 ENCOUNTER — APPOINTMENT (OUTPATIENT)
Dept: GENERAL RADIOLOGY | Age: 58
End: 2023-05-02
Payer: MEDICARE

## 2023-05-02 ENCOUNTER — HOSPITAL ENCOUNTER (INPATIENT)
Age: 58
LOS: 3 days | Discharge: HOME | End: 2023-05-05
Attending: EMERGENCY MEDICINE | Admitting: INTERNAL MEDICINE
Payer: MEDICARE

## 2023-05-02 DIAGNOSIS — K59.81 PSEUDO-OBSTRUCTION OF COLON: ICD-10-CM

## 2023-05-02 DIAGNOSIS — K59.81 OGILVIE'S SYNDROME: Primary | ICD-10-CM

## 2023-05-02 LAB
ABSOLUTE EOS #: 0.18 K/UL (ref 0–0.44)
ABSOLUTE IMMATURE GRANULOCYTE: 0.03 K/UL (ref 0–0.3)
ABSOLUTE LYMPH #: 1.64 K/UL (ref 1.1–3.7)
ABSOLUTE MONO #: 0.68 K/UL (ref 0.1–1.2)
ALBUMIN SERPL-MCNC: 4.2 G/DL (ref 3.5–5.2)
ALP SERPL-CCNC: 103 U/L (ref 40–129)
ALT SERPL-CCNC: 13 U/L (ref 5–41)
ANION GAP SERPL CALCULATED.3IONS-SCNC: 9 MMOL/L (ref 9–17)
AST SERPL-CCNC: 17 U/L
BASOPHILS # BLD: 1 % (ref 0–2)
BASOPHILS ABSOLUTE: 0.07 K/UL (ref 0–0.2)
BILIRUB SERPL-MCNC: 0.2 MG/DL (ref 0.3–1.2)
BUN SERPL-MCNC: 11 MG/DL (ref 6–20)
BUN/CREAT BLD: 21 (ref 9–20)
CALCIUM SERPL-MCNC: 9.2 MG/DL (ref 8.6–10.4)
CHLORIDE SERPL-SCNC: 99 MMOL/L (ref 98–107)
CO2 SERPL-SCNC: 28 MMOL/L (ref 20–31)
CREAT SERPL-MCNC: 0.52 MG/DL (ref 0.7–1.2)
EOSINOPHILS RELATIVE PERCENT: 2 % (ref 1–4)
GFR SERPL CREATININE-BSD FRML MDRD: >60 ML/MIN/1.73M2
GLUCOSE SERPL-MCNC: 96 MG/DL (ref 70–99)
HCT VFR BLD AUTO: 43 % (ref 40.7–50.3)
HGB BLD-MCNC: 13.7 G/DL (ref 13–17)
IMMATURE GRANULOCYTES: 0 %
LACTATE PLASV-SCNC: 0.8 MMOL/L (ref 0.5–2.2)
LYMPHOCYTES # BLD: 19 % (ref 24–43)
MCH RBC QN AUTO: 27.4 PG (ref 25.2–33.5)
MCHC RBC AUTO-ENTMCNC: 31.9 G/DL (ref 28.4–34.8)
MCV RBC AUTO: 86 FL (ref 82.6–102.9)
MONOCYTES # BLD: 8 % (ref 3–12)
NRBC AUTOMATED: 0 PER 100 WBC
PDW BLD-RTO: 13.5 % (ref 11.8–14.4)
PLATELET # BLD AUTO: 181 K/UL (ref 138–453)
PMV BLD AUTO: 9.1 FL (ref 8.1–13.5)
POTASSIUM SERPL-SCNC: 4.4 MMOL/L (ref 3.7–5.3)
PROT SERPL-MCNC: 7.9 G/DL (ref 6.4–8.3)
RBC # BLD: 5 M/UL (ref 4.21–5.77)
SEG NEUTROPHILS: 70 % (ref 36–65)
SEGMENTED NEUTROPHILS ABSOLUTE COUNT: 6.09 K/UL (ref 1.5–8.1)
SODIUM SERPL-SCNC: 136 MMOL/L (ref 135–144)
WBC # BLD AUTO: 8.7 K/UL (ref 3.5–11.3)

## 2023-05-02 PROCEDURE — 1200000000 HC SEMI PRIVATE

## 2023-05-02 PROCEDURE — 99285 EMERGENCY DEPT VISIT HI MDM: CPT

## 2023-05-02 PROCEDURE — 85025 COMPLETE CBC W/AUTO DIFF WBC: CPT

## 2023-05-02 PROCEDURE — A4216 STERILE WATER/SALINE, 10 ML: HCPCS | Performed by: NURSE PRACTITIONER

## 2023-05-02 PROCEDURE — 83605 ASSAY OF LACTIC ACID: CPT

## 2023-05-02 PROCEDURE — 36415 COLL VENOUS BLD VENIPUNCTURE: CPT

## 2023-05-02 PROCEDURE — 2500000003 HC RX 250 WO HCPCS: Performed by: NURSE PRACTITIONER

## 2023-05-02 PROCEDURE — 74018 RADEX ABDOMEN 1 VIEW: CPT

## 2023-05-02 PROCEDURE — 2580000003 HC RX 258: Performed by: NURSE PRACTITIONER

## 2023-05-02 PROCEDURE — 80053 COMPREHEN METABOLIC PANEL: CPT

## 2023-05-02 PROCEDURE — 99222 1ST HOSP IP/OBS MODERATE 55: CPT | Performed by: NURSE PRACTITIONER

## 2023-05-02 RX ORDER — ONDANSETRON 4 MG/1
4 TABLET, ORALLY DISINTEGRATING ORAL EVERY 8 HOURS PRN
Status: DISCONTINUED | OUTPATIENT
Start: 2023-05-02 | End: 2023-05-05 | Stop reason: HOSPADM

## 2023-05-02 RX ORDER — SODIUM CHLORIDE 0.9 % (FLUSH) 0.9 %
10 SYRINGE (ML) INJECTION PRN
Status: DISCONTINUED | OUTPATIENT
Start: 2023-05-02 | End: 2023-05-05 | Stop reason: HOSPADM

## 2023-05-02 RX ORDER — SODIUM CHLORIDE 0.9 % (FLUSH) 0.9 %
5-40 SYRINGE (ML) INJECTION EVERY 12 HOURS SCHEDULED
Status: DISCONTINUED | OUTPATIENT
Start: 2023-05-02 | End: 2023-05-05 | Stop reason: HOSPADM

## 2023-05-02 RX ORDER — SODIUM CHLORIDE 9 MG/ML
INJECTION, SOLUTION INTRAVENOUS PRN
Status: DISCONTINUED | OUTPATIENT
Start: 2023-05-02 | End: 2023-05-05 | Stop reason: HOSPADM

## 2023-05-02 RX ORDER — POTASSIUM CHLORIDE 7.45 MG/ML
10 INJECTION INTRAVENOUS PRN
Status: DISCONTINUED | OUTPATIENT
Start: 2023-05-02 | End: 2023-05-05 | Stop reason: HOSPADM

## 2023-05-02 RX ORDER — ENOXAPARIN SODIUM 100 MG/ML
30 INJECTION SUBCUTANEOUS 2 TIMES DAILY
Status: DISCONTINUED | OUTPATIENT
Start: 2023-05-02 | End: 2023-05-03

## 2023-05-02 RX ORDER — ACETAMINOPHEN 325 MG/1
650 TABLET ORAL EVERY 6 HOURS PRN
Status: DISCONTINUED | OUTPATIENT
Start: 2023-05-02 | End: 2023-05-05 | Stop reason: HOSPADM

## 2023-05-02 RX ORDER — MAGNESIUM SULFATE 1 G/100ML
1000 INJECTION INTRAVENOUS PRN
Status: DISCONTINUED | OUTPATIENT
Start: 2023-05-02 | End: 2023-05-05 | Stop reason: HOSPADM

## 2023-05-02 RX ORDER — POTASSIUM CHLORIDE 20 MEQ/1
40 TABLET, EXTENDED RELEASE ORAL PRN
Status: DISCONTINUED | OUTPATIENT
Start: 2023-05-02 | End: 2023-05-05 | Stop reason: HOSPADM

## 2023-05-02 RX ORDER — ACETAMINOPHEN 650 MG/1
650 SUPPOSITORY RECTAL EVERY 6 HOURS PRN
Status: DISCONTINUED | OUTPATIENT
Start: 2023-05-02 | End: 2023-05-05 | Stop reason: HOSPADM

## 2023-05-02 RX ORDER — SODIUM CHLORIDE 9 MG/ML
INJECTION, SOLUTION INTRAVENOUS CONTINUOUS
Status: DISCONTINUED | OUTPATIENT
Start: 2023-05-02 | End: 2023-05-03

## 2023-05-02 RX ORDER — ONDANSETRON 2 MG/ML
4 INJECTION INTRAMUSCULAR; INTRAVENOUS EVERY 6 HOURS PRN
Status: DISCONTINUED | OUTPATIENT
Start: 2023-05-02 | End: 2023-05-05 | Stop reason: HOSPADM

## 2023-05-02 RX ADMIN — FAMOTIDINE 20 MG: 10 INJECTION, SOLUTION INTRAVENOUS at 23:23

## 2023-05-02 RX ADMIN — SODIUM CHLORIDE: 9 INJECTION, SOLUTION INTRAVENOUS at 21:20

## 2023-05-02 ASSESSMENT — ENCOUNTER SYMPTOMS
COUGH: 0
CONSTIPATION: 0
SHORTNESS OF BREATH: 0
CHEST TIGHTNESS: 0
NAUSEA: 1
ABDOMINAL DISTENTION: 1
BACK PAIN: 1
ABDOMINAL PAIN: 1
VOMITING: 0
DIARRHEA: 0

## 2023-05-02 ASSESSMENT — PAIN - FUNCTIONAL ASSESSMENT: PAIN_FUNCTIONAL_ASSESSMENT: 0-10

## 2023-05-02 ASSESSMENT — PAIN DESCRIPTION - DESCRIPTORS: DESCRIPTORS: TIGHTNESS

## 2023-05-02 ASSESSMENT — PAIN SCALES - GENERAL: PAINLEVEL_OUTOF10: 8

## 2023-05-02 ASSESSMENT — PAIN DESCRIPTION - LOCATION: LOCATION: ABDOMEN

## 2023-05-02 NOTE — ED PROVIDER NOTES
[facility]  eMERGENCY dEPARTMENT eNCOUnter     Pt Name: Sven Zabala  MRN: [id number]  [place] 1965  Date of evaluation: 5/2/23    Sven Zabala is a 61 y.o. male with CC: Bloated (Sent by GI for admission through emergency department for procedure tomorrow)      MDM:    ED Course as of 05/02/23 1914 Tue May 02, 2023   18 Spoke with Dr. Ady Benitez, gastroenterology, regarding the patient's admission for colon decompression tomorrow. CBC, CMP and KUB ordered. Patient n.p.o. with sips of water with meds and ice chips until midnight, n.p.o. completely after midnight. Plan for admission to internal medicine. [AJ]   [id number] Patient's blood work is unremarkable, KUB reveals large bowel obstruction versus ileus. Consult for internal medicine placed for admission. [AJ]   1848 Spoke with Marika Brunson, NP with internal medicine and the patient was excepted for admission. [AJ]      ED Course User Index  [AJ] Ashwin Cortez, APRN - CNP       This visit was performed by both a physician and an APC. I performed all aspects of the MDM as documented.     Mitch Sullivan DO  Attending Emergency Physician                  Yue Schmidt DO  05/02/23 1914

## 2023-05-02 NOTE — ED PROVIDER NOTES
[facility] ED  eMERGENCY dEPARTMENT eNCOUnter      Pt Name: Barbara Oh  MRN: [id number]  [place] 1965  Date of evaluation: 5/2/2023  Provider: JYOTSNA Doran 6762       Chief Complaint   Patient presents with    Bloated     Sent by GI for admission through emergency department for procedure tomorrow         HISTORY OF PRESENT ILLNESS  (Location/Symptom, Timing/Onset, Context/Setting, Quality, Duration, Modifying Factors, Severity.)   Barbara Oh is a 61 y.o. male who presents to the emergency department from [facility] gastroenterology for admission. Patient being admitted for decompression, patient with pseudoobstruction of the colon. Patient is alert and oriented x4, does not appear to be in any acute distress. He is accompanied by his wife. The patient states he has had this procedure done twice before, NG, rectal tube before 24 hours in order to decompress his large intestine. Nursing Notes were reviewed. ALLERGIES     Cetyl alcohol, Metronidazole, Adhesive tape, Cymbalta [duloxetine hcl], and Neosporin [neomycin-polymyxin-gramicidin]    CURRENT MEDICATIONS       Previous Medications    ACETAMINOPHEN (TYLENOL ARTHRITIS PAIN PO)    Take 1 tablet by mouth every 6 hours as needed    ASPIRIN 81 MG EC TABLET    Take 81 mg by mouth daily     CARBIDOPA-LEVODOPA (SINEMET)  MG PER TABLET    Take 1 tablet by mouth at bedtime    CITALOPRAM (CELEXA) 20 MG TABLET    Take 1 tablet by mouth daily    FLUTICASONE (FLONASE) 50 MCG/ACT NASAL SPRAY    USE 2 SPRAYS IN EACH NOSTRIL ONCE DAILY.  NEED TO USE REGULARLY FOR BENEFIT    FUROSEMIDE (LASIX) 40 MG TABLET    Take 1 tablet by mouth daily    HYDROCORTISONE (WESTCORT) 0.2 % CREAM    Apply topically 2 times daily as needed    HYDROXYCHLOROQUINE (PLAQUENIL) 200 MG TABLET    Take 1 tablet by mouth 2 times daily    LORATADINE (CLARITIN) 10 MG TABLET    Take 10 mg by mouth daily    LORAZEPAM (ATIVAN) 1

## 2023-05-03 ENCOUNTER — APPOINTMENT (OUTPATIENT)
Dept: GENERAL RADIOLOGY | Age: 58
End: 2023-05-03
Payer: MEDICARE

## 2023-05-03 PROBLEM — G25.81 RESTLESS LEGS SYNDROME (RLS): Status: ACTIVE | Noted: 2023-05-03

## 2023-05-03 PROBLEM — R25.1 TREMOR OF BOTH HANDS: Status: ACTIVE | Noted: 2023-05-03

## 2023-05-03 LAB
ALBUMIN SERPL-MCNC: 3.9 G/DL (ref 3.5–5.2)
ALP SERPL-CCNC: 89 U/L (ref 40–129)
ALT SERPL-CCNC: 11 U/L (ref 5–41)
ANION GAP SERPL CALCULATED.3IONS-SCNC: 9 MMOL/L (ref 9–17)
AST SERPL-CCNC: 13 U/L
BILIRUB SERPL-MCNC: 0.3 MG/DL (ref 0.3–1.2)
BUN SERPL-MCNC: 10 MG/DL (ref 6–20)
BUN/CREAT BLD: 20 (ref 9–20)
CALCIUM SERPL-MCNC: 8.6 MG/DL (ref 8.6–10.4)
CHLORIDE SERPL-SCNC: 100 MMOL/L (ref 98–107)
CO2 SERPL-SCNC: 29 MMOL/L (ref 20–31)
CREAT SERPL-MCNC: 0.5 MG/DL (ref 0.7–1.2)
GFR SERPL CREATININE-BSD FRML MDRD: >60 ML/MIN/1.73M2
GLUCOSE SERPL-MCNC: 98 MG/DL (ref 70–99)
INR PPP: 1.1
POTASSIUM SERPL-SCNC: 3.9 MMOL/L (ref 3.7–5.3)
PROT SERPL-MCNC: 6.8 G/DL (ref 6.4–8.3)
PROTHROMBIN TIME: 13.8 SEC (ref 11.5–14.2)
SODIUM SERPL-SCNC: 138 MMOL/L (ref 135–144)

## 2023-05-03 PROCEDURE — 85610 PROTHROMBIN TIME: CPT

## 2023-05-03 PROCEDURE — 99232 SBSQ HOSP IP/OBS MODERATE 35: CPT | Performed by: INTERNAL MEDICINE

## 2023-05-03 PROCEDURE — 6360000002 HC RX W HCPCS: Performed by: INTERNAL MEDICINE

## 2023-05-03 PROCEDURE — 87449 NOS EACH ORGANISM AG IA: CPT

## 2023-05-03 PROCEDURE — 99223 1ST HOSP IP/OBS HIGH 75: CPT | Performed by: PSYCHIATRY & NEUROLOGY

## 2023-05-03 PROCEDURE — 80053 COMPREHEN METABOLIC PANEL: CPT

## 2023-05-03 PROCEDURE — A4216 STERILE WATER/SALINE, 10 ML: HCPCS | Performed by: NURSE PRACTITIONER

## 2023-05-03 PROCEDURE — 2580000003 HC RX 258: Performed by: NURSE PRACTITIONER

## 2023-05-03 PROCEDURE — 1200000000 HC SEMI PRIVATE

## 2023-05-03 PROCEDURE — C9113 INJ PANTOPRAZOLE SODIUM, VIA: HCPCS | Performed by: NURSE PRACTITIONER

## 2023-05-03 PROCEDURE — 74018 RADEX ABDOMEN 1 VIEW: CPT

## 2023-05-03 PROCEDURE — 36415 COLL VENOUS BLD VENIPUNCTURE: CPT

## 2023-05-03 PROCEDURE — 6360000002 HC RX W HCPCS: Performed by: NURSE PRACTITIONER

## 2023-05-03 PROCEDURE — 87324 CLOSTRIDIUM AG IA: CPT

## 2023-05-03 RX ORDER — SODIUM CHLORIDE AND POTASSIUM CHLORIDE 150; 450 MG/100ML; MG/100ML
INJECTION, SOLUTION INTRAVENOUS CONTINUOUS
Status: DISCONTINUED | OUTPATIENT
Start: 2023-05-03 | End: 2023-05-05 | Stop reason: HOSPADM

## 2023-05-03 RX ORDER — ENOXAPARIN SODIUM 100 MG/ML
30 INJECTION SUBCUTANEOUS 2 TIMES DAILY
Status: DISCONTINUED | OUTPATIENT
Start: 2023-05-03 | End: 2023-05-05 | Stop reason: HOSPADM

## 2023-05-03 RX ORDER — METOPROLOL TARTRATE 5 MG/5ML
2.5 INJECTION INTRAVENOUS EVERY 12 HOURS
Status: DISCONTINUED | OUTPATIENT
Start: 2023-05-03 | End: 2023-05-03

## 2023-05-03 RX ORDER — FLUTICASONE PROPIONATE 50 MCG
2 SPRAY, SUSPENSION (ML) NASAL DAILY
Status: DISCONTINUED | OUTPATIENT
Start: 2023-05-03 | End: 2023-05-05 | Stop reason: HOSPADM

## 2023-05-03 RX ORDER — METOPROLOL TARTRATE 5 MG/5ML
2.5 INJECTION INTRAVENOUS EVERY 12 HOURS
Status: DISCONTINUED | OUTPATIENT
Start: 2023-05-03 | End: 2023-05-05 | Stop reason: HOSPADM

## 2023-05-03 RX ORDER — LORAZEPAM 2 MG/ML
1 INJECTION INTRAMUSCULAR NIGHTLY
Status: DISCONTINUED | OUTPATIENT
Start: 2023-05-03 | End: 2023-05-05 | Stop reason: HOSPADM

## 2023-05-03 RX ADMIN — SODIUM CHLORIDE, PRESERVATIVE FREE 40 MG: 5 INJECTION INTRAVENOUS at 09:39

## 2023-05-03 RX ADMIN — POTASSIUM CHLORIDE AND SODIUM CHLORIDE: 450; 150 INJECTION, SOLUTION INTRAVENOUS at 15:08

## 2023-05-03 RX ADMIN — ENOXAPARIN SODIUM 30 MG: 100 INJECTION SUBCUTANEOUS at 21:01

## 2023-05-03 ASSESSMENT — ENCOUNTER SYMPTOMS
ABDOMINAL PAIN: 1
NAUSEA: 0
CHEST TIGHTNESS: 0
BLOOD IN STOOL: 0
EYES NEGATIVE: 1
COUGH: 0
WHEEZING: 0
DIARRHEA: 1
ABDOMINAL DISTENTION: 1
SHORTNESS OF BREATH: 1
VOMITING: 0

## 2023-05-03 ASSESSMENT — PAIN SCALES - GENERAL: PAINLEVEL_OUTOF10: 7

## 2023-05-03 NOTE — CARE COORDINATION
Potential DME:    Patient expects to discharge to: Trailer/mobile home  Plan for transportation at discharge: Family    Financial    Payor: Madonna Oconnor / Plan: Dominguez Carroll PPO / Product Type: Medicare /     Does insurance require precert for SNF: Yes    Potential assistance Purchasing Medications:    Meds-to-Beds request:        [address], [address] [phone] Joaquim Diver [phone] [address] [address]  Phone: [phone] Fax: [phone]      Notes:    Factors facilitating achievement of predicted outcomes: Cooperative and Pleasant    Barriers to discharge: na     Additional Case Management Notes:     Pt is independent, drives. Hs used [place] in past after back surgery. Has cane, walker if needed. 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 Freedom of choice list with basic dialogue that supports the patient's individualized plan of care/goals and shares the quality data associated with the providers was provided to: Patient   Patient Representative Name:       The Patient and/or Patient Representative Agree with the Discharge Plan?  Yes    Asher Oro RN  Case Management Department  Ph:  Fax:

## 2023-05-03 NOTE — CONSULTS
Objective:   /77   Pulse 86   Temp 98.2 °F (36.8 °C) (Oral)   Resp 14   Ht 5' 9\" (1.753 m)   Wt 245 lb (111.1 kg)   SpO2 95%   BMI 36.18 kg/m²     Blood pressure range: Systolic (88VWL), VXL:149 , Min:119 , SPN:555   ; Diastolic (32XPS), OJA:33, Min:62, Max:80      Continuous infusions:    0.45 % NaCl with KCl 20 mEq 100 mL/hr at 05/03/23 1508    sodium chloride         ON EXAMINATION:  GENERAL  Appears comfortable and in no distress   HEENT  NC/ AT   NECK  Supple and no bruits heard   Cardiovascular  S1, S2 heard; radial pulse intact   MENTAL STATUS:  Alert, oriented, intact memory, no confusion, normal speech, normal language, no hallucination or delusion   CRANIAL NERVES: II     -       Pupils reactive b/l visual acuity: 20/30 OU; Fundus exam: intact venous pulsations; Visual fields intact to confrontation  III,IV,VI -  EOMs full, no afferent defect, no                      SALINA, no ptosis  V     -     Normal facial sensation  VII    -     Normal facial symmetry  VIII   -     Intact hearing  IX,X -     Symmetrical palate  XI    -     Symmetrical shoulder shrug  XII   -     Midline tongue, no atrophy    MOTOR FUNCTION:  Normal bulk, normal tone, normal power; postural tremors noted; predominantly in right upper extremity greater than left upper extremity but no resting tremor noted. SENSORY FUNCTION:  Normal touch, normal pin, normal vibration, normal proprioception   CEREBELLAR FUNCTION:  Intact fine motor control over upper limbs.,  No cogwheeling noted. No bradykinesia noted.    REFLEX FUNCTION:  Symmetric, no perverted reflex, no Babinski sign   STATION and GAIT  Not tested         Data:    Lab Results:     Lab Results   Component Value Date    CHOL 152 02/10/2018    LDLCHOLESTEROL 121 06/06/2022    HDL 36 (L) 06/06/2022    TRIG 85 02/10/2018    ALT 11 05/03/2023    AST 13 05/03/2023    TSH 1.07 11/04/2021    INR 1.1 05/03/2023    GLUF 102 (H) 06/06/2022    LABA1C 5.5 02/10/2018
shortly after back surgery in 2021 requiring decompressive colonoscopy. He had some recurrent symptoms of abdominal distention, pain, and diarrhea in the beginning of March after which she underwent sigmoidoscopy along temporary relief of abdominal distention. Is continued to have diarrhea as defined by 3-4 watery bowel movements daily. He is passing much gas. He had increased abdominal distention over the last week. He has complaints of high-pitched sounds. Not much of an appetite. No recent antibiotic exposure. No history of C. difficile. Repeat C. difficile test is pending. No history of inflammatory bowel disease. Previous fecal calprotectin negative. No known electrolyte abnormalities, narcotic use. Patient was placed on Sinemet in 2021 shortly after back surgery [facility] for restless leg syndrome. No other medications potentially implicated. Patient received neostigmine. He has a history of a low-dose beta-blocker. Patient was asked to see colorectal surgery on an outpatient basis to discuss possible surgical options    KUB and previous CT scan reviewed    On examination he is comfortable lying in bed. Completely nontoxic in appearance complaining of some abdominal discomfort. Abdomen is softly distended with some tympany. 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 There have been case reports of Sinemet being associate with Andi syndrome, perhaps that is playing a role. Electrolytes are currently normal.  He  has not require any pain medication. He has not required any additional beta-blocker since being the hospital.  At this point we will begin with conservative approach including placement of NG tube as well as ambulation. We will recheck KB in the morning.   If no improvement, will plan for neostigmine

## 2023-05-03 NOTE — H&P
[facility]  Office: [address], DO, Rishi Dunn, DO, Monster Pacheco, DO, Fawn Reaves Blood, DO, Johnie Nichole MD, Carlene Moreira MD, Blaze Whaley MD, Suellyn Snellen, MD,  Gena Estevez MD, Marlyn Pineda MD, Tanner Harper, DO, Chitra Marquez MD,  Travis Spurling, MD, Judy Nagy MD, Miracle Judge DO, Ladonna Phelan MD, Sun Bailey MD, Sintia Busch, DO, Elidia Moss MD, Gladis Matta MD, Sulma Vyas MD, Huey Stoll MD,  Abdoul Rodrigez, DO, Colin Ramos MD,  Maia Olsen, CNP,  Shawnee Kelley, CNP, Ana Jaeger, CNP, Cherie Xiong, CNP,  Antony Love, [facility], Stone Hernandez, CNP, Sadi Melton, CNP, Abraham Graham, CNP, Scott Naylor, CNP, Dimitris Berg, CNP, KRISTIE HogueC, Jamie Elias, CNS, Marc Combs, CNP, Sherice Nagel, [facility]    HISTORY AND PHYSICAL EXAMINATION            Date:   5/3/2023  Patient name:  Simon Bumpers  Date of admission:  5/2/2023  5:14 PM  MRN:   [id number]  Account:  [de-identified]  YOB: 1965  PCP:    Marques Mccauley MD  Room:   [address] [place] [address]  Code Status:    Full Code    Chief Complaint:     Chief Complaint   Patient presents with    [facility]     Sent by GI for admission through emergency department for procedure tomorrow       History Obtained From:     patient    History of Present Illness:     Simon Bumpers is a 61 y.o. Non- / non  male who presents with Bloated (Sent by GI for admission through emergency department for procedure tomorrow)   and is admitted to the hospital for the management of Andi syndrome. Patient has a known hx of [place] syndrome and follows with [address] [place] [address] gastroenterology. He says that he had a colonoscopy as an outpatient 2 months ago to decompress his bowel. Since then he says he has had diarrhea and has felt intermittently short of breath.  He started having severe

## 2023-05-04 ENCOUNTER — APPOINTMENT (OUTPATIENT)
Dept: CT IMAGING | Age: 58
End: 2023-05-04
Payer: MEDICARE

## 2023-05-04 ENCOUNTER — APPOINTMENT (OUTPATIENT)
Dept: GENERAL RADIOLOGY | Age: 58
End: 2023-05-04
Payer: MEDICARE

## 2023-05-04 LAB
ALBUMIN SERPL-MCNC: 3.9 G/DL (ref 3.5–5.2)
ALP SERPL-CCNC: 93 U/L (ref 40–129)
ALT SERPL-CCNC: 11 U/L (ref 5–41)
ANION GAP SERPL CALCULATED.3IONS-SCNC: 13 MMOL/L (ref 9–17)
AST SERPL-CCNC: 14 U/L
BILIRUB SERPL-MCNC: 0.3 MG/DL (ref 0.3–1.2)
BUN SERPL-MCNC: 7 MG/DL (ref 6–20)
BUN/CREAT BLD: 16 (ref 9–20)
C DIFF GDH + TOXINS A+B STL QL IA.RAPID: NEGATIVE
CALCIUM SERPL-MCNC: 8.6 MG/DL (ref 8.6–10.4)
CHLORIDE SERPL-SCNC: 99 MMOL/L (ref 98–107)
CO2 SERPL-SCNC: 25 MMOL/L (ref 20–31)
CREAT SERPL-MCNC: 0.43 MG/DL (ref 0.7–1.2)
FECAL PANCREATIC ELASTASE-1: >800 UG/G
GFR SERPL CREATININE-BSD FRML MDRD: >60 ML/MIN/1.73M2
GLUCOSE SERPL-MCNC: 83 MG/DL (ref 70–99)
POTASSIUM SERPL-SCNC: 3.7 MMOL/L (ref 3.7–5.3)
PROT SERPL-MCNC: 6.9 G/DL (ref 6.4–8.3)
SODIUM SERPL-SCNC: 137 MMOL/L (ref 135–144)
SPECIMEN DESCRIPTION: NORMAL

## 2023-05-04 PROCEDURE — 36415 COLL VENOUS BLD VENIPUNCTURE: CPT

## 2023-05-04 PROCEDURE — 74018 RADEX ABDOMEN 1 VIEW: CPT

## 2023-05-04 PROCEDURE — 6360000002 HC RX W HCPCS: Performed by: INTERNAL MEDICINE

## 2023-05-04 PROCEDURE — C9113 INJ PANTOPRAZOLE SODIUM, VIA: HCPCS | Performed by: NURSE PRACTITIONER

## 2023-05-04 PROCEDURE — 2580000003 HC RX 258: Performed by: NURSE PRACTITIONER

## 2023-05-04 PROCEDURE — 80053 COMPREHEN METABOLIC PANEL: CPT

## 2023-05-04 PROCEDURE — 6360000002 HC RX W HCPCS: Performed by: NURSE PRACTITIONER

## 2023-05-04 PROCEDURE — 99231 SBSQ HOSP IP/OBS SF/LOW 25: CPT | Performed by: PSYCHIATRY & NEUROLOGY

## 2023-05-04 PROCEDURE — 74176 CT ABD & PELVIS W/O CONTRAST: CPT

## 2023-05-04 PROCEDURE — 1200000000 HC SEMI PRIVATE

## 2023-05-04 PROCEDURE — 6360000002 HC RX W HCPCS: Performed by: PSYCHIATRY & NEUROLOGY

## 2023-05-04 PROCEDURE — 99232 SBSQ HOSP IP/OBS MODERATE 35: CPT | Performed by: STUDENT IN AN ORGANIZED HEALTH CARE EDUCATION/TRAINING PROGRAM

## 2023-05-04 RX ORDER — NEOSTIGMINE METHYLSULFATE 5 MG/5 ML
1.5 SYRINGE (ML) INTRAVENOUS ONCE
Status: COMPLETED | OUTPATIENT
Start: 2023-05-04 | End: 2023-05-04

## 2023-05-04 RX ORDER — ATROPINE SULFATE 0.1 MG/ML
INJECTION INTRAVENOUS
Status: DISCONTINUED
Start: 2023-05-04 | End: 2023-05-04 | Stop reason: WASHOUT

## 2023-05-04 RX ORDER — ATROPINE SULFATE 1 MG/ML
1 INJECTION, SOLUTION INTRAVENOUS
Status: DISPENSED | OUTPATIENT
Start: 2023-05-04 | End: 2023-05-05

## 2023-05-04 RX ADMIN — POTASSIUM CHLORIDE AND SODIUM CHLORIDE: 450; 150 INJECTION, SOLUTION INTRAVENOUS at 10:33

## 2023-05-04 RX ADMIN — POTASSIUM CHLORIDE AND SODIUM CHLORIDE: 450; 150 INJECTION, SOLUTION INTRAVENOUS at 02:41

## 2023-05-04 RX ADMIN — POTASSIUM CHLORIDE AND SODIUM CHLORIDE: 450; 150 INJECTION, SOLUTION INTRAVENOUS at 21:06

## 2023-05-04 RX ADMIN — Medication 1.5 MG: at 16:27

## 2023-05-04 RX ADMIN — SODIUM CHLORIDE, PRESERVATIVE FREE 40 MG: 5 INJECTION INTRAVENOUS at 08:00

## 2023-05-04 RX ADMIN — ENOXAPARIN SODIUM 30 MG: 100 INJECTION SUBCUTANEOUS at 21:12

## 2023-05-04 RX ADMIN — LORAZEPAM 1 MG: 2 INJECTION INTRAMUSCULAR; INTRAVENOUS at 00:05

## 2023-05-04 RX ADMIN — LORAZEPAM 1 MG: 2 INJECTION INTRAMUSCULAR; INTRAVENOUS at 21:12

## 2023-05-05 ENCOUNTER — APPOINTMENT (OUTPATIENT)
Dept: GENERAL RADIOLOGY | Age: 58
End: 2023-05-05
Payer: MEDICARE

## 2023-05-05 VITALS
WEIGHT: 228.2 LBS | BODY MASS INDEX: 33.8 KG/M2 | OXYGEN SATURATION: 94 % | DIASTOLIC BLOOD PRESSURE: 78 MMHG | HEIGHT: 69 IN | HEART RATE: 94 BPM | SYSTOLIC BLOOD PRESSURE: 140 MMHG | TEMPERATURE: 98.2 F | RESPIRATION RATE: 16 BRPM

## 2023-05-05 PROCEDURE — 2580000003 HC RX 258: Performed by: NURSE PRACTITIONER

## 2023-05-05 PROCEDURE — A4216 STERILE WATER/SALINE, 10 ML: HCPCS | Performed by: NURSE PRACTITIONER

## 2023-05-05 PROCEDURE — 99232 SBSQ HOSP IP/OBS MODERATE 35: CPT | Performed by: STUDENT IN AN ORGANIZED HEALTH CARE EDUCATION/TRAINING PROGRAM

## 2023-05-05 PROCEDURE — 6360000002 HC RX W HCPCS: Performed by: NURSE PRACTITIONER

## 2023-05-05 PROCEDURE — 6360000002 HC RX W HCPCS: Performed by: INTERNAL MEDICINE

## 2023-05-05 PROCEDURE — C9113 INJ PANTOPRAZOLE SODIUM, VIA: HCPCS | Performed by: NURSE PRACTITIONER

## 2023-05-05 PROCEDURE — 99232 SBSQ HOSP IP/OBS MODERATE 35: CPT | Performed by: PSYCHIATRY & NEUROLOGY

## 2023-05-05 PROCEDURE — 74018 RADEX ABDOMEN 1 VIEW: CPT

## 2023-05-05 RX ADMIN — ENOXAPARIN SODIUM 30 MG: 100 INJECTION SUBCUTANEOUS at 08:42

## 2023-05-05 RX ADMIN — POTASSIUM CHLORIDE AND SODIUM CHLORIDE: 450; 150 INJECTION, SOLUTION INTRAVENOUS at 07:27

## 2023-05-05 RX ADMIN — SODIUM CHLORIDE, PRESERVATIVE FREE 40 MG: 5 INJECTION INTRAVENOUS at 08:41

## 2023-05-05 NOTE — DISCHARGE INSTRUCTIONS
Follow-up with your PCP in 3 days. Call for an appointment soon as possible. Follow-up with GI as instructed. Follow-up with motility specialist at [facility] as instructed by GI. Medications as instructed. Return to the emergency room immediately for any new or worsening concerns.

## 2023-05-05 NOTE — PROGRESS NOTES
Gastroenterology Note      Patient:   Danny Weinberg   :    1965       Subjective:     Patient seen and examined, wife at bedside. Had Neostigmine yesterday administered in ICU, given bradycardia potential.   Had large movements of flatus, liquid stools, no hematochezia. F/u KUB much improved. NG out, mild epistaxis with such. No abdominal pain, walking around room still with liquid stools, feels good, eager to go home. Objective:   Vital Signs:  BP (!) 149/74   Pulse 88   Temp 97.7 °F (36.5 °C) (Oral)   Resp 16   Ht 5' 9\" (1.753 m)   Wt 228 lb 3.2 oz (103.5 kg)   SpO2 94%   BMI 33.70 kg/m²      Physical Exam:   General appearance: Alert, NAD  Lungs: CTA bilaterally    Heart:S1S2 RRR  Abdomen: Soft, +bs, NT  Skin:  No jaundice, no stigmata of chronic liver disease. Lab and Imaging Review   CBC:   Lab Results   Component Value Date/Time    WBC 8.7 2023 05:50 PM    RBC 5.00 2023 05:50 PM    HGB 13.7 2023 05:50 PM    HCT 43.0 2023 05:50 PM    MCV 86.0 2023 05:50 PM    MCH 27.4 2023 05:50 PM    MCHC 31.9 2023 05:50 PM    RDW 13.5 2023 05:50 PM     2023 05:50 PM    MPV 9.1 2023 05:50 PM     BUN/Creatinine:    Lab Results   Component Value Date/Time    BUN 7 2023 05:29 AM    CREATININE 0.43 2023 05:29 AM     Hepatic Function Panel:    Lab Results   Component Value Date/Time    ALKPHOS 93 2023 05:29 AM    ALT 11 2023 05:29 AM    AST 14 2023 05:29 AM    PROT 6.9 2023 05:29 AM    BILITOT 0.3 2023 05:29 AM    BILIDIR <0.08 2022 09:59 AM    IBILI Can not be calculated 2022 09:59 AM    LABALBU 3.9 2023 05:29 AM     PT/INR:    Lab Results   Component Value Date/Time    PROTIME 13.8 2023 05:03 AM    INR 1.1 2023 05:03 AM         Assessment:   Andi's syndrome, 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 Clinically improved.    ? Etiology
Gastroenterology Note      Patient:   Isabella Pastrana   :    1965     Subjective:       Patient seen and examined. NG placed yesterday afternoon about 200cc's returned, mostly ice chips  Liquid stool small volume x 4 yesterday, x one today, small flatus overnight  Feels worse, more distended, wants NG out. Wife at bedside. Neurology did see him to discuss alternative medications for RLS other than Sinemet. Had a dose of ativan overnight for temporary tx of RLS  C-diff remains pending. KUB this am   IMPRESSION:  Diffuse gaseous distention of the small bowel and colon with up to mild  dilation of the small bowel and moderate dilation of the colon suggestive of  ileus. Objective:   Vital Signs:  BP (!) 150/75   Pulse 89   Temp 98.2 °F (36.8 °C)   Resp 16   Ht 5' 9\" (1.753 m)   Wt 245 lb (111.1 kg)   SpO2 95%   BMI 36.18 kg/m²      Physical Exam:   General appearance: Alert, NAD  Lungs: CTA bilaterally    Heart:S1S2 RRR  Abdomen: distended with tympany, diffusely ttp   Skin:  No jaundice, no stigmata of chronic liver disease.     Lab and Imaging Review   CBC:   Lab Results   Component Value Date/Time    WBC 8.7 2023 05:50 PM    RBC 5.00 2023 05:50 PM    HGB 13.7 2023 05:50 PM    HCT 43.0 2023 05:50 PM    MCV 86.0 2023 05:50 PM    MCH 27.4 2023 05:50 PM    MCHC 31.9 2023 05:50 PM    RDW 13.5 2023 05:50 PM     2023 05:50 PM    MPV 9.1 2023 05:50 PM     Hemoglobin/Hematocrit:    Lab Results   Component Value Date/Time    HGB 13.7 2023 05:50 PM    HCT 43.0 2023 05:50 PM     CMP:    Lab Results   Component Value Date/Time     2023 05:29 AM    K 3.7 2023 05:29 AM    CL 99 2023 05:29 AM    CO2 25 2023 05:29 AM    BUN 7 2023 05:29 AM    CREATININE 0.43 2023 05:29 AM    GFRAA >60 2022 09:59 AM    LABGLOM >60 2023 05:29 AM    GLUCOSE 83 2023
[facility]  Office: [address], DO, Rishi Dunn, DO, Mosnter Na, DO, Fawn Reaves Blood, DO, Johnie Nichole MD, Carlene Moreira MD, Blaze Whaley MD, Suellyn Snellen, MD,  Gena Estevez MD, Marlyn Pineda MD, Tanner Harper, DO, Chitra Marquez MD,  Travis Spurling, MD, Judy Nagy MD, Miracle Judge DO, Ladonna Phelan MD, Sun Bailey MD, Sintia Busch, DO, Elidia Moss MD, Gladis Matta MD, Sulma Vyas MD, Huey Stoll MD,  Abdoul Rodrigez, DO, Colin Ramos MD,  Maia Olsen, CNP,  Shawnee Kelley, CNP, Ana Jaeger, CNP, Cherie Xiong, CNP,  Antony Love, [facility], Stone Hernandez, CNP, Sadi Melton, CNP, Abraham Graham, CNP, Scott Naylor, CNP, Dimitris Berg, CNP, Leonides Wayne PA-C, Jamie Elias, CNS, Marc Combs, CNP, Sherice Nagel, CNP         [facility]    Progress Note    5/5/2023    8:35 AM    Name:   Simon Bumpers  MRN:     [id number]     [place]:      [de-identified]   Room:   2014/2014-02   Day:  3  Admit Date:  5/2/2023  5:14 PM    PCP:   Marques Mccauley MD  Code Status:  Full Code    Subjective:     C/C:   Chief Complaint   Patient presents with    Marcella Archer by GI for admission through emergency department for procedure tomorrow     Interval History Status: Improved. Vitals reviewed, afebrile and hemodynamically stable. Saturating well on room air. Labs reviewed, stable. Overnight patient had a bowel movement following neostigmine. On examination patient resting comfortably in bed. Abdominal cramping and distention have resolved. Tolerated lunch. Awaiting KUB and GI clearance for discharge. Brief History: This is a 59-year-old male with a history of recurrent Andi's syndrome who presents to the hospital abdominal pain, constipation, nausea.   X-ray showed prominent colonic dilatation suggestive of  pseudoobstruction or large bowel
Neftaly Betancur NP notified of patient's bloody nose since pulling NG.
Patient came back from CVU for Neostygmine treatment and felling much better. Patient requested NG to be removed and was given order to do so.
Patient transferred to room 2014-2 via bed with belongings. VSS. Assessment as charted. Patient oriented to room and call light.
Patient with large loose/liquid BM and gas within minutes of Neostigmine injection. HR and BP stable, see vitals flowsheet. Continue to monitor.
[facility]  Office: [address], DO, Pickrell Carrier, DO, Jolene Garcia, DO, Foreign Flores Blood, DO, Rosalva Max MD, Stacie Swanson MD, Misty Aguilar MD, Buddy Santiago MD,  Bashir Diaz MD, Jairo Herman MD, Ynes Ji, DO, Barney Bronson MD,  Polo Mortimer, MD, Tiffany Martin MD, Nina Sellers DO, Tomy Dhillon MD, Portia Sandhoff, MD, Christine Taylor, DO, Elizabeth Garza MD, Marshall Aguayo MD, Curt Adams MD, Sadaf Gann MD,  Ian Primrose, DO, Scott Ibarra MD,  Linette Kiser, CNP,  Rosita Schilling, CNP, Margi Stone, CNP, Lefty Joshi, CNP,  Dolores Wheatley, [facility], Masoud Ames, CNP, Lynnette Vieira, CNP, Paige Murillo, CNP, Mattie Carcamo, CNP, Debby Reinoso, CNP, Nette Lang PA-C, Jami Coates, CNS, Cirilo Rojas, CNP, Cori Magana, [facility]    Progress Note    5/4/2023    7:31 AM    Name:   Lynn Coates  MRN:     [id number]     Acct:      [de-identified]   Room:   [facility]   Day:  2  Admit Date:  5/2/2023  5:14 PM    PCP:   Tatiana Knight MD  Code Status:  Full Code    Subjective:     C/C:   Chief Complaint   Patient presents with    Wynnedale Litter by GI for admission through emergency department for procedure tomorrow     Interval History Status: not changed. Vitals reviewed, afebrile and hemodynamically stable. Saturating well on room air. Labs reviewed, stable. Overnight patient continued to have abdominal distention. On examination patient complaints of distention. GI following with plan for CT today. Brief History: This is a 58-year-old male with a history of recurrent Andi's syndrome who presents to the hospital abdominal pain, constipation, nausea. X-ray showed prominent colonic dilatation suggestive of ileus or large bowel obstruction.     Review of Systems:     Constitutional:  negative for chills, fevers,
Pt admitted to floor at this time. Vital signs obtained, telemetry in place. Pt NPO for possible decompression of bowels tomorrow. All needs met at this time.
Pt back to room from CT. Hooked back to suction and IV fluids.
Pt finished oral contrast via NG tube by gravity which was okay to do per Tina Albrecht NP with GI. Called CT to let them know pt was finished, have to wait 1 hour before imaging.
Pt moved up to CVICU 2033 for the administration of neostigmine. PT needs to be closely monitored as it can cause bradycardia and needs atropine at bedside. Bedside report given to MAX oMnroe. PT will recover up in CVICU and then move back to PCU.
Pt off floor to CT.
The patient is discharged home at this time; his wife is resent to drive im home.
[x] Medication Reconciliation was completed and the patient's home medication list was verified. The Med List Status is \"Complete\". The following sources were used to assist with Medication Reconciliation:    [] Patient had a list of medications which was transcribed into the EHR. [] Patient provided bottles of their medications    [x] Home medications reviewed and confirmed with patient.      [] Contacted patient's pharmacy to confirm home medications    [] Contacted patient's physician office to confirm home medications    [] Medical Records from another facility and/or Care Everywhere were reviewed
breath), Use of cane as ambulatory aid, Vertigo, Wears glasses, and Wears partial dentures. Social History:   reports that he quit smoking about 16 years ago. His smoking use included cigarettes. He started smoking about 40 years ago. He has a 34.50 pack-year smoking history. He quit smokeless tobacco use about 3 years ago. His smokeless tobacco use included chew. He reports that he does not currently use alcohol. He reports that he does not use drugs. Family History:   Family History   Problem Relation Age of Onset    Coronary Art Dis Father         premature-- at 52    Diabetes Father     High Blood Pressure Father     Other Father         COPD, EMPHYSEMA    Coronary Art Dis Paternal Uncle         premature    Heart Failure Paternal Uncle     Diabetes Mother        Vitals:  /63   Pulse 78   Temp 97.3 °F (36.3 °C) (Oral)   Resp 14   Ht 5' 9\" (1.753 m)   Wt 245 lb (111.1 kg)   SpO2 93%   BMI 36.18 kg/m²   Temp (24hrs), Av.7 °F (36.5 °C), Min:97.3 °F (36.3 °C), Max:98.1 °F (36.7 °C)    No results for input(s): POCGLU in the last 72 hours. I/O (24Hr):   No intake or output data in the 24 hours ending 23 1100    Labs:  Hematology:  Recent Labs     23  0503   WBC 8.7  --    RBC 5.00  --    HGB 13.7  --    HCT 43.0  --    MCV 86.0  --    MCH 27.4  --    MCHC 31.9  --    RDW 13.5  --      --    MPV 9.1  --    INR  --  1.1     Chemistry:  Recent Labs     23  0503    138   K 4.4 3.9   CL 99 100   CO2 28 29   GLUCOSE 96 98   BUN 11 10   CREATININE 0.52* 0.50*   ANIONGAP 9 9   LABGLOM >60 >60   CALCIUM 9.2 8.6     Recent Labs     23  0503   PROT 7.9 6.8   LABALBU 4.2 3.9   AST 17 13   ALT 13 11   ALKPHOS 103 89   BILITOT 0.2* 0.3     ABG:No results found for: POCPH, PHART, PH, POCPCO2, VJL2WME, PCO2, POCPO2, PO2ART, PO2, POCHCO3, OET1MVE, HCO3, NBEA, PBEA, BEART, BE, THGBART, THB, SSW8RWG, TEGH4JTR, Z2LUHKVN,
FORCEPS N/A 2017    COLONOSCOPY POLYPECTOMY HOT BIOPSY performed by Kvng Iqbal MD at [address]  2021    [id number]    TOE SURGERY Right     2nd toe    TONSILLECTOMY      AS A CHILD    UMBILICAL HERNIA REPAIR      VASECTOMY       Social History: Robel Moore  reports that he quit smoking about 16 years ago. His smoking use included cigarettes. He started smoking about 40 years ago. He has a 34.50 pack-year smoking history. He quit smokeless tobacco use about 3 years ago. His smokeless tobacco use included chew. He reports that he does not currently use alcohol. He reports that he does not use drugs.     Family History   Problem Relation Age of Onset    Coronary Art Dis Father         premature-- at 52    Diabetes Father     High Blood Pressure Father     Other Father         COPD, EMPHYSEMA    Coronary Art Dis Paternal Uncle         premature    Heart Failure Paternal Uncle     Diabetes Mother        Current Medications:     fluticasone  2 spray Each Nostril Daily    pantoprazole (PROTONIX) 40 mg injection  40 mg IntraVENous Daily    enoxaparin  30 mg SubCUTAneous BID    [Held by provider] metoprolol  2.5 mg IntraVENous Q12H    LORazepam  1 mg IntraVENous Nightly    sodium chloride flush  5-40 mL IntraVENous 2 times per day     PRN Meds include: sodium chloride flush, sodium chloride, potassium chloride **OR** potassium alternative oral replacement **OR** potassium chloride, magnesium sulfate, ondansetron **OR** ondansetron, acetaminophen **OR** acetaminophen    ROS:   Constitutional Negative for fever and chills   HEENT Negative for ear discharge, ear pain, nosebleed   Eyes Negative for photophobia, pain and discharge   Respiratory Negative for hemoptysis and sputum   Cardiovascular Negative for orthopnea, claudication and PND   Gastrointestinal Negative for abdominal pain, diarrhea, blood in stool   Musculoskeletal Negative for joint pain, negative for myalgia   Skin Negative
by contextual derivation.   Delores Manzo MD 5/5/2023 9:41 AM

## 2023-05-05 NOTE — PLAN OF CARE
Patient had NG tube placed today, LIWS. NPO with ice chips for bowel rest, monitor overnight. IV fluids infusing, wife at bedside. Vitals are stable. Patient is having diarrhea, stool sample sent to lab for c-diff r/o. Neuro consult placed for possible medication changes contributing to recurring Andi's.    Problem: Pain  Goal: Verbalizes/displays adequate comfort level or baseline comfort level  5/3/2023 1555 by Zane Rae RN  Outcome: Progressing     Problem: Safety - Adult  Goal: Free from fall injury  5/3/2023 1555 by Zane Rae RN  Outcome: Progressing     Problem: ABCDS Injury Assessment  Goal: Absence of physical injury  5/3/2023 1555 by Zane Rae RN  Outcome: Progressing     Problem: Gastrointestinal - Adult  Goal: Minimal or absence of nausea and vomiting  Outcome: Progressing
Problem: Discharge Planning  Goal: Discharge to home or other facility with appropriate resources  Outcome: Progressing     Problem: Pain  Goal: Verbalizes/displays adequate comfort level or baseline comfort level  Outcome: Progressing  Flowsheets (Taken 5/5/2023 0241)  Verbalizes/displays adequate comfort level or baseline comfort level:   Administer analgesics based on type and severity of pain and evaluate response   Consider cultural and social influences on pain and pain management   Encourage patient to monitor pain and request assistance   Assess pain using appropriate pain scale   Implement non-pharmacological measures as appropriate and evaluate response     Problem: Safety - Adult  Goal: Free from fall injury  Outcome: Progressing  Flowsheets (Taken 5/5/2023 0241)  Free From Fall Injury:   Based on caregiver fall risk screen, instruct family/caregiver to ask for assistance with transferring infant if caregiver noted to have fall risk factors   Instruct family/caregiver on patient safety     Problem: Gastrointestinal - Adult  Goal: Minimal or absence of nausea and vomiting  Outcome: Progressing  Goal: Maintains or returns to baseline bowel function  Outcome: Progressing  Goal: Maintains adequate nutritional intake  Outcome: Progressing  Goal: Establish and maintain optimal ostomy function  Outcome: Progressing
Pt currently resting in bed. 1 mg Ativan given to help pt's restless leg syndrome. Pt NPO so he cannot take his usual meds for restless leg. NG tube in place connected to LIWS. Pt alert and oriented. Normal sinus on monitor. Currently has 0.45% NaCl with 20 mEq Kcl running at 100 ml/hr. All needs currently met.    Problem: Discharge Planning  Goal: Discharge to home or other facility with appropriate resources  Outcome: Progressing     Problem: Pain  Goal: Verbalizes/displays adequate comfort level or baseline comfort level  5/4/2023 0049 by Elicia Shultz RN  Outcome: Progressing     Problem: Safety - Adult  Goal: Free from fall injury  5/4/2023 0049 by Elicia Shultz RN  Outcome: Progressing     Problem: ABCDS Injury Assessment  Goal: Absence of physical injury  5/4/2023 0049 by Elicia Shultz RN  Outcome: Progressing     Problem: Gastrointestinal - Adult  Goal: Minimal or absence of nausea and vomiting  5/4/2023 0049 by Elicia Shultz RN  Outcome: Progressing     Problem: Gastrointestinal - Adult  Goal: Maintains or returns to baseline bowel function  Outcome: Progressing
Pt had relatively uneventful night. Currently NPO for potential bowel decompression. Pt stats he has had his bowels decompressed before. GI to see him today. Bed alarm on for safety, bed in lowest position. Pt alert and oriented, normal sinus on monitor. All needs met throughout shift.    Problem: Discharge Planning  Goal: Discharge to home or other facility with appropriate resources  Outcome: Progressing     Problem: Pain  Goal: Verbalizes/displays adequate comfort level or baseline comfort level  Outcome: Progressing     Problem: Safety - Adult  Goal: Free from fall injury  Outcome: Progressing     Problem: ABCDS Injury Assessment  Goal: Absence of physical injury  Outcome: Progressing
Pt remains safe and free from falls thus far in shift. Alert and oriented x4. NG hooked to LIWS with 450 ml output. NSR on cardiac monitor. VSS. IV Protonix. Cdiff was negative. 0.45% NS w/ KCI 20 meq @ 100 ml/hr. CT of abdomen with contrast completed. Neostigmine given up in CVICU, repeat KUB at 8 pm tonight. Bed locked and lowest position. Call light in reach. Pt calls out appropriately. Will continue to monitor pt.    Problem: Discharge Planning  Goal: Discharge to home or other facility with appropriate resources  5/4/2023 1134 by Arnav Keller RN  Outcome: Progressing     Problem: Pain  Goal: Verbalizes/displays adequate comfort level or baseline comfort level  5/4/2023 1134 by Arnav Keller RN  Outcome: Progressing     Problem: Safety - Adult  Goal: Free from fall injury  5/4/2023 1134 by Arnav Keller RN  Outcome: Progressing     Problem: ABCDS Injury Assessment  Goal: Absence of physical injury  5/4/2023 1134 by Arnav Keller RN  Outcome: Progressing     Problem: Gastrointestinal - Adult  Goal: Minimal or absence of nausea and vomiting  5/4/2023 1134 by Arnav Keller RN  Outcome: Progressing
RN  Outcome: Progressing  Goal: Maintains adequate nutritional intake  5/5/2023 1632 by Anderson Trejo RN  Outcome: Adequate for Discharge  5/5/2023 0241 by Sami Jones RN  Outcome: Progressing  Goal: Establish and maintain optimal ostomy function  5/5/2023 1632 by Anderson Trejo RN  Outcome: Adequate for Discharge  5/5/2023 0241 by Sami Jones RN  Outcome: Progressing

## 2023-05-08 ENCOUNTER — CARE COORDINATION (OUTPATIENT)
Dept: CASE MANAGEMENT | Age: 58
End: 2023-05-08

## 2023-05-08 DIAGNOSIS — K59.81 OGILVIE SYNDROME: Primary | ICD-10-CM

## 2023-05-08 PROCEDURE — 1111F DSCHRG MED/CURRENT MED MERGE: CPT | Performed by: FAMILY MEDICINE

## 2023-05-08 NOTE — CARE COORDINATION
[address] Care Transitions Initial Follow Up Call    Call within 2 business days of discharge: Yes    Patient Current Location:  Home: [address] [place] [address] [place]    Care Transition Nurse contacted the patient by telephone to perform post hospital discharge assessment. Verified name and  with patient as identifiers. Provided introduction to self, and explanation of the Care Transition Nurse role. Patient: Christine Merchant Patient : 1965   MRN: [id number]  Reason for Admission: [address] Syndrome  Discharge Date: 23 RARS: Readmission Risk Score: 7.9      Last Discharge  [address]       Date Complaint Diagnosis Description Type Department Provider    23 Bloated Andi's syndrome . .. ED to Hosp-Admission (Discharged) (ADMITTED) [address], DO; Dianelys Regan. .. Was this an external facility discharge? No Discharge Facility: [facility]    Challenges to be reviewed by the provider   Additional needs identified to be addressed with provider: No  none               Method of communication with provider: none. Spoke with patient today who said he is feeling better than when he first came in but still not feeling back to normal.  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 Patient said this has happened to him before where his stomach was bloated and unable to move bowels or pass  gas. He said before they just did decompression with rectal tube and NG tube and symptoms resolved. This time he was given a medication that made him pass gas then had some liquid stools. He was sent home but states on Saturday he passed just a little gas and no bm and same for  and today, passing little gas and no bm. He denies n/v, abdominal pain and does not feel he is anymore bloated than what he was when he returned home. He did call his GI doctor who has referred him to [facility] motility center.  He has not heard anything from them

## 2023-05-10 ENCOUNTER — CARE COORDINATION (OUTPATIENT)
Dept: CASE MANAGEMENT | Age: 58
End: 2023-05-10

## 2023-05-10 NOTE — CARE COORDINATION
MRI [facility]     Non-[facility] follow up appointment(s): na/    Care Transition Nurse reviewed discharge instructions with patient and discussed any barriers to care and/or understanding of plan of care after discharge. Discussed appropriate site of care based on symptoms and resources available to patient including: PCP  Specialist. The patient agrees to contact the PCP office for questions related to their healthcare. Advance Care Planning:   not on file; education provided. Patients top risk factors for readmission: medical condition-Olgivie's syndrome  Interventions to address risk factors: Obtained and reviewed discharge summary and/or continuity of care documents    Offered patient enrollment in the Remote Patient Monitoring (RPM) program for in-home monitoring: Patient is not eligible for RPM program.     Care Transitions Subsequent and Final Call    Schedule Follow Up Appointment with PCP: Completed  Subsequent and Final Calls  Do you have any ongoing symptoms?: Yes  Onset of Patient-reported symptoms: In the past 7 days  Patient-reported symptoms: Other  Have your medications changed?: No  Do you have any questions related to your medications?: No  Do you currently have any active services?: No  Do you have any needs or concerns that I can assist you with?: No  Identified Barriers: Lack of Education  Care Transitions Interventions  Other Interventions:             Care Transition Nurse provided contact information for future needs. Plan for follow-up call in 7-10 days based on severity of symptoms and risk factors. Plan for next call:  check abdominal bloating, ensure his bowels are moving and passing gas.   See if he has secured appointment at  of Kel Greer RN

## 2023-05-12 ENCOUNTER — OFFICE VISIT (OUTPATIENT)
Dept: PRIMARY CARE CLINIC | Age: 58
End: 2023-05-12

## 2023-05-12 VITALS
WEIGHT: 245.9 LBS | BODY MASS INDEX: 36.42 KG/M2 | DIASTOLIC BLOOD PRESSURE: 62 MMHG | HEART RATE: 66 BPM | SYSTOLIC BLOOD PRESSURE: 116 MMHG | HEIGHT: 69 IN | OXYGEN SATURATION: 97 %

## 2023-05-12 DIAGNOSIS — F51.01 PRIMARY INSOMNIA: ICD-10-CM

## 2023-05-12 DIAGNOSIS — K59.81 OGILVIE'S SYNDROME: ICD-10-CM

## 2023-05-12 DIAGNOSIS — Z09 HOSPITAL DISCHARGE FOLLOW-UP: Primary | ICD-10-CM

## 2023-05-12 RX ORDER — SENNA PLUS 8.6 MG/1
8.6 TABLET ORAL NIGHTLY
COMMUNITY
Start: 2023-05-11

## 2023-05-12 RX ORDER — TRAZODONE HYDROCHLORIDE 50 MG/1
50 TABLET ORAL NIGHTLY
Qty: 90 TABLET | Refills: 3 | Status: SHIPPED | OUTPATIENT
Start: 2023-05-12

## 2023-05-12 RX ORDER — HYDROCORTISONE VALERATE CREAM 2 MG/G
CREAM TOPICAL 2 TIMES DAILY PRN
Qty: 45 G | Refills: 1 | Status: SHIPPED | OUTPATIENT
Start: 2023-05-12

## 2023-05-12 RX ORDER — WHEAT DEXTRIN 3 G/3.8 G
3.8 POWDER (GRAM) ORAL
COMMUNITY
Start: 2023-05-11

## 2023-05-12 ASSESSMENT — ENCOUNTER SYMPTOMS
VOMITING: 0
WHEEZING: 0
SHORTNESS OF BREATH: 0
NAUSEA: 0
EYE REDNESS: 0
ABDOMINAL PAIN: 0
SORE THROAT: 0
COUGH: 0
EYE DISCHARGE: 0
DIARRHEA: 0
RHINORRHEA: 0

## 2023-05-12 NOTE — PROGRESS NOTES
Post-Discharge Transitional Care Follow Up      Davidson Thacker   YOB: 1965    Date of Office Visit:  5/12/2023  Date of Hospital Admission: 5/2/23  Date of Hospital Discharge: 5/5/23  Readmission Risk Score (high >=14%. Medium >=10%):Readmission Risk Score: 7.9      Care management risk score Rising risk (score 2-5) and Complex Care (Scores >=6): No Risk Score On File     Non face to face  following discharge, date last encounter closed (first attempt may have been earlier): 05/08/2023     Call initiated 2 business days of discharge: Yes     Hospital discharge follow-up  -     HI DISCHARGE MEDS RECONCILED W/ CURRENT OUTPATIENT MED LIST  Primary insomnia  -     traZODone (DESYREL) 50 MG tablet; Take 1 tablet by mouth nightly, Disp-90 tablet, R-3Normal  Andi's syndrome      Medical Decision Making: high complexity  Return in about 6 months (around 11/12/2023). Subjective:   HPI    Inpatient course: Discharge summary reviewed- see chart. Interval history/Current status: Syndrome. Had marked abdominal distention. Patient was treated with IV neostigmine. Patient had good results. Had good bowel movements. Patient was then referred to [facility]. No other recommendations came from there. Patient Sinemet was discontinued. Patient states still having tremor.       Patient Active Problem List   Diagnosis    Anxiety disorder    Benign essential hypertension    Chronic obstructive pulmonary disease, unspecified COPD type ([address]    Esophageal reflux    Hypogonadism male    Inflammatory arthritis    Lumbar radicular pain    Pulmonary granuloma (HCC)    Male erectile disorder    Sensorineural hearing loss    Tinnitus of both ears    Vertigo    Psoriasis    Hip impingement syndrome    Seborrheic keratosis    Kyphosis of thoracolumbar region    Eczema    Sjogren's syndrome ([address]    Spinal stenosis of lumbar region with neurogenic claudication    Lumbosacral spondylosis without

## 2023-05-18 ENCOUNTER — CARE COORDINATION (OUTPATIENT)
Dept: CASE MANAGEMENT | Age: 58
End: 2023-05-18

## 2023-05-18 NOTE — CARE COORDINATION
[facility] Care Transitions Follow Up Call    Patient Current Location: [address] Transition Nurse contacted the patient by telephone to follow up after admission on . Verified name and  with patient as identifiers. Patient: Mai Topete  Patient : 1965   MRN: [id number]  Reason for Admission: Olgilvie's Syndrome  Discharge Date: 23 RARS: Readmission Risk Score: 7.9      Needs to be reviewed by the provider   Additional needs identified to be addressed with provider: No  none             Method of communication with provider: none. Spoke with patient who said he has been doing ok this week. He has diarrhea stools, has had 3-4 already this morning. He does not have any bloating, cramping abdomina distention, nausea or other issues today. He had his follow up with PCP, Trazadone added for sleep. He was seen by specialist at [facility], states they had no answers really for him there but discussed different options, one being surgery but was told that would be \"way down the road\". Patient was also seen by colorectal surgeon Dr. Wai Martinez from [place]. Patient said they did discuss possible surgery down the road as well. Currently he is doing ok, he does not follow any special diet but avoids certain foods that cause increased gas. He denies any new needs or concerns at this time. Addressed changes since last contact:  none  Discussed follow-up appointments. If no appointment was previously scheduled, appointment scheduling offered: Yes. Is follow up appointment scheduled within 7 days of discharge? Yes. Follow Up  Future Appointments   Date Time Provider Lilli Hill   2023 11:40 AM [facility]     [facility] follow up appointment(s): n/a    Care Transition Nurse reviewed medical action plan and red flags with patient and discussed any barriers to care and/or understanding of plan of care after discharge.  Discussed appropriate site of care based

## 2023-05-25 ENCOUNTER — CARE COORDINATION (OUTPATIENT)
Dept: CASE MANAGEMENT | Age: 58
End: 2023-05-25

## 2023-05-25 NOTE — CARE COORDINATION
[facility] Care Transitions Follow Up Call    Patient Current Location: [address] Transition Nurse contacted the patient by telephone to follow up after admission on . Verified name and  with patient as identifiers. Patient: Sully Parikh  Patient : 1965   MRN: [id number]  Reason for Admission: Ogilvies syndrome  Discharge Date: 23 RARS: Readmission Risk Score: 7.9      Needs to be reviewed by the provider   Additional needs identified to be addressed with provider: No  none             Method of communication with provider: none. Spoke with patient today who said he started to feel bad again last night, thinks he may be having another flare up/episode of his Olgilvie's. He states his stomach is making more sounds, he had not passed any gas yesterday and then finally last night was able to pass gas some. He has diarrhea stools, several daily. Inquired with patient what GI has advised to do if he feels this coming back on and he states he was told to get back to the ED immediately. Treatment for his condition has been with decompression of the bowel with NG and rectal tube, patient has seen Dr. Armand Black who suggested if this happens again to go to [facility] as he may do surgery to see if they can resolve his issues. He has followed up with [facility] as well, states he is going to just stay local for his GI care as they had no other treatments to offer either. Patient denies bloating today said he has had incidences where he felt like this for a day or two and then resolved so is going to see how he feels after he eats. If he has no improvement or if symptoms worsen he is going to go to [facility]. Expressed to patient if he has any needs or concerns or needs assistance with anything to reach out to writer. Addressed changes since last contact:  none  Discussed follow-up appointments.  If no appointment was previously scheduled, appointment scheduling offered: No.   Is follow up appointment

## 2023-05-31 ENCOUNTER — CARE COORDINATION (OUTPATIENT)
Dept: CASE MANAGEMENT | Age: 58
End: 2023-05-31

## 2023-05-31 NOTE — CARE COORDINATION
[facility] Care Transitions Follow Up Call    Patient Current Location:  Home: [address] [place] [address]    Care Transition Nurse contacted the patient by telephone to follow up after admission on . Verified name and  with patient as identifiers. Patient: Mor Chisholm  Patient : 1965   MRN: [id number]  Reason for Admission: Olgilvies Syndrome  Discharge Date: 23 RARS: Readmission Risk Score: 7.9      Needs to be reviewed by the provider   Additional needs identified to be addressed with provider: No  none             Method of communication with provider: none. Spoke with patient today. He was able to go camping this past weekend but said that on Monday this week was starting to feel like he was going to have another episode. He is feeling better today, states is passing gas, no bloating. He is going to have CT next week on , this will be done at [facility] facility, was ordered by Dr. Niranjan Haque. He has follow up with him on  and states his wife and parents are coming with him to visit to discuss options. Patient has had flare ups a couple of times, is willing to try surgical intervention for this if it will prevent future recurrences. He is anxious about when the next one will come on and wants to get it taken care of. He does have questions about what they will do if they do surgery, expressed this will be discussed by Dr. Niranjan Haque as I am not certain what the surgery entails. He denies any new needs or concerns, expressed I would reach out next week after his CT. Denies any needs or concerns at present. Addressed changes since last contact:  none  Discussed follow-up appointments. If no appointment was previously scheduled, appointment scheduling offered: No.   Is follow up appointment scheduled within 7 days of discharge? Yes.     Follow Up  Future Appointments   Date Time Provider Lilli Hill   2023 11:40 AM Millie Vickers MD [facility]

## 2023-06-07 ENCOUNTER — CARE COORDINATION (OUTPATIENT)
Dept: CASE MANAGEMENT | Age: 58
End: 2023-06-07

## 2023-06-07 NOTE — CARE COORDINATION
available to patient including: PCP  Specialist. The patient agrees to contact the PCP office for questions related to their healthcare. Advance Care Planning:   not on file; education provided. Patients top risk factors for readmission: medical condition-Olgilvies   Interventions to address risk factors: Obtained and reviewed discharge summary and/or continuity of care documents    Offered patient enrollment in the Remote Patient Monitoring (RPM) program for in-home monitoring: Patient is not eligible for RPM program.     Care Transitions Subsequent and Final Call    Schedule Follow Up Appointment with PCP: Completed  Subsequent and Final Calls  Do you have any ongoing symptoms?: Yes  Onset of Patient-reported symptoms: In the past 7 days  Patient-reported symptoms: Other  Have your medications changed?: No  Do you have any questions related to your medications?: No  Do you currently have any active services?: No  Do you have any needs or concerns that I can assist you with?: No  Identified Barriers: Lack of Education  Care Transitions Interventions  Other Interventions:             Care Transition Nurse provided contact information for future needs. Plan for follow-up call in 5-7 days based on severity of symptoms and risk factors. Plan for next call:  check what surgeon has to say, any additional needs? Will be final outreach.      Catarino [place] RN

## 2023-06-20 ENCOUNTER — CARE COORDINATION (OUTPATIENT)
Dept: CARE COORDINATION | Age: 58
End: 2023-06-20

## 2023-06-20 SDOH — ECONOMIC STABILITY: HOUSING INSECURITY: IN THE LAST 12 MONTHS, HOW MANY PLACES HAVE YOU LIVED?: 1

## 2023-06-20 SDOH — ECONOMIC STABILITY: INCOME INSECURITY: IN THE LAST 12 MONTHS, WAS THERE A TIME WHEN YOU WERE NOT ABLE TO PAY THE MORTGAGE OR RENT ON TIME?: NO

## 2023-06-20 ASSESSMENT — SOCIAL DETERMINANTS OF HEALTH (SDOH)
HOW OFTEN DO YOU ATTENT MEETINGS OF THE CLUB OR ORGANIZATION YOU BELONG TO?: NEVER
IN A TYPICAL WEEK, HOW MANY TIMES DO YOU TALK ON THE PHONE WITH FAMILY, FRIENDS, OR NEIGHBORS?: THREE TIMES A WEEK
HOW OFTEN DO YOU ATTEND CHURCH OR RELIGIOUS SERVICES?: 1 TO 4 TIMES PER YEAR
DO YOU BELONG TO ANY CLUBS OR ORGANIZATIONS SUCH AS CHURCH GROUPS UNIONS, FRATERNAL OR ATHLETIC GROUPS, OR SCHOOL GROUPS?: NO
HOW OFTEN DO YOU GET TOGETHER WITH FRIENDS OR RELATIVES?: TWICE A WEEK

## 2023-06-20 NOTE — CARE COORDINATION
Ambulatory Care Coordination Note  2023    Patient Current Location: [facility]    ACM contacted the patient by telephone. Verified name and  with patient as identifiers. Provided introduction to self, and explanation of the ACM role. ACM: Chitra Mansfield RN    Challenges to be reviewed by the provider   Additional needs identified to be addressed with provider: No  none               Method of communication with provider: none. Enrolled in care management, referred by care transition nurse. 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 Had no flatus for 2 days over weekend, started to get bloated again but then passed gas and feeling good now. Eating and drinking ok. Chronic diarrhea, about 4 times a day, C diff negative. Following with [facility] general surgeon Dr. Brayan Sotelo, swallowed marker pill today, had initial xrays for Sitz Marker test, followup x rays ,  and  then will follow up with surgeon to determine what surgery is needed for bowel. History: COPD, HTN, chronic back pain- history lumbar and thoracic fusions, scoliosis, Srogren's, tremors and RLS, psoriasis. HTN- on metoprolol. Checks BP and pulse occasionally. systolic always less than 120. Does not see cardiologist.Pulse always above 60. COPD- history granulomas. Quit smoking 17 years ago. Has never used any inhaled medications. Has no symptoms. Not getting CT lung screenings any more. Does not follow with pulmonology currently. He lost about 100 lbs after back surgry, has put 40 lbs back on. Working to get some weight off again, has adjusted his diet to include less red meat. Discussed about limiting more carbs from diet, eating low fat proteins in preparation for upcoming surgery. Weighs regularly at home, is 240 right now. SDOH- lives with wife. He is on disability since back surgery. Worry about upcoming medical bills with surgery/procedures.  Already applied with

## 2023-06-30 ENCOUNTER — CARE COORDINATION (OUTPATIENT)
Dept: CARE COORDINATION | Age: 58
End: 2023-06-30

## 2023-06-30 ASSESSMENT — ENCOUNTER SYMPTOMS: DYSPNEA ASSOCIATED WITH: EXERTION

## 2023-07-17 ENCOUNTER — CARE COORDINATION (OUTPATIENT)
Dept: CARE COORDINATION | Age: 58
End: 2023-07-17

## 2023-07-17 ASSESSMENT — ENCOUNTER SYMPTOMS: DYSPNEA ASSOCIATED WITH: EXERTION

## 2023-07-17 NOTE — CARE COORDINATION
Left VM message asking for return call for care management follow up, check on surgeon appt.     Future Appointments   Date Time Provider [facility] [address]   11/17/2023 11:40 AM Madonna Cazares MD [facility]

## 2023-07-17 NOTE — CARE COORDINATION
Ambulatory Care Coordination Note  7/17/2023    Patient Current Location:  Home: [address] [address]     His Colace was stopped and he developed severe gas pain and wasn't passing gas so restarted and has been better but now having at least 4 BMs/diarrhea a day again. Sometimes will go a day with not passing any gas but worries if doesn't pas any gas for 2 days  He sees [facility] surgeon 8/15 and if has to have surgery will use a [facility] surgeon Dr. Darryl Nielsen will refer him to since can't afford to get done at [facility] but still wants to follow up with him to hear recommendation. CC Plan:   -Follow up in a month to review progress notes, assess symptoms and needs. COPD Assessment    Does the patient understand envrionmental exposure?: Yes  Does the patient have a nebulizer?: No            Symptoms:     Symptom course: stable  Breathlessness: exertion  Change in chronic cough?: No/At Baseline  Change in sputum?: No/At Baseline  Sputum characteristics: Unable to Specify  Self Monitoring - SaO2: No  Have you had a recent diagnosis of pneumonia either by PCP or at a hospital?: No      and   General Assessment    Do you have any symptoms that are causing concern?: Yes  Progression since Onset: Intermittent - Waxing/Waning  Reported Symptoms: Pain (Comment: gas pain)            Offered patient enrollment in the Remote Patient Monitoring (RPM) program for in-home monitoring: Patient declined. Lab Results       None            Care Coordination Interventions    Referral from Primary Care Provider: No  Suggested Interventions and Community Resources  Registered Dietician: Declined          Goals Addressed                   This Visit's Progress     Conditions and Symptoms   On track     I will schedule office visits, as directed by my provider. I will keep my appointment or reschedule if I have to cancel. I will notify my provider of any barriers to my plan of care.   I will notify my provider of

## 2023-07-26 ENCOUNTER — CARE COORDINATION (OUTPATIENT)
Dept: CARE COORDINATION | Age: 58
End: 2023-07-26

## 2023-07-26 NOTE — CARE COORDINATION
Patient called to report his GI provider ordered consult with surgeon Dr. Argenis Leach at [facility], that physician reviewed all his records and he has consult appt 8/18. He will still keep follow up with [facility] surgeon Dr. Thuy Aguilar 8/15. No new symptoms right now, still with the diarrhea at least 4 times a day. Will follow up in a few weeks to review progress notes, care plan.     Future Appointments   Date Time Provider [address]   8/8/2023  3:10 PM Conrado Oglesby MD [facility]   11/17/2023 11:40 AM Conrado Oglesby MD [facility] Zar[facility] Records

## 2023-08-05 SDOH — HEALTH STABILITY: PHYSICAL HEALTH: ON AVERAGE, HOW MANY DAYS PER WEEK DO YOU ENGAGE IN MODERATE TO STRENUOUS EXERCISE (LIKE A BRISK WALK)?: 0 DAYS

## 2023-08-05 SDOH — HEALTH STABILITY: PHYSICAL HEALTH: ON AVERAGE, HOW MANY MINUTES DO YOU ENGAGE IN EXERCISE AT THIS LEVEL?: 0 MIN

## 2023-08-05 ASSESSMENT — LIFESTYLE VARIABLES
HOW OFTEN DO YOU HAVE SIX OR MORE DRINKS ON ONE OCCASION: 1
HOW OFTEN DO YOU HAVE A DRINK CONTAINING ALCOHOL: 1
HOW MANY STANDARD DRINKS CONTAINING ALCOHOL DO YOU HAVE ON A TYPICAL DAY: PATIENT DOES NOT DRINK
HOW OFTEN DO YOU HAVE A DRINK CONTAINING ALCOHOL: NEVER
HOW MANY STANDARD DRINKS CONTAINING ALCOHOL DO YOU HAVE ON A TYPICAL DAY: 0

## 2023-08-05 ASSESSMENT — PATIENT HEALTH QUESTIONNAIRE - PHQ9
SUM OF ALL RESPONSES TO PHQ QUESTIONS 1-9: 2
1. LITTLE INTEREST OR PLEASURE IN DOING THINGS: 1
SUM OF ALL RESPONSES TO PHQ QUESTIONS 1-9: 2
SUM OF ALL RESPONSES TO PHQ QUESTIONS 1-9: 2
SUM OF ALL RESPONSES TO PHQ9 QUESTIONS 1 & 2: 2
SUM OF ALL RESPONSES TO PHQ QUESTIONS 1-9: 2
2. FEELING DOWN, DEPRESSED OR HOPELESS: 1

## 2023-08-08 ENCOUNTER — OFFICE VISIT (OUTPATIENT)
Dept: PRIMARY CARE CLINIC | Age: 58
End: 2023-08-08
Payer: MEDICARE

## 2023-08-08 VITALS
HEART RATE: 68 BPM | DIASTOLIC BLOOD PRESSURE: 70 MMHG | HEIGHT: 69 IN | OXYGEN SATURATION: 97 % | BODY MASS INDEX: 37.38 KG/M2 | SYSTOLIC BLOOD PRESSURE: 122 MMHG | WEIGHT: 252.4 LBS

## 2023-08-08 DIAGNOSIS — Z13.220 ENCOUNTER FOR LIPID SCREENING FOR CARDIOVASCULAR DISEASE: ICD-10-CM

## 2023-08-08 DIAGNOSIS — Z12.5 SCREENING PSA (PROSTATE SPECIFIC ANTIGEN): ICD-10-CM

## 2023-08-08 DIAGNOSIS — Z00.00 MEDICARE ANNUAL WELLNESS VISIT, SUBSEQUENT: Primary | ICD-10-CM

## 2023-08-08 DIAGNOSIS — Z13.6 ENCOUNTER FOR LIPID SCREENING FOR CARDIOVASCULAR DISEASE: ICD-10-CM

## 2023-08-08 DIAGNOSIS — Z00.00 ANNUAL PHYSICAL EXAM: ICD-10-CM

## 2023-08-08 PROCEDURE — 3078F DIAST BP <80 MM HG: CPT | Performed by: FAMILY MEDICINE

## 2023-08-08 PROCEDURE — G0439 PPPS, SUBSEQ VISIT: HCPCS | Performed by: FAMILY MEDICINE

## 2023-08-08 PROCEDURE — 3074F SYST BP LT 130 MM HG: CPT | Performed by: FAMILY MEDICINE

## 2023-08-08 NOTE — PROGRESS NOTES
rhythm, normal S1 and S2, no murmurs, rubs, clicks, or gallops, distal pulses intact, no carotid bruits  Abdomen: soft, non-tender, non-distended, normal bowel sounds, no masses or organomegaly  Extremities: no cyanosis, clubbing or edema  Musculoskeletal: normal range of motion, no joint swelling, deformity or tenderness  Neurologic: reflexes normal and symmetric, no cranial nerve deficit, gait, coordination and speech normal       Allergies   Allergen Reactions    Cetyl Alcohol Hives     Localized reaction by allergy testing    Cetearyl alcohol    Metronidazole Other (See Comments)     \" caused a prickly feeling in my legs \"  Other reaction(s): Unknown    Bacitracin-Polymyxin B      Other reaction(s): Unknown    Isopropyl Alcohol      Other reaction(s): Unknown  Cetearyl alcohol  Cetearyl alcohol      Adhesive Tape Other (See Comments)     opsite and paper tape ok, bandaid ok  REDNESS AND TAKES SKIN OFF. PAPER TAPE OK. opsite and paper tape ok, bandaid ok  Other reaction(s): Unknown  Other reaction(s): Unknown    Duloxetine Hcl Nausea And Vomiting     Other reaction(s): Unknown    Neosporin [Neomycin-Polymyxin-Gramicidin] Rash     Prior to Visit Medications    Medication Sig Taking?  Authorizing Provider   senna (SENOKOT) 8.6 MG tablet Take 1 tablet by mouth nightly Yes Historical Provider, MD   hydrocortisone (WESTCORT) 0.2 % cream Apply topically 2 times daily as needed (rash) Yes Merissa Reynolds MD   traZODone (DESYREL) 50 MG tablet Take 1 tablet by mouth nightly Yes Merissa Reynolds MD   primidone (MYSOLINE) 50 MG tablet Take 1 tablet by mouth 3 times daily Yes Merissa Reynolds MD   citalopram (CELEXA) 20 MG tablet Take 1 tablet by mouth daily Yes Merissa Reynolds MD   metoprolol tartrate (LOPRESSOR) 25 MG tablet Take 0.5 tablets by mouth 2 times daily Yes Merissa Reynolds MD   furosemide (LASIX) 40 MG tablet Take 1 tablet by mouth daily Yes Merissa Reynolds MD   loratadine (CLARITIN) 10 MG

## 2023-08-11 DIAGNOSIS — Z00.00 ANNUAL PHYSICAL EXAM: ICD-10-CM

## 2023-08-11 DIAGNOSIS — Z13.6 ENCOUNTER FOR LIPID SCREENING FOR CARDIOVASCULAR DISEASE: ICD-10-CM

## 2023-08-11 DIAGNOSIS — Z12.5 SCREENING PSA (PROSTATE SPECIFIC ANTIGEN): ICD-10-CM

## 2023-08-11 DIAGNOSIS — Z13.220 ENCOUNTER FOR LIPID SCREENING FOR CARDIOVASCULAR DISEASE: ICD-10-CM

## 2023-08-11 LAB — PROSTATE SPECIFIC ANTIGEN: 0.51 NG/ML

## 2023-08-12 LAB
ALBUMIN SERPL-MCNC: 4.5 G/DL (ref 3.5–5.2)
ALBUMIN/GLOBULIN RATIO: 1.5 (ref 1–2.5)
ALP BLD-CCNC: 94 U/L (ref 40–129)
ALT SERPL-CCNC: 15 U/L (ref 5–41)
ANION GAP SERPL CALCULATED.3IONS-SCNC: 13 MMOL/L (ref 9–17)
AST SERPL-CCNC: 23 U/L
BILIRUB SERPL-MCNC: 0.3 MG/DL (ref 0.3–1.2)
BILIRUBIN DIRECT: <0.1 MG/DL
BILIRUBIN, INDIRECT: NORMAL MG/DL (ref 0–1)
BUN BLDV-MCNC: 11 MG/DL (ref 6–20)
CALCIUM SERPL-MCNC: 9.2 MG/DL (ref 8.6–10.4)
CHLORIDE BLD-SCNC: 100 MMOL/L (ref 98–107)
CHOLESTEROL, FASTING: 168 MG/DL
CHOLESTEROL/HDL RATIO: 4.5
CO2: 26 MMOL/L (ref 20–31)
CREAT SERPL-MCNC: 0.6 MG/DL (ref 0.7–1.2)
GFR SERPL CREATININE-BSD FRML MDRD: >60 ML/MIN/1.73M2
GLUCOSE FASTING: 103 MG/DL (ref 70–99)
HDLC SERPL-MCNC: 37 MG/DL
LDL CHOLESTEROL: 104 MG/DL (ref 0–130)
POTASSIUM SERPL-SCNC: 4.9 MMOL/L (ref 3.7–5.3)
SODIUM BLD-SCNC: 139 MMOL/L (ref 135–144)
TOTAL PROTEIN: 7.6 G/DL (ref 6.4–8.3)
TRIGLYCERIDE, FASTING: 135 MG/DL

## 2023-08-21 ENCOUNTER — CARE COORDINATION (OUTPATIENT)
Dept: CARE COORDINATION | Age: 58
End: 2023-08-21

## 2023-08-21 NOTE — CARE COORDINATION
Ambulatory Care Coordination Note  8/21/2023    Patient Current Location:  Home: [address] [place] [address]      He saw Deshaun Carney and [facility]. Will be scheduled for colonoscopy with Dr. Pradip Ruano to evaluate why having diarrhea. Has not been called by Dr. [place] office to schedule yet. Is on nightly Senokot to help with bowel motility and gas but told to hold for a while and monitor symptoms. Diarrhea has reduced to about twice per day instead of 4-5 times a day. Was also told to increase fiber in diet and exercise more. He doesn't eat vegetables, he requested a dietician referral.  He doesn't plan to follow up with [facility], will stay with [address] [place] [address] and GI. No new symptoms or concerns. CC Plan:   Referred to [facility] dietician. Will follow up in a month to check on colonoscopy, symptoms. COPD Assessment    Does the patient understand envrionmental exposure?: Yes  Does the patient have a nebulizer?: No     No patient-reported symptoms         Symptoms:           and   General Assessment    Do you have any symptoms that are causing concern?: Yes  Progression since Onset: Intermittent - Waxing/Waning  Reported Symptoms: Other (Comment: diarrhea)            Offered patient enrollment in the Remote Patient Monitoring (RPM) program for in-home monitoring: Patient declined. Lab Results       None            Care Coordination Interventions    Referral from Primary Care Provider: No  Suggested Interventions and Community Resources  Registered Dietician: In Process          Goals Addressed                   This Visit's Progress     Conditions and Symptoms   On track     I will schedule office visits, as directed by my provider. I will keep my appointment or reschedule if I have to cancel. I will notify my provider of any barriers to my plan of care. I will notify my provider of any symptoms that indicate a worsening of my condition.     Barriers: stress and lack of education  Plan for Detail Level: Zone

## 2023-08-22 ENCOUNTER — CARE COORDINATION (OUTPATIENT)
Dept: CARE COORDINATION | Age: 58
End: 2023-08-22

## 2023-08-22 NOTE — CARE COORDINATION
Referral from [facility], Angelic Gutierrez RN-  He has Andi's syndrome- issues with bowels. Also has COPD and HTN which are controlled. Chronic diarrhea, was recently told by surgeon to increase fiber in diet to help control symptoms but he doesn't like vegetables. Thanks    Will reach out to patient for consult.   RAULITO Beckford
patient on slowly increasing fiber intake, drinking plenty of fluids and high fiber foods. Will send patient written nutrition information on all the above discussed. 2. Will follow up with patient in 2-3 weeks to review and answer questions.     RAULITO Meek

## 2023-09-06 ENCOUNTER — HOSPITAL ENCOUNTER (OUTPATIENT)
Age: 58
Setting detail: OUTPATIENT SURGERY
Discharge: HOME | End: 2023-09-06
Attending: INTERNAL MEDICINE | Admitting: INTERNAL MEDICINE
Payer: MEDICARE

## 2023-09-06 ENCOUNTER — ANESTHESIA (OUTPATIENT)
Dept: OPERATING ROOM | Age: 58
End: 2023-09-06
Payer: MEDICARE

## 2023-09-06 ENCOUNTER — ANESTHESIA EVENT (OUTPATIENT)
Dept: OPERATING ROOM | Age: 58
End: 2023-09-06
Payer: MEDICARE

## 2023-09-06 VITALS
SYSTOLIC BLOOD PRESSURE: 128 MMHG | TEMPERATURE: 97.3 F | HEART RATE: 60 BPM | OXYGEN SATURATION: 98 % | DIASTOLIC BLOOD PRESSURE: 68 MMHG | BODY MASS INDEX: 36.26 KG/M2 | HEIGHT: 69 IN | WEIGHT: 244.8 LBS | RESPIRATION RATE: 14 BRPM

## 2023-09-06 DIAGNOSIS — R19.7 DIARRHEA, UNSPECIFIED TYPE: ICD-10-CM

## 2023-09-06 PROCEDURE — 3609010300 HC COLONOSCOPY W/BIOPSY SINGLE/MULTIPLE: Performed by: INTERNAL MEDICINE

## 2023-09-06 PROCEDURE — 6360000002 HC RX W HCPCS: Performed by: NURSE ANESTHETIST, CERTIFIED REGISTERED

## 2023-09-06 PROCEDURE — 7100000010 HC PHASE II RECOVERY - FIRST 15 MIN: Performed by: INTERNAL MEDICINE

## 2023-09-06 PROCEDURE — 3700000001 HC ADD 15 MINUTES (ANESTHESIA): Performed by: INTERNAL MEDICINE

## 2023-09-06 PROCEDURE — 2580000003 HC RX 258

## 2023-09-06 PROCEDURE — 2500000003 HC RX 250 WO HCPCS: Performed by: NURSE ANESTHETIST, CERTIFIED REGISTERED

## 2023-09-06 PROCEDURE — 88305 TISSUE EXAM BY PATHOLOGIST: CPT

## 2023-09-06 PROCEDURE — 7100000011 HC PHASE II RECOVERY - ADDTL 15 MIN: Performed by: INTERNAL MEDICINE

## 2023-09-06 PROCEDURE — 3700000000 HC ANESTHESIA ATTENDED CARE: Performed by: INTERNAL MEDICINE

## 2023-09-06 PROCEDURE — 2709999900 HC NON-CHARGEABLE SUPPLY: Performed by: INTERNAL MEDICINE

## 2023-09-06 RX ORDER — LIDOCAINE HYDROCHLORIDE 20 MG/ML
INJECTION, SOLUTION INFILTRATION; PERINEURAL PRN
Status: DISCONTINUED | OUTPATIENT
Start: 2023-09-06 | End: 2023-09-06 | Stop reason: SDUPTHER

## 2023-09-06 RX ORDER — SODIUM CHLORIDE 9 MG/ML
INJECTION, SOLUTION INTRAVENOUS CONTINUOUS
Status: DISCONTINUED | OUTPATIENT
Start: 2023-09-06 | End: 2023-09-06 | Stop reason: HOSPADM

## 2023-09-06 RX ORDER — SODIUM CHLORIDE 9 MG/ML
INJECTION, SOLUTION INTRAVENOUS PRN
Status: DISCONTINUED | OUTPATIENT
Start: 2023-09-06 | End: 2023-09-06 | Stop reason: HOSPADM

## 2023-09-06 RX ORDER — SODIUM CHLORIDE 0.9 % (FLUSH) 0.9 %
5-40 SYRINGE (ML) INJECTION PRN
Status: DISCONTINUED | OUTPATIENT
Start: 2023-09-06 | End: 2023-09-06 | Stop reason: HOSPADM

## 2023-09-06 RX ORDER — SODIUM CHLORIDE 0.9 % (FLUSH) 0.9 %
5-40 SYRINGE (ML) INJECTION EVERY 12 HOURS SCHEDULED
Status: DISCONTINUED | OUTPATIENT
Start: 2023-09-06 | End: 2023-09-06 | Stop reason: HOSPADM

## 2023-09-06 RX ORDER — ONDANSETRON 2 MG/ML
4 INJECTION INTRAMUSCULAR; INTRAVENOUS
Status: DISCONTINUED | OUTPATIENT
Start: 2023-09-06 | End: 2023-09-06 | Stop reason: HOSPADM

## 2023-09-06 RX ORDER — LIDOCAINE HYDROCHLORIDE 10 MG/ML
1 INJECTION, SOLUTION EPIDURAL; INFILTRATION; INTRACAUDAL; PERINEURAL
Status: DISCONTINUED | OUTPATIENT
Start: 2023-09-07 | End: 2023-09-06 | Stop reason: HOSPADM

## 2023-09-06 RX ORDER — SODIUM CHLORIDE, SODIUM LACTATE, POTASSIUM CHLORIDE, CALCIUM CHLORIDE 600; 310; 30; 20 MG/100ML; MG/100ML; MG/100ML; MG/100ML
INJECTION, SOLUTION INTRAVENOUS CONTINUOUS
Status: DISCONTINUED | OUTPATIENT
Start: 2023-09-06 | End: 2023-09-06 | Stop reason: HOSPADM

## 2023-09-06 RX ORDER — PROPOFOL 10 MG/ML
INJECTION, EMULSION INTRAVENOUS PRN
Status: DISCONTINUED | OUTPATIENT
Start: 2023-09-06 | End: 2023-09-06 | Stop reason: SDUPTHER

## 2023-09-06 RX ORDER — HYDROMORPHONE HYDROCHLORIDE 1 MG/ML
0.5 INJECTION, SOLUTION INTRAMUSCULAR; INTRAVENOUS; SUBCUTANEOUS EVERY 5 MIN PRN
Status: DISCONTINUED | OUTPATIENT
Start: 2023-09-06 | End: 2023-09-06 | Stop reason: HOSPADM

## 2023-09-06 RX ORDER — FENTANYL CITRATE 50 UG/ML
25 INJECTION, SOLUTION INTRAMUSCULAR; INTRAVENOUS EVERY 5 MIN PRN
Status: DISCONTINUED | OUTPATIENT
Start: 2023-09-06 | End: 2023-09-06 | Stop reason: HOSPADM

## 2023-09-06 RX ADMIN — PROPOFOL 50 MG: 10 INJECTION, EMULSION INTRAVENOUS at 12:56

## 2023-09-06 RX ADMIN — PROPOFOL 50 MG: 10 INJECTION, EMULSION INTRAVENOUS at 13:04

## 2023-09-06 RX ADMIN — PROPOFOL 50 MG: 10 INJECTION, EMULSION INTRAVENOUS at 13:00

## 2023-09-06 RX ADMIN — LIDOCAINE HYDROCHLORIDE 60 MG: 20 INJECTION, SOLUTION INFILTRATION; PERINEURAL at 12:52

## 2023-09-06 RX ADMIN — PROPOFOL 50 MG: 10 INJECTION, EMULSION INTRAVENOUS at 13:08

## 2023-09-06 RX ADMIN — PROPOFOL 75 MG: 10 INJECTION, EMULSION INTRAVENOUS at 12:52

## 2023-09-06 RX ADMIN — SODIUM CHLORIDE, POTASSIUM CHLORIDE, SODIUM LACTATE AND CALCIUM CHLORIDE: 600; 310; 30; 20 INJECTION, SOLUTION INTRAVENOUS at 11:15

## 2023-09-06 ASSESSMENT — PAIN DESCRIPTION - DESCRIPTORS: DESCRIPTORS: THROBBING

## 2023-09-06 ASSESSMENT — ENCOUNTER SYMPTOMS: SHORTNESS OF BREATH: 1

## 2023-09-06 ASSESSMENT — PAIN - FUNCTIONAL ASSESSMENT
PAIN_FUNCTIONAL_ASSESSMENT: PREVENTS OR INTERFERES SOME ACTIVE ACTIVITIES AND ADLS
PAIN_FUNCTIONAL_ASSESSMENT: 0-10

## 2023-09-06 ASSESSMENT — COPD QUESTIONNAIRES: CAT_SEVERITY: NO INTERVAL CHANGE

## 2023-09-06 NOTE — DISCHARGE INSTRUCTIONS
[facility]    POST-ENDOSCOPY INSTRUCTIONS    1. ACTIVITY   No driving, operating machinery, or making important decisions for 24 hours. Resume normal activity after 24 hours. You may return to work after 24 hours. 2. DIET        Resume your usual diet unless specified below. 3. MEDICATIONS    Resume your usual medications. Do not consume alcohol, tranquilizers, or sleeping medications for 24 hour unless advised by your physician. 4. PHYSICIAN FOLLOW-UP / INSTRUCTIONS    Please call the office/clinic in 10 days for biopsy results:    GI office:  [phone]          Follow up with your family physician as planned. 6. NORMAL CHANGES YOU MAY EXPERIENCE AFTER ENDOSCOPY:         EGD/ERCP     COLONOSCOPY     Sore throat after EGD/ERCP  Passing of gas for several hours. A bloated feeling and belching from  Some mild abdominal cramping. air in stomach               You may feel fatigued for the next 24-48    hours due to the prep and sedation    7. CALL YOUR PHYSICIAN IF YOU EXPERIENCE ANY OF THE FOLLOWING      A. Passing blood rectally or vomiting blood (color may be red or black)      B. Severe abdominal pain or tenderness (that is not relieved by passing air)      C.   Fever, chills, or excessive sweating      D.  Persistent nausea or vomiting      E.  Redness or swelling at the IV site

## 2023-09-06 NOTE — OP NOTE
COLONOSCOPY NOTE      Patient:   Melvi Walker    :    1965    Referring/PCP: Efraín Ron MD  Facility:  [facility]  Procedure:   Colonoscopy --diagnostic  Date:     2023  Endoscopist:  Joycelyn Barry DO  Assistant:                    None    Preoperative Diagnosis:    Naples's  Chronic diarrhea    Postoperative Diagnosis:    Hemorrhoids    Anesthesia: MAC    Complications:  None    Description of Procedure:  Informed consent was obtained after explanation of the procedure including indications, description of the procedure,  benefits and possible risks and complications of the procedure, and alternatives. Questions were answered. The patient's history was reviewed and a directed physical examination was performed prior to the procedure. Patient was monitored throughout the procedure with pulse oximetry and periodic assessment of vital signs. Patient was sedated as noted above. With the patient initially in the left lateral decubitus position, a digital rectal examination was performed. The Olympus video colonoscope was placed in the patient's rectum and advanced without difficulty  to the cecum, which was identified by the ileocecal valve and appendiceal orifice. The prep was good. Examination of the mucosa was performed during both introduction and withdrawal of the colonoscope. Retroflexed view of the rectum was performed. The patient  was taken to the recovery area in good condition. EBL: none  Specimen: Random colon biopsies. Implants: none  Prep: Golytely  W/D time: 9 min  Last colonoscopy:      Findings:     Small internal hemorrhoids. Normal colonoscopy. Random colon biopsies taken. Recommendations:   Await pathology. Next colonoscopy timing to be discussed in office.           Electronically signed by Marce Byers MD, D.O on 2023 at 12:49 PM

## 2023-09-07 ENCOUNTER — CARE COORDINATION (OUTPATIENT)
Dept: CARE COORDINATION | Age: 58
End: 2023-09-07

## 2023-09-07 NOTE — CARE COORDINATION
Nutrition Care Coordinator Follow-Up visit:    Food Recall:eating 2-3 meals/d    Activity Level:  Sedentary:X  Lightly Active: Moderately Active:  Very Active:    Adult BMI:  Underweight (below 18.5)  Normal Weight (18.5-24.9)  Overweight (25-29. 9)  Obese (30-39.9)X  Morbidly Obese (>40)    Weight Change:    Plan:  Plan was established with patient:  Increase dietary fiber by consuming whole grains, fruits and vegetables:X  Limit dietary cholesterol to >200mg/day: Increase water intake:  Avoid added sugar:  Avoid sweetened beverages:  Choose lean meats:  Increase fiber intake: X    Monitoring: Will monitor weight:  Will monitor adherence to meal plan:X  Will monitor adherence to exercise plan: Will monitor HGA1c:    Handouts Provided :  Low Carb snacking:  Carb counting /individual meal plan:  Portion Control:  Food Labels:X  Physical Activity:  Low Fat/Cholesterol:  Hypo/Hyperglycemia:  Calorie Controlled Meal Plan:  High fiber foods: X    Goals: Increase water consumption to 8oz. 6-8 times daily:  Manage blood sugars by consuming 3 meals spaced every 4-5 hours with 2-3 snacks daily:  Increase fiber and decrease fat intake by consuming 1-2 fruit servings and 2-3 vegetable servings per day. discussed  Increase physical activity by:  Consume less than 2,000mg of sodium/day  Avoid consumption of sweetened beverages and added sugar by reading food labels:  Monitor blood sugars by using meter to check blood glucose before morning meal and 2 hours after a meal daily:  Decrease risk of coronary heart disease by consuming fish that contains omega-3 fatty acids at least twice a week, avoiding partially hydrogenated oil/trans fats and limiting saturated fat intake by reading food labels:    Patient goals set: 1. Reviewed slowly increasing fiber intake-Suddenly increasing your fiber intake can be very hard on your digestive system and too much at once can cause abdominal distension, bloating, cramping, nausea, and

## 2023-09-11 LAB — SURGICAL PATHOLOGY REPORT: NORMAL

## 2023-09-27 ENCOUNTER — CARE COORDINATION (OUTPATIENT)
Dept: CARE COORDINATION | Age: 58
End: 2023-09-27

## 2023-09-27 NOTE — CARE COORDINATION
Nutrition Care Coordinator Follow-Up visit:    Food Recall:eating 3 meals/d    Activity Level:  Sedentary:X  Lightly Active: Moderately Active:  Very Active:    Adult BMI:  Underweight (below 18.5)  Normal Weight (18.5-24.9)  Overweight (25-29. 9)  Obese (30-39.9)X  Morbidly Obese (>40)    Plan:  Plan was established with patient:  Increase dietary fiber by consuming whole grains, fruits and vegetables:X  Limit dietary cholesterol to >200mg/day: Increase water intake:  Avoid added sugar:  Avoid sweetened beverages:  Choose lean meats:      Monitoring: Will monitor weight:  Will monitor adherence to meal plan:X  Will monitor adherence to exercise plan: Will monitor HGA1c:    Handouts Provided :  Low Carb snacking:  Carb counting /individual meal plan:  Portion Control:  Food Labels:  Physical Activity:  Low Fat/Cholesterol:  Hypo/Hyperglycemia:  Calorie Controlled Meal Plan:  High fiber foods: X    Goals: Increase water consumption to 8oz. 6-8 times daily:  Manage blood sugars by consuming 3 meals spaced every 4-5 hours with 2-3 snacks daily:  Increase fiber and decrease fat intake by consuming 1-2 fruit servings and 2-3 vegetable servings per day. discussed  Increase physical activity by:  Consume less than 2,000mg of sodium/day  Avoid consumption of sweetened beverages and added sugar by reading food labels:  Monitor blood sugars by using meter to check blood glucose before morning meal and 2 hours after a meal daily:  Decrease risk of coronary heart disease by consuming fish that contains omega-3 fatty acids at least twice a week, avoiding partially hydrogenated oil/trans fats and limiting saturated fat intake by reading food labels:    Patient goals set: 1. Reviewed slowly increasing fiber intake-Suddenly increasing your fiber intake can be very hard on your digestive system and too much at once can cause abdominal distension, bloating, cramping, nausea, and either diarrhea or constipation. We discussed

## 2023-10-06 DIAGNOSIS — M19.90 INFLAMMATORY ARTHRITIS: ICD-10-CM

## 2023-10-06 RX ORDER — HYDROXYCHLOROQUINE SULFATE 200 MG/1
200 TABLET, FILM COATED ORAL 2 TIMES DAILY
Qty: 180 TABLET | Refills: 0 | Status: SHIPPED | OUTPATIENT
Start: 2023-10-06

## 2023-10-06 NOTE — TELEPHONE ENCOUNTER
LAST VISIT:   5/12/2023     Future Appointments   Date Time Provider [address]   10/20/2023  9:00 AM ST[facility] MRI RM 1 (1.5T) [facility] MRI [facility]   10/20/2023 10:00 AM STV MRI RM 1 (1.5T) [facility] MRI [facility]iolog   10/20/2023 11:00 AM STV MRI RM 1 (1.5T) ST[facility] MRI ST[facility] Radiolog   11/17/2023 11:40 AM [facility]

## 2023-10-19 ENCOUNTER — CARE COORDINATION (OUTPATIENT)
Dept: CARE COORDINATION | Age: 58
End: 2023-10-19

## 2023-10-19 NOTE — CARE COORDINATION
Nutrition Care Coordinator Follow-Up visit:    Food Recall:eating 2-3 meals/d    Activity Level:  Sedentary:X  Lightly Active: Moderately Active:  Very Active:    Adult BMI:  Underweight (below 18.5)  Normal Weight (18.5-24.9)  Overweight (25-29. 9)  Obese (30-39.9)X  Morbidly Obese (>40)    Plan:  Plan was established with patient:  Increase dietary fiber by consuming whole grains, fruits and vegetables:X  Limit dietary cholesterol to >200mg/day: Increase water intake:  Avoid added sugar:  Avoid sweetened beverages:  Choose lean meats:      Monitoring: Will monitor weight:  Will monitor adherence to meal plan:X  Will monitor adherence to exercise plan: Will monitor HGA1c:    Handouts Provided :  Low Carb snacking:  Carb counting /individual meal plan:  Portion Control:  Food Labels:  Physical Activity:  Low Fat/Cholesterol:  Hypo/Hyperglycemia:  Calorie Controlled Meal Plan:    Goals: Increase water consumption to 8oz. 6-8 times daily:  Manage blood sugars by consuming 3 meals spaced every 4-5 hours with 2-3 snacks daily:  Increase fiber and decrease fat intake by consuming 1-2 fruit servings and 2-3 vegetable servings per day. discussed  Increase physical activity by:  Consume less than 2,000mg of sodium/day  Avoid consumption of sweetened beverages and added sugar by reading food labels:  Monitor blood sugars by using meter to check blood glucose before morning meal and 2 hours after a meal daily:  Decrease risk of coronary heart disease by consuming fish that contains omega-3 fatty acids at least twice a week, avoiding partially hydrogenated oil/trans fats and limiting saturated fat intake by reading food labels:    Patient goals set: 1. Reviewed slowly increasing fiber intake-Suddenly increasing your fiber intake can be very hard on your digestive system and too much at once can cause abdominal distension, bloating, cramping, nausea, and either diarrhea or constipation. We discussed adding one high

## 2023-10-20 ENCOUNTER — ANESTHESIA EVENT (OUTPATIENT)
Dept: MRI IMAGING | Age: 58
End: 2023-10-20
Payer: MEDICARE

## 2023-10-20 ENCOUNTER — HOSPITAL ENCOUNTER (OUTPATIENT)
Dept: MRI IMAGING | Age: 58
End: 2023-10-20
Payer: MEDICARE

## 2023-10-20 ENCOUNTER — ANESTHESIA (OUTPATIENT)
Dept: MRI IMAGING | Age: 58
End: 2023-10-20
Payer: MEDICARE

## 2023-10-20 VITALS
SYSTOLIC BLOOD PRESSURE: 134 MMHG | BODY MASS INDEX: 36.14 KG/M2 | DIASTOLIC BLOOD PRESSURE: 79 MMHG | TEMPERATURE: 97.2 F | HEART RATE: 83 BPM | HEIGHT: 69 IN | OXYGEN SATURATION: 95 % | RESPIRATION RATE: 17 BRPM | WEIGHT: 244 LBS

## 2023-10-20 DIAGNOSIS — R29.898 LEG WEAKNESS, BILATERAL: ICD-10-CM

## 2023-10-20 DIAGNOSIS — Z98.1 S/P FUSION OF THORACIC SPINE: ICD-10-CM

## 2023-10-20 DIAGNOSIS — M40.15 OTHER SECONDARY KYPHOSIS, THORACOLUMBAR REGION: ICD-10-CM

## 2023-10-20 DIAGNOSIS — G95.9 MYELOPATHY (HCC): ICD-10-CM

## 2023-10-20 DIAGNOSIS — M41.9 SCOLIOSIS, UNSPECIFIED SCOLIOSIS TYPE, UNSPECIFIED SPINAL REGION: ICD-10-CM

## 2023-10-20 DIAGNOSIS — M54.9 BACK PAIN, UNSPECIFIED BACK LOCATION, UNSPECIFIED BACK PAIN LATERALITY, UNSPECIFIED CHRONICITY: ICD-10-CM

## 2023-10-20 LAB
BUN BLD-MCNC: 12 MG/DL (ref 8–26)
CHLORIDE BLD-SCNC: 103 MMOL/L (ref 98–107)
CO2 BLD CALC-SCNC: 28 MMOL/L (ref 22–30)
EGFR, POC: >60 ML/MIN/1.73M2
GLUCOSE BLD-MCNC: 110 MG/DL (ref 74–100)
POC ANION GAP: 11 MMOL/L (ref 7–16)
POC CREATININE: 0.5 MG/DL (ref 0.51–1.19)
POTASSIUM BLD-SCNC: 4.5 MMOL/L (ref 3.5–4.5)
SODIUM BLD-SCNC: 141 MMOL/L (ref 138–146)

## 2023-10-20 PROCEDURE — 7100000011 HC PHASE II RECOVERY - ADDTL 15 MIN

## 2023-10-20 PROCEDURE — 80051 ELECTROLYTE PANEL: CPT

## 2023-10-20 PROCEDURE — 2580000003 HC RX 258: Performed by: NURSE PRACTITIONER

## 2023-10-20 PROCEDURE — 2500000003 HC RX 250 WO HCPCS: Performed by: NURSE ANESTHETIST, CERTIFIED REGISTERED

## 2023-10-20 PROCEDURE — 3700000000 HC ANESTHESIA ATTENDED CARE

## 2023-10-20 PROCEDURE — 72156 MRI NECK SPINE W/O & W/DYE: CPT

## 2023-10-20 PROCEDURE — 82947 ASSAY GLUCOSE BLOOD QUANT: CPT

## 2023-10-20 PROCEDURE — 6360000002 HC RX W HCPCS: Performed by: NURSE ANESTHETIST, CERTIFIED REGISTERED

## 2023-10-20 PROCEDURE — 72158 MRI LUMBAR SPINE W/O & W/DYE: CPT

## 2023-10-20 PROCEDURE — 72157 MRI CHEST SPINE W/O & W/DYE: CPT

## 2023-10-20 PROCEDURE — 82565 ASSAY OF CREATININE: CPT

## 2023-10-20 PROCEDURE — A9579 GAD-BASE MR CONTRAST NOS,1ML: HCPCS | Performed by: NURSE PRACTITIONER

## 2023-10-20 PROCEDURE — 6360000004 HC RX CONTRAST MEDICATION: Performed by: NURSE PRACTITIONER

## 2023-10-20 PROCEDURE — 2580000003 HC RX 258: Performed by: ANESTHESIOLOGY

## 2023-10-20 PROCEDURE — 6360000002 HC RX W HCPCS: Performed by: STUDENT IN AN ORGANIZED HEALTH CARE EDUCATION/TRAINING PROGRAM

## 2023-10-20 PROCEDURE — 84520 ASSAY OF UREA NITROGEN: CPT

## 2023-10-20 PROCEDURE — 7100000010 HC PHASE II RECOVERY - FIRST 15 MIN

## 2023-10-20 PROCEDURE — 3700000001 HC ADD 15 MINUTES (ANESTHESIA)

## 2023-10-20 RX ORDER — SODIUM CHLORIDE 9 MG/ML
INJECTION, SOLUTION INTRAVENOUS PRN
Status: DISCONTINUED | OUTPATIENT
Start: 2023-10-20 | End: 2023-10-23 | Stop reason: HOSPADM

## 2023-10-20 RX ORDER — FENTANYL CITRATE 50 UG/ML
50 INJECTION, SOLUTION INTRAMUSCULAR; INTRAVENOUS EVERY 5 MIN PRN
Status: DISCONTINUED | OUTPATIENT
Start: 2023-10-20 | End: 2023-10-23 | Stop reason: HOSPADM

## 2023-10-20 RX ORDER — PROPOFOL 10 MG/ML
INJECTION, EMULSION INTRAVENOUS CONTINUOUS PRN
Status: DISCONTINUED | OUTPATIENT
Start: 2023-10-20 | End: 2023-10-20 | Stop reason: SDUPTHER

## 2023-10-20 RX ORDER — LIDOCAINE HYDROCHLORIDE 10 MG/ML
INJECTION, SOLUTION EPIDURAL; INFILTRATION; INTRACAUDAL; PERINEURAL PRN
Status: DISCONTINUED | OUTPATIENT
Start: 2023-10-20 | End: 2023-10-20 | Stop reason: SDUPTHER

## 2023-10-20 RX ORDER — LANOLIN ALCOHOL/MO/W.PET/CERES
10 CREAM (GRAM) TOPICAL DAILY
COMMUNITY

## 2023-10-20 RX ORDER — FENTANYL CITRATE 50 UG/ML
INJECTION, SOLUTION INTRAMUSCULAR; INTRAVENOUS
Status: DISPENSED
Start: 2023-10-20 | End: 2023-10-21

## 2023-10-20 RX ORDER — EPHEDRINE SULFATE/0.9% NACL/PF 50 MG/5 ML
SYRINGE (ML) INTRAVENOUS PRN
Status: DISCONTINUED | OUTPATIENT
Start: 2023-10-20 | End: 2023-10-20 | Stop reason: SDUPTHER

## 2023-10-20 RX ORDER — SODIUM CHLORIDE 0.9 % (FLUSH) 0.9 %
5-40 SYRINGE (ML) INJECTION PRN
Status: DISCONTINUED | OUTPATIENT
Start: 2023-10-20 | End: 2023-10-23 | Stop reason: HOSPADM

## 2023-10-20 RX ORDER — PROPOFOL 10 MG/ML
INJECTION, EMULSION INTRAVENOUS PRN
Status: DISCONTINUED | OUTPATIENT
Start: 2023-10-20 | End: 2023-10-20 | Stop reason: SDUPTHER

## 2023-10-20 RX ORDER — MIDAZOLAM HYDROCHLORIDE 1 MG/ML
INJECTION INTRAMUSCULAR; INTRAVENOUS PRN
Status: DISCONTINUED | OUTPATIENT
Start: 2023-10-20 | End: 2023-10-20 | Stop reason: SDUPTHER

## 2023-10-20 RX ORDER — SODIUM CHLORIDE, SODIUM LACTATE, POTASSIUM CHLORIDE, CALCIUM CHLORIDE 600; 310; 30; 20 MG/100ML; MG/100ML; MG/100ML; MG/100ML
INJECTION, SOLUTION INTRAVENOUS CONTINUOUS
Status: DISCONTINUED | OUTPATIENT
Start: 2023-10-20 | End: 2023-10-23 | Stop reason: HOSPADM

## 2023-10-20 RX ORDER — FENTANYL CITRATE 50 UG/ML
INJECTION, SOLUTION INTRAMUSCULAR; INTRAVENOUS PRN
Status: DISCONTINUED | OUTPATIENT
Start: 2023-10-20 | End: 2023-10-20 | Stop reason: SDUPTHER

## 2023-10-20 RX ORDER — HYDRALAZINE HYDROCHLORIDE 20 MG/ML
10 INJECTION INTRAMUSCULAR; INTRAVENOUS
Status: DISCONTINUED | OUTPATIENT
Start: 2023-10-20 | End: 2023-10-23 | Stop reason: HOSPADM

## 2023-10-20 RX ORDER — ONDANSETRON 2 MG/ML
4 INJECTION INTRAMUSCULAR; INTRAVENOUS
Status: ACTIVE | OUTPATIENT
Start: 2023-10-20 | End: 2023-10-21

## 2023-10-20 RX ORDER — SODIUM CHLORIDE 0.9 % (FLUSH) 0.9 %
10 SYRINGE (ML) INJECTION PRN
Status: DISCONTINUED | OUTPATIENT
Start: 2023-10-20 | End: 2023-10-23 | Stop reason: HOSPADM

## 2023-10-20 RX ORDER — SODIUM CHLORIDE 0.9 % (FLUSH) 0.9 %
5-40 SYRINGE (ML) INJECTION EVERY 12 HOURS SCHEDULED
Status: DISCONTINUED | OUTPATIENT
Start: 2023-10-20 | End: 2023-10-23 | Stop reason: HOSPADM

## 2023-10-20 RX ORDER — FENTANYL CITRATE 50 UG/ML
25 INJECTION, SOLUTION INTRAMUSCULAR; INTRAVENOUS EVERY 5 MIN PRN
Status: DISCONTINUED | OUTPATIENT
Start: 2023-10-20 | End: 2023-10-23 | Stop reason: HOSPADM

## 2023-10-20 RX ADMIN — Medication 20 MG: at 09:18

## 2023-10-20 RX ADMIN — SODIUM CHLORIDE, PRESERVATIVE FREE 10 ML: 5 INJECTION INTRAVENOUS at 12:01

## 2023-10-20 RX ADMIN — Medication 10 MG: at 09:23

## 2023-10-20 RX ADMIN — GADOTERIDOL 20 ML: 279.3 INJECTION, SOLUTION INTRAVENOUS at 12:00

## 2023-10-20 RX ADMIN — PROPOFOL 100 MG: 10 INJECTION, EMULSION INTRAVENOUS at 09:07

## 2023-10-20 RX ADMIN — SODIUM CHLORIDE, POTASSIUM CHLORIDE, SODIUM LACTATE AND CALCIUM CHLORIDE: 600; 310; 30; 20 INJECTION, SOLUTION INTRAVENOUS at 07:52

## 2023-10-20 RX ADMIN — PROPOFOL 30 MG: 10 INJECTION, EMULSION INTRAVENOUS at 11:04

## 2023-10-20 RX ADMIN — PROPOFOL 20 MG: 10 INJECTION, EMULSION INTRAVENOUS at 11:22

## 2023-10-20 RX ADMIN — FENTANYL CITRATE 50 MCG: 50 INJECTION, SOLUTION INTRAMUSCULAR; INTRAVENOUS at 12:42

## 2023-10-20 RX ADMIN — MIDAZOLAM 2 MG: 1 INJECTION INTRAMUSCULAR; INTRAVENOUS at 09:03

## 2023-10-20 RX ADMIN — FENTANYL CITRATE 100 MCG: 50 INJECTION, SOLUTION INTRAMUSCULAR; INTRAVENOUS at 09:03

## 2023-10-20 RX ADMIN — LIDOCAINE HYDROCHLORIDE 50 MG: 10 INJECTION, SOLUTION EPIDURAL; INFILTRATION; INTRACAUDAL; PERINEURAL at 09:07

## 2023-10-20 RX ADMIN — PROPOFOL 50 MG: 10 INJECTION, EMULSION INTRAVENOUS at 11:26

## 2023-10-20 RX ADMIN — PROPOFOL 50 MG: 10 INJECTION, EMULSION INTRAVENOUS at 10:47

## 2023-10-20 RX ADMIN — PROPOFOL 100 MCG/KG/MIN: 10 INJECTION, EMULSION INTRAVENOUS at 09:07

## 2023-10-20 RX ADMIN — Medication 10 MG: at 09:52

## 2023-10-20 RX ADMIN — Medication 10 MG: at 09:15

## 2023-10-20 ASSESSMENT — ENCOUNTER SYMPTOMS: SHORTNESS OF BREATH: 1

## 2023-10-20 ASSESSMENT — PAIN DESCRIPTION - DESCRIPTORS: DESCRIPTORS: ACHING

## 2023-10-20 ASSESSMENT — PAIN DESCRIPTION - LOCATION: LOCATION: BACK

## 2023-10-20 ASSESSMENT — PAIN DESCRIPTION - ORIENTATION: ORIENTATION: LOWER

## 2023-10-20 ASSESSMENT — PAIN - FUNCTIONAL ASSESSMENT
PAIN_FUNCTIONAL_ASSESSMENT: ACTIVITIES ARE NOT PREVENTED
PAIN_FUNCTIONAL_ASSESSMENT: 0-10

## 2023-10-20 ASSESSMENT — PAIN SCALES - GENERAL: PAINLEVEL_OUTOF10: 8

## 2023-10-20 ASSESSMENT — COPD QUESTIONNAIRES: CAT_SEVERITY: NO INTERVAL CHANGE

## 2023-10-20 NOTE — DISCHARGE INSTRUCTIONS
No alcoholic beverages, no driving or operating machinery, no making important decisions for 24 hours. You may have a normal diet but should eat lightly day of surgery. Drink plenty of fluids. Urinate within 8 hours after surgery, if unable to urinate call your doctor     Follow up with primary care physician.

## 2023-10-20 NOTE — ANESTHESIA PRE PROCEDURE
Department of Anesthesiology  Preprocedure Note       Name:  Julia Hernandez   Age:  61 y.o.  :  1965                                          MRN:  [id number]         Date:  10/20/2023      Surgeon: * Surgery not found *    Procedure:     Medications prior to admission:   Prior to Admission medications    Medication Sig Start Date End Date Taking?  Authorizing Provider   hydroxychloroquine (PLAQUENIL) 200 MG tablet Take 1 tablet by mouth 2 times daily 10/6/23   Steph Tafoya MD   senna (SENOKOT) 8.6 MG tablet Take 1 tablet by mouth nightly  Patient not taking: Reported on 2023   Adali Thakkar MD   hydrocortisone ([address]) 0.2 % cream Apply topically 2 times daily as needed (rash) 23   Lina Rashid MD   traZODone (DESYREL) 50 MG tablet Take 1 tablet by mouth nightly 23   Lina Rashid MD   primidone (MYSOLINE) 50 MG tablet Take 1 tablet by mouth 3 times daily 3/8/23   Lina Rashid MD   citalopram (CELEXA) 20 MG tablet Take 1 tablet by mouth daily 23   Lina Rashid MD   metoprolol tartrate (LOPRESSOR) 25 MG tablet Take 0.5 tablets by mouth 2 times daily 23   Lina Rashid MD   furosemide (LASIX) 40 MG tablet Take 1 tablet by mouth daily 23   Lina Rashid MD   loratadine (CLARITIN) 10 MG tablet Take 1 tablet by mouth daily    Adali Thakkar MD   Acetaminophen (TYLENOL ARTHRITIS PAIN PO) Take 1 tablet by mouth every 6 hours as needed    Adali Thakkar MD   vitamin D (CHOLECALCIFEROL) 25 MCG (1000 UT) TABS tablet Take 1 tablet by mouth nightly    Adali Thakkar MD   Multiple Vitamins-Minerals (THERAPEUTIC MULTIVITAMIN-MINERALS) tablet Take 1 tablet by mouth daily    Adali Thakkar MD   Sod Fluoride-Potassium Nitrate (PREVIDENT 5000 SENSITIVE) 1.1-5 % PSTE Place onto teeth    Adali Thakkar MD   Magnesium Cl-Calcium Carbonate (SLOW-MAG PO) Take 1 tablet by mouth 2 times daily

## 2023-10-20 NOTE — H&P
History and Physical    Pt Name: Claudette Rede  MRN: [id number]  YOB: 1965  Date of evaluation: 10/20/2023  Primary Care Physician: Conrado Oglesby MD    SUBJECTIVE:   History of Chief Complaint:    Claudette Rede is a 61 y.o. male who is scheduled today for MRI cervical, thoracic and lumbar spine with and without contrast, with anesthesia. He reports a history of kyphosis, diagnosed as a teen; states treated conservatively with a brace for 3 years and ultimately required back surgery in 2006. He reports progressive worsening of back pain with eventual spine surgery/fusion in 2021 at [facility] with Dr. Amie Hendricks. He complains of \"horrendous\" lower back pain. He reports this imaging was postponed due to bowel issues \"ileus\". He reports he is unable to lie flat or still for this prolonged duration, has history of anxiety. Allergies  is allergic to cetyl alcohol, metronidazole, bacitracin-polymyxin b, isopropyl alcohol, adhesive tape, duloxetine hcl, and neosporin [neomycin-polymyxin-gramicidin]. Medications  Prior to Admission medications    Medication Sig Start Date End Date Taking?  Authorizing Provider   melatonin 3 MG TABS tablet Take 10 mg by mouth daily   Yes Adali Thakkar MD   hydroxychloroquine (PLAQUENIL) 200 MG tablet Take 1 tablet by mouth 2 times daily 10/6/23   Andra Tafoya MD   senna (SENOKOT) 8.6 MG tablet Take 1 tablet by mouth nightly 5/11/23   ProviderAdali MD   hydrocortisone (WESTCORT) 0.2 % cream Apply topically 2 times daily as needed (rash) 5/12/23   Conrado Oglesby MD   traZODone (DESYREL) 50 MG tablet Take 1 tablet by mouth nightly 5/12/23   Conrado Oglesby MD   primidone (MYSOLINE) 50 MG tablet Take 1 tablet by mouth 3 times daily 3/8/23   Conrado Oglesby MD   citalopram (CELEXA) 20 MG tablet Take 1 tablet by mouth daily 1/24/23   Conrado Oglesby MD   metoprolol tartrate (LOPRESSOR) 25 MG tablet Take 0.5 tablets by mouth 2 times daily 1/24/23   Moiz Carney MD   furosemide (LASIX) 40 MG tablet Take 1 tablet by mouth daily 1/24/23   Moiz Carney MD   loratadine (CLARITIN) 10 MG tablet Take 1 tablet by mouth daily    Adali Thakkar MD   Acetaminophen (TYLENOL ARTHRITIS PAIN PO) Take 1 tablet by mouth every 6 hours as needed    Adali Thakkar MD   vitamin D (CHOLECALCIFEROL) 25 MCG (1000 UT) TABS tablet Take 1 tablet by mouth nightly    Adali Thakkar MD   Multiple Vitamins-Minerals (THERAPEUTIC MULTIVITAMIN-MINERALS) tablet Take 1 tablet by mouth daily    Adali Thakkar MD   Sod Fluoride-Potassium Nitrate (PREVIDENT 5000 SENSITIVE) 1.1-5 % PSTE Place onto teeth    Adali Thakkar MD   Magnesium Cl-Calcium Carbonate (SLOW-MAG PO) Take 1 tablet by mouth 2 times daily    Adali Thakkar MD   aspirin 81 MG EC tablet Take 1 tablet by mouth daily    Adali Thakkar MD   omeprazole (PRILOSEC) 20 MG capsule Take 1 capsule by mouth nightly    Adali Thakkar MD     Past Medical History    has a past medical history of ADHD (attention deficit hyperactivity disorder), Angular cheilitis, Anticoagulant long-term use, Anxiety, Arthritis, Bilateral lower extremity edema, Chronic back pain, COVID-19, Depression, Dizziness, Eczema, Ex-smoker, Floaters in visual field, bilateral, GERD (gastroesophageal reflux disease), H/O chest pain, H/O dermatitis, History of blood transfusion, History of panic attacks, [place] (hard of hearing), Hypertension, Impaired mobility, Kyphosis, Left eye injury, Muscle spasm, Nocturia, Obesity, Andi's syndrome, Osteoarthritis, Prolonged emergence from general anesthesia, PVC's (premature ventricular contractions), Rash, Restless legs syndrome, Sjogren's syndrome ([address]), Snores, SOB (shortness of breath), Use of cane as ambulatory aid, Vertigo, Wears glasses, and Wears partial dentures.   Past Surgical History   has a past surgical history that includes

## 2023-11-17 ENCOUNTER — CARE COORDINATION (OUTPATIENT)
Dept: CARE COORDINATION | Age: 58
End: 2023-11-17

## 2023-11-21 ENCOUNTER — HOSPITAL ENCOUNTER (OUTPATIENT)
Age: 58
Setting detail: SPECIMEN
Discharge: HOME | End: 2023-11-21

## 2023-11-23 LAB
C DIFFICILE TOXINS, PCR: NORMAL
SPECIMEN DESCRIPTION: NORMAL

## 2023-11-25 LAB
CALPROTECTIN, FECAL: 10 UG/G
FECAL PANCREATIC ELASTASE-1: >800 UG/G

## 2023-12-08 ENCOUNTER — OFFICE VISIT (OUTPATIENT)
Dept: PRIMARY CARE CLINIC | Age: 58
End: 2023-12-08

## 2023-12-08 ENCOUNTER — CARE COORDINATION (OUTPATIENT)
Dept: CARE COORDINATION | Age: 58
End: 2023-12-08

## 2023-12-08 VITALS
SYSTOLIC BLOOD PRESSURE: 122 MMHG | WEIGHT: 257.6 LBS | HEIGHT: 69 IN | OXYGEN SATURATION: 97 % | DIASTOLIC BLOOD PRESSURE: 74 MMHG | HEART RATE: 66 BPM | BODY MASS INDEX: 38.16 KG/M2

## 2023-12-08 DIAGNOSIS — L98.9 BENIGN SKIN LESION OF THIGH: Primary | ICD-10-CM

## 2023-12-08 DIAGNOSIS — F41.8 SITUATIONAL ANXIETY: ICD-10-CM

## 2023-12-08 DIAGNOSIS — M48.10 DIFFUSE IDIOPATHIC SKELETAL HYPEROSTOSIS: ICD-10-CM

## 2023-12-08 PROBLEM — K59.81 OGILVIE SYNDROME: Status: RESOLVED | Noted: 2023-05-02 | Resolved: 2023-12-08

## 2023-12-08 PROBLEM — L30.9 ECZEMA: Status: RESOLVED | Noted: 2020-04-13 | Resolved: 2023-12-08

## 2023-12-08 PROBLEM — K56.7 ILEUS (HCC): Status: RESOLVED | Noted: 2021-11-09 | Resolved: 2023-12-08

## 2023-12-08 RX ORDER — ALPRAZOLAM 0.5 MG/1
0.5 TABLET ORAL 2 TIMES DAILY PRN
Qty: 4 TABLET | Refills: 0 | Status: SHIPPED | OUTPATIENT
Start: 2023-12-08 | End: 2024-01-07

## 2023-12-08 RX ORDER — CHOLESTYRAMINE 4 G/9G
1 POWDER, FOR SUSPENSION ORAL DAILY
COMMUNITY
Start: 2023-12-04

## 2023-12-08 ASSESSMENT — ENCOUNTER SYMPTOMS
DIARRHEA: 1
ABDOMINAL PAIN: 0
COUGH: 0
RHINORRHEA: 0
SORE THROAT: 0
EYE DISCHARGE: 0
VOMITING: 0
SHORTNESS OF BREATH: 0
WHEEZING: 0
EYE REDNESS: 0
NAUSEA: 0

## 2023-12-08 NOTE — PROGRESS NOTES
[address] [facility] PRIMARY CARE  [facility]  [address] [facility] [address]  Dept: [phone]    Steph Olivares is a 60 y.o. male Established patient, who presents today for his medical conditions/complaints as noted below. Chief Complaint   Patient presents with    Hypertension       HPI:     HPI  Pt had medication changed by gastroenterology. Started on [address]. Patient has not started Questran as of yet. Patient with lesion on his left thigh near the scrotum. States has a foul odor. Quite painful even mild touch. Has been present for several weeks. Patient requesting medication for flying. Has never flown before. [address] requested facet joint injections and possible radiofrequency ablation. The progress note was reviewed. Recommended to see pain management someplace closer here if possible. Patient was placed on Plaquenil for Sjogren's syndrome. Patient states this was done years ago. States does not believe it is doing anything. Side effects were looked up and can cause diarrhea.       Reviewed prior notes  neurosurgery and gastroenterology  Reviewed previous Labs    LDL Cholesterol (mg/dL)   Date Value   08/11/2023 104   06/06/2022 121   02/10/2018 94       (goal LDL is <100)   AST (U/L)   Date Value   08/11/2023 23     ALT (U/L)   Date Value   08/11/2023 15     BUN (mg/dL)   Date Value   08/11/2023 11     Hemoglobin A1C (%)   Date Value   02/10/2018 5.5     TSH (mIU/L)   Date Value   11/04/2021 1.07     BP Readings from Last 3 Encounters:   12/08/23 122/74   10/20/23 134/79   09/06/23 128/68          (goal 120/80)    Past Medical History:   Diagnosis Date    ADHD (attention deficit hyperactivity disorder)     Angular cheilitis     Anticoagulant long-term use     Anxiety     Arthritis     BACK AND FINGERS     Bilateral lower extremity edema 03/2021    started on Lasix per PCP    Chronic back pain     dr Ming Leblanc of [facility]

## 2024-01-09 ENCOUNTER — INITIAL CONSULT (OUTPATIENT)
Dept: PAIN MANAGEMENT | Age: 59
End: 2024-01-09
Payer: MEDICARE

## 2024-01-09 VITALS — BODY MASS INDEX: 38.16 KG/M2 | OXYGEN SATURATION: 96 % | HEART RATE: 75 BPM | WEIGHT: 257.6 LBS | HEIGHT: 69 IN

## 2024-01-09 DIAGNOSIS — M47.817 LUMBOSACRAL SPONDYLOSIS WITHOUT MYELOPATHY: Primary | ICD-10-CM

## 2024-01-09 DIAGNOSIS — E66.01 SEVERE OBESITY (BMI 35.0-39.9) WITH COMORBIDITY (HCC): ICD-10-CM

## 2024-01-09 DIAGNOSIS — Z98.1 HISTORY OF LUMBAR FUSION: ICD-10-CM

## 2024-01-09 DIAGNOSIS — Z98.1 S/P FUSION OF THORACIC SPINE: ICD-10-CM

## 2024-01-09 PROCEDURE — 99204 OFFICE O/P NEW MOD 45 MIN: CPT | Performed by: ANESTHESIOLOGY

## 2024-01-09 ASSESSMENT — ENCOUNTER SYMPTOMS
NAUSEA: 0
WHEEZING: 0
CHEST TIGHTNESS: 0
DIARRHEA: 0
BACK PAIN: 1
VOMITING: 0
SHORTNESS OF BREATH: 0
CONSTIPATION: 0

## 2024-01-09 NOTE — PROGRESS NOTES
connecting martina fixation from L5-S1.  No acute fracture is  identified, to the extent visualized.  There is no abnormal enhancement  postcontrast administration.  Mild degenerative changes are present.     L3-L4: Facet hypertrophy resulting in moderate narrowing of the thecal sac.     L4-L5: There is no significant disc protrusion, spinal canal stenosis or  neural foraminal narrowing.     L5-S1: There is no significant disc protrusion, spinal canal stenosis or  neural foraminal narrowing.     IMPRESSION:  No acute abnormality of the visualized portions of the L-spine.       EXAMINATION:  MRI OF THE THORACIC SPINE WITHOUT AND WITH CONTRAST  10/20/2023 11:09 am  The lower half of the T-spine is completely obscured by artifact from  orthopedic hardware.  Visualized portions of the thoracic cord are normal in  size and signal intensity.  Mild degenerative changes are present.  No  significant canal or neural foraminal stenosis is identified from T1 to T7.     No abnormal enhancement of the upper T-spine is identified.     IMPRESSION:  No acute abnormality the upper T-spine visualized.     Lower half of the T-spine completely obscured by artifact created from  orthopedic hardware.      1. Lumbosacral spondylosis without myelopathy    2. History of lumbar fusion    3. Severe obesity (BMI 35.0-39.9) with comorbidity (HCC)    4. S/P fusion of thoracic spine        Chronic predominantly axial lower lumbar spinal pain going on for several years progressively worsening affecting quality of life  Average pain score on a scale of 1-10 6/10  Imaging showed facet arthropathy  Risk factors include thoracic and lumbar spine fusion surgery  Failed conservative measures with NSAIDs and physical therapy  Clinical presentation suggest facet mediated pain  Discussed diagnostic bilateral lumbar medial branch nerve block at L2-3 and L3-4 facet joint  If this provide good short-term relief we will then consider for confirmatory block and

## 2024-01-09 NOTE — H&P (VIEW-ONLY)
The patient is a 58 y.o.Non- / non  male.    Chief Complaint   Patient presents with    New Patient        HPI    Requesting physician for the evaluation of Ramakrishna Gamboa 1965: Temo Lopez MD     Pain History  -58-year-old man with longstanding history of pain involving mid and low back  History of lumbar spine L5-S1 fusion in 2006  Also was diagnosed with kyphosis and was following with [facility] underwent extensive thoracic spine corrective fusion surgery in 2020  Continues to have pain in mid and lower back area    Today identified pain predominantly in the lumbar area and in the sacral area  Had recent follow-up with [facility] and was advised for lumbar radiofrequency ablation  Pain is chronic located in the lumbar area bilaterally no dermatomal radiation  Aggravated with routine activity  He has done multiple courses of physical therapy in past most recently in 2021 and continues to do home exercises  Patient had recent diagnostic workup with MRI thoracic and lumbar spine that reported lumbar facet arthropathy  Pain score today  5  1. Location:back   2. Radiation:left leg occasionally   3. Character:aching, burning, shooting, stabbing, throbbing  5. Duration:year  6. Onset: years  7. Did an injury cause pain: no  8. Aggravating factors:walking, standing, bending, twisting  9. Alleviating factors:pain medication  10. Associated symptoms (numbness / tingling / weakness):  yes  -Where at:low back & Left hip  -Down into finger tips or toes (specify which finger or toes):no  -constant or intermitting: constant  11. Red Flags: (weight loss / chills / loss of bladder or bowel control):no     Previous management history  1. Previous diagnostic workup: (Imaging/EMG)   CT, MRI, or Xray: yes  What part of the body:back & neck  What facility did they have it at:[facility]  What year or specific date: 2023  EMG:  yes    2. Previous non interventional treatments

## 2024-01-14 NOTE — TELEPHONE ENCOUNTER
----- Message from [place] sent at 8/19/2021 10:23 AM EDT -----  Subject: Appointment Request    Reason for Call: Urgent (Patient Request) Existing Condition Follow    QUESTIONS  Type of Appointment? Established Patient  Reason for appointment request? Available appointments did not meet   patient need  Additional Information for Provider? Pt would like to be seen as soon as   he can. BP 97/64 not all the time but feeling a little concerned about   that. Also is having some bowl issues. He did have back surgery on July 12.  ---------------------------------------------------------------------------  --------------  CALL BACK INFO  What is the best way for the office to contact you? OK to leave message on   voicemail  Preferred Call Back Phone Number? [phone]  ---------------------------------------------------------------------------  --------------  SCRIPT ANSWERS  Relationship to Patient? Third Party  Representative Name? Anat Armijo  (Is the patient requesting to be seen urgently for their symptoms?)? Yes  Is this follow up request related to routine Diabetes Management? No  Are you having any new concerns about your existing condition? No  Have you been diagnosed with, awaiting test results for, or told that you   are suspected of having COVID-19 (Coronavirus)? (If patient has tested   negative or was tested as a requirement for work, school, or travel and   not based on symptoms, answer no)? No  Do you currently have flu-like symptoms including fever or chills, cough,   shortness of breath, difficulty breathing, or new loss of taste or smell? No  Have you had close contact with someone with COVID-19 in the last 14 days? No  (Service Expert  click yes below to proceed with [employer] As Usual   Scheduling)?  Yes
Private car

## 2024-01-20 DIAGNOSIS — F43.21 ADJUSTMENT DISORDER WITH DEPRESSED MOOD: ICD-10-CM

## 2024-01-22 RX ORDER — CITALOPRAM 20 MG/1
20 TABLET ORAL DAILY
Qty: 90 TABLET | Refills: 0 | Status: SHIPPED | OUTPATIENT
Start: 2024-01-22

## 2024-02-07 ENCOUNTER — HOSPITAL ENCOUNTER (OUTPATIENT)
Dept: PAIN MANAGEMENT | Facility: CLINIC | Age: 59
Discharge: HOME | End: 2024-02-07
Payer: MEDICARE

## 2024-02-07 VITALS
BODY MASS INDEX: 38.06 KG/M2 | DIASTOLIC BLOOD PRESSURE: 67 MMHG | TEMPERATURE: 97.5 F | HEART RATE: 70 BPM | HEIGHT: 69 IN | RESPIRATION RATE: 14 BRPM | OXYGEN SATURATION: 94 % | SYSTOLIC BLOOD PRESSURE: 125 MMHG | WEIGHT: 257 LBS

## 2024-02-07 DIAGNOSIS — R52 PAIN MANAGEMENT: ICD-10-CM

## 2024-02-07 DIAGNOSIS — M47.817 LUMBOSACRAL SPONDYLOSIS WITHOUT MYELOPATHY: Primary | ICD-10-CM

## 2024-02-07 PROCEDURE — 2500000003 HC RX 250 WO HCPCS: Performed by: ANESTHESIOLOGY

## 2024-02-07 PROCEDURE — 64494 INJ PARAVERT F JNT L/S 2 LEV: CPT

## 2024-02-07 PROCEDURE — 6360000004 HC RX CONTRAST MEDICATION: Performed by: ANESTHESIOLOGY

## 2024-02-07 PROCEDURE — 6360000002 HC RX W HCPCS: Performed by: ANESTHESIOLOGY

## 2024-02-07 PROCEDURE — 64493 INJ PARAVERT F JNT L/S 1 LEV: CPT

## 2024-02-07 PROCEDURE — 99152 MOD SED SAME PHYS/QHP 5/>YRS: CPT | Performed by: ANESTHESIOLOGY

## 2024-02-07 PROCEDURE — 64494 INJ PARAVERT F JNT L/S 2 LEV: CPT | Performed by: ANESTHESIOLOGY

## 2024-02-07 PROCEDURE — 64493 INJ PARAVERT F JNT L/S 1 LEV: CPT | Performed by: ANESTHESIOLOGY

## 2024-02-07 RX ORDER — FENTANYL CITRATE 50 UG/ML
INJECTION, SOLUTION INTRAMUSCULAR; INTRAVENOUS
Status: COMPLETED | OUTPATIENT
Start: 2024-02-07 | End: 2024-02-07

## 2024-02-07 RX ORDER — LIDOCAINE HYDROCHLORIDE 10 MG/ML
INJECTION, SOLUTION EPIDURAL; INFILTRATION; INTRACAUDAL; PERINEURAL
Status: COMPLETED | OUTPATIENT
Start: 2024-02-07 | End: 2024-02-07

## 2024-02-07 RX ORDER — MIDAZOLAM HYDROCHLORIDE 2 MG/2ML
INJECTION, SOLUTION INTRAMUSCULAR; INTRAVENOUS
Status: COMPLETED | OUTPATIENT
Start: 2024-02-07 | End: 2024-02-07

## 2024-02-07 RX ORDER — BUPIVACAINE HYDROCHLORIDE 5 MG/ML
INJECTION, SOLUTION EPIDURAL; INTRACAUDAL
Status: COMPLETED | OUTPATIENT
Start: 2024-02-07 | End: 2024-02-07

## 2024-02-07 RX ADMIN — MIDAZOLAM HYDROCHLORIDE 1 MG: 1 INJECTION, SOLUTION INTRAMUSCULAR; INTRAVENOUS at 11:08

## 2024-02-07 RX ADMIN — IOHEXOL 3 ML: 180 INJECTION INTRAVENOUS at 11:11

## 2024-02-07 RX ADMIN — FENTANYL CITRATE 50 MCG: 50 INJECTION, SOLUTION INTRAMUSCULAR; INTRAVENOUS at 11:08

## 2024-02-07 RX ADMIN — LIDOCAINE HYDROCHLORIDE 5 ML: 10 INJECTION, SOLUTION EPIDURAL; INFILTRATION; INTRACAUDAL at 11:08

## 2024-02-07 RX ADMIN — BUPIVACAINE HYDROCHLORIDE 6 ML: 5 INJECTION, SOLUTION EPIDURAL; INTRACAUDAL; PERINEURAL at 11:13

## 2024-02-07 ASSESSMENT — PAIN DESCRIPTION - FREQUENCY: FREQUENCY: CONTINUOUS

## 2024-02-07 ASSESSMENT — PAIN DESCRIPTION - ONSET: ONSET: ON-GOING

## 2024-02-07 ASSESSMENT — PAIN SCALES - GENERAL: PAINLEVEL_OUTOF10: 9

## 2024-02-07 ASSESSMENT — PAIN DESCRIPTION - LOCATION: LOCATION: BACK

## 2024-02-07 ASSESSMENT — PAIN - FUNCTIONAL ASSESSMENT: PAIN_FUNCTIONAL_ASSESSMENT: PREVENTS OR INTERFERES WITH ALL ACTIVE AND SOME PASSIVE ACTIVITIES

## 2024-02-07 ASSESSMENT — PAIN DESCRIPTION - DIRECTION: RADIATING_TOWARDS: RIGHT HIP

## 2024-02-07 ASSESSMENT — PAIN DESCRIPTION - PAIN TYPE: TYPE: CHRONIC PAIN

## 2024-02-07 ASSESSMENT — PAIN DESCRIPTION - DESCRIPTORS: DESCRIPTORS: BURNING;THROBBING;SHARP

## 2024-02-07 ASSESSMENT — PAIN DESCRIPTION - ORIENTATION: ORIENTATION: LOWER

## 2024-02-07 NOTE — DISCHARGE INSTRUCTIONS
Discharge Instructions following Sedation or Anesthesia:  You have  received  a sedative/anesthetic therefore, you should not consume any alcoholic beverages for minimum of 12 hours.  Do not drive or operate machinery for 24 hours.  Do not sign legal documents for 24 hours.  Dizziness, drowsiness, and unsteadiness may occur.  Rest when need to.  Increase diet as tolerated.  Keep up on fluids if diet allows.      General Instructions:  Do not take a tub bath for 72 hours after procedure (this includes hot tubs and swimming pools).  You may shower, but avoid hot water to injection site.   Avoid strenuous activity TODAY especially if you experience dizziness.   Remove band-aid the next day.  Wash off any residual iodine   Do not use heat, heating pad, or any other heating device over the injection site for 3 days after the procedure.  If you experience pain after your procedure, you may continue with your current pain medication as prescribed.  (DO NOT INCREASE YOUR PAIN MEDICATION WITHOUT TALKING TO DOCTOR)  Soreness and pain at injection site is common, may use ice to reduce soreness.    Please complete pain diary as instructed.     Call [facility] at [phone] if you experience:   Fever, chills or temperature over 100    Vomiting, Headache, persistent stiff neck, nausea, blurred vision   Difficulty in urinating or unable to urinate with 8 hours   Increase in weakness, numbness or loss of function   Increased redness, swelling or drainage at the injection site

## 2024-02-07 NOTE — OP NOTE
Patient Name: Ramakrishna Gamboa   YOB: 1965  Room/Bed: Room/bed info not found  Medical Record Number: [id number]  Date: 2/7/2024       Sedation/ Anesthesia Plan:   intravenous sedation   as needed.    Medications Planned:   midazolam (Versed) / Fentanyl  Intravenously  as needed.    Preoperative Diagnosis: Lumbar spondylosis w/o myelopathy or radiculopathy  Postoperative Diagnosis: Lumbar spondylosis w/o myelopathy or radiculopathy  Blood Loss: none    Procedure Performed:  Bilateral Lumbar Medial Branch nerve Blocks at the transverse processes of L2, L3, L4  ala under fluoroscopy guidance    Procedure:      The Patient was seen in the preop area, chart was reviewed, informed consent was obtained. Patient was taken to procedure room and was placed in prone position. Vital signs were monitored through out the  Procedure. A time out was completed.  The skin over the back was prepped and draped in sterile manner.     The target point was marked at the junction of Transverse process and superior articular process at the target levels.  Skin and deep tissues were anesthetized with 1 % lidocaine. A 25-gauge needlele was advanced to the target spots under fluoroscopy guidance in AP / Lateral and Oblique views.     Then after negative aspiration contrast dye was injected with live fluoroscopy in AP views that showed  spread of the contrast with no epidural space and no vascular runoff or intrathecal spread.    Finally 0.5 ml of treatment solution 0.5% BUPIVACAINE  was injected at each level.  The needle was removed and a Band-Aid was placed over the needle  insertion site.  The patient's vital signs remained stable and the patient tolerated the procedure well.    Electronically signed by Misael Danielle MD on 2/7/2024 at 11:19 AM    SEDATION NOTE:    ASA CLASSIFICATION  3  MP   CLASSIFICATION  3    Moderate intravenous conscious sedation was supervised by Dr. Danielle  The patient was independently monitored

## 2024-02-07 NOTE — INTERVAL H&P NOTE
Update History & Physical    The patient's History and Physical of January 9, 2024 was reviewed with the patient and I examined the patient. There was no change. The surgical site was confirmed by the patient and me.     Plan: The risks, benefits, expected outcome, and alternative to the recommended procedure have been discussed with the patient. Patient understands and wants to proceed with the procedure.     ASA 3  MP 3    Electronically signed by Misael Danielle MD on 2/7/2024 at 10:54 AM

## 2024-02-08 ENCOUNTER — TELEPHONE (OUTPATIENT)
Dept: PAIN MANAGEMENT | Age: 59
End: 2024-02-08

## 2024-02-08 DIAGNOSIS — M47.817 LUMBOSACRAL SPONDYLOSIS WITHOUT MYELOPATHY: Primary | ICD-10-CM

## 2024-02-08 NOTE — TELEPHONE ENCOUNTER
S/P:Bilateral Lumbar Medial Branch nerve Blocks at the transverse processes of L2, L3, L4  ala under fluoroscopy guidance     DOS: 02/07/24    Pain  before procedure with activity :5    Pain after procedure with activity:5    Activities following procedure include:riding in car, standing, cooking, carring, lifting    % of pain relief:80%    Procedure successful: yes     OV or OR scheduled:02/28 @ 11:45 Mbb #2

## 2024-02-09 DIAGNOSIS — M48.10 DIFFUSE IDIOPATHIC SKELETAL HYPEROSTOSIS: ICD-10-CM

## 2024-02-09 DIAGNOSIS — M40.205 KYPHOSIS OF THORACOLUMBAR REGION, UNSPECIFIED KYPHOSIS TYPE: ICD-10-CM

## 2024-02-16 DIAGNOSIS — R60.0 LOCALIZED EDEMA: ICD-10-CM

## 2024-02-19 RX ORDER — FUROSEMIDE 40 MG/1
40 TABLET ORAL DAILY
Qty: 90 TABLET | Refills: 0 | Status: SHIPPED | OUTPATIENT
Start: 2024-02-19

## 2024-03-18 ENCOUNTER — HOSPITAL ENCOUNTER (OUTPATIENT)
Dept: PAIN MANAGEMENT | Facility: CLINIC | Age: 59
Discharge: HOME | End: 2024-03-18
Payer: MEDICARE

## 2024-03-18 VITALS
BODY MASS INDEX: 37.03 KG/M2 | TEMPERATURE: 97.6 F | HEART RATE: 82 BPM | DIASTOLIC BLOOD PRESSURE: 75 MMHG | OXYGEN SATURATION: 95 % | HEIGHT: 69 IN | WEIGHT: 250 LBS | SYSTOLIC BLOOD PRESSURE: 117 MMHG | RESPIRATION RATE: 16 BRPM

## 2024-03-18 DIAGNOSIS — M47.817 LUMBOSACRAL SPONDYLOSIS WITHOUT MYELOPATHY: Primary | ICD-10-CM

## 2024-03-18 DIAGNOSIS — R52 PAIN MANAGEMENT: ICD-10-CM

## 2024-03-18 PROCEDURE — 99152 MOD SED SAME PHYS/QHP 5/>YRS: CPT | Performed by: ANESTHESIOLOGY

## 2024-03-18 PROCEDURE — 64493 INJ PARAVERT F JNT L/S 1 LEV: CPT

## 2024-03-18 PROCEDURE — 64494 INJ PARAVERT F JNT L/S 2 LEV: CPT | Performed by: ANESTHESIOLOGY

## 2024-03-18 PROCEDURE — 6360000002 HC RX W HCPCS: Performed by: ANESTHESIOLOGY

## 2024-03-18 PROCEDURE — 64494 INJ PARAVERT F JNT L/S 2 LEV: CPT

## 2024-03-18 PROCEDURE — 2500000003 HC RX 250 WO HCPCS: Performed by: ANESTHESIOLOGY

## 2024-03-18 PROCEDURE — 64493 INJ PARAVERT F JNT L/S 1 LEV: CPT | Performed by: ANESTHESIOLOGY

## 2024-03-18 PROCEDURE — 6360000004 HC RX CONTRAST MEDICATION: Performed by: ANESTHESIOLOGY

## 2024-03-18 RX ORDER — MIDAZOLAM HYDROCHLORIDE 2 MG/2ML
INJECTION, SOLUTION INTRAMUSCULAR; INTRAVENOUS
Status: COMPLETED | OUTPATIENT
Start: 2024-03-18 | End: 2024-03-18

## 2024-03-18 RX ORDER — LIDOCAINE HYDROCHLORIDE 10 MG/ML
INJECTION, SOLUTION EPIDURAL; INFILTRATION; INTRACAUDAL; PERINEURAL
Status: COMPLETED | OUTPATIENT
Start: 2024-03-18 | End: 2024-03-18

## 2024-03-18 RX ORDER — FENTANYL CITRATE 50 UG/ML
INJECTION, SOLUTION INTRAMUSCULAR; INTRAVENOUS
Status: COMPLETED | OUTPATIENT
Start: 2024-03-18 | End: 2024-03-18

## 2024-03-18 RX ORDER — BUPIVACAINE HYDROCHLORIDE 5 MG/ML
INJECTION, SOLUTION EPIDURAL; INTRACAUDAL
Status: COMPLETED | OUTPATIENT
Start: 2024-03-18 | End: 2024-03-18

## 2024-03-18 RX ADMIN — LIDOCAINE HYDROCHLORIDE 5 ML: 10 INJECTION, SOLUTION EPIDURAL; INFILTRATION; INTRACAUDAL at 12:41

## 2024-03-18 RX ADMIN — IOHEXOL 3 ML: 180 INJECTION INTRAVENOUS at 12:45

## 2024-03-18 RX ADMIN — FENTANYL CITRATE 50 MCG: 50 INJECTION, SOLUTION INTRAMUSCULAR; INTRAVENOUS at 12:40

## 2024-03-18 RX ADMIN — MIDAZOLAM HYDROCHLORIDE 1 MG: 1 INJECTION, SOLUTION INTRAMUSCULAR; INTRAVENOUS at 12:40

## 2024-03-18 RX ADMIN — BUPIVACAINE HYDROCHLORIDE 6 ML: 5 INJECTION, SOLUTION EPIDURAL; INTRACAUDAL; PERINEURAL at 12:46

## 2024-03-18 ASSESSMENT — PAIN - FUNCTIONAL ASSESSMENT
PAIN_FUNCTIONAL_ASSESSMENT: 0-10
PAIN_FUNCTIONAL_ASSESSMENT: PREVENTS OR INTERFERES WITH ALL ACTIVE AND SOME PASSIVE ACTIVITIES

## 2024-03-18 ASSESSMENT — PAIN DESCRIPTION - DESCRIPTORS: DESCRIPTORS: BURNING;SHARP;THROBBING

## 2024-03-18 NOTE — DISCHARGE INSTRUCTIONS
Discharge Instructions following Sedation or Anesthesia:  You have  received  a sedative/anesthetic therefore, you should not consume any alcoholic beverages for minimum of 12 hours.  Do not drive or operate machinery for 24 hours.  Do not sign legal documents for 24 hours.  Dizziness, drowsiness, and unsteadiness may occur.  Rest when need to.  Increase diet as tolerated.  Keep up on fluids if diet allows.      General Instructions:  Do not take a tub bath for 72 hours after procedure (this includes hot tubs and swimming pools).  You may shower, but avoid hot water to injection site.   Avoid strenuous activity TODAY especially if you experience dizziness.   Remove band-aid the next day.  Wash off any residual iodine   Do not use heat, heating pad, or any other heating device over the injection site for 3 days after the procedure.  If you experience pain after your procedure, you may continue with your current pain medication as prescribed.  (DO NOT INCREASE YOUR PAIN MEDICATION WITHOUT TALKING TO DOCTOR)  Soreness and pain at injection site is common, may use ice to reduce soreness.    Please complete pain diary as instructed.     Call [facility] at [phone] if you experience:   Fever, chills or temperature over 100    Vomiting, Headache, persistent stiff neck, nausea, blurred vision   Difficulty in urinating or unable to urinate with 8 hours   Increase in weakness, numbness or loss of function   Increased redness, swelling or drainage at the injection site

## 2024-03-18 NOTE — OP NOTE
Patient Name: Ramakrishna Gamboa   YOB: 1965  Room/Bed: Room/bed info not found  Medical Record Number: [id number]  Date: 3/18/2024       Sedation/ Anesthesia Plan:   intravenous sedation   as needed.    Medications Planned:   midazolam (Versed) / Fentanyl  Intravenously  as needed.    Preoperative Diagnosis: Lumbar spondylosis w/o myelopathy or radiculopathy  Postoperative Diagnosis: Lumbar spondylosis w/o myelopathy or radiculopathy  Blood Loss: none    Procedure Performed:  Bilateral Lumbar Medial Branch nerve Blocks at the transverse processes of  L2, L3, L4 under fluoroscopy guidance    Procedure:      The Patient was seen in the preop area, chart was reviewed, informed consent was obtained. Patient was taken to procedure room and was placed in prone position. Vital signs were monitored through out the  Procedure. A time out was completed.  The skin over the back was prepped and draped in sterile manner.     The target point was marked at the junction of Transverse process and superior articular process at the target levels.  Skin and deep tissues were anesthetized with 1 % lidocaine. A 25-gauge needlele was advanced to the target spots under fluoroscopy guidance in AP / Lateral and Oblique views.     Then after negative aspiration contrast dye was injected with live fluoroscopy in AP views that showed  spread of the contrast with no epidural space and no vascular runoff or intrathecal spread.    Finally 0.5 ml of treatment solution 0.5% BUPIVACAINE  was injected at each level.  The needle was removed and a Band-Aid was placed over the needle  insertion site.  The patient's vital signs remained stable and the patient tolerated the procedure well.      Electronically signed by Misael Danielle MD on 3/18/2024 at 12:53 PM    SEDATION NOTE:    ASA CLASSIFICATION  3  MP   CLASSIFICATION  3    Moderate intravenous conscious sedation was supervised by Dr. Danielle  The patient was independently monitored

## 2024-03-18 NOTE — H&P
Pain Pre-Op H&P Note    Misael Danielle MD    HPI: Ramakrishna RAM Lumamaegan  presents with     -58-year-old man with longstanding history of pain involving mid and low back  History of lumbar spine L5-S1 fusion in 2006  Also was diagnosed with kyphosis and was following with [facility] underwent extensive thoracic spine corrective fusion surgery in 2020  Continues to have pain in mid and lower back area     Today identified pain predominantly in the lumbar area and in the sacral area  Had recent follow-up with [facility] and was advised for lumbar radiofrequency ablation  Pain is chronic located in the lumbar area bilaterally no dermatomal radiation  Aggravated with routine activity  He has done multiple courses of physical therapy in past most recently in 2021 and continues to do home exercises    Past Medical History:   Diagnosis Date    ADHD (attention deficit hyperactivity disorder)     Angular cheilitis     Anticoagulant long-term use     Anxiety     Arthritis     BACK AND FINGERS     Bilateral lower extremity edema 03/2021    started on Lasix per PCP    Chronic back pain     dr markham [facility], [facility]     COVID-19 12/2022    cold symptoms    Depression     Dizziness     Eczema     Ex-smoker     quit in 2006, smoked for 23 years    Floaters in visual field, bilateral     GERD (gastroesophageal reflux disease)     H/O chest pain     H/O dermatitis     History of blood transfusion     History of panic attacks     [place] (hard of hearing)     LEFT EAR WORSE THAN RIGHT. getting hearing aides Dr. Irasema amin    Hypertension     DR MALCOLM PCP    Impaired mobility     occas uses cane    Kyphosis     [facility] OR 7/12/2021 with Dr. Jed Jeff cant lie flat,severe back pain    Left eye injury     BUNGY CORD STRAP BUSTED AND INJURED LEFT EYE    Muscle spasm     LOWER RIGHT BACK    Nocturia     Obesity     Oldfield's syndrome     \"bowels not working\"

## 2024-03-18 NOTE — H&P (VIEW-ONLY)
Pain Pre-Op H&P Note    Misael Danielle MD    HPI: Ramakrishna RAM Lumamaegan  presents with     -58-year-old man with longstanding history of pain involving mid and low back  History of lumbar spine L5-S1 fusion in 2006  Also was diagnosed with kyphosis and was following with [facility] underwent extensive thoracic spine corrective fusion surgery in 2020  Continues to have pain in mid and lower back area     Today identified pain predominantly in the lumbar area and in the sacral area  Had recent follow-up with [facility] and was advised for lumbar radiofrequency ablation  Pain is chronic located in the lumbar area bilaterally no dermatomal radiation  Aggravated with routine activity  He has done multiple courses of physical therapy in past most recently in 2021 and continues to do home exercises    Past Medical History:   Diagnosis Date    ADHD (attention deficit hyperactivity disorder)     Angular cheilitis     Anticoagulant long-term use     Anxiety     Arthritis     BACK AND FINGERS     Bilateral lower extremity edema 03/2021    started on Lasix per PCP    Chronic back pain     dr markham [facility], [facility]     COVID-19 12/2022    cold symptoms    Depression     Dizziness     Eczema     Ex-smoker     quit in 2006, smoked for 23 years    Floaters in visual field, bilateral     GERD (gastroesophageal reflux disease)     H/O chest pain     H/O dermatitis     History of blood transfusion     History of panic attacks     [place] (hard of hearing)     LEFT EAR WORSE THAN RIGHT. getting hearing aides Dr. Irasema amin    Hypertension     DR MALCOLM PCP    Impaired mobility     occas uses cane    Kyphosis     [facility] OR 7/12/2021 with Dr. Jed Jeff cant lie flat,severe back pain    Left eye injury     BUNGY CORD STRAP BUSTED AND INJURED LEFT EYE    Muscle spasm     LOWER RIGHT BACK    Nocturia     Obesity     Natoma's syndrome     \"bowels not working\"

## 2024-03-19 ENCOUNTER — TELEPHONE (OUTPATIENT)
Dept: PAIN MANAGEMENT | Age: 59
End: 2024-03-19

## 2024-03-19 DIAGNOSIS — M47.817 LUMBOSACRAL SPONDYLOSIS WITHOUT MYELOPATHY: Primary | ICD-10-CM

## 2024-03-19 NOTE — TELEPHONE ENCOUNTER
Procedure: Bilateral Lumbar Medial Branch nerve Blocks at the transverse processes of  L2, L3, L4 under fluoroscopy guidance   DOS: 241920  Pain level before procedure with activity 9.  Pain with activity after procedure 0.  What activities done the day of procedure walking standing lifting  What percentage of  pain relief from procedure did you receive 100  Success [facility]  OR Scheduled  4/17

## 2024-03-21 DIAGNOSIS — G25.0 BENIGN ESSENTIAL TREMOR SYNDROME: ICD-10-CM

## 2024-03-22 RX ORDER — PRIMIDONE 50 MG/1
50 TABLET ORAL 3 TIMES DAILY
Qty: 270 TABLET | Refills: 0 | Status: SHIPPED | OUTPATIENT
Start: 2024-03-22

## 2024-04-17 ENCOUNTER — HOSPITAL ENCOUNTER (OUTPATIENT)
Dept: PAIN MANAGEMENT | Facility: CLINIC | Age: 59
Discharge: HOME | End: 2024-04-17
Payer: MEDICARE

## 2024-04-17 VITALS
WEIGHT: 257 LBS | TEMPERATURE: 97.2 F | DIASTOLIC BLOOD PRESSURE: 75 MMHG | OXYGEN SATURATION: 94 % | HEART RATE: 67 BPM | BODY MASS INDEX: 38.06 KG/M2 | SYSTOLIC BLOOD PRESSURE: 133 MMHG | RESPIRATION RATE: 15 BRPM | HEIGHT: 69 IN

## 2024-04-17 DIAGNOSIS — R52 PAIN MANAGEMENT: ICD-10-CM

## 2024-04-17 DIAGNOSIS — M47.817 LUMBOSACRAL SPONDYLOSIS WITHOUT MYELOPATHY: Primary | ICD-10-CM

## 2024-04-17 PROCEDURE — 6360000002 HC RX W HCPCS: Performed by: ANESTHESIOLOGY

## 2024-04-17 PROCEDURE — 64636 DESTROY L/S FACET JNT ADDL: CPT

## 2024-04-17 PROCEDURE — 64635 DESTROY LUMB/SAC FACET JNT: CPT

## 2024-04-17 PROCEDURE — 64635 DESTROY LUMB/SAC FACET JNT: CPT | Performed by: ANESTHESIOLOGY

## 2024-04-17 PROCEDURE — 99152 MOD SED SAME PHYS/QHP 5/>YRS: CPT | Performed by: ANESTHESIOLOGY

## 2024-04-17 PROCEDURE — 2500000003 HC RX 250 WO HCPCS: Performed by: ANESTHESIOLOGY

## 2024-04-17 PROCEDURE — 64636 DESTROY L/S FACET JNT ADDL: CPT | Performed by: ANESTHESIOLOGY

## 2024-04-17 RX ORDER — LIDOCAINE HYDROCHLORIDE 10 MG/ML
INJECTION, SOLUTION EPIDURAL; INFILTRATION; INTRACAUDAL; PERINEURAL
Status: COMPLETED | OUTPATIENT
Start: 2024-04-17 | End: 2024-04-17

## 2024-04-17 RX ORDER — LIDOCAINE HYDROCHLORIDE 40 MG/ML
INJECTION, SOLUTION RETROBULBAR
Status: COMPLETED | OUTPATIENT
Start: 2024-04-17 | End: 2024-04-17

## 2024-04-17 RX ORDER — MIDAZOLAM HYDROCHLORIDE 2 MG/2ML
INJECTION, SOLUTION INTRAMUSCULAR; INTRAVENOUS
Status: COMPLETED | OUTPATIENT
Start: 2024-04-17 | End: 2024-04-17

## 2024-04-17 RX ORDER — FENTANYL CITRATE 50 UG/ML
INJECTION, SOLUTION INTRAMUSCULAR; INTRAVENOUS
Status: COMPLETED | OUTPATIENT
Start: 2024-04-17 | End: 2024-04-17

## 2024-04-17 RX ADMIN — FENTANYL CITRATE 50 MCG: 50 INJECTION, SOLUTION INTRAMUSCULAR; INTRAVENOUS at 09:03

## 2024-04-17 RX ADMIN — MIDAZOLAM HYDROCHLORIDE 1 MG: 1 INJECTION, SOLUTION INTRAMUSCULAR; INTRAVENOUS at 09:03

## 2024-04-17 RX ADMIN — LIDOCAINE HYDROCHLORIDE 5 ML: 10 INJECTION, SOLUTION EPIDURAL; INFILTRATION; INTRACAUDAL at 09:12

## 2024-04-17 RX ADMIN — LIDOCAINE HYDROCHLORIDE 5 ML: 40 SOLUTION RETROBULBAR; TOPICAL at 09:12

## 2024-04-17 RX ADMIN — LIDOCAINE HYDROCHLORIDE 5 ML: 10 INJECTION, SOLUTION EPIDURAL; INFILTRATION; INTRACAUDAL at 09:03

## 2024-04-17 ASSESSMENT — PAIN DESCRIPTION - DESCRIPTORS: DESCRIPTORS: BURNING;ACHING;THROBBING

## 2024-04-17 NOTE — INTERVAL H&P NOTE
Update History & Physical    The patient's History and Physical of March 18, 2024 was reviewed with the patient and I examined the patient. There was no change. The surgical site was confirmed by the patient and me.     Plan: The risks, benefits, expected outcome, and alternative to the recommended procedure have been discussed with the patient. Patient understands and wants to proceed with the procedure.     ASA 3  MP32    Electronically signed by Misael Danielle MD on 4/17/2024 at 8:44 AM

## 2024-04-17 NOTE — DISCHARGE INSTRUCTIONS
Discharge Instructions following Sedation or Anesthesia:  You have  received  a sedative/anesthetic therefore, you should not consume any alcoholic beverages for minimum of 12 hours.  Do not drive or operate machinery for 24 hours.  Do not sign legal documents for 24 hours.  Dizziness, drowsiness, and unsteadiness may occur.  Rest when need to.  Increase diet as tolerated.  Keep up on fluids if diet allows.      General Instructions:  Do not take a tub bath for 72 hours after procedure (this includes hot tubs and swimming pools).  You may shower, but avoid hot water to injection site.   Avoid strenuous activity TODAY especially if you experience dizziness.   Remove band-aid the next day.  Wash off any residual iodine   Do not use heat, heating pad, or any other heating device over the injection site for 3 days after the procedure.  If you experience pain after your procedure, you may continue with your current pain medication as prescribed.  (DO NOT INCREASE YOUR PAIN MEDICATION WITHOUT TALKING TO DOCTOR)  Soreness and pain at injection site is common, may use ice to reduce soreness.    Call [facility] at [phone] if you experience:   Fever, chills or temperature over 100    Vomiting, Headache, persistent stiff neck, nausea, blurred vision   Difficulty in urinating or unable to urinate with 8 hours   Increase in weakness, numbness or loss of function   Increased redness, swelling or drainage at the injection site

## 2024-04-17 NOTE — OP NOTE
Preoperative Diagnosis: Lumbar spondylosis w/o myelopathy, chronic low back pain  Postoperative Diagnosis: Lumbar spondylosis w/o myelopathy, chronic low back pain  SEDATION: SEE SEDATION NOTE  BLOOD LOSS: NONE    Procedure Performed:  :Radiofrequency ablation of median branches at the Transverse processes of L2/l3/l4 for l2/3 nd l3/4 facet joints on Bilateral under fluoroscopic guidance with IV sedation    Procedure:    After starting an IV, the patient was taken to procedure room.  The patient was placed in prone position and skin over the back was prepped and draped in sterile manner.    Standard monitors were connected and vitals were monitored during the case and they remained stable during the procedure.    A meaningful communication was kept up with the patient throughout the procedure.    Then using fluoroscopy the junction of the transverse process of the target vertebra with the superior process of the facet joint was observed and the view was optimized.  The skin and deep tissues were infiltrated with 2 ml of  1 % lidocaine. The RF canula with the 10 mm active tip was introduced through the skin wheal under fluoroscopy guidance such that the tip of the needle lies in the groove of the transverse process with the superior processes of the facet joint.  Then a lateral and AP view of the lumbar spine was obtained to make sure the tip of needle is not in the neural foramen.  Then electric impedence was checked to make sure it is acceptable. Then a sensory stimulus was applied at 50 Hz up to 0.5 volt and concordant pain symptoms were reproduced. Then a motor stimulus was applied at 2 Hz up to 2 volts or 3 x times the sensory stimulus and no motor stimulation was seen in lower extremities.  Some multifidus stimulus was seen.  Then after negative aspiration 1 ml of 4% lidocaine was injected through the needle at each level.The radiofrequency lesion was done at 85 degrees centigrade for 110 seconds.

## 2024-04-21 NOTE — PROGRESS NOTES
[facility]  [address] [address] [address]  [facility] [address]  Phone: [phone]  Fax: [phone]    Ramakrishna Gamboa is a 58 y.o. male who presents today for his medical conditions/complaintsas noted below.  Chief Complaint   Patient presents with    Abdominal Pain     Pt is here for abd pain that start a week ago-  pt rates the pain a 10 when he takes a deep breath         HPI:     HPI  Abdominal pain started one week ago.    Located  RUQ and radiates up to ribs. Started as a burning sensation in small area that woke him up then when he turned in bed seemed to   spread outward. NO rash. No cough or SOB. Bowels are his normal.     Thinks he might have pulled a muscle when hanging curtains and then again at his [facility] from his chronic pain and how it limits him.    Current Outpatient Medications   Medication Sig Dispense Refill    naproxen (NAPROSYN) 500 MG tablet Take 1 tablet by mouth 2 times daily (with meals) 60 tablet 0    primidone (MYSOLINE) 50 MG tablet Take 1 tablet by mouth 3 times daily 270 tablet 0    furosemide (LASIX) 40 MG tablet TAKE 1 TABLET BY MOUTH EVERY DAY 90 tablet 0    metoprolol tartrate (LOPRESSOR) 25 MG tablet TAKE 1/2 TABLET BY MOUTH TWO TIMES A DAY 90 tablet 0    citalopram (CELEXA) 20 MG tablet TAKE 1 TABLET BY MOUTH EVERY DAY 90 tablet 0    ciclopirox (LOPROX) 0.77 % cream Apply to rash in groin twice daily      cholestyramine (QUESTRAN) 4 g packet Take 1 packet by mouth daily      melatonin 3 MG TABS tablet Take 10 mg by mouth daily      hydrocortisone (WESTCORT) 0.2 % cream Apply topically 2 times daily as needed (rash) 45 g 1    traZODone (DESYREL) 50 MG tablet Take 1 tablet by mouth nightly 90 tablet 3    loratadine (CLARITIN) 10 MG tablet Take 1 tablet by mouth daily      Acetaminophen (TYLENOL ARTHRITIS PAIN PO) Take 1 tablet by mouth every 6 hours as needed      vitamin D (CHOLECALCIFEROL)

## 2024-04-22 ENCOUNTER — OFFICE VISIT (OUTPATIENT)
Dept: PRIMARY CARE CLINIC | Age: 59
End: 2024-04-22
Payer: MEDICARE

## 2024-04-22 VITALS
OXYGEN SATURATION: 92 % | HEIGHT: 69 IN | BODY MASS INDEX: 39.01 KG/M2 | DIASTOLIC BLOOD PRESSURE: 60 MMHG | HEART RATE: 78 BPM | TEMPERATURE: 94.4 F | WEIGHT: 263.4 LBS | SYSTOLIC BLOOD PRESSURE: 112 MMHG

## 2024-04-22 DIAGNOSIS — S39.011A STRAIN OF ABDOMINAL MUSCLE, INITIAL ENCOUNTER: Primary | ICD-10-CM

## 2024-04-22 DIAGNOSIS — M47.817 LUMBOSACRAL SPONDYLOSIS WITHOUT MYELOPATHY: ICD-10-CM

## 2024-04-22 PROCEDURE — 3074F SYST BP LT 130 MM HG: CPT | Performed by: PHYSICIAN ASSISTANT

## 2024-04-22 PROCEDURE — 3078F DIAST BP <80 MM HG: CPT | Performed by: PHYSICIAN ASSISTANT

## 2024-04-22 PROCEDURE — 99213 OFFICE O/P EST LOW 20 MIN: CPT | Performed by: PHYSICIAN ASSISTANT

## 2024-04-22 RX ORDER — NAPROXEN 500 MG/1
500 TABLET ORAL 2 TIMES DAILY WITH MEALS
Qty: 60 TABLET | Refills: 0 | Status: SHIPPED | OUTPATIENT
Start: 2024-04-22

## 2024-04-22 SDOH — ECONOMIC STABILITY: FOOD INSECURITY: WITHIN THE PAST 12 MONTHS, THE FOOD YOU BOUGHT JUST DIDN'T LAST AND YOU DIDN'T HAVE MONEY TO GET MORE.: NEVER TRUE

## 2024-04-22 SDOH — ECONOMIC STABILITY: FOOD INSECURITY: WITHIN THE PAST 12 MONTHS, YOU WORRIED THAT YOUR FOOD WOULD RUN OUT BEFORE YOU GOT MONEY TO BUY MORE.: NEVER TRUE

## 2024-04-22 SDOH — ECONOMIC STABILITY: INCOME INSECURITY: HOW HARD IS IT FOR YOU TO PAY FOR THE VERY BASICS LIKE FOOD, HOUSING, MEDICAL CARE, AND HEATING?: NOT HARD AT ALL

## 2024-04-22 ASSESSMENT — ENCOUNTER SYMPTOMS
VOMITING: 0
COUGH: 0
BACK PAIN: 1
SHORTNESS OF BREATH: 0
ABDOMINAL DISTENTION: 0
DIARRHEA: 1
BLOOD IN STOOL: 0
ANAL BLEEDING: 0
WHEEZING: 0
CONSTIPATION: 0
NAUSEA: 0

## 2024-04-22 ASSESSMENT — PATIENT HEALTH QUESTIONNAIRE - PHQ9
SUM OF ALL RESPONSES TO PHQ QUESTIONS 1-9: 18
2. FEELING DOWN, DEPRESSED OR HOPELESS: NEARLY EVERY DAY
7. TROUBLE CONCENTRATING ON THINGS, SUCH AS READING THE NEWSPAPER OR WATCHING TELEVISION: NEARLY EVERY DAY
5. POOR APPETITE OR OVEREATING: NOT AT ALL
9. THOUGHTS THAT YOU WOULD BE BETTER OFF DEAD, OR OF HURTING YOURSELF: NOT AT ALL
SUM OF ALL RESPONSES TO PHQ QUESTIONS 1-9: 18
SUM OF ALL RESPONSES TO PHQ QUESTIONS 1-9: 18
1. LITTLE INTEREST OR PLEASURE IN DOING THINGS: NEARLY EVERY DAY
3. TROUBLE FALLING OR STAYING ASLEEP: NEARLY EVERY DAY
SUM OF ALL RESPONSES TO PHQ QUESTIONS 1-9: 18
4. FEELING TIRED OR HAVING LITTLE ENERGY: NEARLY EVERY DAY
6. FEELING BAD ABOUT YOURSELF - OR THAT YOU ARE A FAILURE OR HAVE LET YOURSELF OR YOUR FAMILY DOWN: NEARLY EVERY DAY
8. MOVING OR SPEAKING SO SLOWLY THAT OTHER PEOPLE COULD HAVE NOTICED. OR THE OPPOSITE, BEING SO FIGETY OR RESTLESS THAT YOU HAVE BEEN MOVING AROUND A LOT MORE THAN USUAL: NOT AT ALL
10. IF YOU CHECKED OFF ANY PROBLEMS, HOW DIFFICULT HAVE THESE PROBLEMS MADE IT FOR YOU TO DO YOUR WORK, TAKE CARE OF THINGS AT HOME, OR GET ALONG WITH OTHER PEOPLE: NOT DIFFICULT AT ALL
SUM OF ALL RESPONSES TO PHQ9 QUESTIONS 1 & 2: 6

## 2024-04-25 DIAGNOSIS — F43.21 ADJUSTMENT DISORDER WITH DEPRESSED MOOD: ICD-10-CM

## 2024-04-26 RX ORDER — CITALOPRAM 20 MG/1
20 TABLET ORAL DAILY
Qty: 90 TABLET | Refills: 0 | Status: SHIPPED | OUTPATIENT
Start: 2024-04-26

## 2024-04-29 ENCOUNTER — APPOINTMENT (OUTPATIENT)
Dept: CT IMAGING | Age: 59
DRG: 392 | End: 2024-04-29
Payer: MEDICARE

## 2024-04-29 ENCOUNTER — HOSPITAL ENCOUNTER (INPATIENT)
Age: 59
LOS: 2 days | Discharge: HOME | DRG: 392 | End: 2024-05-01
Attending: EMERGENCY MEDICINE | Admitting: INTERNAL MEDICINE
Payer: MEDICARE

## 2024-04-29 DIAGNOSIS — R10.84 GENERALIZED ABDOMINAL PAIN: Primary | ICD-10-CM

## 2024-04-29 PROBLEM — K59.81 OGILVIE SYNDROME: Status: ACTIVE | Noted: 2024-04-29

## 2024-04-29 LAB
ALBUMIN SERPL-MCNC: 4.1 G/DL (ref 3.5–5.2)
ALP SERPL-CCNC: 105 U/L (ref 40–129)
ALT SERPL-CCNC: 27 U/L (ref 5–41)
ANION GAP SERPL CALCULATED.3IONS-SCNC: 10 MMOL/L (ref 9–17)
AST SERPL-CCNC: 20 U/L
BASOPHILS # BLD: 0.06 K/UL (ref 0–0.2)
BASOPHILS NFR BLD: 1 % (ref 0–2)
BILIRUB SERPL-MCNC: 0.3 MG/DL (ref 0.3–1.2)
BUN SERPL-MCNC: 10 MG/DL (ref 6–20)
BUN/CREAT SERPL: 20 (ref 9–20)
CALCIUM SERPL-MCNC: 8.8 MG/DL (ref 8.6–10.4)
CHLORIDE SERPL-SCNC: 100 MMOL/L (ref 98–107)
CO2 SERPL-SCNC: 27 MMOL/L (ref 20–31)
CREAT SERPL-MCNC: 0.5 MG/DL (ref 0.7–1.2)
EOSINOPHIL # BLD: 0.19 K/UL (ref 0–0.44)
EOSINOPHILS RELATIVE PERCENT: 3 % (ref 1–4)
ERYTHROCYTE [DISTWIDTH] IN BLOOD BY AUTOMATED COUNT: 12.4 % (ref 11.8–14.4)
GFR SERPL CREATININE-BSD FRML MDRD: >90 ML/MIN/1.73M2
GLUCOSE SERPL-MCNC: 108 MG/DL (ref 70–99)
HCT VFR BLD AUTO: 41.6 % (ref 40.7–50.3)
HGB BLD-MCNC: 13.7 G/DL (ref 13–17)
IMM GRANULOCYTES # BLD AUTO: 0.02 K/UL (ref 0–0.3)
IMM GRANULOCYTES NFR BLD: 0 %
LIPASE SERPL-CCNC: 25 U/L (ref 13–60)
LYMPHOCYTES NFR BLD: 1.19 K/UL (ref 1.1–3.7)
LYMPHOCYTES RELATIVE PERCENT: 16 % (ref 24–43)
MCH RBC QN AUTO: 29.4 PG (ref 25.2–33.5)
MCHC RBC AUTO-ENTMCNC: 32.9 G/DL (ref 28.4–34.8)
MCV RBC AUTO: 89.3 FL (ref 82.6–102.9)
MONOCYTES NFR BLD: 0.6 K/UL (ref 0.1–1.2)
MONOCYTES NFR BLD: 8 % (ref 3–12)
NEUTROPHILS NFR BLD: 72 % (ref 36–65)
NEUTS SEG NFR BLD: 5.34 K/UL (ref 1.5–8.1)
NRBC BLD-RTO: 0 PER 100 WBC
PLATELET # BLD AUTO: 189 K/UL (ref 138–453)
PMV BLD AUTO: 8.9 FL (ref 8.1–13.5)
POTASSIUM SERPL-SCNC: 4 MMOL/L (ref 3.7–5.3)
PROT SERPL-MCNC: 7.7 G/DL (ref 6.4–8.3)
RBC # BLD AUTO: 4.66 M/UL (ref 4.21–5.77)
SODIUM SERPL-SCNC: 137 MMOL/L (ref 135–144)
WBC OTHER # BLD: 7.4 K/UL (ref 3.5–11.3)

## 2024-04-29 PROCEDURE — 1200000000 HC SEMI PRIVATE

## 2024-04-29 PROCEDURE — 85025 COMPLETE CBC W/AUTO DIFF WBC: CPT

## 2024-04-29 PROCEDURE — 87449 NOS EACH ORGANISM AG IA: CPT

## 2024-04-29 PROCEDURE — 80053 COMPREHEN METABOLIC PANEL: CPT

## 2024-04-29 PROCEDURE — 87324 CLOSTRIDIUM AG IA: CPT

## 2024-04-29 PROCEDURE — 99285 EMERGENCY DEPT VISIT HI MDM: CPT

## 2024-04-29 PROCEDURE — 74177 CT ABD & PELVIS W/CONTRAST: CPT

## 2024-04-29 PROCEDURE — 6370000000 HC RX 637 (ALT 250 FOR IP): Performed by: NURSE PRACTITIONER

## 2024-04-29 PROCEDURE — 2580000003 HC RX 258: Performed by: NURSE PRACTITIONER

## 2024-04-29 PROCEDURE — 83690 ASSAY OF LIPASE: CPT

## 2024-04-29 PROCEDURE — 99222 1ST HOSP IP/OBS MODERATE 55: CPT | Performed by: NURSE PRACTITIONER

## 2024-04-29 PROCEDURE — 2580000003 HC RX 258: Performed by: PHYSICIAN ASSISTANT

## 2024-04-29 PROCEDURE — 6360000004 HC RX CONTRAST MEDICATION: Performed by: PHYSICIAN ASSISTANT

## 2024-04-29 RX ORDER — TRAZODONE HYDROCHLORIDE 50 MG/1
50 TABLET ORAL NIGHTLY
Status: DISCONTINUED | OUTPATIENT
Start: 2024-04-30 | End: 2024-05-01 | Stop reason: HOSPADM

## 2024-04-29 RX ORDER — ACETAMINOPHEN 650 MG/1
650 SUPPOSITORY RECTAL EVERY 6 HOURS PRN
Status: DISCONTINUED | OUTPATIENT
Start: 2024-04-29 | End: 2024-05-01 | Stop reason: HOSPADM

## 2024-04-29 RX ORDER — SODIUM CHLORIDE 9 MG/ML
INJECTION, SOLUTION INTRAVENOUS PRN
Status: DISCONTINUED | OUTPATIENT
Start: 2024-04-29 | End: 2024-05-01 | Stop reason: HOSPADM

## 2024-04-29 RX ORDER — ASPIRIN 81 MG/1
81 TABLET ORAL DAILY
Status: DISCONTINUED | OUTPATIENT
Start: 2024-04-30 | End: 2024-05-01 | Stop reason: HOSPADM

## 2024-04-29 RX ORDER — ENOXAPARIN SODIUM 100 MG/ML
30 INJECTION SUBCUTANEOUS 2 TIMES DAILY
Status: DISCONTINUED | OUTPATIENT
Start: 2024-04-29 | End: 2024-05-01 | Stop reason: HOSPADM

## 2024-04-29 RX ORDER — CETIRIZINE HYDROCHLORIDE 10 MG/1
10 TABLET ORAL DAILY
Status: DISCONTINUED | OUTPATIENT
Start: 2024-04-29 | End: 2024-05-01 | Stop reason: HOSPADM

## 2024-04-29 RX ORDER — SODIUM CHLORIDE 0.9 % (FLUSH) 0.9 %
5-40 SYRINGE (ML) INJECTION EVERY 12 HOURS SCHEDULED
Status: DISCONTINUED | OUTPATIENT
Start: 2024-04-29 | End: 2024-05-01 | Stop reason: HOSPADM

## 2024-04-29 RX ORDER — POLYETHYLENE GLYCOL 3350 17 G/17G
17 POWDER, FOR SOLUTION ORAL DAILY PRN
Status: DISCONTINUED | OUTPATIENT
Start: 2024-04-29 | End: 2024-05-01 | Stop reason: HOSPADM

## 2024-04-29 RX ORDER — CITALOPRAM 20 MG/1
20 TABLET ORAL DAILY
Status: DISCONTINUED | OUTPATIENT
Start: 2024-04-29 | End: 2024-05-01 | Stop reason: HOSPADM

## 2024-04-29 RX ORDER — PANTOPRAZOLE SODIUM 40 MG/1
40 TABLET, DELAYED RELEASE ORAL
Status: DISCONTINUED | OUTPATIENT
Start: 2024-04-30 | End: 2024-05-01 | Stop reason: HOSPADM

## 2024-04-29 RX ORDER — POTASSIUM CHLORIDE 7.45 MG/ML
10 INJECTION INTRAVENOUS PRN
Status: DISCONTINUED | OUTPATIENT
Start: 2024-04-29 | End: 2024-05-01 | Stop reason: HOSPADM

## 2024-04-29 RX ORDER — SODIUM CHLORIDE 0.9 % (FLUSH) 0.9 %
10 SYRINGE (ML) INJECTION PRN
Status: DISCONTINUED | OUTPATIENT
Start: 2024-04-29 | End: 2024-05-01 | Stop reason: HOSPADM

## 2024-04-29 RX ORDER — ACETAMINOPHEN 325 MG/1
650 TABLET ORAL EVERY 6 HOURS PRN
Status: DISCONTINUED | OUTPATIENT
Start: 2024-04-29 | End: 2024-05-01 | Stop reason: HOSPADM

## 2024-04-29 RX ORDER — PRIMIDONE 50 MG/1
50 TABLET ORAL 3 TIMES DAILY
Status: DISCONTINUED | OUTPATIENT
Start: 2024-04-29 | End: 2024-05-01 | Stop reason: HOSPADM

## 2024-04-29 RX ORDER — POTASSIUM CHLORIDE 20 MEQ/1
40 TABLET, EXTENDED RELEASE ORAL PRN
Status: DISCONTINUED | OUTPATIENT
Start: 2024-04-29 | End: 2024-05-01 | Stop reason: HOSPADM

## 2024-04-29 RX ORDER — 0.9 % SODIUM CHLORIDE 0.9 %
100 INTRAVENOUS SOLUTION INTRAVENOUS ONCE
Status: COMPLETED | OUTPATIENT
Start: 2024-04-29 | End: 2024-04-29

## 2024-04-29 RX ORDER — 0.9 % SODIUM CHLORIDE 0.9 %
1000 INTRAVENOUS SOLUTION INTRAVENOUS ONCE
Status: COMPLETED | OUTPATIENT
Start: 2024-04-29 | End: 2024-04-29

## 2024-04-29 RX ORDER — FUROSEMIDE 40 MG/1
40 TABLET ORAL DAILY
Status: DISCONTINUED | OUTPATIENT
Start: 2024-04-29 | End: 2024-05-01 | Stop reason: HOSPADM

## 2024-04-29 RX ORDER — MAGNESIUM SULFATE 1 G/100ML
1000 INJECTION INTRAVENOUS PRN
Status: DISCONTINUED | OUTPATIENT
Start: 2024-04-29 | End: 2024-05-01 | Stop reason: HOSPADM

## 2024-04-29 RX ORDER — SODIUM CHLORIDE 9 MG/ML
INJECTION, SOLUTION INTRAVENOUS CONTINUOUS
Status: DISCONTINUED | OUTPATIENT
Start: 2024-04-29 | End: 2024-05-01 | Stop reason: HOSPADM

## 2024-04-29 RX ORDER — ONDANSETRON 4 MG/1
4 TABLET, ORALLY DISINTEGRATING ORAL EVERY 8 HOURS PRN
Status: DISCONTINUED | OUTPATIENT
Start: 2024-04-29 | End: 2024-05-01 | Stop reason: HOSPADM

## 2024-04-29 RX ORDER — ONDANSETRON 2 MG/ML
4 INJECTION INTRAMUSCULAR; INTRAVENOUS EVERY 6 HOURS PRN
Status: DISCONTINUED | OUTPATIENT
Start: 2024-04-29 | End: 2024-05-01 | Stop reason: HOSPADM

## 2024-04-29 RX ADMIN — SODIUM CHLORIDE 1000 ML: 9 INJECTION, SOLUTION INTRAVENOUS at 12:26

## 2024-04-29 RX ADMIN — METOPROLOL TARTRATE 25 MG: 25 TABLET, FILM COATED ORAL at 20:12

## 2024-04-29 RX ADMIN — SODIUM CHLORIDE, PRESERVATIVE FREE 10 ML: 5 INJECTION INTRAVENOUS at 13:50

## 2024-04-29 RX ADMIN — POLYETHYLENE GLYCOL-3350 AND ELECTROLYTES 4000 ML: 236; 6.74; 5.86; 2.97; 22.74 POWDER, FOR SOLUTION ORAL at 18:00

## 2024-04-29 RX ADMIN — IOPAMIDOL 75 ML: 755 INJECTION, SOLUTION INTRAVENOUS at 13:49

## 2024-04-29 RX ADMIN — SODIUM CHLORIDE: 9 INJECTION, SOLUTION INTRAVENOUS at 17:59

## 2024-04-29 RX ADMIN — SODIUM CHLORIDE 100 ML: 9 INJECTION, SOLUTION INTRAVENOUS at 13:50

## 2024-04-29 RX ADMIN — PRIMIDONE 50 MG: 50 TABLET ORAL at 21:24

## 2024-04-29 ASSESSMENT — ENCOUNTER SYMPTOMS
VOMITING: 0
SINUS PRESSURE: 0
CONSTIPATION: 0
NAUSEA: 0
COUGH: 0
WHEEZING: 0
SINUS PAIN: 0
DIARRHEA: 0
ABDOMINAL DISTENTION: 1
PHOTOPHOBIA: 0
ABDOMINAL PAIN: 1
SHORTNESS OF BREATH: 0

## 2024-04-29 ASSESSMENT — PAIN - FUNCTIONAL ASSESSMENT
PAIN_FUNCTIONAL_ASSESSMENT: 0-10
PAIN_FUNCTIONAL_ASSESSMENT: 0-10

## 2024-04-29 ASSESSMENT — PAIN SCALES - GENERAL
PAINLEVEL_OUTOF10: 4
PAINLEVEL_OUTOF10: 5
PAINLEVEL_OUTOF10: 0
PAINLEVEL_OUTOF10: 0

## 2024-04-29 ASSESSMENT — PAIN DESCRIPTION - LOCATION
LOCATION: ABDOMEN
LOCATION: ABDOMEN

## 2024-04-29 ASSESSMENT — PAIN DESCRIPTION - DESCRIPTORS
DESCRIPTORS: PRESSURE;TIGHTNESS
DESCRIPTORS: OTHER (COMMENT)

## 2024-04-29 ASSESSMENT — PAIN DESCRIPTION - ORIENTATION: ORIENTATION: LEFT

## 2024-04-29 ASSESSMENT — PAIN DESCRIPTION - FREQUENCY: FREQUENCY: CONTINUOUS

## 2024-04-29 NOTE — PLAN OF CARE
Pt admitted to floor. AOx4. Independent. IVF started. Bowel prep to be started.  Problem: Discharge Planning  Goal: Discharge to home or other facility with appropriate resources  Outcome: Progressing  Flowsheets (Taken 4/29/2024 1756 by Mary Chao, RN)  Discharge to home or other facility with appropriate resources:   Identify barriers to discharge with patient and caregiver   Identify discharge learning needs (meds, wound care, etc)   Refer to discharge planning if patient needs post-hospital services based on physician order or complex needs related to functional status, cognitive ability or social support system     Problem: Pain  Goal: Verbalizes/displays adequate comfort level or baseline comfort level  Outcome: Progressing     Problem: Gastrointestinal - Adult  Goal: Minimal or absence of nausea and vomiting  Outcome: Progressing  Goal: Maintains or returns to baseline bowel function  Outcome: Progressing  Goal: Maintains adequate nutritional intake  Outcome: Progressing     Problem: Metabolic/Fluid and Electrolytes - Adult  Goal: Electrolytes maintained within normal limits  Outcome: Progressing

## 2024-04-29 NOTE — ED PROVIDER NOTES
EMERGENCY DEPARTMENT ENCOUNTER   ATTENDING ATTESTATION     Pt Name: Ramakrishna Gamboa  MRN: [id number]  Birthdate 1965  Date of evaluation: 4/29/24   Ramakrishna Gamboa is a 58 y.o. male with CC: Abdominal Pain (Hx of verónica syndrome ) and Bloated    MDM:   I performed a substantive part of the MDM during the patient's E/M visit. I personally evaluated and examined the patient. I personally made or approved the documented management plan and acknowledge its risk of complications.    Independent Interpretation: My (EKG/X-Ray/US/CT) interpretation     Discussion: Management/test interpretation discussed with        CRITICAL CARE:       EKG: All EKG's are interpreted by the Emergency Department Physician who either signs or Co-signs this chart in the absence of a cardiologist.      RADIOLOGY:All plain film, CT, MRI, and formal ultrasound images (except ED bedside ultrasound) are read by the radiologist, see reports below, unless otherwise noted in MDM or here.  CT ABDOMEN PELVIS W IV CONTRAST Additional Contrast? None    (Results Pending)     LABS: All lab results were reviewed by myself, and all abnormals are listed below.  Labs Reviewed   CBC WITH AUTO DIFFERENTIAL - Abnormal; Notable for the following components:       Result Value    Neutrophils % 72 (*)     Lymphocytes % 16 (*)     All other components within normal limits   COMPREHENSIVE METABOLIC PANEL - Abnormal; Notable for the following components:    Glucose 108 (*)     Creatinine 0.5 (*)     All other components within normal limits   LIPASE     CONSULTS:  IP CONSULT TO GI  FINAL IMPRESSION    No diagnosis found.        PASTMEDICAL HISTORY     Past Medical History:   Diagnosis Date    ADHD (attention deficit hyperactivity disorder)     Angular cheilitis     Anticoagulant long-term use     Anxiety     Arthritis     BACK AND FINGERS     Bilateral lower extremity edema 03/2021    started on Lasix per PCP    Chronic back pain     dr shahrzad MURILLO of [facility]- 
be to admit to medicine for potential endoscopy for decompression.  Patient is agreeable this plan of care.  Case discussed with Jamie nurse practitioner [facility] admission was excepted.    Evaluation and treatment course in the ED, and plan of care upon discharge was discussed in length with the patient. Patient had no further questions prior to being discharged and was instructed to return to the ED for new or worsening symptoms.        The patient was involved in his/her plan of care. The testing that was ordered was discussed with the patient. Any medications that may have been ordered were discussed with the patient.       I have reviewed the patient's previous medical records using the electronic health record that we have available.      Labs and imaging were reviewed.     CONSULTS:  IP CONSULT TO GI  IP CONSULT TO HOSPITALIST  IP CONSULT TO GI  IP CONSULT TO GENERAL SURGERY    PROCEDURES:  Procedures        FINAL IMPRESSION      1. Generalized abdominal pain          DISPOSITION/PLAN   DISPOSITION Admitted 04/29/2024 04:55:19 PM      PATIENTREFERRED TO:   No follow-up provider specified.    DISCHARGE MEDICATIONS:     Current Discharge Medication List              (Please note that portions of this note were completed with a voice recognition program.  Efforts were made to edit thedictations but occasionally words are mis-transcribed.)    ADAM Styles Matthew Christopher, PA-C  04/29/24 2041

## 2024-04-29 NOTE — PROGRESS NOTES
Pt admitted to room. Oriented to room, call light and bed mechanics. Side rails up x2. Call light within reach. Orders reviewed.

## 2024-04-29 NOTE — H&P
[facility]  Office: [phone]  Josh Lewis DO, Edwin Villalba DO, Brando Roman DO, Asa Ramirez DO, Olinda Uriostegui MD, Qiana Mejias MD, Cong Phan MD, Ruthie Solitario MD,  Rigo Capone MD, Bethany Calvillo MD, Yanira Flood MD,  Katerina Kruger DO, Abhishek Rojo MD, Tico Anderson MD, Ramakrishna Lewis DO, Joann Gonzales MD,  Ruperto Chamberlain DO, Erica Garrido MD, Mady Barrera MD, Prema Bowman MD, Betty Herrera MD,  Torin Kelley MD, Mary Kay Rivas MD, Hugo Kamara MD, Bradley Ferrer MD, Ed Osorio MD, Freedom Gutierrez MD, Torsten Tony DO, Juan Buchanan DO, Aleksandr Chowdary MD,  Atilio Thurston MD, Shirley Waterhouse, CNP,  Frida Campa CNP, Duane Cardenas, CNP,  Katya Stringer, DNP, Lakeshia Mitchell, CNP, Kiara Patel, CNP, Maria Del Carmen Reynolds, CNP, Kassandra Patricio, CNP, Micheline Minor, PA-C, Madison Torres PA-C, Vidya Friedman, CNP, Aida [place], CNP, Chandni Arizmendi, CNP, Mayda Lira, CNP, Hermila Martinez, CNP, Sinai Clayton, CNS, Maricruz Trujillo, CNP, Latia Gutierrez CNP, Tracy Schwab, CNP         [facility]   IN-PATIENT SERVICE   [facility]    HISTORY AND PHYSICAL EXAMINATION            Date:   4/29/2024  Patient name:  Ramakrishna Gamboa  Date of admission:  4/29/2024 11:02 AM  MRN:   [id number]  Account:  [id number]  YOB: 1965  PCP:    Temo Lopez MD  Room:   [place] [address]  Code Status:    Prior    Chief Complaint:     Chief Complaint   Patient presents with    Abdominal Pain     Hx of verónica syndrome     Bloated       History Obtained From:     patient    History of Present Illness:     Ramakrishna Gamboa is a 58 y.o. Non- / non  male who presents with Abdominal Pain (Hx of verónica syndrome ) and Bloated   and is admitted to the hospital for the management of Verónica syndrome.      Patient reports to the emergency department with abdominal distention and pain.  Patient denies any nausea.  He has had 3 prior

## 2024-04-30 ENCOUNTER — ANESTHESIA EVENT (OUTPATIENT)
Dept: OPERATING ROOM | Age: 59
DRG: 392 | End: 2024-04-30
Payer: MEDICARE

## 2024-04-30 ENCOUNTER — ANESTHESIA (OUTPATIENT)
Dept: OPERATING ROOM | Age: 59
DRG: 392 | End: 2024-04-30
Payer: MEDICARE

## 2024-04-30 LAB
ANION GAP SERPL CALCULATED.3IONS-SCNC: 11 MMOL/L (ref 9–17)
BASOPHILS # BLD: 0.07 K/UL (ref 0–0.2)
BASOPHILS NFR BLD: 1 % (ref 0–2)
BUN SERPL-MCNC: 7 MG/DL (ref 6–20)
BUN/CREAT SERPL: 14 (ref 9–20)
C DIFF GDH + TOXINS A+B STL QL IA.RAPID: NEGATIVE
CALCIUM SERPL-MCNC: 8.7 MG/DL (ref 8.6–10.4)
CHLORIDE SERPL-SCNC: 103 MMOL/L (ref 98–107)
CO2 SERPL-SCNC: 26 MMOL/L (ref 20–31)
CREAT SERPL-MCNC: 0.5 MG/DL (ref 0.7–1.2)
EOSINOPHIL # BLD: 0.2 K/UL (ref 0–0.44)
EOSINOPHILS RELATIVE PERCENT: 3 % (ref 1–4)
ERYTHROCYTE [DISTWIDTH] IN BLOOD BY AUTOMATED COUNT: 12.5 % (ref 11.8–14.4)
GFR SERPL CREATININE-BSD FRML MDRD: >90 ML/MIN/1.73M2
GLUCOSE SERPL-MCNC: 99 MG/DL (ref 70–99)
HCT VFR BLD AUTO: 41 % (ref 40.7–50.3)
HGB BLD-MCNC: 13.2 G/DL (ref 13–17)
IMM GRANULOCYTES # BLD AUTO: 0.02 K/UL (ref 0–0.3)
IMM GRANULOCYTES NFR BLD: 0 %
LYMPHOCYTES NFR BLD: 1.47 K/UL (ref 1.1–3.7)
LYMPHOCYTES RELATIVE PERCENT: 19 % (ref 24–43)
MAGNESIUM SERPL-MCNC: 2 MG/DL (ref 1.6–2.6)
MCH RBC QN AUTO: 29.2 PG (ref 25.2–33.5)
MCHC RBC AUTO-ENTMCNC: 32.2 G/DL (ref 28.4–34.8)
MCV RBC AUTO: 90.7 FL (ref 82.6–102.9)
MONOCYTES NFR BLD: 0.6 K/UL (ref 0.1–1.2)
MONOCYTES NFR BLD: 8 % (ref 3–12)
NEUTROPHILS NFR BLD: 69 % (ref 36–65)
NEUTS SEG NFR BLD: 5.3 K/UL (ref 1.5–8.1)
NRBC BLD-RTO: 0 PER 100 WBC
PHOSPHATE SERPL-MCNC: 2.3 MG/DL (ref 2.5–4.5)
PLATELET # BLD AUTO: 211 K/UL (ref 138–453)
PMV BLD AUTO: 8.9 FL (ref 8.1–13.5)
POTASSIUM SERPL-SCNC: 3.2 MMOL/L (ref 3.7–5.3)
RBC # BLD AUTO: 4.52 M/UL (ref 4.21–5.77)
SODIUM SERPL-SCNC: 140 MMOL/L (ref 135–144)
SPECIMEN DESCRIPTION: NORMAL
WBC OTHER # BLD: 7.7 K/UL (ref 3.5–11.3)

## 2024-04-30 PROCEDURE — 6360000002 HC RX W HCPCS: Performed by: NURSE ANESTHETIST, CERTIFIED REGISTERED

## 2024-04-30 PROCEDURE — 2500000003 HC RX 250 WO HCPCS: Performed by: NURSE PRACTITIONER

## 2024-04-30 PROCEDURE — 99232 SBSQ HOSP IP/OBS MODERATE 35: CPT | Performed by: NURSE PRACTITIONER

## 2024-04-30 PROCEDURE — 2709999900 HC NON-CHARGEABLE SUPPLY: Performed by: INTERNAL MEDICINE

## 2024-04-30 PROCEDURE — 80048 BASIC METABOLIC PNL TOTAL CA: CPT

## 2024-04-30 PROCEDURE — 84100 ASSAY OF PHOSPHORUS: CPT

## 2024-04-30 PROCEDURE — 7100000000 HC PACU RECOVERY - FIRST 15 MIN: Performed by: INTERNAL MEDICINE

## 2024-04-30 PROCEDURE — 0DJD8ZZ INSPECTION OF LOWER INTESTINAL TRACT, VIA NATURAL OR ARTIFICIAL OPENING ENDOSCOPIC: ICD-10-PCS | Performed by: INTERNAL MEDICINE

## 2024-04-30 PROCEDURE — 2500000003 HC RX 250 WO HCPCS: Performed by: NURSE ANESTHETIST, CERTIFIED REGISTERED

## 2024-04-30 PROCEDURE — 3700000001 HC ADD 15 MINUTES (ANESTHESIA): Performed by: INTERNAL MEDICINE

## 2024-04-30 PROCEDURE — 6370000000 HC RX 637 (ALT 250 FOR IP): Performed by: INTERNAL MEDICINE

## 2024-04-30 PROCEDURE — 2580000003 HC RX 258: Performed by: INTERNAL MEDICINE

## 2024-04-30 PROCEDURE — 85025 COMPLETE CBC W/AUTO DIFF WBC: CPT

## 2024-04-30 PROCEDURE — 2580000003 HC RX 258: Performed by: NURSE PRACTITIONER

## 2024-04-30 PROCEDURE — 3700000000 HC ANESTHESIA ATTENDED CARE: Performed by: INTERNAL MEDICINE

## 2024-04-30 PROCEDURE — 1200000000 HC SEMI PRIVATE

## 2024-04-30 PROCEDURE — 7100000001 HC PACU RECOVERY - ADDTL 15 MIN: Performed by: INTERNAL MEDICINE

## 2024-04-30 PROCEDURE — 83735 ASSAY OF MAGNESIUM: CPT

## 2024-04-30 PROCEDURE — 2580000003 HC RX 258: Performed by: NURSE ANESTHETIST, CERTIFIED REGISTERED

## 2024-04-30 PROCEDURE — 36415 COLL VENOUS BLD VENIPUNCTURE: CPT

## 2024-04-30 PROCEDURE — 6370000000 HC RX 637 (ALT 250 FOR IP): Performed by: NURSE PRACTITIONER

## 2024-04-30 PROCEDURE — 3609027000 HC COLONOSCOPY: Performed by: INTERNAL MEDICINE

## 2024-04-30 PROCEDURE — 6360000002 HC RX W HCPCS: Performed by: INTERNAL MEDICINE

## 2024-04-30 RX ORDER — PROPOFOL 10 MG/ML
INJECTION, EMULSION INTRAVENOUS PRN
Status: DISCONTINUED | OUTPATIENT
Start: 2024-04-30 | End: 2024-04-30 | Stop reason: SDUPTHER

## 2024-04-30 RX ORDER — SODIUM CHLORIDE 9 MG/ML
INJECTION, SOLUTION INTRAVENOUS CONTINUOUS PRN
Status: DISCONTINUED | OUTPATIENT
Start: 2024-04-30 | End: 2024-04-30 | Stop reason: SDUPTHER

## 2024-04-30 RX ORDER — LIDOCAINE HYDROCHLORIDE 20 MG/ML
INJECTION, SOLUTION EPIDURAL; INFILTRATION; INTRACAUDAL; PERINEURAL PRN
Status: DISCONTINUED | OUTPATIENT
Start: 2024-04-30 | End: 2024-04-30 | Stop reason: SDUPTHER

## 2024-04-30 RX ADMIN — PROPOFOL 30 MG: 10 INJECTION, EMULSION INTRAVENOUS at 15:34

## 2024-04-30 RX ADMIN — TRAZODONE HYDROCHLORIDE 50 MG: 50 TABLET ORAL at 20:31

## 2024-04-30 RX ADMIN — PROPOFOL 40 MG: 10 INJECTION, EMULSION INTRAVENOUS at 15:23

## 2024-04-30 RX ADMIN — PRIMIDONE 50 MG: 50 TABLET ORAL at 08:31

## 2024-04-30 RX ADMIN — PROPOFOL 70 MG: 10 INJECTION, EMULSION INTRAVENOUS at 15:22

## 2024-04-30 RX ADMIN — PROPOFOL 40 MG: 10 INJECTION, EMULSION INTRAVENOUS at 15:25

## 2024-04-30 RX ADMIN — SODIUM CHLORIDE: 9 INJECTION, SOLUTION INTRAVENOUS at 05:11

## 2024-04-30 RX ADMIN — FUROSEMIDE 40 MG: 40 TABLET ORAL at 08:31

## 2024-04-30 RX ADMIN — METOPROLOL TARTRATE 25 MG: 25 TABLET, FILM COATED ORAL at 20:31

## 2024-04-30 RX ADMIN — METOPROLOL TARTRATE 25 MG: 25 TABLET, FILM COATED ORAL at 08:31

## 2024-04-30 RX ADMIN — PROPOFOL 20 MG: 10 INJECTION, EMULSION INTRAVENOUS at 15:27

## 2024-04-30 RX ADMIN — SODIUM CHLORIDE: 9 INJECTION, SOLUTION INTRAVENOUS at 22:04

## 2024-04-30 RX ADMIN — PROPOFOL 20 MG: 10 INJECTION, EMULSION INTRAVENOUS at 15:31

## 2024-04-30 RX ADMIN — PROPOFOL 30 MG: 10 INJECTION, EMULSION INTRAVENOUS at 15:26

## 2024-04-30 RX ADMIN — CETIRIZINE HYDROCHLORIDE 10 MG: 10 TABLET, FILM COATED ORAL at 08:31

## 2024-04-30 RX ADMIN — SODIUM CHLORIDE: 9 INJECTION, SOLUTION INTRAVENOUS at 15:18

## 2024-04-30 RX ADMIN — CITALOPRAM HYDROBROMIDE 20 MG: 20 TABLET ORAL at 08:31

## 2024-04-30 RX ADMIN — POTASSIUM CHLORIDE 40 MEQ: 1500 TABLET, EXTENDED RELEASE ORAL at 08:31

## 2024-04-30 RX ADMIN — SODIUM PHOSPHATE, MONOBASIC, MONOHYDRATE AND SODIUM PHOSPHATE, DIBASIC, ANHYDROUS 20 MMOL: 142; 276 INJECTION, SOLUTION INTRAVENOUS at 11:05

## 2024-04-30 RX ADMIN — LIDOCAINE HYDROCHLORIDE 60 MG: 20 INJECTION, SOLUTION EPIDURAL; INFILTRATION; INTRACAUDAL; PERINEURAL at 15:22

## 2024-04-30 RX ADMIN — PANTOPRAZOLE SODIUM 40 MG: 40 TABLET, DELAYED RELEASE ORAL at 05:08

## 2024-04-30 RX ADMIN — PRIMIDONE 50 MG: 50 TABLET ORAL at 20:31

## 2024-04-30 RX ADMIN — PROPOFOL 30 MG: 10 INJECTION, EMULSION INTRAVENOUS at 15:30

## 2024-04-30 RX ADMIN — ENOXAPARIN SODIUM 30 MG: 100 INJECTION SUBCUTANEOUS at 20:31

## 2024-04-30 ASSESSMENT — ENCOUNTER SYMPTOMS: SHORTNESS OF BREATH: 0

## 2024-04-30 NOTE — PLAN OF CARE
Problem: Discharge Planning  Goal: Discharge to home or other facility with appropriate resources  4/30/2024 0437 by Janelle Preciado, RN  Outcome: Progressing  Flowsheets (Taken 4/29/2024 2009)  Discharge to home or other facility with appropriate resources:   Identify barriers to discharge with patient and caregiver   Arrange for needed discharge resources and transportation as appropriate   Identify discharge learning needs (meds, wound care, etc)   Refer to discharge planning if patient needs post-hospital services based on physician order or complex needs related to functional status, cognitive ability or social support system    Problem: Pain  Goal: Verbalizes/displays adequate comfort level or baseline comfort level  4/30/2024 0437 by Janelle Preciado RN  Outcome: Progressing  Flowsheets (Taken 4/30/2024 0437)  Verbalizes/displays adequate comfort level or baseline comfort level:   Encourage patient to monitor pain and request assistance   Assess pain using appropriate pain scale   Administer analgesics based on type and severity of pain and evaluate response    Problem: Gastrointestinal - Adult  Goal: Minimal or absence of nausea and vomiting  4/30/2024 0437 by Janelle Preciado, RN  Outcome: Progressing  Flowsheets (Taken 4/29/2024 2009)  Minimal or absence of nausea and vomiting:   Administer IV fluids as ordered to ensure adequate hydration   Advance diet as tolerated, if ordered   Provide nonpharmacologic comfort measures as appropriate

## 2024-04-30 NOTE — PROGRESS NOTES
Occupational Therapy  [facility]  Occupational Therapy Not Seen Note    Patient not available for Occupational Therapy due to:    [] Testing:    [] Hemodialysis    [] Cancelled by RN:    [] Refusal by Patient:    [] Surgery:     [] Intubation:     [] Pain Medication:    [] Sedation:     [] Spine Precautions :    [] Medical Instability:    [x] Other: OT being discontinued at this time. Patient independent. No further needs.  Writer spoke w/ RN this date who reports pt has been indep w/ mobility since admission.  OT will defer evaluation at this time.  Please re-order skilled OT if functional mobility status changes.         Aspen TOLEDO, OT

## 2024-04-30 NOTE — ANESTHESIA PRE PROCEDURE
Department of Anesthesiology  Preprocedure Note       Name:  Ramakrishna Gamboa   Age:  58 y.o.  :  1965                                          MRN:  [id number]         Date:  2024      Surgeon: Surgeon(s):  Ramakrishna De León MD    Procedure: Procedure(s):  COLONOSCOPY DIAGNOSTIC    Medications prior to admission:   Prior to Admission medications    Medication Sig Start Date End Date Taking? Authorizing Provider   citalopram (CELEXA) 20 MG tablet TAKE 1 TABLET BY MOUTH EVERY DAY 24   Temo Lopez MD   naproxen (NAPROSYN) 500 MG tablet Take 1 tablet by mouth 2 times daily (with meals) 24   Elis Padilla PA-C   primidone (MYSOLINE) 50 MG tablet Take 1 tablet by mouth 3 times daily 3/22/24   Temo Lopez MD   furosemide (LASIX) 40 MG tablet TAKE 1 TABLET BY MOUTH EVERY DAY 24   Temo Lopez MD   metoprolol tartrate (LOPRESSOR) 25 MG tablet TAKE 1/2 TABLET BY MOUTH TWO TIMES A DAY 24   Temo Lopez MD   ciclopirox (LOPROX) 0.77 % cream Apply to rash in groin twice daily  Patient not taking: Reported on 2024   Adali Thakkar MD   cholestyramine (QUESTRAN) 4 g packet Take 1 packet by mouth daily 23   Adali Thakkar MD   melatonin 3 MG TABS tablet Take 10 mg by mouth daily    Adali Thakkar MD   hydrocortisone (WESTCORT) 0.2 % cream Apply topically 2 times daily as needed (rash)  Patient not taking: Reported on 2024   Temo Lopez MD   traZODone (DESYREL) 50 MG tablet Take 1 tablet by mouth nightly 23   Temo Lopez MD   loratadine (CLARITIN) 10 MG tablet Take 1 tablet by mouth daily    Adali Thakkar MD   Acetaminophen (TYLENOL ARTHRITIS PAIN PO) Take 1 tablet by mouth every 6 hours as needed    Adali Thakkar MD   vitamin D (CHOLECALCIFEROL) 25 MCG (1000 UT) TABS tablet Take 1 tablet by mouth nightly    Adali Thakkar MD   Multiple Vitamins-Minerals

## 2024-04-30 NOTE — CARE COORDINATION
Case Management Assessment  Initial Evaluation    Date/Time of Evaluation: 4/30/2024 2:56 PM  Assessment Completed by: PATY COOPER RN    If patient is discharged prior to next notation, then this note serves as note for discharge by case management.    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 Yes    Chart Reviewed: Yes      History Provided by: Patient  Patient Orientation: Alert and Oriented, Person, Place, Situation, Self    Patient Cognition: Alert    Hospitalization in the last 30 days (Readmission):  No    If yes, Readmission Assessment in CM Navigator will be completed.    Advance Directives:      Code Status: Full Code   Patient's Primary Decision Maker is: Legal Next of Kin    Primary Decision Maker: Vicky Gamboa - Spouse - [phone]    Discharge Planning:    Patient lives with: Spouse/Significant Other Type of Home: Trailer/Mobile Home  Primary Care Giver: Self  Patient Support Systems include: Spouse/Significant Other   Current Financial resources: None  Current community resources: None  Current services prior to admission: None            Current DME:              Type of Home Care services:  None    ADLS  Prior functional level: Independent in ADLs/IADLs  Current functional level: Independent in ADLs/IADLs    PT AM-PAC:   /24  OT AM-PAC:   /24    Family can provide assistance at DC: Yes  Would you like Case Management to discuss the discharge plan with any other family members/significant others, and if so, who? Yes (spouse)  Plans to Return to Present Housing: Yes  Other Identified Issues/Barriers to RETURNING to current housing: medical condition  Potential Assistance needed at

## 2024-04-30 NOTE — CONSULTS
GASTROENTEROLOGY CONSULT       REASON FOR CONSULT:  abd distention, pain, hx Cambridge's    REQUESTING PHYSICIAN:  Cong Phan MD    HISTORY OF PRESENT ILLNESS:    The patient is a 58 y.o. male who presents for evaluation of ongoing abdominal pain, distention, hx of Cambridge's dating back to when I first met him at [facility] 3 years ago. He is known to us from the office, here and [facility].   I gave him bowel prep overnight.   And my plan was for him to have a decompression colonoscopy today.   Labs and chart reviewed in detail.   Wife at bedside.     MEDICAL HISTORY:   Past Medical History:   Diagnosis Date    ADHD (attention deficit hyperactivity disorder)     Angular cheilitis     Anticoagulant long-term use     Anxiety     Arthritis     BACK AND FINGERS     Bilateral lower extremity edema 03/2021    started on Lasix per PCP    Chronic back pain     dr markham [facility], [facility]     COVID-19 12/2022    cold symptoms    Depression     Dizziness     Eczema     Ex-smoker     quit in 2006, smoked for 23 years    Floaters in visual field, bilateral     GERD (gastroesophageal reflux disease)     H/O chest pain     H/O dermatitis     History of blood transfusion     History of panic attacks     [place] (hard of hearing)     LEFT EAR WORSE THAN RIGHT. getting hearing aides eloisa, Dr. Villalpando    Hypertension     DR MALCOLM PCP    Impaired mobility     occas uses cane    Kyphosis     [facility]  OR 7/12/2021 with Dr. Jed Jeff cant lie flat,severe back pain    Left eye injury     BUNGY CORD STRAP BUSTED AND INJURED LEFT EYE    Muscle spasm     LOWER RIGHT BACK    Nocturia     Obesity     Cambridge's syndrome     \"bowels not working\"    Osteoarthritis     Prolonged emergence from general anesthesia     PATIENT STATES HIS B/P WOULD DROP WITH INTUBATION, GO UP AFTER ET TUBE REMOVED.    PVC's (premature ventricular contractions)     PVC'S. NO CARDIOLOGIST, PCP DR AFRICA MALCOLM    Rash     occas to see

## 2024-04-30 NOTE — OP NOTE
[facility] End[facility]  COLONOSCOPY    Patient: Ramakrishna Gamboa   Date:  2024   : 1965       Med Rec#: [id number]     Procedure:  Colonoscopy     Indication: [place] syndrome; colonoscopy performed for decompression    Findings   Internal hemorrhoid  Minimal sigmoid diverticulosis  Ectatic colon throughout  Suboptimal bowel prep; visualization of some areas such as the cecum was limited  All air was aspirated during withdrawal    Recommendations  Follow-up in GI office    Appropriate Photos Taken: Yes  Specimen(s) Removed: As stated above  Previous Colonoscopy: 2023   Withdrawal Time: 8 minutes  Estimated Blood Loss: None  Anesthesia:  MAC    Complications:    Sub-optimal bowel prep  Description of Procedure:  Informed consent was obtained after explanation of the procedure including indications, description of the procedure,  benefits and possible risks and complications of the procedure, and alternatives.  All questions were answered.  The patient's history was reviewed and a directed physical examination was performed prior to the procedure.  The patient was monitored throughout the procedure with pulse oximetry and periodic assessment of vital signs.  With the patient initially in the left lateral decubitus position, a digital rectal examination was performed.  The video endoscope was placed in the patient's rectum and advanced without difficulty  to the ILEUM, which was identified by the ileocecal valve.  The appendiceal orifice was not visualized secondary to the presence of particulate matter.  The prep was adequate.   Examination of the mucosa and the anatomy was performed during both introduction and withdrawal of the endoscope.       Geo De León M.D.  2024 at 3:21 PM

## 2024-04-30 NOTE — ANESTHESIA POSTPROCEDURE EVALUATION
Department of Anesthesiology  Postprocedure Note    Patient: Ramakrishna Gamboa  MRN: [id number]  YOB: 1965  Date of evaluation: 4/30/2024    Procedure Summary       Date: 04/30/24 Room / Location: [address]    Anesthesia Start: 1518 Anesthesia Stop: 1548    Procedure: COLONOSCOPY DIAGNOSTIC Diagnosis:       Andi's syndrome      ([place]'s syndrome [K59.81])    Surgeons: Ramakrishna De León MD Responsible Provider: Moisés White MD    Anesthesia Type: MAC ASA Status: 3            Anesthesia Type: No value filed.    Marci Phase I:      Marci Phase II: Marci Score: 10    Anesthesia Post Evaluation    Patient location during evaluation: PACU  Patient participation: complete - patient participated  Level of consciousness: awake  Airway patency: patent  Nausea & Vomiting: no nausea  Cardiovascular status: blood pressure returned to baseline  Respiratory status: acceptable  Hydration status: euvolemic  Comments: Multimodal analgesia pain management as indicated by procedure  Multimodal analgesia pain management approach  Pain management: adequate    No notable events documented.

## 2024-04-30 NOTE — PLAN OF CARE
Problem: Discharge Planning  Goal: Discharge to home or other facility with appropriate resources  4/30/2024 1538 by Patty Christianson RN  Outcome: Progressing  Flowsheets (Taken 4/30/2024 0840)  Discharge to home or other facility with appropriate resources:   Identify barriers to discharge with patient and caregiver   Arrange for needed discharge resources and transportation as appropriate   Identify discharge learning needs (meds, wound care, etc)   Refer to discharge planning if patient needs post-hospital services based on physician order or complex needs related to functional status, cognitive ability or social support system     Problem: Pain  Goal: Verbalizes/displays adequate comfort level or baseline comfort level  4/30/2024 1538 by Patty Christianson RN  Outcome: Progressing  Flowsheets (Taken 4/30/2024 1538)  Verbalizes/displays adequate comfort level or baseline comfort level:   Encourage patient to monitor pain and request assistance   Assess pain using appropriate pain scale  Note: Patient denies any pain at this time.    Problem: Gastrointestinal - Adult  Goal: Minimal or absence of nausea and vomiting  4/30/2024 1538 by Patty Christianson RN  Outcome: Progressing  Flowsheets (Taken 4/30/2024 0840)  Minimal or absence of nausea and vomiting:   Administer IV fluids as ordered to ensure adequate hydration   Administer ordered antiemetic medications as needed   Provide nonpharmacologic comfort measures as appropriate  Goal: Maintains or returns to baseline bowel function  4/30/2024 1538 by Patty Christianson RN  Outcome: Progressing  Flowsheets (Taken 4/30/2024 0840)  Maintains or returns to baseline bowel function:   Assess bowel function   Administer IV fluids as ordered to ensure adequate hydration   Administer ordered medications as needed   Encourage mobilization and activity  Goal: Maintains adequate nutritional intake  4/30/2024 1538 by Patty Christianson RN  Outcome:

## 2024-04-30 NOTE — CONSULTS
General Surgery:  Consult Note        PATIENT NAME: Ramakrishna Gamboa   YOB: 1965    ADMISSION DATE: 4/29/2024 11:02 AM     Admitting Provider: [unfilled]    Consulted Physician: Dr. Krishna     TODAY'S DATE: 4/30/2024    Chief Complaint:  Abdominal distention  Consult Regarding:  Oglvies    HISTORY OF PRESENT ILLNESS:  The patient is a 58 y.o. male  who was admitted on 4/29/2024 with 1 week abdominal pain, distention, cramping, and intermittent small volume liquid stools. Patient states symptoms are the same as his prior bouts of oglvie's flares, for which he is established with [facility] and has been decompressed before. Patient has seen Dr. Krishna in the clinic and states he underwent some testing at that time, but that there was no indication for surgery. Patient denies fevers, sweats, chills, CP or SOB. Patient endorses intermittent nausea but no emesis. Patient states these symptoms all started after his back surgery in 2021 and that he had never suffered from bowel issues until then. Patient takes cholestyramine daily to reduce the quantity of liquid stool he has at baseline. Patient states he's otherwise healthy and independent unless having a back pain flare at which time he uses a walker. Patient has prior umbilical hernia repair. Patient denies tobacco use, etoh, or illicit drugs. Takes ASA 81mg but no AC.      Past Medical History:        Diagnosis Date    ADHD (attention deficit hyperactivity disorder)     Angular cheilitis     Anticoagulant long-term use     Anxiety     Arthritis     BACK AND FINGERS     Bilateral lower extremity edema 03/2021    started on Lasix per PCP    Chronic back pain     dr shahrzad MURILLO  [facility]IST, scheduled for OR 7/12/2021 at [facility]- 12/2022    cold symptoms    Depression     Dizziness     Eczema     Ex-smoker     quit in 2006, smoked for 23 years    Floaters in visual field, bilateral     GERD (gastroesophageal reflux disease)     H/O  [FreeTextEntry1] : R/O BV\par R/O chlamydia reinfection. GIven physical findings and presence of IUD will reculture. \par Annual visit

## 2024-04-30 NOTE — PROGRESS NOTES
[facility]  Office: [phone]  Josh Lewis DO, Edwin Villalba DO, Brando Roman DO, Asa Ramirez DO, Olinda Uriostegui MD, Qiana Mejias MD, Cong Phan MD, Ruthie Solitario MD,  Rigo Capone MD, Bethany Calvillo MD, Yanira Flood MD,  Katerina Kruger DO, Abhishek Rojo MD, Tico Anderson MD, Ramakrishna Lewis DO, Joann Gonzales MD,  Ruperto Chamberlain DO, Erica Garrido MD, Mady Barrera MD, Prema Bowman MD, Betty Herrera MD,  Torin Kelley MD, Mary Kay Rivas MD, Hugo Kamara MD, Bradley Ferrer MD, Ed Osorio MD, Freedom Gutierrez MD, Torsten Tony DO, Juan Buchanan DO, Aleksandr Chowdary MD,  Atilio Thurston MD, Shirley Waterhouse, CNP,  Frida Campa CNP, Duane Cardenas, CNP,  Katya Stringer, DNP, Lakeshia Mitchell, CNP, Kiara Patel, CNP, Maria Del Carmen Reynolds, CNP, Kassandra Patricio, CNP, Micheline Minor, PA-C, Madison Torres, PA-C, Vidya Friedman, CNP, Aida Colorado, CNP, Chandni Arizmendi, CNP, Mayda Lira, CNP, Hermila Martinez, CNP, Sinai Clayton, CNS, Maricruz Trujillo, CNP, Latia Gutierrez CNP, Tracy Schwab, CNP         [facility]   IN-PATIENT SERVICE   [facility]    Progress Note    4/30/2024    10:44 AM    Name:   Ramakrishna Gamboa  MRN:     [id number]     Acct:      [id number]   Room:   2007/2007-02  IP Day:  1  Admit Date:  4/29/2024 11:02 AM    PCP:   Temo Lopez MD  Code Status:  Full Code    Subjective:     C/C:   Chief Complaint   Patient presents with    Abdominal Pain     Hx of verónica syndrome     Bloated     Interval History Status: improved.     Did have some gas output overnight and his distention is improved.  GI reportedly plans to go to the OR this afternoon for decompression.  Was seen by colorectal surgeon this morning and they expressed no intention for operative intervention at this time.    Brief History:     4/29 - Patient reports to the emergency department with abdominal distention and pain.  Patient denies any nausea.  He has had 3 prior

## 2024-04-30 NOTE — PROGRESS NOTES
Physical Therapy  DATE: 2024    NAME: Ramakrishna Gamboa  MRN: [id number]   : 1965    Patient not seen this date for Physical Therapy due to:      [] Cancel by RN or physician due to:    [] Hemodialysis    [] Critical Lab Value Level     [] Blood transfusion in progress    [] Acute or unstable cardiovascular status   _MAP < 55 or more than >115  _HR < 40 or > 130    [] Acute or unstable pulmonary status   -FiO2 > 60%   _RR < 5 or >40    _O2 sats < 85%    [] Strict Bedrest    [] Off Unit for surgery or procedure    [] Off Unit for testing       [] Pending imaging to R/O fracture    [] Refusal by Patient      [] Other      [x] PT being discontinued at this time. Patient independent. No further needs.  Writer spoke w/ RN this date who reports pt has been indep w/ mobility since admission.  PT will defer evaluation at this time.  Please re-order skilled PT if functional mobility status changes.       [] PT being discontinued at this time as the patient has been transferred to hospice care. No further needs.      Amanda Reina, PT

## 2024-05-01 VITALS
DIASTOLIC BLOOD PRESSURE: 64 MMHG | SYSTOLIC BLOOD PRESSURE: 124 MMHG | OXYGEN SATURATION: 95 % | TEMPERATURE: 97.5 F | RESPIRATION RATE: 16 BRPM | HEIGHT: 69 IN | HEART RATE: 67 BPM | BODY MASS INDEX: 38.67 KG/M2 | WEIGHT: 261.1 LBS

## 2024-05-01 LAB
ANION GAP SERPL CALCULATED.3IONS-SCNC: 8 MMOL/L (ref 9–17)
BASOPHILS # BLD: 0.05 K/UL (ref 0–0.2)
BASOPHILS NFR BLD: 1 % (ref 0–2)
BUN SERPL-MCNC: 6 MG/DL (ref 6–20)
BUN/CREAT SERPL: ABNORMAL (ref 9–20)
CALCIUM SERPL-MCNC: 8 MG/DL (ref 8.6–10.4)
CHLORIDE SERPL-SCNC: 108 MMOL/L (ref 98–107)
CO2 SERPL-SCNC: 24 MMOL/L (ref 20–31)
CREAT SERPL-MCNC: <0.4 MG/DL (ref 0.7–1.2)
EOSINOPHIL # BLD: 0.24 K/UL (ref 0–0.44)
EOSINOPHILS RELATIVE PERCENT: 4 % (ref 1–4)
ERYTHROCYTE [DISTWIDTH] IN BLOOD BY AUTOMATED COUNT: 12.5 % (ref 11.8–14.4)
GFR, ESTIMATED: ABNORMAL ML/MIN/1.73M2
GLUCOSE SERPL-MCNC: 98 MG/DL (ref 70–99)
HCT VFR BLD AUTO: 36.1 % (ref 40.7–50.3)
HGB BLD-MCNC: 11.8 G/DL (ref 13–17)
IMM GRANULOCYTES # BLD AUTO: 0.01 K/UL (ref 0–0.3)
IMM GRANULOCYTES NFR BLD: 0 %
LYMPHOCYTES NFR BLD: 1.26 K/UL (ref 1.1–3.7)
LYMPHOCYTES RELATIVE PERCENT: 20 % (ref 24–43)
MAGNESIUM SERPL-MCNC: 1.9 MG/DL (ref 1.6–2.6)
MCH RBC QN AUTO: 29.4 PG (ref 25.2–33.5)
MCHC RBC AUTO-ENTMCNC: 32.7 G/DL (ref 28.4–34.8)
MCV RBC AUTO: 90 FL (ref 82.6–102.9)
MONOCYTES NFR BLD: 0.61 K/UL (ref 0.1–1.2)
MONOCYTES NFR BLD: 10 % (ref 3–12)
NEUTROPHILS NFR BLD: 65 % (ref 36–65)
NEUTS SEG NFR BLD: 4.05 K/UL (ref 1.5–8.1)
NRBC BLD-RTO: 0 PER 100 WBC
PHOSPHATE SERPL-MCNC: 2.1 MG/DL (ref 2.5–4.5)
PLATELET # BLD AUTO: 158 K/UL (ref 138–453)
PMV BLD AUTO: 8.9 FL (ref 8.1–13.5)
POTASSIUM SERPL-SCNC: 3.1 MMOL/L (ref 3.7–5.3)
RBC # BLD AUTO: 4.01 M/UL (ref 4.21–5.77)
SODIUM SERPL-SCNC: 140 MMOL/L (ref 135–144)
WBC OTHER # BLD: 6.2 K/UL (ref 3.5–11.3)

## 2024-05-01 PROCEDURE — 83735 ASSAY OF MAGNESIUM: CPT

## 2024-05-01 PROCEDURE — 85025 COMPLETE CBC W/AUTO DIFF WBC: CPT

## 2024-05-01 PROCEDURE — 84100 ASSAY OF PHOSPHORUS: CPT

## 2024-05-01 PROCEDURE — 6360000002 HC RX W HCPCS: Performed by: INTERNAL MEDICINE

## 2024-05-01 PROCEDURE — 6370000000 HC RX 637 (ALT 250 FOR IP): Performed by: INTERNAL MEDICINE

## 2024-05-01 PROCEDURE — 80048 BASIC METABOLIC PNL TOTAL CA: CPT

## 2024-05-01 PROCEDURE — 36415 COLL VENOUS BLD VENIPUNCTURE: CPT

## 2024-05-01 PROCEDURE — 2580000003 HC RX 258: Performed by: INTERNAL MEDICINE

## 2024-05-01 PROCEDURE — 6370000000 HC RX 637 (ALT 250 FOR IP): Performed by: STUDENT IN AN ORGANIZED HEALTH CARE EDUCATION/TRAINING PROGRAM

## 2024-05-01 PROCEDURE — 99239 HOSP IP/OBS DSCHRG MGMT >30: CPT | Performed by: NURSE PRACTITIONER

## 2024-05-01 PROCEDURE — 6360000002 HC RX W HCPCS: Performed by: STUDENT IN AN ORGANIZED HEALTH CARE EDUCATION/TRAINING PROGRAM

## 2024-05-01 RX ORDER — POTASSIUM CHLORIDE 20 MEQ/1
40 TABLET, EXTENDED RELEASE ORAL ONCE
Status: COMPLETED | OUTPATIENT
Start: 2024-05-01 | End: 2024-05-01

## 2024-05-01 RX ORDER — MAGNESIUM SULFATE IN WATER 40 MG/ML
2000 INJECTION, SOLUTION INTRAVENOUS ONCE
Status: COMPLETED | OUTPATIENT
Start: 2024-05-01 | End: 2024-05-01

## 2024-05-01 RX ADMIN — PRIMIDONE 50 MG: 50 TABLET ORAL at 10:31

## 2024-05-01 RX ADMIN — POTASSIUM CHLORIDE 40 MEQ: 1500 TABLET, EXTENDED RELEASE ORAL at 10:29

## 2024-05-01 RX ADMIN — PANTOPRAZOLE SODIUM 40 MG: 40 TABLET, DELAYED RELEASE ORAL at 06:51

## 2024-05-01 RX ADMIN — METOPROLOL TARTRATE 25 MG: 25 TABLET, FILM COATED ORAL at 10:31

## 2024-05-01 RX ADMIN — ENOXAPARIN SODIUM 30 MG: 100 INJECTION SUBCUTANEOUS at 10:31

## 2024-05-01 RX ADMIN — CITALOPRAM HYDROBROMIDE 20 MG: 20 TABLET ORAL at 10:29

## 2024-05-01 RX ADMIN — FUROSEMIDE 40 MG: 40 TABLET ORAL at 10:31

## 2024-05-01 RX ADMIN — CETIRIZINE HYDROCHLORIDE 10 MG: 10 TABLET, FILM COATED ORAL at 10:29

## 2024-05-01 RX ADMIN — SODIUM CHLORIDE: 9 INJECTION, SOLUTION INTRAVENOUS at 10:53

## 2024-05-01 RX ADMIN — ASPIRIN 81 MG: 81 TABLET, COATED ORAL at 10:32

## 2024-05-01 RX ADMIN — MAGNESIUM SULFATE HEPTAHYDRATE 2000 MG: 40 INJECTION, SOLUTION INTRAVENOUS at 13:33

## 2024-05-01 RX ADMIN — PRIMIDONE 50 MG: 50 TABLET ORAL at 13:31

## 2024-05-01 RX ADMIN — POTASSIUM CHLORIDE 40 MEQ: 1500 TABLET, EXTENDED RELEASE ORAL at 13:31

## 2024-05-01 NOTE — PLAN OF CARE
Problem: Pain  Goal: Verbalizes/displays adequate comfort level or baseline comfort level  4/30/2024 2314 by Elis Torres RN  Outcome: Progressing  Flowsheets (Taken 4/30/2024 2314)  Verbalizes/displays adequate comfort level or baseline comfort level:   Encourage patient to monitor pain and request assistance   Assess pain using appropriate pain scale   Administer analgesics based on type and severity of pain and evaluate response   Consider cultural and social influences on pain and pain management   Implement non-pharmacological measures as appropriate and evaluate response   Notify Licensed Independent Practitioner if interventions unsuccessful or patient reports new pain

## 2024-05-01 NOTE — DISCHARGE SUMMARY
[facility]  Office: [phone]  Josh Lewis DO, Edwin Villalba DO, Brando Roman DO, Asa Ramirez DO, Olinda Uriostegui MD, Qiana Mejias MD, Cong Phan MD, Ruthie Solitario MD,  Rigo Capone MD, Bethany Calvillo MD, Yanira Flood MD,  Katerina Kruger DO, Abhishek Rojo MD, Tico Anderson MD, Ramakrishna Lewis DO, Joann Gonzales MD,  Ruperto Chamberlain DO, Erica Garrido MD, Mady Barrera MD, Prema Bowman MD, Betty Herrera MD,  Torin Kelley MD, Mary Kay Rivas MD, Hugo Kamara MD, Bradley Ferrer MD, Ed Osorio MD, Freedom Gutierrez MD, Torsten Tony DO, Juan Buchanan DO, Aleksandr Chowdary MD,  Atilio Thurston MD, Shirley Waterhouse, CNP,  Frida Campa CNP, Duane Cardenas, CNP,  Katya Stringer, DNP, Lakeshia Mitchell, CNP, Kiara Patel, CNP, Maria Del Carmen Reynolds, CNP, Kassandra Patricio, CNP, Micheline Minor, PA-C, Madison Torres PA-C, Vidya Friedman, CNP, Aida Colorado, CNP, Chandni Arizmendi, CNP, Mayda Lira, CNP, Hermila Martinez, CNP, Sinai Clayton, CNS, Maricruz Trujillo, CNP, Latia Gutierrez CNP, Tracy Schwab, CNP         [facility]   IN-PATIENT SERVICE   [facility]    Discharge Summary     Patient ID: Ramakrishna Gamboa  :  1965   MRN: [id number]     ACCOUNT:  [id number]   Patient's PCP: Temo Lopez MD  Admit Date: 2024   Discharge Date: 2024     Length of Stay: 2  Code Status:  Full Code  Admitting Physician: Olinda Uriostegui MD  Discharge Physician: JYOTSNA Cardona - NP     Active Discharge Diagnoses:     Hospital Problem Lists:  Principal Problem:    Truman syndrome  Active Problems:    Benign essential hypertension    Chronic obstructive pulmonary disease, unspecified COPD type (HCC)    Sjogren's syndrome (HCC)    History of lumbar fusion    Truman's syndrome  Resolved Problems:    * No resolved hospital problems. *      Admission Condition:  fair     Discharged Condition: good    Hospital Stay:     Hospital Course:  Ramakrishna RAM

## 2024-05-01 NOTE — CARE COORDINATION
Discharge Report    [facility]  Clinical Case Management Department  Written by: Terri Serrato RN    Patient Name: Ramakrishna Gamboa  Attending Provider: Cong Phan MD  Admit Date: 2024 11:02 AM  MRN: [id number]  Account: [id number]                     : 1965  Discharge Date: 24      Disposition: home    Terri Serrato RN

## 2024-05-01 NOTE — DISCHARGE INSTR - ACTIVITY
Take all medications as directed  Follow with physicians as requested   Low Fiber diet     Call Doctor:  If you have a fever  You are vomiting  You have new or increased abdominal pain  You are unable to pass stool or gas

## 2024-05-01 NOTE — PROGRESS NOTES
General Surgery:  Daily Progress Note            PATIENT NAME: Ramakrishna Gamboa     TODAY'S DATE: 5/1/2024, 6:15 AM  CC:  abdominal bloating     SUBJECTIVE:     Pt seen and examined at bedside.  No acute events overnight. Feeling much better after GI decompression yesterday. No nausea or emesis. Tolerated dinner last night.     ROS:  General: no fevers or chills  Resp: no wheezing, no SOB  Abd: no acute abdominal pain, nausea or emesis  Neuro: no weakness, no focal deficits     OBJECTIVE:   VITALS:  /67   Pulse 68   Temp 97.7 °F (36.5 °C) (Oral)   Resp 16   Ht 1.753 m (5' 9\")   Wt 118.4 kg (261 lb 1.6 oz)   SpO2 94%   BMI 38.56 kg/m²      INTAKE/OUTPUT:      Intake/Output Summary (Last 24 hours) at 5/1/2024 0615  Last data filed at 4/30/2024 1817  Gross per 24 hour   Intake 1293 ml   Output --   Net 1293 ml       PHYSICAL EXAM:  General Appearance:  awake, alert, not distressed   HEENT:  Normocephalic, atraumatic, mucus membranes moist   Skin:  Skin color, texture, turgor normal. No rashes or lesions.  Lungs:  normal effort, no wheezing, no accessory muscle use  Heart:  regular rate and rhythm   Abdomen:  soft, minimal distention, nontender  Extremities: Extremities warm to touch, pink, with no edema.          ASSESSMENT:  Active Hospital Problems    Diagnosis Date Noted    Andi syndrome [K59.81] 04/29/2024    Andi's syndrome [K59.81] 02/10/2022    History of lumbar fusion [Z98.1] 05/20/2021    Sjogren's syndrome (HCC) [M35.00] 05/20/2020    Chronic obstructive pulmonary disease, unspecified COPD type (HCC) [J44.9] 01/12/2016    Benign essential hypertension [I10] 01/12/2016       58 y.o. male with colonic distention   S/p colonic decompression      -diet as tolerated if no nausea/emesis    -Replace electrolytes    -Monitor bowel fxn   -No plans for surgical intervention this admission if patient continues to feel improved s/p colonic decompression    Nu Goddard DO  General Surgery PGY-5

## 2024-05-01 NOTE — PLAN OF CARE
Problem: Discharge Planning  Goal: Discharge to home or other facility with appropriate resources  Outcome: Adequate for Discharge  Flowsheets (Taken 5/1/2024 0900)  Discharge to home or other facility with appropriate resources:   Identify barriers to discharge with patient and caregiver   Arrange for needed discharge resources and transportation as appropriate   Identify discharge learning needs (meds, wound care, etc)   Refer to discharge planning if patient needs post-hospital services based on physician order or complex needs related to functional status, cognitive ability or social support system     Problem: Pain  Goal: Verbalizes/displays adequate comfort level or baseline comfort level  Outcome: Adequate for Discharge  Flowsheets (Taken 5/1/2024 0743)  Verbalizes/displays adequate comfort level or baseline comfort level:   Encourage patient to monitor pain and request assistance   Assess pain using appropriate pain scale   Administer analgesics based on type and severity of pain and evaluate response   Implement non-pharmacological measures as appropriate and evaluate response   Notify Licensed Independent Practitioner if interventions unsuccessful or patient reports new pain     Problem: Gastrointestinal - Adult  Goal: Minimal or absence of nausea and vomiting  Outcome: Adequate for Discharge  Flowsheets (Taken 5/1/2024 0900)  Minimal or absence of nausea and vomiting: Advance diet as tolerated, if ordered  Goal: Maintains or returns to baseline bowel function  Outcome: Adequate for Discharge  Flowsheets (Taken 5/1/2024 0900)  Maintains or returns to baseline bowel function:   Assess bowel function   Encourage oral fluids to ensure adequate hydration   Administer ordered medications as needed  Goal: Maintains adequate nutritional intake  Outcome: Adequate for Discharge  Flowsheets (Taken 5/1/2024 0900)  Maintains adequate nutritional intake: Monitor intake and output, weight and lab values     Problem:

## 2024-05-01 NOTE — FLOWSHEET NOTE
05/01/24 1625   Discharge Event   Discharged with Documented Belongings Yes   Departure Mode With spouse   Mobility at Departure Wheelchair   Discharged to Private Residence   Time of Discharge 1625     Patient stable for discharge. All belongings were packed and taken with patient at time of discharge. Patient states understanding of all discharge instructions.

## 2024-05-01 NOTE — PLAN OF CARE
Problem: Discharge Planning  Goal: Discharge to home or other facility with appropriate resources  5/1/2024 1622 by Janis Ramos RN  Outcome: Adequate for Discharge  5/1/2024 1411 by Janis Ramos RN  Outcome: Adequate for Discharge  Flowsheets (Taken 5/1/2024 0900)  Discharge to home or other facility with appropriate resources:   Identify barriers to discharge with patient and caregiver   Arrange for needed discharge resources and transportation as appropriate   Identify discharge learning needs (meds, wound care, etc)   Refer to discharge planning if patient needs post-hospital services based on physician order or complex needs related to functional status, cognitive ability or social support system     Problem: Pain  Goal: Verbalizes/displays adequate comfort level or baseline comfort level  5/1/2024 1622 by Janis Ramos RN  Outcome: Adequate for Discharge  5/1/2024 1411 by Janis Ramos RN  Outcome: Adequate for Discharge  Flowsheets (Taken 5/1/2024 0743)  Verbalizes/displays adequate comfort level or baseline comfort level:   Encourage patient to monitor pain and request assistance   Assess pain using appropriate pain scale   Administer analgesics based on type and severity of pain and evaluate response   Implement non-pharmacological measures as appropriate and evaluate response   Notify Licensed Independent Practitioner if interventions unsuccessful or patient reports new pain     Problem: Gastrointestinal - Adult  Goal: Minimal or absence of nausea and vomiting  5/1/2024 1622 by Janis Ramos RN  Outcome: Adequate for Discharge  5/1/2024 1411 by Janis Ramos RN  Outcome: Adequate for Discharge  Flowsheets (Taken 5/1/2024 0900)  Minimal or absence of nausea and vomiting: Advance diet as tolerated, if ordered  Goal: Maintains or returns to baseline bowel function  5/1/2024 1622 by Janis Ramos RN  Outcome: Adequate for Discharge  5/1/2024 1411 by Janis Ramos RN  Outcome: Adequate for

## 2024-05-01 NOTE — PROGRESS NOTES
Gastroenterology Progress Note      Patient:   Ramakrishna Gamboa   :    1965   Facility:   [facility]  Date:     2024  Consultant:   LIBIA SANDOVAL      Subjective:     Patient seen and examined. Wife at bedside doing much better, stool for c-diff negative.   Passing loose stools, no increase in pain with eating.   Feel back to his relative baseline.       Objective:   Vital Signs:  /64   Pulse 67   Temp 97.5 °F (36.4 °C) (Oral)   Resp 16   Ht 1.753 m (5' 9\")   Wt 118.4 kg (261 lb 1.6 oz)   SpO2 95%   BMI 38.56 kg/m²      Physical Exam:   General appearance: Alert, NAD  Lungs: CTA bilaterally    Heart:S1S2 RRR  Abdomen: Soft, no tympany present bs  Skin:  No jaundice, no stigmata of chronic liver disease.    Lab and Imaging Review   CBC:   Lab Results   Component Value Date/Time    WBC 6.2 2024 05:41 AM    RBC 4.01 2024 05:41 AM    HGB 11.8 2024 05:41 AM    HCT 36.1 2024 05:41 AM    MCV 90.0 2024 05:41 AM    MCH 29.4 2024 05:41 AM    MCHC 32.7 2024 05:41 AM    RDW 12.5 2024 05:41 AM     2024 05:41 AM    MPV 8.9 2024 05:41 AM     Hemoglobin/Hematocrit:    Lab Results   Component Value Date/Time    HGB 11.8 2024 05:41 AM    HCT 36.1 2024 05:41 AM     CMP:    Lab Results   Component Value Date/Time     2024 05:41 AM    K 3.1 2024 05:41 AM     2024 05:41 AM    CO2 24 2024 05:41 AM    BUN 6 2024 05:41 AM    CREATININE <0.4 2024 05:41 AM    GFRAA >60 2022 09:59 AM    LABGLOM >60 2022 09:59 AM    GLUCOSE 98 2024 05:41 AM    CALCIUM 8.0 2024 05:41 AM    BILITOT 0.3 2024 10:36 AM    ALKPHOS 105 2024 10:36 AM    AST 20 2024 10:36 AM    ALT 27 2024 10:36 AM     BMP:    Lab Results   Component Value Date/Time     2024 05:41 AM    K 3.1 2024 05:41 AM     2024 05:41 AM

## 2024-05-02 ENCOUNTER — CARE COORDINATION (OUTPATIENT)
Dept: CASE MANAGEMENT | Age: 59
End: 2024-05-02

## 2024-05-02 DIAGNOSIS — K59.81 OGILVIE SYNDROME: Primary | ICD-10-CM

## 2024-05-02 PROCEDURE — 1111F DSCHRG MED/CURRENT MED MERGE: CPT

## 2024-05-02 NOTE — CARE COORDINATION
Care Transition Nurse provided contact information.  Plan for follow-up call in 3-5 days based on severity of symptoms and risk factors.  Plan for next call: symptom management-follow up on portillo irizarry RN

## 2024-05-06 ENCOUNTER — OFFICE VISIT (OUTPATIENT)
Dept: PRIMARY CARE CLINIC | Age: 59
End: 2024-05-06

## 2024-05-06 VITALS
WEIGHT: 258.4 LBS | HEIGHT: 69 IN | BODY MASS INDEX: 38.27 KG/M2 | SYSTOLIC BLOOD PRESSURE: 120 MMHG | HEART RATE: 83 BPM | DIASTOLIC BLOOD PRESSURE: 68 MMHG | OXYGEN SATURATION: 95 %

## 2024-05-06 DIAGNOSIS — I10 BENIGN ESSENTIAL HYPERTENSION: ICD-10-CM

## 2024-05-06 DIAGNOSIS — K59.81 OGILVIE'S SYNDROME: ICD-10-CM

## 2024-05-06 DIAGNOSIS — L23.9 ALLERGIC CONTACT DERMATITIS, UNSPECIFIED TRIGGER: ICD-10-CM

## 2024-05-06 DIAGNOSIS — E83.39 HYPOPHOSPHATEMIA: ICD-10-CM

## 2024-05-06 DIAGNOSIS — E83.42 HYPOMAGNESEMIA: ICD-10-CM

## 2024-05-06 DIAGNOSIS — M45.4 ANKYLOSING SPONDYLITIS OF THORACIC REGION (HCC): ICD-10-CM

## 2024-05-06 DIAGNOSIS — Z09 HOSPITAL DISCHARGE FOLLOW-UP: Primary | ICD-10-CM

## 2024-05-06 LAB
ANION GAP SERPL CALCULATED.3IONS-SCNC: 12 MMOL/L (ref 9–16)
BUN BLDV-MCNC: 13 MG/DL (ref 6–20)
CALCIUM SERPL-MCNC: 8.7 MG/DL (ref 8.6–10.4)
CHLORIDE BLD-SCNC: 100 MMOL/L (ref 98–107)
CO2: 29 MMOL/L (ref 20–31)
CREAT SERPL-MCNC: 0.6 MG/DL (ref 0.7–1.2)
GFR, ESTIMATED: >90 ML/MIN/1.73M2
GLUCOSE BLD-MCNC: 123 MG/DL (ref 74–99)
MAGNESIUM: 2.1 MG/DL (ref 1.6–2.6)
PHOSPHORUS: 2.8 MG/DL (ref 2.5–4.5)
POTASSIUM SERPL-SCNC: 4.3 MMOL/L (ref 3.7–5.3)
SODIUM BLD-SCNC: 141 MMOL/L (ref 136–145)

## 2024-05-06 RX ORDER — PREDNISONE 10 MG/1
TABLET ORAL
Qty: 48 TABLET | Refills: 0 | Status: SHIPPED | OUTPATIENT
Start: 2024-05-06

## 2024-05-06 ASSESSMENT — ENCOUNTER SYMPTOMS
COUGH: 0
ABDOMINAL PAIN: 0
SHORTNESS OF BREATH: 0
WHEEZING: 0
EYE DISCHARGE: 0
VOMITING: 0
RHINORRHEA: 0
SORE THROAT: 0
NAUSEA: 0
DIARRHEA: 0
EYE REDNESS: 0

## 2024-05-06 NOTE — PROGRESS NOTES
Post-Discharge Transitional Care Follow Up      Ramakrishna Gamboa   YOB: 1965    Date of Office Visit:  5/6/2024  Date of Hospital Admission: 4/29/24  Date of Hospital Discharge: 5/1/24  Readmission Risk Score (high >=14%. Medium >=10%):Readmission Risk Score: 8.5      Care management risk score Rising risk (score 2-5) and Complex Care (Scores >=6): No Risk Score On File     Non face to face  following discharge, date last encounter closed (first attempt may have been earlier): 05/02/2024     Call initiated 2 business days of discharge: Yes     Hospital discharge follow-up  -     IN DISCHARGE MEDS RECONCILED W/ CURRENT OUTPATIENT MED LIST  Ankylosing spondylitis of thoracic region (HCC)  Benign essential hypertension  Andi's syndrome      Medical Decision Making: moderate complexity  Return if symptoms worsen or fail to improve.           Subjective:   HPI    Inpatient course: Discharge summary reviewed- see chart.    Interval history/Current status: Patient was admitted to the hospital with recurrent abdominal bloating and swelling.  CAT scan showed marked gaseous distention.  Patient underwent decompression with colonoscope.  Patient states he felt better immediately.  Patient was treated for low magnesium and low phosphorus from the bowel prep.  Patient states abdomen is feeling better now.  States general surgery has had nothing to offer for his Andi syndrome.    Patient developed rash on his back in the upper part.  States very itchy.      Patient Active Problem List   Diagnosis    Anxiety disorder    Benign essential hypertension    Chronic obstructive pulmonary disease, unspecified COPD type (HCC)    Esophageal reflux    Hypogonadism male    Lumbar radicular pain    Pulmonary granuloma (HCC)    Male erectile disorder    Sensorineural hearing loss    Tinnitus of both ears    Vertigo    Psoriasis    Hip impingement syndrome    Seborrheic keratosis    Kyphosis of thoracolumbar region

## 2024-05-08 ENCOUNTER — CARE COORDINATION (OUTPATIENT)
Dept: CASE MANAGEMENT | Age: 59
End: 2024-05-08

## 2024-05-08 NOTE — CARE COORDINATION
Care Transitions Outreach Attempt    Call within 2 business days of discharge: Yes   Attempted to reach patient for transitions of care follow up. Unable to reach patient.    Patient: Ramakrishna Gamboa Patient : 1965 MRN: [id number]    Last Discharge Facility       Date Complaint Diagnosis Description Type Department Provider    24 Abdominal Pain; Bloated Generalized abdominal pain ED to Hosp-Admission (Discharged) (ADMITTED) Cong Brandt MD; Graciela Bowles...          # 1 attempt-Attempted to reach patient for subsequent call. Left [employer] appropriate message with contact information requesting return call to [phone]     Was this an external facility discharge? No Discharge Facility Name: [facility]    Noted following upcoming appointments from discharge chart review:   [facility] follow up appointment(s):   Future Appointments   Date Time Provider Department Center   2024  2:50 PM Misael Danielle MD [facility]   2024 10:00 AM Temo Lopez MD [facility]   2024  1:00 PM Temo Lopez MD [facility]     Non-BS  follow up appointment(s):

## 2024-05-09 ENCOUNTER — CARE COORDINATION (OUTPATIENT)
Dept: CASE MANAGEMENT | Age: 59
End: 2024-05-09

## 2024-05-09 NOTE — CARE COORDINATION
Care Transitions Follow Up Call    Patient Current Location:  Home: [address]  [address]  [place] [place] [address]    [facility] Care Coordinator contacted the patient by telephone to follow up after admission on 24.  Verified name and  with patient as identifiers.    Patient: Ramakrishna Gamboa  Patient : 1965   MRN: [id number]  Reason for Admission: Andi syndrome   Discharge Date: 24 RARS: Readmission Risk Score: 8.5      Needs to be reviewed by the provider   Additional needs identified to be addressed with provider: No  none             Method of communication with provider: none.    Subsequent transitional call. Spoke to Geo today. He reports that he is not doing bad things are getting better.Everything is coming a long . He has no abdominal pain/diarrhea today.  Discussed HFU 24 he developed rash to upper shoulder pt placed on prednisone taper he stated it is helping. 23 BMP done glucose 123. Magnesium phosphorus wnl. Reminded of next appointment 24 pain management . Thanked writer for calling.     Addressed changes since last contact:   Had HFU on steroid taper-rash    Discussed follow-up appointments. If no appointment was previously scheduled, appointment scheduling offered: Yes.   Is follow up appointment scheduled within 7 days of discharge? Yes.    Follow Up  Future Appointments   Date Time Provider Department Center   2024  2:50 PM Misael Danielle MD [facility]   2024 10:00 AM Temo Lopez MD [facility]   2024  1:00 PM Temo Lopez MD [facility] Care Coordinator reviewed discharge instructions, medical action plan, and red flags with patient and discussed any barriers to care and/or understanding of plan of care after discharge. Discussed appropriate site of care based on symptoms and resources available to patient including: PCP  Specialist  Urgent care clinics  When to call 911  [employer]. The patient

## 2024-05-16 ENCOUNTER — CARE COORDINATION (OUTPATIENT)
Dept: CASE MANAGEMENT | Age: 59
End: 2024-05-16

## 2024-05-16 NOTE — CARE COORDINATION
Care Transitions Follow Up Call    Patient Current Location:  Home: [address]  [place] [address]    [facility] Care Coordinator contacted the patient by telephone to follow up after admission on 2024.  Verified name and  with patient as identifiers.    Patient: Ramakrishna Gamboa  Patient : 1965   MRN: [id number]  Reason for Admission: [place] syndrome   Discharge Date: 24 RARS: Readmission Risk Score: 8.5      Needs to be reviewed by the provider   Additional needs identified to be addressed with provider: No  none             Method of communication with provider: none.    Writer spoke with Geo for a follow up care transitions call. He states he is doing much better and is feeling good. He had his PC and surgeon follow ups and has an upcoming GI appointment. He states he is not having abdominal pain or bloating. No n/v/d and reports appetite is normal, no b/b issues. He denies having any new needs or concerns at this time.     Addressed changes since last contact:  none  Discussed follow-up appointments. If no appointment was previously scheduled, appointment scheduling offered: Yes.   Is follow up appointment scheduled within 7 days of discharge? Yes.    Follow Up  Future Appointments   Date Time Provider Department Center   2024  2:50 PM [facility]   2024 10:00 AM [facility]   2024  1:00 PM Temo Lopez MD [facility]     External follow up appointment(s): N/A    [facility] Care Coordinator reviewed red flags with patient and discussed any barriers to care and/or understanding of plan of care after discharge. Discussed appropriate site of care based on symptoms and resources available to patient including: PCP  Specialist  [employer]. The patient agrees to contact the PCP office for questions related to their healthcare.     Advance Care Planning:   reviewed and current.     Patients top risk

## 2024-05-18 DIAGNOSIS — R60.0 LOCALIZED EDEMA: ICD-10-CM

## 2024-05-20 RX ORDER — FUROSEMIDE 40 MG/1
40 TABLET ORAL DAILY
Qty: 90 TABLET | Refills: 0 | Status: SHIPPED | OUTPATIENT
Start: 2024-05-20

## 2024-05-24 ENCOUNTER — CARE COORDINATION (OUTPATIENT)
Dept: CASE MANAGEMENT | Age: 59
End: 2024-05-24

## 2024-05-24 NOTE — CARE COORDINATION
[employer]. The patient agrees to contact the PCP office for questions related to their healthcare.     Advance Care Planning:   reviewed and current.     Patients top risk factors for readmission: medical condition-[place] syndrome   Interventions to address risk factors: Scheduled appointment with PCP-5/6/24 completed  and Obtained and reviewed discharge summary and/or continuity of care documents    Offered patient enrollment in the Remote Patient Monitoring (RPM) program for in-home monitoring: Patient is not eligible for RPM program because: patient does not have qualifying diagnosis.     Care Transitions Subsequent and Final Call    Subsequent and Final Calls  Care Transitions Interventions  Other Interventions:             [facility] Care Coordinator provided contact information for future needs. No further follow-up call indicated based on severity of symptoms and risk factors.    Kayley Marks LPN

## 2024-05-28 ENCOUNTER — OFFICE VISIT (OUTPATIENT)
Dept: PAIN MANAGEMENT | Age: 59
End: 2024-05-28
Payer: MEDICARE

## 2024-05-28 VITALS — HEIGHT: 69 IN | BODY MASS INDEX: 37.18 KG/M2 | WEIGHT: 251 LBS

## 2024-05-28 DIAGNOSIS — M48.062 SPINAL STENOSIS OF LUMBAR REGION WITH NEUROGENIC CLAUDICATION: ICD-10-CM

## 2024-05-28 DIAGNOSIS — M47.817 LUMBOSACRAL SPONDYLOSIS WITHOUT MYELOPATHY: Primary | ICD-10-CM

## 2024-05-28 DIAGNOSIS — M40.205 KYPHOSIS OF THORACOLUMBAR REGION, UNSPECIFIED KYPHOSIS TYPE: ICD-10-CM

## 2024-05-28 PROCEDURE — 99212 OFFICE O/P EST SF 10 MIN: CPT | Performed by: ANESTHESIOLOGY

## 2024-05-28 ASSESSMENT — ENCOUNTER SYMPTOMS
BACK PAIN: 1
RESPIRATORY NEGATIVE: 1
EYES NEGATIVE: 1

## 2024-05-28 NOTE — PROGRESS NOTES
The patient is a 58 y.o.Non- / non  male.    Chief Complaint   Patient presents with    Back Pain        HPI    S/P:  RFA 04/17/2024      Outcome   Any improvement of activity?  No   Any side effects (appetite,leg cramping,facial fleshing):no   Increase of pain:  No  Pain score Today:  2  % of pain relief: 95%  Pain diary (medial branch block):      Hemoglobin A1C   Date Value Ref Range Status   02/10/2018 5.5 4.0 - 6.0 % Final         Past Medical History:   Diagnosis Date    ADHD (attention deficit hyperactivity disorder)     Angular cheilitis     Anticoagulant long-term use     Anxiety     Arthritis     BACK AND FINGERS     Bilateral lower extremity edema 03/2021    started on Lasix per PCP    Chronic back pain     dr markham [facility], scheduled for OR 7/12/2021 at [facility]    COPD (chronic obstructive pulmonary disease) ([facility])     COVID-19 12/2022    cold symptoms    Depression     Dizziness     Eczema     Ex-smoker     quit in 2006, smoked for 23 years    Floaters in visual field, bilateral     GERD (gastroesophageal reflux disease)     H/O chest pain     H/O dermatitis     History of blood transfusion     History of panic attacks     [place] (hard of hearing)     LEFT EAR WORSE THAN RIGHT. getting hearing aides eloisa, Dr. Villalpando    Hypertension     DR MALCOLM PCP    Impaired mobility     occas uses cane    Kyphosis     [facility] OR 7/12/2021 with Dr. Jed Jeff cant lie flat,severe back pain    Left eye injury     BUNGY CORD STRAP BUSTED AND INJURED LEFT EYE    Muscle spasm     LOWER RIGHT BACK    Nocturia     Obesity     Medford's syndrome     \"bowels not working\"    Osteoarthritis     Prolonged emergence from general anesthesia     PATIENT STATES HIS B/P WOULD DROP WITH INTUBATION, GO UP AFTER ET TUBE REMOVED.    PVC's (premature ventricular contractions)     PVC'S. NO CARDIOLOGIST, PCP DR AFRICA MALCOLM    Rash     occas to see rheumatoid arthritis  workup for lupus    Restless legs

## 2024-06-06 SDOH — HEALTH STABILITY: PHYSICAL HEALTH: ON AVERAGE, HOW MANY MINUTES DO YOU ENGAGE IN EXERCISE AT THIS LEVEL?: 0 MIN

## 2024-06-06 SDOH — HEALTH STABILITY: PHYSICAL HEALTH: ON AVERAGE, HOW MANY DAYS PER WEEK DO YOU ENGAGE IN MODERATE TO STRENUOUS EXERCISE (LIKE A BRISK WALK)?: 0 DAYS

## 2024-06-06 ASSESSMENT — PATIENT HEALTH QUESTIONNAIRE - PHQ9
9. THOUGHTS THAT YOU WOULD BE BETTER OFF DEAD, OR OF HURTING YOURSELF: NOT AT ALL
7. TROUBLE CONCENTRATING ON THINGS, SUCH AS READING THE NEWSPAPER OR WATCHING TELEVISION: NEARLY EVERY DAY
5. POOR APPETITE OR OVEREATING: SEVERAL DAYS
SUM OF ALL RESPONSES TO PHQ QUESTIONS 1-9: 18
1. LITTLE INTEREST OR PLEASURE IN DOING THINGS: NEARLY EVERY DAY
8. MOVING OR SPEAKING SO SLOWLY THAT OTHER PEOPLE COULD HAVE NOTICED. OR THE OPPOSITE, BEING SO FIGETY OR RESTLESS THAT YOU HAVE BEEN MOVING AROUND A LOT MORE THAN USUAL: SEVERAL DAYS
SUM OF ALL RESPONSES TO PHQ QUESTIONS 1-9: 18
3. TROUBLE FALLING OR STAYING ASLEEP: NEARLY EVERY DAY
4. FEELING TIRED OR HAVING LITTLE ENERGY: NEARLY EVERY DAY
SUM OF ALL RESPONSES TO PHQ9 QUESTIONS 1 & 2: 6
6. FEELING BAD ABOUT YOURSELF - OR THAT YOU ARE A FAILURE OR HAVE LET YOURSELF OR YOUR FAMILY DOWN: SEVERAL DAYS
10. IF YOU CHECKED OFF ANY PROBLEMS, HOW DIFFICULT HAVE THESE PROBLEMS MADE IT FOR YOU TO DO YOUR WORK, TAKE CARE OF THINGS AT HOME, OR GET ALONG WITH OTHER PEOPLE: SOMEWHAT DIFFICULT
2. FEELING DOWN, DEPRESSED OR HOPELESS: NEARLY EVERY DAY
SUM OF ALL RESPONSES TO PHQ QUESTIONS 1-9: 18
SUM OF ALL RESPONSES TO PHQ QUESTIONS 1-9: 18

## 2024-06-07 ENCOUNTER — OFFICE VISIT (OUTPATIENT)
Dept: PRIMARY CARE CLINIC | Age: 59
End: 2024-06-07

## 2024-06-07 VITALS
BODY MASS INDEX: 38.7 KG/M2 | HEART RATE: 77 BPM | OXYGEN SATURATION: 95 % | SYSTOLIC BLOOD PRESSURE: 136 MMHG | WEIGHT: 261.3 LBS | DIASTOLIC BLOOD PRESSURE: 70 MMHG | HEIGHT: 69 IN

## 2024-06-07 DIAGNOSIS — M40.205 KYPHOSIS OF THORACOLUMBAR REGION, UNSPECIFIED KYPHOSIS TYPE: ICD-10-CM

## 2024-06-07 DIAGNOSIS — F43.21 ADJUSTMENT DISORDER WITH DEPRESSED MOOD: ICD-10-CM

## 2024-06-07 DIAGNOSIS — F51.01 PRIMARY INSOMNIA: ICD-10-CM

## 2024-06-07 DIAGNOSIS — M48.10 DIFFUSE IDIOPATHIC SKELETAL HYPEROSTOSIS: ICD-10-CM

## 2024-06-07 DIAGNOSIS — Z00.00 MEDICARE ANNUAL WELLNESS VISIT, SUBSEQUENT: Primary | ICD-10-CM

## 2024-06-07 PROBLEM — L82.1 SEBORRHEIC KERATOSIS: Status: RESOLVED | Noted: 2019-01-15 | Resolved: 2024-06-07

## 2024-06-07 RX ORDER — TRAZODONE HYDROCHLORIDE 50 MG/1
50 TABLET ORAL NIGHTLY
Qty: 90 TABLET | Refills: 3 | Status: SHIPPED | OUTPATIENT
Start: 2024-06-07

## 2024-06-07 RX ORDER — CITALOPRAM 20 MG/1
20 TABLET ORAL DAILY
Qty: 90 TABLET | Refills: 0 | Status: SHIPPED | OUTPATIENT
Start: 2024-06-07

## 2024-06-07 NOTE — PROGRESS NOTES
Medicare Annual Wellness Visit    Ramakrishna Gamboa is here for Medicare AWV    Assessment & Plan   Medicare annual wellness visit, subsequent  Adjustment disorder with depressed mood  -     citalopram (CELEXA) 20 MG tablet; Take 1 tablet by mouth daily, Disp-90 tablet, R-0Normal  Primary insomnia  -     traZODone (DESYREL) 50 MG tablet; Take 1 tablet by mouth nightly, Disp-90 tablet, R-3Normal  Diffuse idiopathic skeletal hyperostosis  -     metoprolol tartrate (LOPRESSOR) 25 MG tablet; TAKE 1/2 TABLET BY MOUTH TWO TIMES A DAY, Disp-90 tablet, R-3Normal  Kyphosis of thoracolumbar region, unspecified kyphosis type  -     metoprolol tartrate (LOPRESSOR) 25 MG tablet; TAKE 1/2 TABLET BY MOUTH TWO TIMES A DAY, Disp-90 tablet, R-3Normal    Recommendations for Preventive Services Due: see orders and patient instructions/AVS.  Recommended screening schedule for the next 5-10 years is provided to the patient in written form: see Patient Instructions/AVS.     Return in about 6 months (around 12/7/2024).     Subjective   The following acute and/or chronic problems were also addressed today:  Copd- stable  Lumbar spondylosis  Olgives syndrome     Patient's complete Health Risk Assessment and screening values have been reviewed and are found in Flowsheets. The following problems were reviewed today and where indicated follow up appointments were made and/or referrals ordered.    Positive Risk Factor Screenings with Interventions:    Fall Risk:  Do you feel unsteady or are you worried about falling? : (!) yes  2 or more falls in past year?: no  Fall with injury in past year?: (!) yes     Interventions:    Reviewed medications, home hazards, visual acuity, and co-morbidities that can increase risk for falls     Depression:  PHQ-2 Score: 6  PHQ-9 Total Score: 18    Interpretation:  5-9 mild   10-14 moderate   15-19 moderately severe   20-27 severe   Interventions:  Patient declines any further evaluation or treatment

## 2024-06-12 ENCOUNTER — TELEPHONE (OUTPATIENT)
Dept: PAIN MANAGEMENT | Age: 59
End: 2024-06-12

## 2024-06-12 NOTE — TELEPHONE ENCOUNTER
Patient called in regards to procedure RFA that was done on 4/17/2024, patient stated pain has come back. There is no increase of pain, patient stated pain is right back to what it was before. Please advise.

## 2024-07-02 ENCOUNTER — HOSPITAL ENCOUNTER (OUTPATIENT)
Age: 59
Discharge: HOME | End: 2024-07-04
Payer: MEDICARE

## 2024-07-02 ENCOUNTER — HOSPITAL ENCOUNTER (OUTPATIENT)
Dept: GENERAL RADIOLOGY | Age: 59
Discharge: HOME | End: 2024-07-04
Payer: MEDICARE

## 2024-07-02 DIAGNOSIS — R19.7 DIARRHEA, UNSPECIFIED TYPE: ICD-10-CM

## 2024-07-02 DIAGNOSIS — K63.89 COLON DISTENTION: ICD-10-CM

## 2024-07-02 PROCEDURE — 74018 RADEX ABDOMEN 1 VIEW: CPT

## 2024-07-04 LAB
C DIFFICILE TOXINS, PCR: NORMAL
SPECIMEN DESCRIPTION: NORMAL

## 2024-07-05 DIAGNOSIS — G25.0 BENIGN ESSENTIAL TREMOR SYNDROME: ICD-10-CM

## 2024-07-05 RX ORDER — PRIMIDONE 50 MG/1
50 TABLET ORAL 3 TIMES DAILY
Qty: 270 TABLET | Refills: 0 | Status: SHIPPED | OUTPATIENT
Start: 2024-07-05

## 2024-07-05 NOTE — TELEPHONE ENCOUNTER
LAST VISIT:   5/6/2024     Future Appointments   Date Time Provider Department Center   7/16/2024 10:00 AM Misael Danielle MD [facility]   11/6/2024  1:00 PM Temo Lopez MD [facility]   12/13/2024 10:00 AM [facility]       Pharmacy verified? Yes    [id number] - [place], [place] - [address] [facility] [phone] - F [phone]  [address] [place] [address]  Phone: [phone] Fax: [phone]

## 2024-07-16 ENCOUNTER — OFFICE VISIT (OUTPATIENT)
Dept: PAIN MANAGEMENT | Age: 59
End: 2024-07-16
Payer: MEDICARE

## 2024-07-16 VITALS — HEART RATE: 85 BPM | WEIGHT: 261 LBS | HEIGHT: 69 IN | OXYGEN SATURATION: 96 % | BODY MASS INDEX: 38.66 KG/M2

## 2024-07-16 DIAGNOSIS — M40.205 KYPHOSIS OF THORACOLUMBAR REGION, UNSPECIFIED KYPHOSIS TYPE: ICD-10-CM

## 2024-07-16 DIAGNOSIS — M46.1 BILATERAL SACROILIITIS (HCC): Primary | ICD-10-CM

## 2024-07-16 DIAGNOSIS — M48.10 DIFFUSE IDIOPATHIC SKELETAL HYPEROSTOSIS: ICD-10-CM

## 2024-07-16 DIAGNOSIS — Z98.1 HISTORY OF LUMBAR FUSION: ICD-10-CM

## 2024-07-16 DIAGNOSIS — M47.817 LUMBOSACRAL SPONDYLOSIS WITHOUT MYELOPATHY: ICD-10-CM

## 2024-07-16 PROCEDURE — 99214 OFFICE O/P EST MOD 30 MIN: CPT | Performed by: ANESTHESIOLOGY

## 2024-07-16 ASSESSMENT — ENCOUNTER SYMPTOMS
VOMITING: 0
CONSTIPATION: 0
BACK PAIN: 1
NAUSEA: 0
SHORTNESS OF BREATH: 1
DIARRHEA: 1
COUGH: 0

## 2024-07-16 NOTE — PROGRESS NOTES
The patient is a 58 y.o.Non- / non  male.    Chief Complaint   Patient presents with    Back Pain      58-year-old man with history of chronic lower lumbosacral area pain  History of lumbosacral scoliosis  Interval thoracolumbar spine surgery also have L5-S1 fusion  For lumbar area pain he was diagnosed with lumbar spondylosis had lumbar L2-3-4 radiofrequency ablation with 80% plus improvement in range of motion and activity tolerance and pain  Today  Main issue is located pain in the sacral joint on both sides  Describes it as sharp pain aggravated with standing and walking  Pain interferes with quality of life  On clinical examination  Patient Joe's finger positive, Sim's test positive  Gaenslen's test positive  Pelvic compression reproduced the pain    Clinical presentation suggest SI joint mediated pain  Risk factor include previous L5-S1 fusion  Discussed bilateral SI joint injection  Patient has tried NSAIDs and physical therapy in past    Patient is here today for: back pain  Pain level: 8  Character: ACHING,BURNING, STABBING  Radiating: NO   Weakness or numbness: NO  Aggravating Factors: STANDING,WALKING  Alleviating Factors: LAYING DOWN  Constant or intermitting: INTERMIT  Bladder/bowel loss: NO         Past Medical History:   Diagnosis Date    ADHD (attention deficit hyperactivity disorder)     Angular cheilitis     Anticoagulant long-term use     Anxiety     Arthritis     BACK AND FINGERS     Bilateral lower extremity edema 03/2021    started on Lasix per PCP    Chronic back pain     [facility], scheduled for OR 7/12/2021 at [facility]    COPD (chronic obstructive pulmonary disease) ([facility])     COVID-19 12/2022    cold symptoms    Depression     Dizziness     Eczema     Ex-smoker     quit in 2006, smoked for 23 years    Floaters in visual field, bilateral     GERD (gastroesophageal reflux disease)     H/O chest pain     H/O dermatitis     History of blood

## 2024-07-16 NOTE — H&P (VIEW-ONLY)
Pulse 85, height 1.753 m (5' 9\"), weight 118.4 kg (261 lb), SpO2 96 %.  Vital signs are normal.  No fever.    Output: Producing urine and producing stool.    HEENT: Normal HEENT exam.    Lungs:  Normal effort and normal respiratory rate.  He is not in respiratory distress.    Heart: Normal rate.    Extremities: Normal range of motion.  There is no deformity.    Neurological: Patient is alert and oriented to person, place and time.  Normal strength.  Patient has normal coordination.    Pupils:  Pupils are equal, round, and reactive to light.  Pupils are equal.   Skin:  Warm and dry.  No rash or cyanosis.     Assessment & Plan  1. Bilateral sacroiliitis (HCC)    2. Diffuse idiopathic skeletal hyperostosis    3. Lumbosacral spondylosis without myelopathy    4. Kyphosis of thoracolumbar region, unspecified kyphosis type    5. History of lumbar fusion        Orders Placed This Encounter   Procedures    DE INJECT SI JOINT ARTHRGRPHY&/ANES/STEROID W/VERO      No orders of the defined types were placed in this encounter.           Electronically signed by Misael Danielle MD on 7/16/2024 at 11:07 AM

## 2024-08-14 ENCOUNTER — HOSPITAL ENCOUNTER (OUTPATIENT)
Dept: PAIN MANAGEMENT | Facility: CLINIC | Age: 59
Discharge: HOME | End: 2024-08-14
Payer: MEDICARE

## 2024-08-14 VITALS
HEART RATE: 62 BPM | SYSTOLIC BLOOD PRESSURE: 115 MMHG | TEMPERATURE: 96.9 F | OXYGEN SATURATION: 94 % | RESPIRATION RATE: 10 BRPM | WEIGHT: 260 LBS | DIASTOLIC BLOOD PRESSURE: 62 MMHG | HEIGHT: 69 IN | BODY MASS INDEX: 38.51 KG/M2

## 2024-08-14 DIAGNOSIS — R52 PAIN MANAGEMENT: ICD-10-CM

## 2024-08-14 DIAGNOSIS — M46.1 BILATERAL SACROILIITIS (HCC): Primary | ICD-10-CM

## 2024-08-14 PROCEDURE — 6360000002 HC RX W HCPCS: Performed by: ANESTHESIOLOGY

## 2024-08-14 PROCEDURE — 6360000004 HC RX CONTRAST MEDICATION: Performed by: ANESTHESIOLOGY

## 2024-08-14 PROCEDURE — G0260 INJ FOR SACROILIAC JT ANESTH: HCPCS

## 2024-08-14 PROCEDURE — 2500000003 HC RX 250 WO HCPCS: Performed by: ANESTHESIOLOGY

## 2024-08-14 RX ORDER — FENTANYL CITRATE 50 UG/ML
INJECTION, SOLUTION INTRAMUSCULAR; INTRAVENOUS
Status: COMPLETED | OUTPATIENT
Start: 2024-08-14 | End: 2024-08-14

## 2024-08-14 RX ORDER — DEXAMETHASONE SODIUM PHOSPHATE 10 MG/ML
INJECTION, SOLUTION INTRAMUSCULAR; INTRAVENOUS
Status: COMPLETED | OUTPATIENT
Start: 2024-08-14 | End: 2024-08-14

## 2024-08-14 RX ORDER — MIDAZOLAM HYDROCHLORIDE 2 MG/2ML
INJECTION, SOLUTION INTRAMUSCULAR; INTRAVENOUS
Status: COMPLETED | OUTPATIENT
Start: 2024-08-14 | End: 2024-08-14

## 2024-08-14 RX ORDER — BUPIVACAINE HYDROCHLORIDE 5 MG/ML
INJECTION, SOLUTION EPIDURAL; INTRACAUDAL
Status: COMPLETED | OUTPATIENT
Start: 2024-08-14 | End: 2024-08-14

## 2024-08-14 RX ORDER — LIDOCAINE HYDROCHLORIDE 10 MG/ML
INJECTION, SOLUTION EPIDURAL; INFILTRATION; INTRACAUDAL; PERINEURAL
Status: COMPLETED | OUTPATIENT
Start: 2024-08-14 | End: 2024-08-14

## 2024-08-14 RX ADMIN — BUPIVACAINE HYDROCHLORIDE 5 ML: 5 INJECTION, SOLUTION EPIDURAL; INTRACAUDAL; PERINEURAL at 11:46

## 2024-08-14 RX ADMIN — LIDOCAINE HYDROCHLORIDE 5 ML: 10 INJECTION, SOLUTION EPIDURAL; INFILTRATION; INTRACAUDAL at 11:45

## 2024-08-14 RX ADMIN — MIDAZOLAM HYDROCHLORIDE 1 MG: 1 INJECTION, SOLUTION INTRAMUSCULAR; INTRAVENOUS at 11:54

## 2024-08-14 RX ADMIN — DEXAMETHASONE SODIUM PHOSPHATE 10 MG: 10 INJECTION, SOLUTION INTRAMUSCULAR; INTRAVENOUS at 11:46

## 2024-08-14 RX ADMIN — FENTANYL CITRATE 50 MCG: 50 INJECTION, SOLUTION INTRAMUSCULAR; INTRAVENOUS at 11:45

## 2024-08-14 RX ADMIN — MIDAZOLAM HYDROCHLORIDE 1 MG: 1 INJECTION, SOLUTION INTRAMUSCULAR; INTRAVENOUS at 11:45

## 2024-08-14 RX ADMIN — FENTANYL CITRATE 50 MCG: 50 INJECTION, SOLUTION INTRAMUSCULAR; INTRAVENOUS at 11:54

## 2024-08-14 RX ADMIN — IOHEXOL 3 ML: 180 INJECTION INTRAVENOUS at 11:45

## 2024-08-14 ASSESSMENT — PAIN - FUNCTIONAL ASSESSMENT
PAIN_FUNCTIONAL_ASSESSMENT: 0-10
PAIN_FUNCTIONAL_ASSESSMENT: 0-10

## 2024-08-14 ASSESSMENT — PAIN DESCRIPTION - DESCRIPTORS: DESCRIPTORS: BURNING;OTHER (COMMENT)

## 2024-08-14 NOTE — OP NOTE
Pre Op Diagnoses: BilateralSacroiliac joint pain  Post Op Diagnoses:Bilateral Sacroiliac joint pain     Procedure: BilateralSI joint steroid injection with flouro guidance     Blood Loss: None  Procedure:      The Patient was seen in the preop area, chart was reviewed, informed consent was obtained. Patient was taken to procedure room and was placed in prone position. Vital signs were monitored through out the Procedure. A time out was completed.  The skin over the back was prepped and draped in sterile manner.      The target point was marked at the left SI joint. Skin and deep tissues were anesthetized with 1 % lidocaine. A 22 G spinal needlele was advanced under fluoroscopy guidance in AP view. Positon was confirmed by injecting small amount of contrast dye  Finally 3 ml of treatment solution containing 5 ml of 0.5 % Bupivacaine and 1 ml of Dexamethasone 10 mg was injected  The needle was removed and a Band-Aid was placed over the needle insertion site.    The same procedure was then repeated on the other side with same technique, the remaining 3 ml of treatment solution was injected on that side.    The patient's vital signs remained stable and the patient tolerated the procedure well.      SEDATION NOTE:    ASA CLASSIFICATION  3  MP   CLASSIFICATION  3    Moderate intravenous conscious sedation was supervised by Dr. Danielle  The patient was independently monitored by a Registered Nurse assigned to the Procedure Room  Monitoring included automated blood pressure, continuous EKG, Capnography and continuous pulse oximetry.   The detailed Conscious Record is permanently stored in the Hospital Information System.     The following is the conscious sedation record;  Start Time:  1144  End times:  1158  Duration:  14 minutes  MEDS GIVEN 2 MG VERSED  [facility] FENTANYL

## 2024-08-14 NOTE — INTERVAL H&P NOTE
Update History & Physical    The patient's History and Physical of July 16, 2024 was reviewed with the patient and I examined the patient. There was no change. The surgical site was confirmed by the patient and me.     Plan: The risks, benefits, expected outcome, and alternative to the recommended procedure have been discussed with the patient. Patient understands and wants to proceed with the procedure.   ASA 3  MP 3      Electronically signed by Misael Danielle MD on 8/14/2024 at 11:42 AM

## 2024-08-14 NOTE — DISCHARGE INSTRUCTIONS

## 2024-08-23 DIAGNOSIS — R60.0 LOCALIZED EDEMA: ICD-10-CM

## 2024-08-23 RX ORDER — FUROSEMIDE 40 MG/1
40 TABLET ORAL DAILY
Qty: 90 TABLET | Refills: 0 | Status: SHIPPED | OUTPATIENT
Start: 2024-08-23

## 2024-08-23 NOTE — TELEPHONE ENCOUNTER
LAST VISIT:   5/6/2024     Future Appointments   Date Time Provider Department Center   9/10/2024 10:30 AM Misael Danielle MD [facility]   11/6/2024  1:00 PM Temo Lopez MD [facility]   12/13/2024 10:00 AM Temo Lopez MD [facility]       Pharmacy verified? Yes    [id number] - [place], [place] - [address] [facility] [phone] - F [phone]  [address] [place] [address]  Phone: [phone] Fax: [phone]

## 2024-09-10 ENCOUNTER — OFFICE VISIT (OUTPATIENT)
Dept: PAIN MANAGEMENT | Age: 59
End: 2024-09-10
Payer: MEDICARE

## 2024-09-10 VITALS — HEIGHT: 69 IN | BODY MASS INDEX: 38.51 KG/M2 | HEART RATE: 62 BPM | OXYGEN SATURATION: 94 % | WEIGHT: 260 LBS

## 2024-09-10 DIAGNOSIS — Z98.1 S/P FUSION OF THORACIC SPINE: ICD-10-CM

## 2024-09-10 DIAGNOSIS — M46.1 BILATERAL SACROILIITIS (HCC): Primary | ICD-10-CM

## 2024-09-10 DIAGNOSIS — Z98.1 HISTORY OF LUMBAR FUSION: ICD-10-CM

## 2024-09-10 DIAGNOSIS — M47.817 LUMBOSACRAL SPONDYLOSIS WITHOUT MYELOPATHY: ICD-10-CM

## 2024-09-10 PROCEDURE — 99212 OFFICE O/P EST SF 10 MIN: CPT | Performed by: ANESTHESIOLOGY

## 2024-09-10 ASSESSMENT — ENCOUNTER SYMPTOMS
DIARRHEA: 0
NAUSEA: 0
CONSTIPATION: 0
SHORTNESS OF BREATH: 1
BACK PAIN: 1
VOMITING: 0
COUGH: 0

## 2024-09-12 LAB
Lab: NEGATIVE

## 2024-09-13 ENCOUNTER — HOSPITAL ENCOUNTER (OUTPATIENT)
Age: 59
Setting detail: SPECIMEN
Discharge: HOME | End: 2024-09-13

## 2024-09-16 LAB
C DIFFICILE TOXINS, PCR: NORMAL
SPECIMEN DESCRIPTION: NORMAL

## 2024-10-04 DIAGNOSIS — G25.0 BENIGN ESSENTIAL TREMOR SYNDROME: ICD-10-CM

## 2024-10-07 RX ORDER — PRIMIDONE 50 MG/1
50 TABLET ORAL 3 TIMES DAILY
Qty: 270 TABLET | Refills: 0 | Status: SHIPPED | OUTPATIENT
Start: 2024-10-07

## 2024-10-18 DIAGNOSIS — F43.21 ADJUSTMENT DISORDER WITH DEPRESSED MOOD: ICD-10-CM

## 2024-10-21 RX ORDER — CITALOPRAM HYDROBROMIDE 20 MG/1
20 TABLET ORAL DAILY
Qty: 90 TABLET | Refills: 0 | Status: ON HOLD | OUTPATIENT
Start: 2024-10-21

## 2024-10-27 ENCOUNTER — HOSPITAL ENCOUNTER (INPATIENT)
Age: 59
LOS: 4 days | Discharge: HOME | End: 2024-10-31
Attending: EMERGENCY MEDICINE | Admitting: FAMILY MEDICINE
Payer: MEDICARE

## 2024-10-27 ENCOUNTER — APPOINTMENT (OUTPATIENT)
Dept: CT IMAGING | Age: 59
End: 2024-10-27
Payer: MEDICARE

## 2024-10-27 DIAGNOSIS — R10.9 ABDOMINAL PAIN, UNSPECIFIED ABDOMINAL LOCATION: Primary | ICD-10-CM

## 2024-10-27 DIAGNOSIS — R14.0 ABDOMINAL DISTENSION: ICD-10-CM

## 2024-10-27 LAB
ALBUMIN SERPL-MCNC: 4.1 G/DL (ref 3.5–5.2)
ALP SERPL-CCNC: 100 U/L (ref 40–129)
ALT SERPL-CCNC: 13 U/L (ref 5–41)
ANION GAP SERPL CALCULATED.3IONS-SCNC: 8 MMOL/L (ref 9–17)
AST SERPL-CCNC: 18 U/L
BASOPHILS # BLD: 0.05 K/UL (ref 0–0.2)
BASOPHILS NFR BLD: 1 % (ref 0–2)
BILIRUB DIRECT SERPL-MCNC: <0.1 MG/DL
BILIRUB INDIRECT SERPL-MCNC: ABNORMAL MG/DL (ref 0–1)
BILIRUB SERPL-MCNC: 0.2 MG/DL (ref 0.3–1.2)
BILIRUB UR QL STRIP: NEGATIVE
BUN SERPL-MCNC: 13 MG/DL (ref 8–23)
BUN/CREAT SERPL: 26 (ref 9–20)
CALCIUM SERPL-MCNC: 8.9 MG/DL (ref 8.6–10.4)
CHLORIDE SERPL-SCNC: 99 MMOL/L (ref 98–107)
CLARITY UR: CLEAR
CO2 SERPL-SCNC: 27 MMOL/L (ref 20–31)
COLOR UR: YELLOW
CREAT SERPL-MCNC: 0.5 MG/DL (ref 0.7–1.2)
EOSINOPHIL # BLD: 0.19 K/UL (ref 0–0.44)
EOSINOPHILS RELATIVE PERCENT: 3 % (ref 1–4)
EPI CELLS #/AREA URNS HPF: NORMAL /HPF (ref 0–5)
ERYTHROCYTE [DISTWIDTH] IN BLOOD BY AUTOMATED COUNT: 12.9 % (ref 11.8–14.4)
GFR, ESTIMATED: >90 ML/MIN/1.73M2
GLUCOSE SERPL-MCNC: 114 MG/DL (ref 70–99)
GLUCOSE UR STRIP-MCNC: NEGATIVE MG/DL
HCT VFR BLD AUTO: 42.2 % (ref 40.7–50.3)
HGB BLD-MCNC: 14 G/DL (ref 13–17)
HGB UR QL STRIP.AUTO: NEGATIVE
IMM GRANULOCYTES # BLD AUTO: 0.02 K/UL (ref 0–0.3)
IMM GRANULOCYTES NFR BLD: 0 %
KETONES UR STRIP-MCNC: NEGATIVE MG/DL
LACTATE BLDV-SCNC: 1 MMOL/L (ref 0.5–2.2)
LEUKOCYTE ESTERASE UR QL STRIP: NEGATIVE
LIPASE SERPL-CCNC: 28 U/L (ref 13–60)
LYMPHOCYTES NFR BLD: 1.33 K/UL (ref 1.1–3.7)
LYMPHOCYTES RELATIVE PERCENT: 17 % (ref 24–43)
MCH RBC QN AUTO: 30 PG (ref 25.2–33.5)
MCHC RBC AUTO-ENTMCNC: 33.2 G/DL (ref 28.4–34.8)
MCV RBC AUTO: 90.4 FL (ref 82.6–102.9)
MONOCYTES NFR BLD: 0.6 K/UL (ref 0.1–1.2)
MONOCYTES NFR BLD: 8 % (ref 3–12)
NEUTROPHILS NFR BLD: 71 % (ref 36–65)
NEUTS SEG NFR BLD: 5.5 K/UL (ref 1.5–8.1)
NITRITE UR QL STRIP: NEGATIVE
NRBC BLD-RTO: 0 PER 100 WBC
PH UR STRIP: 6 [PH] (ref 5–8)
PLATELET # BLD AUTO: 184 K/UL (ref 138–453)
PMV BLD AUTO: 9.2 FL (ref 8.1–13.5)
POTASSIUM SERPL-SCNC: 4.7 MMOL/L (ref 3.7–5.3)
PROT SERPL-MCNC: 7.5 G/DL (ref 6.4–8.3)
PROT UR STRIP-MCNC: NEGATIVE MG/DL
RBC # BLD AUTO: 4.67 M/UL (ref 4.21–5.77)
RBC #/AREA URNS HPF: NORMAL /HPF (ref 0–2)
SODIUM SERPL-SCNC: 134 MMOL/L (ref 135–144)
SP GR UR STRIP: 1.01 (ref 1–1.03)
UROBILINOGEN UR STRIP-ACNC: NORMAL EU/DL (ref 0–1)
WBC #/AREA URNS HPF: NORMAL /HPF (ref 0–5)
WBC OTHER # BLD: 7.7 K/UL (ref 3.5–11.3)

## 2024-10-27 PROCEDURE — 99285 EMERGENCY DEPT VISIT HI MDM: CPT

## 2024-10-27 PROCEDURE — 80048 BASIC METABOLIC PNL TOTAL CA: CPT

## 2024-10-27 PROCEDURE — 6360000002 HC RX W HCPCS: Performed by: NURSE PRACTITIONER

## 2024-10-27 PROCEDURE — 83690 ASSAY OF LIPASE: CPT

## 2024-10-27 PROCEDURE — 96375 TX/PRO/DX INJ NEW DRUG ADDON: CPT

## 2024-10-27 PROCEDURE — 6370000000 HC RX 637 (ALT 250 FOR IP): Performed by: FAMILY MEDICINE

## 2024-10-27 PROCEDURE — 2580000003 HC RX 258: Performed by: NURSE PRACTITIONER

## 2024-10-27 PROCEDURE — 81001 URINALYSIS AUTO W/SCOPE: CPT

## 2024-10-27 PROCEDURE — 6360000002 HC RX W HCPCS: Performed by: FAMILY MEDICINE

## 2024-10-27 PROCEDURE — 83605 ASSAY OF LACTIC ACID: CPT

## 2024-10-27 PROCEDURE — 74177 CT ABD & PELVIS W/CONTRAST: CPT

## 2024-10-27 PROCEDURE — 96374 THER/PROPH/DIAG INJ IV PUSH: CPT

## 2024-10-27 PROCEDURE — 1200000000 HC SEMI PRIVATE

## 2024-10-27 PROCEDURE — 6360000004 HC RX CONTRAST MEDICATION: Performed by: NURSE PRACTITIONER

## 2024-10-27 PROCEDURE — 80076 HEPATIC FUNCTION PANEL: CPT

## 2024-10-27 PROCEDURE — 85025 COMPLETE CBC W/AUTO DIFF WBC: CPT

## 2024-10-27 RX ORDER — SODIUM CHLORIDE 9 MG/ML
INJECTION, SOLUTION INTRAVENOUS PRN
Status: DISCONTINUED | OUTPATIENT
Start: 2024-10-27 | End: 2024-10-31 | Stop reason: HOSPADM

## 2024-10-27 RX ORDER — LANOLIN ALCOHOL/MO/W.PET/CERES
3 CREAM (GRAM) TOPICAL NIGHTLY
Status: DISCONTINUED | OUTPATIENT
Start: 2024-10-27 | End: 2024-10-31 | Stop reason: HOSPADM

## 2024-10-27 RX ORDER — 0.9 % SODIUM CHLORIDE 0.9 %
100 INTRAVENOUS SOLUTION INTRAVENOUS ONCE
Status: COMPLETED | OUTPATIENT
Start: 2024-10-27 | End: 2024-10-27

## 2024-10-27 RX ORDER — PANTOPRAZOLE SODIUM 40 MG/1
40 TABLET, DELAYED RELEASE ORAL NIGHTLY
Status: DISCONTINUED | OUTPATIENT
Start: 2024-10-27 | End: 2024-10-31 | Stop reason: HOSPADM

## 2024-10-27 RX ORDER — SODIUM CHLORIDE 0.9 % (FLUSH) 0.9 %
5-40 SYRINGE (ML) INJECTION EVERY 12 HOURS SCHEDULED
Status: DISCONTINUED | OUTPATIENT
Start: 2024-10-27 | End: 2024-10-31 | Stop reason: HOSPADM

## 2024-10-27 RX ORDER — ONDANSETRON 4 MG/1
4 TABLET, ORALLY DISINTEGRATING ORAL EVERY 8 HOURS PRN
Status: DISCONTINUED | OUTPATIENT
Start: 2024-10-27 | End: 2024-10-31 | Stop reason: HOSPADM

## 2024-10-27 RX ORDER — FENTANYL CITRATE 0.05 MG/ML
25 INJECTION, SOLUTION INTRAMUSCULAR; INTRAVENOUS ONCE
Status: COMPLETED | OUTPATIENT
Start: 2024-10-27 | End: 2024-10-27

## 2024-10-27 RX ORDER — CETIRIZINE HYDROCHLORIDE 10 MG/1
10 TABLET ORAL DAILY
Status: DISCONTINUED | OUTPATIENT
Start: 2024-10-28 | End: 2024-10-31 | Stop reason: HOSPADM

## 2024-10-27 RX ORDER — ACETAMINOPHEN 325 MG/1
650 TABLET ORAL EVERY 4 HOURS PRN
Status: DISCONTINUED | OUTPATIENT
Start: 2024-10-27 | End: 2024-10-31 | Stop reason: HOSPADM

## 2024-10-27 RX ORDER — MORPHINE SULFATE 2 MG/ML
2 INJECTION, SOLUTION INTRAMUSCULAR; INTRAVENOUS EVERY 4 HOURS PRN
Status: DISCONTINUED | OUTPATIENT
Start: 2024-10-27 | End: 2024-10-29

## 2024-10-27 RX ORDER — TRAZODONE HYDROCHLORIDE 50 MG/1
50 TABLET, FILM COATED ORAL NIGHTLY
Status: DISCONTINUED | OUTPATIENT
Start: 2024-10-27 | End: 2024-10-31 | Stop reason: HOSPADM

## 2024-10-27 RX ORDER — SODIUM CHLORIDE 0.9 % (FLUSH) 0.9 %
10 SYRINGE (ML) INJECTION PRN
Status: DISCONTINUED | OUTPATIENT
Start: 2024-10-27 | End: 2024-10-31 | Stop reason: HOSPADM

## 2024-10-27 RX ORDER — SODIUM CHLORIDE 0.9 % (FLUSH) 0.9 %
5-40 SYRINGE (ML) INJECTION PRN
Status: DISCONTINUED | OUTPATIENT
Start: 2024-10-27 | End: 2024-10-31 | Stop reason: HOSPADM

## 2024-10-27 RX ORDER — CITALOPRAM HYDROBROMIDE 20 MG/1
20 TABLET ORAL DAILY
Status: DISCONTINUED | OUTPATIENT
Start: 2024-10-28 | End: 2024-10-31 | Stop reason: HOSPADM

## 2024-10-27 RX ORDER — ONDANSETRON 2 MG/ML
4 INJECTION INTRAMUSCULAR; INTRAVENOUS EVERY 6 HOURS PRN
Status: DISCONTINUED | OUTPATIENT
Start: 2024-10-27 | End: 2024-10-31 | Stop reason: HOSPADM

## 2024-10-27 RX ORDER — ONDANSETRON 2 MG/ML
4 INJECTION INTRAMUSCULAR; INTRAVENOUS ONCE
Status: COMPLETED | OUTPATIENT
Start: 2024-10-27 | End: 2024-10-27

## 2024-10-27 RX ORDER — PRIMIDONE 50 MG/1
50 TABLET ORAL 3 TIMES DAILY
Status: DISCONTINUED | OUTPATIENT
Start: 2024-10-27 | End: 2024-10-31 | Stop reason: HOSPADM

## 2024-10-27 RX ORDER — IOPAMIDOL 755 MG/ML
75 INJECTION, SOLUTION INTRAVASCULAR
Status: COMPLETED | OUTPATIENT
Start: 2024-10-27 | End: 2024-10-27

## 2024-10-27 RX ORDER — FUROSEMIDE 40 MG/1
40 TABLET ORAL DAILY
Status: DISCONTINUED | OUTPATIENT
Start: 2024-10-28 | End: 2024-10-31 | Stop reason: HOSPADM

## 2024-10-27 RX ADMIN — FENTANYL CITRATE 25 MCG: 0.05 INJECTION, SOLUTION INTRAMUSCULAR; INTRAVENOUS at 13:32

## 2024-10-27 RX ADMIN — PRIMIDONE 50 MG: 50 TABLET ORAL at 21:03

## 2024-10-27 RX ADMIN — PANTOPRAZOLE SODIUM 40 MG: 40 TABLET, DELAYED RELEASE ORAL at 20:58

## 2024-10-27 RX ADMIN — ONDANSETRON 4 MG: 2 INJECTION, SOLUTION INTRAMUSCULAR; INTRAVENOUS at 13:32

## 2024-10-27 RX ADMIN — Medication 3 MG: at 20:58

## 2024-10-27 RX ADMIN — SODIUM CHLORIDE 100 ML: 9 INJECTION, SOLUTION INTRAVENOUS at 12:14

## 2024-10-27 RX ADMIN — SODIUM CHLORIDE, PRESERVATIVE FREE 10 ML: 5 INJECTION INTRAVENOUS at 20:59

## 2024-10-27 RX ADMIN — MORPHINE SULFATE 2 MG: 2 INJECTION, SOLUTION INTRAMUSCULAR; INTRAVENOUS at 20:59

## 2024-10-27 RX ADMIN — IOPAMIDOL 75 ML: 755 INJECTION, SOLUTION INTRAVENOUS at 12:13

## 2024-10-27 RX ADMIN — Medication 12.5 MG: at 20:58

## 2024-10-27 RX ADMIN — MORPHINE SULFATE 2 MG: 2 INJECTION, SOLUTION INTRAMUSCULAR; INTRAVENOUS at 18:20

## 2024-10-27 RX ADMIN — TRAZODONE HYDROCHLORIDE 50 MG: 50 TABLET ORAL at 20:58

## 2024-10-27 RX ADMIN — SODIUM CHLORIDE, PRESERVATIVE FREE 10 ML: 5 INJECTION INTRAVENOUS at 12:13

## 2024-10-27 ASSESSMENT — PAIN SCALES - GENERAL
PAINLEVEL_OUTOF10: 7
PAINLEVEL_OUTOF10: 10
PAINLEVEL_OUTOF10: 8
PAINLEVEL_OUTOF10: 7
PAINLEVEL_OUTOF10: 6
PAINLEVEL_OUTOF10: 7

## 2024-10-27 ASSESSMENT — PAIN DESCRIPTION - ONSET: ONSET: ON-GOING

## 2024-10-27 ASSESSMENT — PAIN DESCRIPTION - ORIENTATION
ORIENTATION: LEFT;LOWER;MID
ORIENTATION: LOWER

## 2024-10-27 ASSESSMENT — PAIN DESCRIPTION - LOCATION
LOCATION: ABDOMEN
LOCATION: ABDOMEN

## 2024-10-27 ASSESSMENT — PAIN DESCRIPTION - PAIN TYPE: TYPE: ACUTE PAIN

## 2024-10-27 ASSESSMENT — PAIN - FUNCTIONAL ASSESSMENT
PAIN_FUNCTIONAL_ASSESSMENT: 0-10
PAIN_FUNCTIONAL_ASSESSMENT: PREVENTS OR INTERFERES SOME ACTIVE ACTIVITIES AND ADLS

## 2024-10-27 ASSESSMENT — PAIN DESCRIPTION - FREQUENCY: FREQUENCY: CONTINUOUS

## 2024-10-27 ASSESSMENT — PAIN DESCRIPTION - DESCRIPTORS
DESCRIPTORS: SQUEEZING;TIGHTNESS
DESCRIPTORS: ACHING

## 2024-10-27 NOTE — ED PROVIDER NOTES
[facility] ED  eMERGENCY dEPARTMENT eNCOUnter      Pt Name: Ramakrishna Gamboa  MRN: [id number]  Birthdate 1965  Date of evaluation: 10/27/2024  Provider: JYOTSNA Ventura CNP    CHIEF COMPLAINT       Chief Complaint   Patient presents with    Abdominal Pain     Cramping boat, pseudo bowel obstruction         HISTORY OF PRESENT ILLNESS  (Location/Symptom, Timing/Onset, Context/Setting, Quality, Duration, Modifying Factors, Severity.)   Ramakrishna Gamboa is a 59 y.o. male who presents to the emergency department. C/o abd pain, distention. Onset was 10/24/24. States he is not passing gas. 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 Denies fever, chills, injury, emesis, urinary sx. Reports he has had loose stools. Rates his pain 6/10. He was sent by [facility].      Nursing Notes were reviewed.    ALLERGIES     Cetyl alcohol, Metronidazole, Bacitracin-polymyxin b, Adhesive tape, Duloxetine hcl, and Neosporin [neomycin-polymyxin-gramicidin]    CURRENT MEDICATIONS       Previous Medications    ACETAMINOPHEN (TYLENOL ARTHRITIS PAIN PO)    Take 1 tablet by mouth every 6 hours as needed    ASPIRIN 81 MG EC TABLET    Take 1 tablet by mouth daily    CHOLESTYRAMINE (QUESTRAN) 4 G PACKET    Take 1 packet by mouth daily    CICLOPIROX (LOPROX) 0.77 % CREAM    Apply to rash in groin twice daily    CITALOPRAM (CELEXA) 20 MG TABLET    Take 1 tablet by mouth daily    FUROSEMIDE (LASIX) 40 MG TABLET    TAKE 1 TABLET BY MOUTH EVERY DAY    HYDROCORTISONE (WESTCORT) 0.2 % CREAM    Apply topically 2 times daily as needed (rash)    LORATADINE (CLARITIN) 10 MG TABLET    Take 1 tablet by mouth daily    MAGNESIUM CL-CALCIUM CARBONATE (SLOW-MAG PO)    Take 1 tablet by mouth 2 times daily    MELATONIN 3 MG TABS TABLET    Take 10 mg by mouth daily    METOPROLOL TARTRATE (LOPRESSOR) 25 MG TABLET    TAKE 1/2 TABLET BY MOUTH TWO TIMES A DAY    MULTIPLE VITAMINS-MINERALS (THERAPEUTIC MULTIVITAMIN-MINERALS) TABLET    Take 1 tablet by      Standing Status:   Standing     Number of Occurrences:   1         CLINICAL DECISION MAKING:  The patient presented alert with a nontoxic appearance.    The patient was involved in his plan of care through shared decision making. The testing that was ordered was discussed with the patient. Any medications that may have been ordered were discussed with the patient.     I have reviewed the patient's previous medical records using the electronic health record that we have available that were pertinent to today's visit.      Labs were reviewed.  Imaging was reviewed and reported by the radiologist. Results showed:  1. No acute intra-abdominal or pelvic abnormality. 2. Cholelithiasis without appreciable pericholecystic inflammatory changes.       Findings were discussed. I have discusssed recommendation for admission with the patient and/or family. We have discussed benefits of admission and the risks of discharge home. The patient agrees with the plan of care and admission. The patient will be admitted for further evaluation and treatment.     I have consulted GI and internal medicine. I spoke with Cheryl Flaherty for GI. The patient is able to be on clear liquids today. The patient will be admitted to the [facility] hospitalist. I spoke with Dr. Malone, he agrees to accept the patient for further evaluation and treatment.    Code status: FULL        CONSULTS:  IP CONSULT TO GI  IP CONSULT TO INTERNAL MEDICINE        FINAL IMPRESSION      1. Abdominal pain, unspecified abdominal location    2. Abdominal distension            Problem List  Patient Active Problem List   Diagnosis Code    Anxiety disorder F41.9    Benign essential hypertension I10    Chronic obstructive pulmonary disease, unspecified COPD type ([facility]) J44.9    Esophageal reflux K21.9    Hypogonadism male E29.1    Lumbar radicular pain M54.16    Pulmonary granuloma ([facility]) J84.10    Male erectile disorder N52.9    Sensorineural hearing loss H90.5

## 2024-10-27 NOTE — PROGRESS NOTES
Patient arrived to the floor from ed. Oriented to the room, given the call light and made comfortable. Orders released, VSS. Bed locked and lowered.

## 2024-10-27 NOTE — PROGRESS NOTES
GI aware of consult/patient admission. Will complete full consult tomorrow. Plans for possible flexible sigmoidoscopy with decompression Monday. NPO at midnight and enema prep tomorrow. Clear liquid diet today.     Cheryl Flaherty CNP

## 2024-10-27 NOTE — ED NOTES
Pt to ED with complaints of abdominal distension, diarrhea, and not passing gas. 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 Pt states having diarrhea for several days with no relief. Pt told by gastro to come to [facility]. Pt alert and oriented x4 and well appearing. Wife at bedside

## 2024-10-28 ENCOUNTER — ANESTHESIA EVENT (OUTPATIENT)
Dept: OPERATING ROOM | Age: 59
End: 2024-10-28
Payer: MEDICARE

## 2024-10-28 ENCOUNTER — ANESTHESIA (OUTPATIENT)
Dept: OPERATING ROOM | Age: 59
End: 2024-10-28
Payer: MEDICARE

## 2024-10-28 LAB
ANION GAP SERPL CALCULATED.3IONS-SCNC: 10 MMOL/L (ref 9–17)
BASOPHILS # BLD: 0.03 K/UL (ref 0–0.2)
BASOPHILS NFR BLD: 0 % (ref 0–2)
BUN SERPL-MCNC: 9 MG/DL (ref 8–23)
BUN/CREAT SERPL: 18 (ref 9–20)
CALCIUM SERPL-MCNC: 8.7 MG/DL (ref 8.6–10.4)
CHLORIDE SERPL-SCNC: 98 MMOL/L (ref 98–107)
CO2 SERPL-SCNC: 28 MMOL/L (ref 20–31)
CREAT SERPL-MCNC: 0.5 MG/DL (ref 0.7–1.2)
EOSINOPHIL # BLD: 0.15 K/UL (ref 0–0.44)
EOSINOPHILS RELATIVE PERCENT: 2 % (ref 1–4)
ERYTHROCYTE [DISTWIDTH] IN BLOOD BY AUTOMATED COUNT: 12.9 % (ref 11.8–14.4)
GFR, ESTIMATED: >90 ML/MIN/1.73M2
GLUCOSE SERPL-MCNC: 106 MG/DL (ref 70–99)
HCT VFR BLD AUTO: 40.9 % (ref 40.7–50.3)
HGB BLD-MCNC: 13.4 G/DL (ref 13–17)
IMM GRANULOCYTES # BLD AUTO: 0.02 K/UL (ref 0–0.3)
IMM GRANULOCYTES NFR BLD: 0 %
LYMPHOCYTES NFR BLD: 1.25 K/UL (ref 1.1–3.7)
LYMPHOCYTES RELATIVE PERCENT: 15 % (ref 24–43)
MCH RBC QN AUTO: 30.1 PG (ref 25.2–33.5)
MCHC RBC AUTO-ENTMCNC: 32.8 G/DL (ref 28.4–34.8)
MCV RBC AUTO: 91.9 FL (ref 82.6–102.9)
MONOCYTES NFR BLD: 0.62 K/UL (ref 0.1–1.2)
MONOCYTES NFR BLD: 8 % (ref 3–12)
NEUTROPHILS NFR BLD: 75 % (ref 36–65)
NEUTS SEG NFR BLD: 6.1 K/UL (ref 1.5–8.1)
NRBC BLD-RTO: 0 PER 100 WBC
PLATELET # BLD AUTO: 164 K/UL (ref 138–453)
PMV BLD AUTO: 9.1 FL (ref 8.1–13.5)
POTASSIUM SERPL-SCNC: 4 MMOL/L (ref 3.7–5.3)
RBC # BLD AUTO: 4.45 M/UL (ref 4.21–5.77)
SODIUM SERPL-SCNC: 136 MMOL/L (ref 135–144)
WBC OTHER # BLD: 8.2 K/UL (ref 3.5–11.3)

## 2024-10-28 PROCEDURE — 6370000000 HC RX 637 (ALT 250 FOR IP): Performed by: FAMILY MEDICINE

## 2024-10-28 PROCEDURE — 6360000002 HC RX W HCPCS: Performed by: FAMILY MEDICINE

## 2024-10-28 PROCEDURE — 6360000002 HC RX W HCPCS: Performed by: INTERNAL MEDICINE

## 2024-10-28 PROCEDURE — 2580000003 HC RX 258: Performed by: NURSE PRACTITIONER

## 2024-10-28 PROCEDURE — 0DJD8ZZ INSPECTION OF LOWER INTESTINAL TRACT, VIA NATURAL OR ARTIFICIAL OPENING ENDOSCOPIC: ICD-10-PCS | Performed by: INTERNAL MEDICINE

## 2024-10-28 PROCEDURE — 2500000003 HC RX 250 WO HCPCS: Performed by: NURSE ANESTHETIST, CERTIFIED REGISTERED

## 2024-10-28 PROCEDURE — 2709999900 HC NON-CHARGEABLE SUPPLY: Performed by: INTERNAL MEDICINE

## 2024-10-28 PROCEDURE — 2580000003 HC RX 258: Performed by: INTERNAL MEDICINE

## 2024-10-28 PROCEDURE — 6360000002 HC RX W HCPCS: Performed by: NURSE ANESTHETIST, CERTIFIED REGISTERED

## 2024-10-28 PROCEDURE — 6370000000 HC RX 637 (ALT 250 FOR IP): Performed by: INTERNAL MEDICINE

## 2024-10-28 PROCEDURE — 7100000000 HC PACU RECOVERY - FIRST 15 MIN: Performed by: INTERNAL MEDICINE

## 2024-10-28 PROCEDURE — 1200000000 HC SEMI PRIVATE

## 2024-10-28 PROCEDURE — 3700000000 HC ANESTHESIA ATTENDED CARE: Performed by: INTERNAL MEDICINE

## 2024-10-28 PROCEDURE — 85025 COMPLETE CBC W/AUTO DIFF WBC: CPT

## 2024-10-28 PROCEDURE — 80048 BASIC METABOLIC PNL TOTAL CA: CPT

## 2024-10-28 PROCEDURE — 6360000002 HC RX W HCPCS: Performed by: NURSE PRACTITIONER

## 2024-10-28 PROCEDURE — 2580000003 HC RX 258: Performed by: NURSE ANESTHETIST, CERTIFIED REGISTERED

## 2024-10-28 PROCEDURE — 36415 COLL VENOUS BLD VENIPUNCTURE: CPT

## 2024-10-28 PROCEDURE — 3609023000 HC SIGMOIDOSCOPY W/DECOMPRESSION: Performed by: INTERNAL MEDICINE

## 2024-10-28 PROCEDURE — 7100000001 HC PACU RECOVERY - ADDTL 15 MIN: Performed by: INTERNAL MEDICINE

## 2024-10-28 RX ORDER — SODIUM CHLORIDE 9 MG/ML
INJECTION, SOLUTION INTRAVENOUS
Status: DISCONTINUED | OUTPATIENT
Start: 2024-10-28 | End: 2024-10-28 | Stop reason: SDUPTHER

## 2024-10-28 RX ORDER — PROPOFOL 10 MG/ML
INJECTION, EMULSION INTRAVENOUS
Status: DISCONTINUED | OUTPATIENT
Start: 2024-10-28 | End: 2024-10-28 | Stop reason: SDUPTHER

## 2024-10-28 RX ORDER — LIDOCAINE HYDROCHLORIDE 20 MG/ML
INJECTION, SOLUTION INFILTRATION; PERINEURAL
Status: DISCONTINUED | OUTPATIENT
Start: 2024-10-28 | End: 2024-10-28 | Stop reason: SDUPTHER

## 2024-10-28 RX ORDER — SODIUM CHLORIDE 9 MG/ML
INJECTION, SOLUTION INTRAVENOUS CONTINUOUS
Status: DISCONTINUED | OUTPATIENT
Start: 2024-10-28 | End: 2024-10-29

## 2024-10-28 RX ORDER — DICYCLOMINE HYDROCHLORIDE 10 MG/ML
20 INJECTION INTRAMUSCULAR ONCE
Status: COMPLETED | OUTPATIENT
Start: 2024-10-28 | End: 2024-10-28

## 2024-10-28 RX ADMIN — LIDOCAINE HYDROCHLORIDE 60 MG: 20 INJECTION, SOLUTION INFILTRATION; PERINEURAL at 14:20

## 2024-10-28 RX ADMIN — Medication 12.5 MG: at 21:36

## 2024-10-28 RX ADMIN — MORPHINE SULFATE 2 MG: 2 INJECTION, SOLUTION INTRAMUSCULAR; INTRAVENOUS at 22:33

## 2024-10-28 RX ADMIN — DICYCLOMINE HYDROCHLORIDE 20 MG: 10 INJECTION, SOLUTION INTRAMUSCULAR at 06:46

## 2024-10-28 RX ADMIN — PANTOPRAZOLE SODIUM 40 MG: 40 TABLET, DELAYED RELEASE ORAL at 21:36

## 2024-10-28 RX ADMIN — MORPHINE SULFATE 2 MG: 2 INJECTION, SOLUTION INTRAMUSCULAR; INTRAVENOUS at 04:30

## 2024-10-28 RX ADMIN — CITALOPRAM HYDROBROMIDE 20 MG: 20 TABLET ORAL at 08:45

## 2024-10-28 RX ADMIN — TRAZODONE HYDROCHLORIDE 50 MG: 50 TABLET ORAL at 21:36

## 2024-10-28 RX ADMIN — PROPOFOL 50 MG: 10 INJECTION, EMULSION INTRAVENOUS at 14:20

## 2024-10-28 RX ADMIN — SODIUM CHLORIDE: 9 INJECTION, SOLUTION INTRAVENOUS at 22:35

## 2024-10-28 RX ADMIN — SODIUM CHLORIDE: 9 INJECTION, SOLUTION INTRAVENOUS at 14:15

## 2024-10-28 RX ADMIN — CETIRIZINE HYDROCHLORIDE 10 MG: 10 TABLET, FILM COATED ORAL at 08:45

## 2024-10-28 RX ADMIN — PRIMIDONE 50 MG: 50 TABLET ORAL at 08:45

## 2024-10-28 RX ADMIN — SODIUM CHLORIDE: 9 INJECTION, SOLUTION INTRAVENOUS at 06:28

## 2024-10-28 RX ADMIN — Medication 3 MG: at 21:35

## 2024-10-28 RX ADMIN — FUROSEMIDE 40 MG: 40 TABLET ORAL at 08:45

## 2024-10-28 RX ADMIN — Medication 12.5 MG: at 08:45

## 2024-10-28 RX ADMIN — PROPOFOL 50 MG: 10 INJECTION, EMULSION INTRAVENOUS at 14:25

## 2024-10-28 RX ADMIN — PRIMIDONE 50 MG: 50 TABLET ORAL at 21:36

## 2024-10-28 ASSESSMENT — PAIN - FUNCTIONAL ASSESSMENT
PAIN_FUNCTIONAL_ASSESSMENT: NONE - DENIES PAIN
PAIN_FUNCTIONAL_ASSESSMENT: ACTIVITIES ARE NOT PREVENTED

## 2024-10-28 ASSESSMENT — PAIN DESCRIPTION - DESCRIPTORS: DESCRIPTORS: ACHING

## 2024-10-28 ASSESSMENT — PAIN SCALES - GENERAL
PAINLEVEL_OUTOF10: 10
PAINLEVEL_OUTOF10: 10
PAINLEVEL_OUTOF10: 8
PAINLEVEL_OUTOF10: 9
PAINLEVEL_OUTOF10: 6

## 2024-10-28 ASSESSMENT — PAIN DESCRIPTION - ONSET: ONSET: ON-GOING

## 2024-10-28 ASSESSMENT — PAIN DESCRIPTION - LOCATION
LOCATION: ABDOMEN
LOCATION: ABDOMEN
LOCATION: FLANK

## 2024-10-28 ASSESSMENT — PAIN DESCRIPTION - ORIENTATION: ORIENTATION: LOWER

## 2024-10-28 ASSESSMENT — PAIN DESCRIPTION - FREQUENCY: FREQUENCY: CONTINUOUS

## 2024-10-28 NOTE — ANESTHESIA POSTPROCEDURE EVALUATION
Department of Anesthesiology  Postprocedure Note    Patient: Ramakrishna Gamboa  MRN: [id number]  YOB: 1965  Date of evaluation: 10/28/2024    Procedure Summary       Date: 10/28/24 Room / Location: [address]    Anesthesia Start: 1415 Anesthesia Stop: 1434    Procedure: SIGMOIDOSCOPY DIAGNOSTIC FLEXIBLE WITH DECOMPRESSION (Rectum) Diagnosis:       Andi's syndrome      (Andi's syndrome [K59.81])    Surgeons: Ramakrishna De León MD Responsible Provider: Jesus Acosta DO    Anesthesia Type: general, TIVA ASA Status: 3            Anesthesia Type: No value filed.    Marci Phase I: Marci Score: 10    Marci Phase II:      Anesthesia Post Evaluation    Patient location during evaluation: PACU  Patient participation: complete - patient participated  Level of consciousness: awake and alert  Airway patency: patent  Nausea & Vomiting: no nausea and no vomiting  Cardiovascular status: hemodynamically stable  Respiratory status: acceptable  Hydration status: stable  Pain management: adequate    No notable events documented.

## 2024-10-28 NOTE — PROGRESS NOTES
Spiritual Health History and Assessment/Progress Note  [facility]    (P) Spiritual/Emotional Needs, Initial Encounter,  ,  ,      Name: Ramakrishna Gamboa MRN: [id number]    Age: 59 y.o.     Sex: male   Language: English   Latter day: Tenriism   Abdominal pain     Date: 10/28/2024            Total Time Calculated: (P) 25 min              Spiritual Assessment began in [facility]        Referral/Consult From: (P) Rounding, Other (comment) (CPE Visit)   Encounter Overview/Reason: (P) Spiritual/Emotional Needs, Initial Encounter  Service Provided For: (P) Patient    Stella, Belief, Meaning:   Patient identifies as spiritual, is connected with a stella tradition or spiritual practice, and has beliefs or practices that help with coping during difficult times  Family/Friends identify as spiritual, are connected with a stella tradition or spiritual practice, and have beliefs or practices that help with coping during difficult times      Importance and Influence:  Patient has spiritual/personal beliefs that influence decisions regarding their health  Family/Friends No family/friends present    Community:  Patient is connected with a spiritual community  Family/Friends No family/friends present    Assessment and Plan of Care:     Patient Interventions include: Facilitated expression of thoughts and feelings, Explored spiritual coping/struggle/distress, Affirmed coping skills/support systems, and Provided sacramental/Voodoo ritual  Family/Friends Interventions include: No family/friends present    Patient Plan of Care: Spiritual Care available upon further referral  Family/Friends Plan of Care: Spiritual Care available upon further referral    Electronically signed by Chaplain Janie on 10/28/2024 at 12:48 PM       10/28/24 [id number]   Encounter Summary   Encounter Overview/Reason Spiritual/Emotional Needs;Initial Encounter   Service Provided For Patient   Referral/Consult From Rounding;Other (comment)  (CPE

## 2024-10-28 NOTE — PLAN OF CARE
Problem: Discharge Planning  Goal: Discharge to home or other facility with appropriate resources  10/28/2024 0145 by Silvia Garber RN  Outcome: Progressing  Flowsheets (Taken 10/27/2024 2000)  Discharge to home or other facility with appropriate resources:   Identify barriers to discharge with patient and caregiver   Arrange for needed discharge resources and transportation as appropriate   Identify discharge learning needs (meds, wound care, etc)   Refer to discharge planning if patient needs post-hospital services based on physician order or complex needs related to functional status, cognitive ability or social support system     Problem: Pain  Goal: Verbalizes/displays adequate comfort level or baseline comfort level  10/28/2024 0145 by Silvia Garber RN  Outcome: Progressing     Problem: Gastrointestinal - Adult  Goal: Minimal or absence of nausea and vomiting  10/28/2024 0145 by Silvia Garber RN  Outcome: Progressing     Problem: Gastrointestinal - Adult  Goal: Maintains or returns to baseline bowel function  10/28/2024 0145 by Silvia Garber RN  Outcome: Progressing     Problem: Gastrointestinal - Adult  Goal: Maintains adequate nutritional intake  10/28/2024 0145 by Silvia Garber RN  Outcome: Progressing

## 2024-10-28 NOTE — H&P
History & Physical  [facility]    Adult Hospitalist      Name: Ramakrishna Gamboa  MRN: [id number]     Acct: [id number]  Room: 2025/2025-01    Admit Date: 10/27/2024  9:55 AM  PCP: Temo Lopez MD    Primary Problem  Principal Problem:    Abdominal pain  Resolved Problems:    * No resolved hospital problems. *        Assesment/ plan:     Patient is admitted to [facility] with telemetry    Acute abdominal pain/abdominal distention/recurrent Leeds's  CT abdomen was significant for cholelithiasis  Pain control  GI consult, s/p flex sigmoidoscopy on 10/28/2024      Mild hyponatremia  Patient presented with a sodium of 134  Monitor sodium        Hypertensive heart disease  On metoprolol and Lasix        Major depression  On Celexa                    Continue to monitor/telemetry/CBC with differential daily/BMP daily  DVT and GI prophylaxis.  Continue medications as below      Scheduled Meds:   sodium chloride flush  5-40 mL IntraVENous 2 times per day    primidone  50 mg Oral TID    citalopram  20 mg Oral Daily    traZODone  50 mg Oral Nightly    cetirizine  10 mg Oral Daily    metoprolol tartrate  12.5 mg Oral BID    furosemide  40 mg Oral Daily    melatonin  3 mg Oral Nightly    pantoprazole  40 mg Oral Nightly     Continuous Infusions:   sodium chloride 75 mL/hr at 10/28/24 0628    sodium chloride       PRN Meds:  sodium chloride flush, 10 mL, PRN  sodium chloride flush, 5-40 mL, PRN  sodium chloride, , PRN  acetaminophen, 650 mg, Q4H PRN  ondansetron, 4 mg, Q8H PRN   Or  ondansetron, 4 mg, Q6H PRN  morphine, 2 mg, Q4H PRN        Chief Complaint:     Chief Complaint   Patient presents with    Abdominal Pain     Cramping boat, pseudo bowel obstruction         History of Present Illness:      Ramakrishna Gamboa is a 59 y.o.  male who presents with Abdominal Pain (Cramping boat, pseudo bowel obstruction)    59-year-old gentleman presented to ER complaining of abdominal pain.  Patient was complaining of

## 2024-10-28 NOTE — CONSULTS
MG tablet Take 1 tablet by mouth daily 10/21/24  Yes Temo Lopez MD   primidone (MYSOLINE) 50 MG tablet TAKE 1 TABLET BY MOUTH 3 TIMES A DAY 10/7/24  Yes Temo Lopez MD   furosemide (LASIX) 40 MG tablet TAKE 1 TABLET BY MOUTH EVERY DAY 8/23/24  Yes Torie Tafoya MD   traZODone (DESYREL) 50 MG tablet Take 1 tablet by mouth nightly 6/7/24  Yes Temo Lopez MD   metoprolol tartrate (LOPRESSOR) 25 MG tablet TAKE 1/2 TABLET BY MOUTH TWO TIMES A DAY 6/7/24  Yes Temo Lopez MD   melatonin 3 MG TABS tablet Take 10 mg by mouth daily   Yes Adali Thakkar MD   loratadine (CLARITIN) 10 MG tablet Take 1 tablet by mouth daily   Yes Adali Thakkar MD   Acetaminophen (TYLENOL ARTHRITIS PAIN PO) Take 1 tablet by mouth every 6 hours as needed   Yes ProviderAdali MD   vitamin D (CHOLECALCIFEROL) 25 MCG (1000 UT) TABS tablet Take 1 tablet by mouth nightly   Yes ProviderAdali MD   Multiple Vitamins-Minerals (THERAPEUTIC MULTIVITAMIN-MINERALS) tablet Take 1 tablet by mouth daily   Yes Adali Thakkar MD   aspirin 81 MG EC tablet Take 1 tablet by mouth daily   Yes Adali Thakkar MD   omeprazole (PRILOSEC) 20 MG capsule Take 1 capsule by mouth nightly   Yes Adali Thakkar MD   naproxen (NAPROSYN) 500 MG tablet Take 1 tablet by mouth 2 times daily (with meals)  Patient not taking: Reported on 10/27/2024 4/22/24   Elis Padilla PA-C   ciclopirox (LOPROX) 0.77 % cream Apply to rash in groin twice daily 1/4/24   Adali Thakkar MD   cholestyramine (QUESTRAN) 4 g packet Take 1 packet by mouth daily 12/4/23   Adali Thakkar MD   hydrocortisone (WESTCORT) 0.2 % cream Apply topically 2 times daily as needed (rash) 5/12/23   Temo Lopez MD   Sod Fluoride-Potassium Nitrate (PREVIDENT 5000 SENSITIVE) 1.1-5 % PSTE Place onto teeth    Adali Thakkar MD   Magnesium Cl-Calcium Carbonate (SLOW-MAG PO) Take 1 tablet by mouth 2  times daily    Provider, MD Adali     Scheduled Meds:   sodium chloride flush  5-40 mL IntraVENous 2 times per day    primidone  50 mg Oral TID    citalopram  20 mg Oral Daily    traZODone  50 mg Oral Nightly    cetirizine  10 mg Oral Daily    metoprolol tartrate  12.5 mg Oral BID    furosemide  40 mg Oral Daily    melatonin  3 mg Oral Nightly    pantoprazole  40 mg Oral Nightly     Continuous Infusions:   sodium chloride 75 mL/hr at 10/28/24 0628    sodium chloride       PRN Meds:sodium chloride flush, sodium chloride flush, sodium chloride, acetaminophen, ondansetron **OR** ondansetron, morphine    ALLERGIES:  Allergies   Allergen Reactions    Cetyl Alcohol Hives     Localized reaction by allergy testing    Cetearyl alcohol    Metronidazole Other (See Comments)     \" caused a prickly feeling in my legs \"  Other reaction(s): Unknown    Bacitracin-Polymyxin B      Other reaction(s): Unknown    Adhesive Tape Other (See Comments)     opsite and paper tape ok, bandaid ok  REDNESS AND TAKES SKIN OFF. PAPER TAPE OK.  opsite and paper tape ok, bandaid ok  Other reaction(s): Unknown  Other reaction(s): Unknown    Duloxetine Hcl Nausea And Vomiting     Other reaction(s): Unknown    Neosporin [Neomycin-Polymyxin-Gramicidin] Rash       SOCIAL HISTORY:    reports that he quit smoking about 18 years ago. His smoking use included cigarettes. He started smoking about 41 years ago. He has a 35.1 pack-year smoking history. He quit smokeless tobacco use about 5 years ago.  His smokeless tobacco use included chew. He reports that he does not currently use alcohol. He reports that he does not use drugs.    FAMILY HISTORY:   Family History   Problem Relation Age of Onset    Coronary Art Dis Father         premature-- at 49    Diabetes Father     High Blood Pressure Father     Other Father         COPD, EMPHYSEMA    Coronary Art Dis Paternal Uncle         premature    Heart Failure Paternal Uncle     Diabetes Mother

## 2024-10-28 NOTE — ANESTHESIA PRE PROCEDURE
Department of Anesthesiology  Preprocedure Note       Name:  Ramakrishna Gamboa   Age:  59 y.o.  :  1965                                          MRN:  [id number]         Date:  10/28/2024      Surgeon: Surgeon(s):  Ramakrishna De León MD    Procedure: Procedure(s):  SIGMOIDOSCOPY DIAGNOSTIC FLEXIBLE WITH DECOMPRESSION    Medications prior to admission:   Prior to Admission medications    Medication Sig Start Date End Date Taking? Authorizing Provider   citalopram (CELEXA) 20 MG tablet Take 1 tablet by mouth daily 10/21/24  Yes Temo Lopez MD   primidone (MYSOLINE) 50 MG tablet TAKE 1 TABLET BY MOUTH 3 TIMES A DAY 10/7/24  Yes Temo Lopez MD   furosemide (LASIX) 40 MG tablet TAKE 1 TABLET BY MOUTH EVERY DAY 24  Yes Torie Tafoya MD   traZODone (DESYREL) 50 MG tablet Take 1 tablet by mouth nightly 24  Yes Temo Lopez MD   metoprolol tartrate (LOPRESSOR) 25 MG tablet TAKE 1/2 TABLET BY MOUTH TWO TIMES A DAY 24  Yes Temo Lopez MD   melatonin 3 MG TABS tablet Take 10 mg by mouth daily   Yes ProviderAdali MD   loratadine (CLARITIN) 10 MG tablet Take 1 tablet by mouth daily   Yes ProviderAdali MD   Acetaminophen (TYLENOL ARTHRITIS PAIN PO) Take 1 tablet by mouth every 6 hours as needed   Yes Provider, MD Adali   vitamin D (CHOLECALCIFEROL) 25 MCG (1000 UT) TABS tablet Take 1 tablet by mouth nightly   Yes ProviderAdali MD   Multiple Vitamins-Minerals (THERAPEUTIC MULTIVITAMIN-MINERALS) tablet Take 1 tablet by mouth daily   Yes Provider, MD Adali   aspirin 81 MG EC tablet Take 1 tablet by mouth daily   Yes ProviderAdali MD   omeprazole (PRILOSEC) 20 MG capsule Take 1 capsule by mouth nightly   Yes Adali Thakkar MD   naproxen (NAPROSYN) 500 MG tablet Take 1 tablet by mouth 2 times daily (with meals)  Patient not taking: Reported on 10/27/2024 4/22/24   Elis Padilla PA-C   ciclopirox (LOPROX) 0.77 %

## 2024-10-28 NOTE — CARE COORDINATION
Case Management Assessment  Initial Evaluation    Date/Time of Evaluation: 10/28/2024 3:33 PM  Assessment Completed by: Bruna Yañez    If patient is discharged prior to next notation, then this note serves as note for discharge by case management.    Patient Name: Ramakrishna Gamboa                   YOB: 1965  Diagnosis: Abdominal distension [R14.0]  Abdominal pain [R10.9]  Abdominal pain, unspecified abdominal location [R10.9]                   Date / Time: 10/27/2024  9:55 AM    Patient Admission Status: Inpatient   Readmission Risk (Low < 19, Mod (19-27), High > 27): Readmission Risk Score: 7.9    Current PCP: Temo Lopez MD  PCP verified by CM? Yes    Chart Reviewed: Yes      History Provided by: Patient  Patient Orientation: Alert and Oriented    Patient Cognition: Alert    Hospitalization in the last 30 days (Readmission):  No    If yes, Readmission Assessment in CM Navigator will be completed.    Advance Directives:      Code Status: Full Code   Patient's Primary Decision Maker is: Legal Next of Kin    Primary Decision Maker: Jonathan Gamboa - Spouse - [phone]    Discharge Planning:    Patient lives with: Spouse/Significant Other Type of Home: Other (Comment) (n/a)  Primary Care Giver: Self  Patient Support Systems include: Spouse/Significant Other   Current Financial resources: None  Current community resources: None  Current services prior to admission: Durable Medical Equipment            Current DME: Cane, Walker            Type of Home Care services:  None    ADLS  Prior functional level: Independent in ADLs/IADLs  Current functional level: Independent in ADLs/IADLs    PT AM-PAC:   /24  OT AM-PAC:   /24    Family can provide assistance at DC: Yes  Would you like Case Management to discuss the discharge plan with any other family members/significant others, and if so, who? Yes (spouse/ jonathan)  Plans to Return to Present Housing: Yes  Other Identified Issues/Barriers to

## 2024-10-28 NOTE — OP NOTE
[facility] Endoscopy  INCOMPLETE COLONOSCOPY    Patient: Ramakrishna Gamboa   Date:  10/28/2024   : 1965       Med Rec#: [id number]     Procedure: Incomplete colonoscopy    Indication: Symptomatic [place] syndrome, procedure is for decompression    Findings   Hemorrhoids, internal  Diverticulosis, left side  Poor prep; significant lesions may have been missed  All gas and stool found in the colon with suction.  Decompression was successful.    Recommendations  Follow up with gastroenterology.    Specimen(s) Removed: As stated above  Withdrawal Time: Not applicable  Estimated Blood Loss: None  Anesthesia:  MAC    Complications: Poor bowel prep  Description of Procedure:  Informed consent was obtained after explanation of the procedure including indications, description of the procedure,  benefits and possible risks and complications of the procedure, and alternatives.  All questions were answered.  The patient's history was reviewed and a directed physical examination was performed prior to the procedure.  The patient was monitored throughout the procedure with pulse oximetry and periodic assessment of vital signs.  With the patient initially in the left lateral decubitus position, a digital rectal examination was performed.  The video endoscope was placed in the patient's rectum and advanced without difficulty  to the hepatic flexure.  The prep was poor.   Examination of the mucosa and the anatomy was performed during both introduction and withdrawal of the endoscope.     Geo De León M.D.  10/28/2024 at 2:18 PM

## 2024-10-28 NOTE — PLAN OF CARE
Problem: Discharge Planning  Goal: Discharge to home or other facility with appropriate resources  10/28/2024 1044 by Cheryl Valles RN  Outcome: Progressing  10/28/2024 0145 by Silvia Garber RN  Outcome: Progressing  Flowsheets (Taken 10/27/2024 2000)  Discharge to home or other facility with appropriate resources:   Identify barriers to discharge with patient and caregiver   Arrange for needed discharge resources and transportation as appropriate   Identify discharge learning needs (meds, wound care, etc)   Refer to discharge planning if patient needs post-hospital services based on physician order or complex needs related to functional status, cognitive ability or social support system     Problem: Pain  Goal: Verbalizes/displays adequate comfort level or baseline comfort level  10/28/2024 1044 by Cheryl Valles RN  Outcome: Progressing  10/28/2024 0145 by Silvia Garber RN  Outcome: Progressing     Problem: Gastrointestinal - Adult  Goal: Minimal or absence of nausea and vomiting  10/28/2024 1044 by Cheryl Valles RN  Outcome: Progressing  10/28/2024 0145 by Silvia Garber RN  Outcome: Progressing  Goal: Maintains or returns to baseline bowel function  10/28/2024 1044 by Cheryl Valles RN  Outcome: Progressing  10/28/2024 0145 by Silvia Garber RN  Outcome: Progressing  Goal: Maintains adequate nutritional intake  10/28/2024 1044 by Cheryl Valles RN  Outcome: Progressing  10/28/2024 0145 by Silvia Garber RN  Outcome: Progressing

## 2024-10-29 ENCOUNTER — APPOINTMENT (OUTPATIENT)
Dept: GENERAL RADIOLOGY | Age: 59
End: 2024-10-29
Payer: MEDICARE

## 2024-10-29 LAB
ANION GAP SERPL CALCULATED.3IONS-SCNC: 10 MMOL/L (ref 9–17)
BASOPHILS # BLD: 0.03 K/UL (ref 0–0.2)
BASOPHILS NFR BLD: 0 % (ref 0–2)
BUN SERPL-MCNC: 7 MG/DL (ref 8–23)
BUN/CREAT SERPL: 14 (ref 9–20)
CALCIUM SERPL-MCNC: 8.3 MG/DL (ref 8.6–10.4)
CHLORIDE SERPL-SCNC: 100 MMOL/L (ref 98–107)
CO2 SERPL-SCNC: 27 MMOL/L (ref 20–31)
CREAT SERPL-MCNC: 0.5 MG/DL (ref 0.7–1.2)
EOSINOPHIL # BLD: 0.11 K/UL (ref 0–0.44)
EOSINOPHILS RELATIVE PERCENT: 2 % (ref 1–4)
ERYTHROCYTE [DISTWIDTH] IN BLOOD BY AUTOMATED COUNT: 12.7 % (ref 11.8–14.4)
GFR, ESTIMATED: >90 ML/MIN/1.73M2
GLUCOSE SERPL-MCNC: 94 MG/DL (ref 70–99)
HCT VFR BLD AUTO: 37 % (ref 40.7–50.3)
HGB BLD-MCNC: 12.4 G/DL (ref 13–17)
IMM GRANULOCYTES # BLD AUTO: 0.01 K/UL (ref 0–0.3)
IMM GRANULOCYTES NFR BLD: 0 %
LIPASE SERPL-CCNC: 24 U/L (ref 13–60)
LYMPHOCYTES NFR BLD: 1.27 K/UL (ref 1.1–3.7)
LYMPHOCYTES RELATIVE PERCENT: 19 % (ref 24–43)
MCH RBC QN AUTO: 30 PG (ref 25.2–33.5)
MCHC RBC AUTO-ENTMCNC: 33.5 G/DL (ref 28.4–34.8)
MCV RBC AUTO: 89.6 FL (ref 82.6–102.9)
MONOCYTES NFR BLD: 0.6 K/UL (ref 0.1–1.2)
MONOCYTES NFR BLD: 9 % (ref 3–12)
NEUTROPHILS NFR BLD: 70 % (ref 36–65)
NEUTS SEG NFR BLD: 4.68 K/UL (ref 1.5–8.1)
NRBC BLD-RTO: 0 PER 100 WBC
PLATELET # BLD AUTO: 149 K/UL (ref 138–453)
PMV BLD AUTO: 9.5 FL (ref 8.1–13.5)
POTASSIUM SERPL-SCNC: 3.5 MMOL/L (ref 3.7–5.3)
RBC # BLD AUTO: 4.13 M/UL (ref 4.21–5.77)
SODIUM SERPL-SCNC: 137 MMOL/L (ref 135–144)
WBC OTHER # BLD: 6.7 K/UL (ref 3.5–11.3)

## 2024-10-29 PROCEDURE — 83690 ASSAY OF LIPASE: CPT

## 2024-10-29 PROCEDURE — 6370000000 HC RX 637 (ALT 250 FOR IP): Performed by: INTERNAL MEDICINE

## 2024-10-29 PROCEDURE — 36415 COLL VENOUS BLD VENIPUNCTURE: CPT

## 2024-10-29 PROCEDURE — 6370000000 HC RX 637 (ALT 250 FOR IP): Performed by: HOSPITALIST

## 2024-10-29 PROCEDURE — 6370000000 HC RX 637 (ALT 250 FOR IP): Performed by: NURSE PRACTITIONER

## 2024-10-29 PROCEDURE — 74019 RADEX ABDOMEN 2 VIEWS: CPT

## 2024-10-29 PROCEDURE — 1200000000 HC SEMI PRIVATE

## 2024-10-29 PROCEDURE — 85025 COMPLETE CBC W/AUTO DIFF WBC: CPT

## 2024-10-29 PROCEDURE — 74018 RADEX ABDOMEN 1 VIEW: CPT

## 2024-10-29 PROCEDURE — 6360000002 HC RX W HCPCS: Performed by: INTERNAL MEDICINE

## 2024-10-29 PROCEDURE — 2580000003 HC RX 258: Performed by: INTERNAL MEDICINE

## 2024-10-29 PROCEDURE — 80048 BASIC METABOLIC PNL TOTAL CA: CPT

## 2024-10-29 RX ORDER — POTASSIUM CHLORIDE 1500 MG/1
20 TABLET, EXTENDED RELEASE ORAL ONCE
Status: COMPLETED | OUTPATIENT
Start: 2024-10-29 | End: 2024-10-29

## 2024-10-29 RX ORDER — HYDROCODONE BITARTRATE AND ACETAMINOPHEN 5; 325 MG/1; MG/1
2 TABLET ORAL EVERY 4 HOURS PRN
Status: DISCONTINUED | OUTPATIENT
Start: 2024-10-29 | End: 2024-10-31 | Stop reason: HOSPADM

## 2024-10-29 RX ORDER — MORPHINE SULFATE 2 MG/ML
2 INJECTION, SOLUTION INTRAMUSCULAR; INTRAVENOUS EVERY 4 HOURS PRN
Status: DISCONTINUED | OUTPATIENT
Start: 2024-10-29 | End: 2024-10-31 | Stop reason: HOSPADM

## 2024-10-29 RX ORDER — HYDROCODONE BITARTRATE AND ACETAMINOPHEN 5; 325 MG/1; MG/1
1 TABLET ORAL EVERY 4 HOURS PRN
Status: DISCONTINUED | OUTPATIENT
Start: 2024-10-29 | End: 2024-10-31 | Stop reason: HOSPADM

## 2024-10-29 RX ADMIN — FUROSEMIDE 40 MG: 40 TABLET ORAL at 08:15

## 2024-10-29 RX ADMIN — POTASSIUM CHLORIDE 20 MEQ: 1500 TABLET, EXTENDED RELEASE ORAL at 23:57

## 2024-10-29 RX ADMIN — MORPHINE SULFATE 2 MG: 2 INJECTION, SOLUTION INTRAMUSCULAR; INTRAVENOUS at 15:01

## 2024-10-29 RX ADMIN — TRAZODONE HYDROCHLORIDE 50 MG: 50 TABLET ORAL at 21:30

## 2024-10-29 RX ADMIN — CITALOPRAM HYDROBROMIDE 20 MG: 20 TABLET ORAL at 08:15

## 2024-10-29 RX ADMIN — Medication 12.5 MG: at 21:30

## 2024-10-29 RX ADMIN — PRIMIDONE 50 MG: 50 TABLET ORAL at 14:03

## 2024-10-29 RX ADMIN — PANTOPRAZOLE SODIUM 40 MG: 40 TABLET, DELAYED RELEASE ORAL at 21:30

## 2024-10-29 RX ADMIN — SODIUM CHLORIDE, PRESERVATIVE FREE 10 ML: 5 INJECTION INTRAVENOUS at 21:31

## 2024-10-29 RX ADMIN — Medication 3 MG: at 21:30

## 2024-10-29 RX ADMIN — PRIMIDONE 50 MG: 50 TABLET ORAL at 21:30

## 2024-10-29 RX ADMIN — PRIMIDONE 50 MG: 50 TABLET ORAL at 08:15

## 2024-10-29 RX ADMIN — Medication 12.5 MG: at 08:14

## 2024-10-29 RX ADMIN — CETIRIZINE HYDROCHLORIDE 10 MG: 10 TABLET, FILM COATED ORAL at 08:14

## 2024-10-29 RX ADMIN — HYDROCODONE BITARTRATE AND ACETAMINOPHEN 1 TABLET: 5; 325 TABLET ORAL at 17:22

## 2024-10-29 ASSESSMENT — PAIN - FUNCTIONAL ASSESSMENT
PAIN_FUNCTIONAL_ASSESSMENT: PREVENTS OR INTERFERES SOME ACTIVE ACTIVITIES AND ADLS
PAIN_FUNCTIONAL_ASSESSMENT: ACTIVITIES ARE NOT PREVENTED

## 2024-10-29 ASSESSMENT — PAIN DESCRIPTION - LOCATION
LOCATION: ABDOMEN
LOCATION: ABDOMEN

## 2024-10-29 ASSESSMENT — PAIN SCALES - GENERAL
PAINLEVEL_OUTOF10: 3
PAINLEVEL_OUTOF10: 5
PAINLEVEL_OUTOF10: 4
PAINLEVEL_OUTOF10: 4
PAINLEVEL_OUTOF10: 5

## 2024-10-29 ASSESSMENT — PAIN DESCRIPTION - DESCRIPTORS
DESCRIPTORS: STABBING;THROBBING
DESCRIPTORS: STABBING;THROBBING

## 2024-10-29 ASSESSMENT — PAIN DESCRIPTION - ORIENTATION
ORIENTATION: LEFT
ORIENTATION: LEFT

## 2024-10-29 NOTE — PLAN OF CARE
Problem: Discharge Planning  Goal: Discharge to home or other facility with appropriate resources  10/29/2024 1515 by Ebonie Glover RN  Outcome: Progressing     Problem: Pain  Goal: Verbalizes/displays adequate comfort level or baseline comfort level  10/29/2024 1515 by Ebonie Glover RN  Outcome: Progressing  Patient having pain on their abdomen and rates it a 5. Pain interventions includerelaxation techniques, medication, regular rest periods, medicate per physician recommendation/order, medicate at the onset of pain before it interferes with regular functions , medicate before exercise/activity, and educate on the risk of over sedation vs. inadequate pain relief. Patients goal for pain relief is  0 . The need for pain and symptom management will be considered in the discharge planning process to ensure patients comfort.        Problem: Gastrointestinal - Adult  Goal: Minimal or absence of nausea and vomiting  10/29/2024 1515 by Ebonie Glover RN  Outcome: Progressing     Problem: Gastrointestinal - Adult  Goal: Maintains or returns to baseline bowel function  10/29/2024 1515 by Ebonie Glover, RN  Outcome: Progressing     Problem: Gastrointestinal - Adult  Goal: Maintains adequate nutritional intake  10/29/2024 1515 by Ebonie Glover RN  Outcome: Progressing     Problem: Safety - Adult  Goal: Free from fall injury  Outcome: Progressing

## 2024-10-29 NOTE — CONSULTS
General Surgery: Consult Note  Shaheen Brownlee and Azeb        PATIENT NAME: Ramakrishna Gamboa   YOB: 1965    ADMISSION DATE: 10/27/2024  9:55 AM     Admitting Provider: Dr. Malone    Consulted Physician: Dr. Krishna/Azeb     TODAY'S DATE: 10/29/2024    Consult Regarding:  [place]    HISTORY OF PRESENT ILLNESS:  The patient is a 59 y.o. male who was admitted on 10/27/24 with colonic distention. Patient is known to Dr. Krishna due to his recurrent Andi's.  Patient reports that after his back surgery at [facility] 2 years ago, he has been suffering with gastrointestinal issues where his abdomen would become so distended.  He was eventually diagnosed with [place] syndrome and states that he has had 4 episodes in the last 2 years where he has needed to be hospitalized due to his colonic distention.  He had seen a surgeon at [facility] initially but was limited financially to continue seeing that physician.  Patient states that he was eventually referred to the [facility] where he saw Dr. Walker and underwent continued testing.  Patient states that he was supposed to get a specialized CT scan but was never able to obtain that with that physician.  He was then told that if he ever started to feel like his Andi's was coming back to call the office.  Patient states that he called last Friday when he started to feel the rumbling and distention in his abdomen.  He states that he never got a call back until yesterday where the physician had spoken to his wife.  Patient alleges that their conversation ended with a possible referral to a colorectal surgeon at the [facility].  Patient states that eventually he went to the emergency department on 10/27 due to the continued distention.  He subsequently underwent colonoscopy yesterday with GI where decompression was successful.  However, patient states that last night he started to feel the rumblings in his stomach again followed by pain  visualized and likely surgically absent. Lymph Nodes: No lymphadenopathy. Peritoneum: No ascites or free air. No fluid collection. Retroperitoneum: Normal. Vessels: Normal caliber vessels.  Scattered atherosclerotic calcification within the abdominal aorta and branch vessels Abdominal Wall: Surgical changes about the anterior abdominal wall. Bones: No acute osseous findings.  Again seen are extensive posterior fusion changes of the thoracolumbar spine.  Posterior fusion changes of L5-S1 are also noted.  Unchanged avascular necrosis of the bilateral femoral heads. Lung Bases: Bibasilar atelectasis. Inferior Mediastinum: Normal.     1. No acute intra-abdominal or pelvic abnormality. 2. Cholelithiasis without appreciable pericholecystic inflammatory changes.         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  Patient is not peritoneal although he does continue to have colonic distention and dilation on imaging.  Patient continues to pass flatus and denies any nausea or vomiting.  Will discuss with Dr. Krishna.      Electronically signed by Kiara Bowie DO  on 10/29/2024 at 4:05 PM

## 2024-10-29 NOTE — PROGRESS NOTES
Progress note  [facility].,    Adult Hospitalist      Name: Ramakrishna Gamboa  MRN: [id number]     Acct: [id number]  Room: 2025/2025-01    Admit Date: 10/27/2024  9:55 AM  PCP: Temo Lopez MD    Primary Problem  Principal Problem:    Abdominal pain  Resolved Problems:    * No resolved hospital problems. *        Assesment/ plan:     Patient is admitted to [facility] with telemetry    Acute abdominal pain/abdominal distention/recurrent [place]'s  CT abdomen was significant for cholelithiasis  Pain control-pain medications adjusted  GI consult, s/p flex sigmoidoscopy on 10/28/2024  KUB due to ongoing left upper quadrant pain  Colorectal surgery is consulted  Advance diet as tolerated      Mild hyponatremia  Patient presented with a sodium of 134  Monitor sodium        Hypertensive heart disease  On metoprolol and Lasix        Major depression  On Celexa                    Continue to monitor/telemetry/CBC with differential daily/BMP daily  DVT and GI prophylaxis.  Continue medications as below      Scheduled Meds:   sodium chloride flush  5-40 mL IntraVENous 2 times per day    primidone  50 mg Oral TID    citalopram  20 mg Oral Daily    traZODone  50 mg Oral Nightly    cetirizine  10 mg Oral Daily    metoprolol tartrate  12.5 mg Oral BID    furosemide  40 mg Oral Daily    melatonin  3 mg Oral Nightly    pantoprazole  40 mg Oral Nightly     Continuous Infusions:   sodium chloride       PRN Meds:  sodium chloride flush, 10 mL, PRN  sodium chloride flush, 5-40 mL, PRN  sodium chloride, , PRN  acetaminophen, 650 mg, Q4H PRN  ondansetron, 4 mg, Q8H PRN   Or  ondansetron, 4 mg, Q6H PRN  morphine, 2 mg, Q4H PRN        Chief Complaint:     Chief Complaint   Patient presents with    Abdominal Pain     Cramping boat, pseudo bowel obstruction         History of Present Illness:      Patient seen examined the bedside.  Complaining of ongoing left upper quadrant pain.  Complaining of nausea but no reported  Code    Electronically signed by Eyad Cristina MD on 10/29/2024 at 4:56 PM     Copy sent to Temo Gann MD    This note was created with the assistance of a speech-recognition program.  Although the intention is to generate a document that actually reflects the content of the visit, no guarantees can be provided that every mistake has been identified and corrected by editing.     Note was updated later by me after  physical examination and  completion of the assessment.

## 2024-10-29 NOTE — PROGRESS NOTES
Located within [facility] - GI Progress Note    Patient:   Davida Gamboa   :    1965   Med Rec#:                 [id number]   Date:     10/29/2024  Consultant:   DAVIDA GARCIA MD        SUBJECTIVE:     The patient does feel better since colonoscopic decompression yesterday.  However, he still has episodic fleeting moderate to severe pain in his left upper quadrant and deep to his ribs on the left side.  He is passing gas.  He is tolerating liquid meals with some discomfort.  No melena no hematochezia.  Some nausea without vomiting.        CURRENT MEDICATIONS:  .  Scheduled Meds:   sodium chloride flush  5-40 mL IntraVENous 2 times per day    primidone  50 mg Oral TID    citalopram  20 mg Oral Daily    traZODone  50 mg Oral Nightly    cetirizine  10 mg Oral Daily    metoprolol tartrate  12.5 mg Oral BID    furosemide  40 mg Oral Daily    melatonin  3 mg Oral Nightly    pantoprazole  40 mg Oral Nightly     .  Continuous Infusions:   sodium chloride       .  PRN Meds:sodium chloride flush, sodium chloride flush, sodium chloride, acetaminophen, ondansetron **OR** ondansetron, morphine  .      PHYSICAL EXAM:   .    Temp (24hrs), Av.1 °F (36.7 °C), Min:97.3 °F (36.3 °C), Max:98.8 °F (37.1 °C)    /64   Pulse 65   Temp 98.1 °F (36.7 °C) (Oral)   Resp 20   Ht 1.753 m (5' 9\")   Wt 120.7 kg (266 lb)   SpO2 93%   BMI 39.28 kg/m²    .  General:    Alert, NAD     Lungs:   CTA bilaterally    Heart:   RRR  Abdomen:   Soft, nLUQ tenderness, mild distention, bowel sounds normal, no masses  Extremities:   No clubbing, No cyanosis, Trace bilateral edema  Skin:    No jaundice       LABS and IMAGING:     CBC  Recent Labs     10/27/24  1005 10/28/24  0601 10/29/24  0643   WBC 7.7 8.2 6.7   HGB 14.0 13.4 12.4*   MCV 90.4 91.9 89.6   RDW 12.9 12.9 12.7    164 149          BMP  Recent Labs     10/27/24  1005 10/28/24  0601 10/29/24  0643   * 136 137   K 4.7 4.0 3.5*   CL 99 98 100   CO2 27 28 27   BUN

## 2024-10-30 ENCOUNTER — APPOINTMENT (OUTPATIENT)
Dept: GENERAL RADIOLOGY | Age: 59
End: 2024-10-30
Payer: MEDICARE

## 2024-10-30 LAB
ANION GAP SERPL CALCULATED.3IONS-SCNC: 11 MMOL/L (ref 9–17)
BASOPHILS # BLD: 0.04 K/UL (ref 0–0.2)
BASOPHILS NFR BLD: 1 % (ref 0–2)
BUN SERPL-MCNC: 6 MG/DL (ref 8–23)
BUN/CREAT SERPL: 12 (ref 9–20)
CALCIUM SERPL-MCNC: 8.5 MG/DL (ref 8.6–10.4)
CHLORIDE SERPL-SCNC: 100 MMOL/L (ref 98–107)
CO2 SERPL-SCNC: 27 MMOL/L (ref 20–31)
CREAT SERPL-MCNC: 0.5 MG/DL (ref 0.7–1.2)
EOSINOPHIL # BLD: 0.17 K/UL (ref 0–0.44)
EOSINOPHILS RELATIVE PERCENT: 3 % (ref 1–4)
ERYTHROCYTE [DISTWIDTH] IN BLOOD BY AUTOMATED COUNT: 12.6 % (ref 11.8–14.4)
GFR, ESTIMATED: >90 ML/MIN/1.73M2
GLUCOSE SERPL-MCNC: 102 MG/DL (ref 70–99)
HCT VFR BLD AUTO: 37.8 % (ref 40.7–50.3)
HGB BLD-MCNC: 12.6 G/DL (ref 13–17)
IMM GRANULOCYTES # BLD AUTO: 0.01 K/UL (ref 0–0.3)
IMM GRANULOCYTES NFR BLD: 0 %
LYMPHOCYTES NFR BLD: 1.2 K/UL (ref 1.1–3.7)
LYMPHOCYTES RELATIVE PERCENT: 18 % (ref 24–43)
MCH RBC QN AUTO: 29.9 PG (ref 25.2–33.5)
MCHC RBC AUTO-ENTMCNC: 33.3 G/DL (ref 28.4–34.8)
MCV RBC AUTO: 89.6 FL (ref 82.6–102.9)
MONOCYTES NFR BLD: 0.62 K/UL (ref 0.1–1.2)
MONOCYTES NFR BLD: 9 % (ref 3–12)
NEUTROPHILS NFR BLD: 69 % (ref 36–65)
NEUTS SEG NFR BLD: 4.75 K/UL (ref 1.5–8.1)
NRBC BLD-RTO: 0 PER 100 WBC
PLATELET # BLD AUTO: 148 K/UL (ref 138–453)
PMV BLD AUTO: 9.4 FL (ref 8.1–13.5)
POTASSIUM SERPL-SCNC: 3.8 MMOL/L (ref 3.7–5.3)
RBC # BLD AUTO: 4.22 M/UL (ref 4.21–5.77)
SODIUM SERPL-SCNC: 138 MMOL/L (ref 135–144)
WBC OTHER # BLD: 6.8 K/UL (ref 3.5–11.3)

## 2024-10-30 PROCEDURE — 6370000000 HC RX 637 (ALT 250 FOR IP): Performed by: INTERNAL MEDICINE

## 2024-10-30 PROCEDURE — 6370000000 HC RX 637 (ALT 250 FOR IP): Performed by: HOSPITALIST

## 2024-10-30 PROCEDURE — 6360000002 HC RX W HCPCS: Performed by: INTERNAL MEDICINE

## 2024-10-30 PROCEDURE — 36415 COLL VENOUS BLD VENIPUNCTURE: CPT

## 2024-10-30 PROCEDURE — 1200000000 HC SEMI PRIVATE

## 2024-10-30 PROCEDURE — 85025 COMPLETE CBC W/AUTO DIFF WBC: CPT

## 2024-10-30 PROCEDURE — 80048 BASIC METABOLIC PNL TOTAL CA: CPT

## 2024-10-30 PROCEDURE — 74018 RADEX ABDOMEN 1 VIEW: CPT

## 2024-10-30 PROCEDURE — 2580000003 HC RX 258: Performed by: INTERNAL MEDICINE

## 2024-10-30 RX ORDER — NEOSTIGMINE METHYLSULFATE 5 MG/5 ML
1.5 SYRINGE (ML) INTRAVENOUS ONCE
Status: COMPLETED | OUTPATIENT
Start: 2024-10-30 | End: 2024-10-30

## 2024-10-30 RX ADMIN — PANTOPRAZOLE SODIUM 40 MG: 40 TABLET, DELAYED RELEASE ORAL at 21:43

## 2024-10-30 RX ADMIN — HYDROCODONE BITARTRATE AND ACETAMINOPHEN 2 TABLET: 5; 325 TABLET ORAL at 12:59

## 2024-10-30 RX ADMIN — SODIUM CHLORIDE, PRESERVATIVE FREE 10 ML: 5 INJECTION INTRAVENOUS at 07:53

## 2024-10-30 RX ADMIN — FUROSEMIDE 40 MG: 40 TABLET ORAL at 07:50

## 2024-10-30 RX ADMIN — PRIMIDONE 50 MG: 50 TABLET ORAL at 21:43

## 2024-10-30 RX ADMIN — PRIMIDONE 50 MG: 50 TABLET ORAL at 07:50

## 2024-10-30 RX ADMIN — PRIMIDONE 50 MG: 50 TABLET ORAL at 12:59

## 2024-10-30 RX ADMIN — CITALOPRAM HYDROBROMIDE 20 MG: 20 TABLET ORAL at 07:50

## 2024-10-30 RX ADMIN — Medication 12.5 MG: at 21:43

## 2024-10-30 RX ADMIN — HYDROCODONE BITARTRATE AND ACETAMINOPHEN 2 TABLET: 5; 325 TABLET ORAL at 07:50

## 2024-10-30 RX ADMIN — Medication 12.5 MG: at 07:50

## 2024-10-30 RX ADMIN — Medication 1.5 MG: at 17:48

## 2024-10-30 RX ADMIN — CETIRIZINE HYDROCHLORIDE 10 MG: 10 TABLET, FILM COATED ORAL at 07:50

## 2024-10-30 RX ADMIN — Medication 3 MG: at 21:43

## 2024-10-30 RX ADMIN — SODIUM CHLORIDE, PRESERVATIVE FREE 10 ML: 5 INJECTION INTRAVENOUS at 21:47

## 2024-10-30 RX ADMIN — HYDROCODONE BITARTRATE AND ACETAMINOPHEN 2 TABLET: 5; 325 TABLET ORAL at 03:16

## 2024-10-30 RX ADMIN — TRAZODONE HYDROCHLORIDE 50 MG: 50 TABLET ORAL at 21:43

## 2024-10-30 ASSESSMENT — PAIN SCALES - GENERAL
PAINLEVEL_OUTOF10: 5
PAINLEVEL_OUTOF10: 2
PAINLEVEL_OUTOF10: 8
PAINLEVEL_OUTOF10: 7
PAINLEVEL_OUTOF10: 10
PAINLEVEL_OUTOF10: 2

## 2024-10-30 ASSESSMENT — PAIN DESCRIPTION - DESCRIPTORS
DESCRIPTORS: SPASM;STABBING;THROBBING
DESCRIPTORS: CRAMPING;STABBING

## 2024-10-30 ASSESSMENT — PAIN DESCRIPTION - LOCATION
LOCATION: ABDOMEN

## 2024-10-30 ASSESSMENT — PAIN DESCRIPTION - ORIENTATION
ORIENTATION: LEFT;UPPER;LOWER
ORIENTATION: LEFT;UPPER
ORIENTATION: LEFT;LOWER;UPPER

## 2024-10-30 NOTE — PLAN OF CARE
Pt resting in bed, up as tolerated to bathroom. Has c/o abdominal pain which was relieved w/ prn pain med. Dr Krishna recgraham transfer to [facility]. Pt tolerating full liquids well. Dr. Krishna recc staying on full liquids pending transfer to [facility].    Problem: Discharge Planning  Goal: Discharge to home or other facility with appropriate resources  Outcome: Progressing     Problem: Pain  Goal: Verbalizes/displays adequate comfort level or baseline comfort level  Outcome: Progressing     Problem: Gastrointestinal - Adult  Goal: Minimal or absence of nausea and vomiting  Outcome: Progressing     Problem: Gastrointestinal - Adult  Goal: Maintains or returns to baseline bowel function  Outcome: Progressing     Problem: Gastrointestinal - Adult  Goal: Maintains adequate nutritional intake  Outcome: Progressing     Problem: Safety - Adult  Goal: Free from fall injury  Outcome: Progressing  Flowsheets (Taken 10/30/2024 2853)  Free From Fall Injury: Instruct family/caregiver on patient safety     Problem: Respiratory - Adult  Goal: Achieves optimal ventilation and oxygenation  Reactivated     Problem: Musculoskeletal - Adult  Goal: Return ADL status to a safe level of function  Reactivated

## 2024-10-30 NOTE — PROGRESS NOTES
Dr David ordered neostigmine to stimulate decompression of sigmoid colon. It was administered with oversight of Kay SANTANA house supervisor. Pt had a positive outcome with expulsion of stool and gas. Pt was monitored for continuous heart rate, continuous pulse ox, and BP q 15 minutes. Pt received 2L per nasal cannula. Pt tolerated procedure well.

## 2024-10-30 NOTE — PROGRESS NOTES
Neostigmine administered per order. Bedside cardiac monitoring maintained to monitor for bradycardia. Patient tolerates the medication well.

## 2024-10-30 NOTE — PROGRESS NOTES
History of panic attacks     [place] (hard of hearing)     LEFT EAR WORSE THAN RIGHT. getting hearing aides eloisa, Dr. Villalpando    Hypertension     DR MALCOLM PCP    Impaired mobility     occas uses cane    Kyphosis     [facility] OR 7/12/2021 with Dr. Jed Jeff cant lie flat,severe back pain    Left eye injury     BUNGY CORD STRAP BUSTED AND INJURED LEFT EYE    Muscle spasm     LOWER RIGHT BACK    Nocturia     Obesity     Mascotte's syndrome     \"bowels not working\"    Osteoarthritis     Prolonged emergence from general anesthesia     PATIENT STATES HIS B/P WOULD DROP WITH INTUBATION, GO UP AFTER ET TUBE REMOVED.    PVC's (premature ventricular contractions)     PVC'S. NO CARDIOLOGIST, PCP DR AFRICA MALCOLM    Rash     occas to see rheumatoid arthritis drLiss workup for lupus    Restless legs syndrome     Sjogren's syndrome (HCC)     Snores     mild, denies apnea, does \"grind\" his teeth    SOB (shortness of breath)     used to see Dr. Coffey, borderline COPD/ never followed up / never filled scripts for inhalers    Use of cane as ambulatory aid     Vertigo     Dr. Villalpando    Wears glasses     Wears partial dentures     LOWER        Past Surgical History:     Past Surgical History:   Procedure Laterality Date    BACK SURGERY  2006    cage    COLONOSCOPY      COLONOSCOPY      \"reversed colonoscopy to suck out air/gas\" Mascotte syndrome    COLONOSCOPY N/A 09/06/2023    COLONOSCOPY WITH BIOPSIES performed by Santino Shearer MD at [facility] OR    COLONOSCOPY N/A 04/30/2024    COLONOSCOPY DIAGNOSTIC performed by Ramakrishna De León MD at [facility] OR    CYST REMOVAL Right     index finger    EYE SURGERY Right     CATARACT    HAMMER TOE SURGERY Left 02/20/2023    HAMMERTOE CORRECTION 2ND, 3RD & 4TH TOES performed by Christian Yip DPM at [facility] OR    HEMORRHOID SURGERY  2017    HERNIA REPAIR      UMBILICAL    NOSE SURGERY      REALIGNED NOSE    OTHER SURGICAL HISTORY  02/21/2020    MRI cervical and thoracic spine without contrast . CT of lung ,  (NAPROSYN) 500 MG tablet Take 1 tablet by mouth 2 times daily (with meals)  Patient not taking: Reported on 10/27/2024 4/22/24   Elis Padilla PA-C   ciclopirox (LOPROX) 0.77 % cream Apply to rash in groin twice daily 24   Adali Thakkar MD   cholestyramine (QUESTRAN) 4 g packet Take 1 packet by mouth daily 23   Adali Thakkar MD   hydrocortisone (WESTCORT) 0.2 % cream Apply topically 2 times daily as needed (rash) 23   Temo Lopez MD   Sod Fluoride-Potassium Nitrate (PREVIDENT 5000 SENSITIVE) 1.1-5 % PSTE Place onto teeth    Adali Thakkar MD   Magnesium Cl-Calcium Carbonate (SLOW-MAG PO) Take 1 tablet by mouth 2 times daily    Adali Thakkar MD        Allergies:       Cetyl alcohol, Metronidazole, Bacitracin-polymyxin b, Adhesive tape, Duloxetine hcl, and Neosporin [neomycin-polymyxin-gramicidin]    Social History:     Tobacco:    reports that he quit smoking about 18 years ago. His smoking use included cigarettes. He started smoking about 41 years ago. He has a 35.1 pack-year smoking history. He quit smokeless tobacco use about 5 years ago.  His smokeless tobacco use included chew.  Alcohol:      reports that he does not currently use alcohol.  Drug Use:  reports no history of drug use.    Family History:     Family History   Problem Relation Age of Onset    Coronary Art Dis Father         premature-- at 49    Diabetes Father     High Blood Pressure Father     Other Father         COPD, EMPHYSEMA    Coronary Art Dis Paternal Uncle         premature    Heart Failure Paternal Uncle     Diabetes Mother          Physical Exam:     Vitals:  /67   Pulse 65   Temp 97.9 °F (36.6 °C) (Oral)   Resp 18   Ht 1.753 m (5' 9\")   Wt 120.7 kg (266 lb)   SpO2 96%   BMI 39.28 kg/m²   Temp (24hrs), Av °F (36.7 °C), Min:97.5 °F (36.4 °C), Max:98.4 °F (36.9 °C)          General appearance - alert, well appearing, and in no acute distress  Mental status -

## 2024-10-30 NOTE — PLAN OF CARE
Problem: Discharge Planning  Goal: Discharge to home or other facility with appropriate resources  10/29/2024 2154 by Suzanna Chavez RN  Outcome: Progressing  10/29/2024 1515 by Ebonie Glover RN  Outcome: Progressing     Problem: Pain  Goal: Verbalizes/displays adequate comfort level or baseline comfort level  10/29/2024 2154 by Suzanna Chavez RN  Outcome: Progressing  10/29/2024 1515 by Ebonie Glover RN  Outcome: Progressing     Problem: Gastrointestinal - Adult  Goal: Minimal or absence of nausea and vomiting  10/29/2024 2154 by Suzanna Chavez RN  Outcome: Progressing  10/29/2024 1515 by Ebonie Glover RN  Outcome: Progressing  Goal: Maintains or returns to baseline bowel function  10/29/2024 2154 by Suzanna Chavez RN  Outcome: Progressing  10/29/2024 1515 by Ebonie Glover RN  Outcome: Progressing  Goal: Maintains adequate nutritional intake  10/29/2024 2154 by Suzanna Chavez RN  Outcome: Progressing  10/29/2024 1515 by Ebonie Glover RN  Outcome: Progressing     Problem: Safety - Adult  Goal: Free from fall injury  10/29/2024 2154 by Suzanna Chavez RN  Outcome: Progressing  10/29/2024 1515 by Ebonie Glover RN  Outcome: Progressing

## 2024-10-30 NOTE — PROGRESS NOTES
[facility] - GI Progress Note    Patient:   Ramakirshna Gamboa   :    1965   Med Rec#:                 [id number]   Date:     10/30/2024  Consultant:   LIBIA SANDOVAL        SUBJECTIVE:         Patient seen and examined   Dr. Krishna saw patient and is recommended [facility] transfer for surgery.   He tolerates full liquids.   Pain relieved with prn narcotics.     CURRENT MEDICATIONS:  .  Scheduled Meds:   sodium chloride flush  5-40 mL IntraVENous 2 times per day    primidone  50 mg Oral TID    citalopram  20 mg Oral Daily    traZODone  50 mg Oral Nightly    cetirizine  10 mg Oral Daily    metoprolol tartrate  12.5 mg Oral BID    furosemide  40 mg Oral Daily    melatonin  3 mg Oral Nightly    pantoprazole  40 mg Oral Nightly     .  Continuous Infusions:   sodium chloride       .  PRN Meds:HYDROcodone 5 mg - acetaminophen, HYDROcodone 5 mg - acetaminophen, morphine, sodium chloride flush, sodium chloride flush, sodium chloride, acetaminophen, ondansetron **OR** ondansetron  .      PHYSICAL EXAM:   .    Temp (24hrs), Av °F (36.7 °C), Min:97.5 °F (36.4 °C), Max:98.4 °F (36.9 °C)    /70   Pulse 65   Temp 97.5 °F (36.4 °C) (Oral)   Resp 18   Ht 1.753 m (5' 9\")   Wt 120.7 kg (266 lb)   SpO2 93%   BMI 39.28 kg/m²    .  General:    Alert, NAD     Lungs:   CTA bilaterally    Heart:   RRR  Abdomen:   Soft, LUQ tenderness, mild distention, bowel sounds normal, no masses  Extremities:   No clubbing, No cyanosis, Trace bilateral edema  Skin:    No jaundice       LABS and IMAGING:     CBC  Recent Labs     10/28/24  0601 10/29/24  0643 10/30/24  0528   WBC 8.2 6.7 6.8   HGB 13.4 12.4* 12.6*   MCV 91.9 89.6 89.6   RDW 12.9 12.7 12.6    149 148          BMP  Recent Labs     10/28/24  0601 10/29/24  0643 10/30/24  0528    137 138   K 4.0 3.5* 3.8   CL 98 100 100   CO2 28 27 27   BUN 9 7* 6*   CREATININE 0.5* 0.5* 0.5*   GLUCOSE 106* 94 102*   CALCIUM 8.7 8.3* 8.5*       LFTS  No

## 2024-10-30 NOTE — CARE COORDINATION
Transitional Planning:  Spoke with patient as he has concerns re: [facility] transfer.   Patient wants to know plan. Spoke with nurse Bulmaro who states we are waiting for response from medical team to begin access process.  Bulmaro generated PS message to surgical team.

## 2024-10-30 NOTE — PROGRESS NOTES
Spoke with Dr Cristina re: transfer to . She wants Dr Krishna to make the arrangement. Dr Krishna says he has the number for [facility] and that he will make the call.

## 2024-10-30 NOTE — PROGRESS NOTES
Surgery:    Patient with recurrent episodes of colonic distention requiring multiple colonic decompressions by GI, history of chronic diarrhea issues too. Has been by us, GI at [facility] and [facility]. Now comes in with complain of colonic distention. Complicated case. All symptoms started after his back surgery.     Chart and records reviewed. No good surgical treatment . Believe Subtotal colectomy will result in chronic worsening diarrhea. Recommend transfer to GI/ Colorectal at [facility] for further management. Discussed with the patient and the nurse.

## 2024-10-31 VITALS
HEIGHT: 69 IN | TEMPERATURE: 98.4 F | HEART RATE: 72 BPM | RESPIRATION RATE: 17 BRPM | WEIGHT: 266 LBS | OXYGEN SATURATION: 95 % | DIASTOLIC BLOOD PRESSURE: 70 MMHG | BODY MASS INDEX: 39.4 KG/M2 | SYSTOLIC BLOOD PRESSURE: 132 MMHG

## 2024-10-31 PROCEDURE — 2580000003 HC RX 258: Performed by: INTERNAL MEDICINE

## 2024-10-31 PROCEDURE — 6370000000 HC RX 637 (ALT 250 FOR IP): Performed by: INTERNAL MEDICINE

## 2024-10-31 RX ORDER — PHENOL 1.4 %
1 AEROSOL, SPRAY (ML) MUCOUS MEMBRANE DAILY
COMMUNITY

## 2024-10-31 RX ORDER — MULTIVITAMIN WITH IRON
250 TABLET ORAL 2 TIMES DAILY
COMMUNITY

## 2024-10-31 RX ORDER — SENNOSIDES 8.6 MG
650 CAPSULE ORAL 2 TIMES DAILY
COMMUNITY

## 2024-10-31 RX ADMIN — PRIMIDONE 50 MG: 50 TABLET ORAL at 16:21

## 2024-10-31 RX ADMIN — CETIRIZINE HYDROCHLORIDE 10 MG: 10 TABLET, FILM COATED ORAL at 09:00

## 2024-10-31 RX ADMIN — SODIUM CHLORIDE, PRESERVATIVE FREE 10 ML: 5 INJECTION INTRAVENOUS at 09:09

## 2024-10-31 RX ADMIN — CITALOPRAM HYDROBROMIDE 20 MG: 20 TABLET ORAL at 09:00

## 2024-10-31 RX ADMIN — Medication 12.5 MG: at 09:00

## 2024-10-31 RX ADMIN — FUROSEMIDE 40 MG: 40 TABLET ORAL at 09:00

## 2024-10-31 RX ADMIN — PRIMIDONE 50 MG: 50 TABLET ORAL at 09:00

## 2024-10-31 NOTE — PROGRESS NOTES
PROGRESS NOTE          PATIENT NAME: Ramakrishna Gamboa  MEDICAL RECORD NO. [id number]  DATE: 10/31/2024    HD: # 4      Patient Active Problem List   Diagnosis    Anxiety disorder    Benign essential hypertension    Chronic obstructive pulmonary disease, unspecified COPD type (HCC)    Esophageal reflux    Hypogonadism male    Lumbar radicular pain    Pulmonary granuloma (HCC)    Male erectile disorder    Sensorineural hearing loss    Tinnitus of both ears    Vertigo    Psoriasis    Hip impingement syndrome    Kyphosis of thoracolumbar region    Sjogren's syndrome (HCC)    Spinal stenosis of lumbar region with neurogenic claudication    Lumbosacral spondylosis without myelopathy    Bilateral sacroiliitis (HCC)    Diffuse idiopathic skeletal hyperostosis    History of lumbar fusion    S/P fusion of thoracic spine    Andi's syndrome    Physical deconditioning    Tremor of both hands    Restless legs syndrome (RLS)    Andi syndrome    Abdominal pain       DIAGNOSIS AND PLAN  59-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  Had neostigmine given yesterday after which patient had passed flatus and bowel movement.    Plan as discussed with Dr. Jamison.    Awaiting [facility] transfer.  If patient is able to tolerate diet and continues to have passage of flatus and bowel movement, it is okay to discharge the patient home with outpatient follow-up to [facility].  Okay to give regular diet from general surgery perspective.  No plan for surgery at this stage.  Rest of the care per primary.      Chief Complaint: \"abdominal pain\"    SUBJECTIVE    Patient seen at bedside. Underwent colonic decompression yesterday but states he feels it did not work. The procedure was reported as successful with all gas and stool suctioned from colon. Patient states his symptoms did improve after the procedure but that they later returned.  Was given neostigmine yesterday and had passage of flatus and bowel movement last night.  Patient feels much better

## 2024-10-31 NOTE — DISCHARGE SUMMARY
hemodynamically stable and asymptomatic.    The plan was discussed in detail with patient who agreed with the plan and verbalized understanding .    The patient was seen and examined on day of discharge and this discharge summary is in conjunction with any daily progress note from day of discharge.    Hospital Data:    Labs:    Hematology:  Recent Labs     10/29/24  0643 10/30/24  0528   WBC 6.7 6.8   RBC 4.13* 4.22   HGB 12.4* 12.6*   HCT 37.0* 37.8*   MCV 89.6 89.6   MCH 30.0 29.9   MCHC 33.5 33.3   RDW 12.7 12.6    148   MPV 9.5 9.4     Chemistry:  Recent Labs     10/29/24  0643 10/30/24  0528    138   K 3.5* 3.8    100   CO2 27 27   GLUCOSE 94 102*   BUN 7* 6*   CREATININE 0.5* 0.5*   ANIONGAP 10 11   LABGLOM >90 >90   CALCIUM 8.3* 8.5*     Recent Labs     10/29/24  1252   LIPASE 24     Lab Results   Component Value Date    INR 1.1 05/03/2023    PROTIME 13.8 05/03/2023     No results found for: \"SPECIAL\"  No results found for: \"CULTURE\"    No results found for: \"POCPH\", \"PHART\", \"PH\", \"POCPCO2\", \"YBW2YOF\", \"PCO2\", \"POCPO2\", \"PO2ART\", \"PO2\", \"POCHCO3\", \"LLN7ZQC\", \"HCO3\", \"NBEA\", \"PBEA\", \"BEART\", \"BE\", \"THGBART\", \"THB\", \"IED2XHT\", \"JWIY4SUM\", \"P8YADQDG\", \"O2SAT\", \"FIO2\"    Radiology:    XR ABDOMEN (KUB) (SINGLE AP VIEW)    Result Date: 10/30/2024  Air-filled loops of colon are seen throughout the abdomen, improved from 10/29.  There is a dilated loop of small bowel within the left upper quadrant.     XR ABDOMEN DECUBITUS LEFT    Result Date: 10/29/2024  Unchanged ileus.     XR ABDOMEN (2 VIEWS)    Result Date: 10/29/2024  Colonic ileus     CT ABDOMEN PELVIS W IV CONTRAST Additional Contrast? None    Result Date: 10/27/2024  1. No acute intra-abdominal or pelvic abnormality. 2. Cholelithiasis without appreciable pericholecystic inflammatory changes.         All radiological studies reviewed      Reviews of Symptoms:    A 10 point system is reviewed and  negative except described in hospital  QUESTRAN     ciclopirox 0.77 % cream  Commonly known as: LOPROX     citalopram 20 MG tablet  Commonly known as: CELEXA  Take 1 tablet by mouth daily     furosemide 40 MG tablet  Commonly known as: LASIX  TAKE 1 TABLET BY MOUTH EVERY DAY     hydrocortisone 0.2 % cream  Commonly known as: WESTCORT  Apply topically 2 times daily as needed (rash)     loratadine 10 MG tablet  Commonly known as: CLARITIN     melatonin 3 MG Tabs tablet     metoprolol tartrate 25 MG tablet  Commonly known as: LOPRESSOR  TAKE 1/2 TABLET BY MOUTH TWO TIMES A DAY     omeprazole 20 MG delayed release capsule  Commonly known as: PRILOSEC     PreviDent 5000 Sensitive 1.1-5 % Pste  Generic drug: Sod Fluoride-Potassium Nitrate     primidone 50 MG tablet  Commonly known as: MYSOLINE  TAKE 1 TABLET BY MOUTH 3 TIMES A DAY     SLOW-MAG PO     therapeutic multivitamin-minerals tablet     traZODone 50 MG tablet  Commonly known as: DESYREL  Take 1 tablet by mouth nightly     TYLENOL ARTHRITIS PAIN PO     vitamin D 25 MCG (1000 UT) Tabs tablet  Commonly known as: CHOLECALCIFEROL            STOP taking these medications      naproxen 500 MG tablet  Commonly known as: Naprosyn              Code Status:  Full Code    Time Spent on discharge is  35 mins in patient examination, evaluation, counseling as well as medication reconciliation, prescriptions for required medications, discharge plan and follow up.    Electronically signed by Eyad Cristina MD on 10/31/2024 at 4:55 PM     Thank you Temo Gann MD for the opportunity to be involved in this patient's care.    This note was created with the assistance of a speech-recognition program.  Although the intention is to generate a document that actually reflects the content of the visit, no guarantees can be provided that every mistake has been identified and corrected by editing.     Note was updated later by me after  physical examination and  completion of the assessment.

## 2024-10-31 NOTE — PROGRESS NOTES
[facility] - GI Progress Note    Patient:   Ramakrishna Gamboa   :    1965   Med Rec#:                 [id number]   Date:     10/31/2024  Consultant:   LIBIA SANDOVAL        SUBJECTIVE:         Patient seen and examined.     CURRENT MEDICATIONS:  .  Scheduled Meds:   sodium chloride flush  5-40 mL IntraVENous 2 times per day    primidone  50 mg Oral TID    citalopram  20 mg Oral Daily    traZODone  50 mg Oral Nightly    cetirizine  10 mg Oral Daily    metoprolol tartrate  12.5 mg Oral BID    furosemide  40 mg Oral Daily    melatonin  3 mg Oral Nightly    pantoprazole  40 mg Oral Nightly     .  Continuous Infusions:   sodium chloride       .  PRN Meds:HYDROcodone 5 mg - acetaminophen, HYDROcodone 5 mg - acetaminophen, morphine, sodium chloride flush, sodium chloride flush, sodium chloride, acetaminophen, ondansetron **OR** ondansetron  .      PHYSICAL EXAM:   .    Temp (24hrs), Av.1 °F (36.7 °C), Min:97.5 °F (36.4 °C), Max:98.6 °F (37 °C)    /65   Pulse 83   Temp 98.2 °F (36.8 °C) (Oral)   Resp 17   Ht 1.753 m (5' 9\")   Wt 120.7 kg (266 lb)   SpO2 95%   BMI 39.28 kg/m²    .  General:    Alert, NAD     Lungs:   CTA bilaterally    Heart:   RRR  Abdomen:   Soft, LUQ tenderness, mild distention, bowel sounds normal, no masses  Extremities:   No clubbing, No cyanosis, Trace bilateral edema  Skin:    No jaundice       LABS and IMAGING:     CBC  Recent Labs     10/29/24  0643 10/30/24  0528   WBC 6.7 6.8   HGB 12.4* 12.6*   MCV 89.6 89.6   RDW 12.7 12.6    148          BMP  Recent Labs     10/29/24  0643 10/30/24  0528    138   K 3.5* 3.8    100   CO2 27 27   BUN 7* 6*   CREATININE 0.5* 0.5*   GLUCOSE 94 102*   CALCIUM 8.3* 8.5*       LFTS  No results for input(s): \"ALKPHOS\", \"ALT\", \"AST\", \"BILITOT\", \"BILIDIR\", \"LABALBU\" in the last 72 hours.      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  Status post decompression colonoscopy Monday.      Patient was seen by Dr. Krishna and is comfortable with plans to transfer patient to [facility] for surgery.   Continue full liquids.   Serial imaging in the meantime. Consider a dose of Neostigmine pending, low long transfer to [facility] takes and how he looks clinically.   Following.     Maribell Tripp CNP  1:01 PM  10/31/2024

## 2024-10-31 NOTE — PROGRESS NOTES
PROGRESS NOTE          PATIENT NAME: Ramakrishna Gamboa  MEDICAL RECORD NO. [id number]  DATE: 10/30/2024    HD: # 3      Patient Active Problem List   Diagnosis    Anxiety disorder    Benign essential hypertension    Chronic obstructive pulmonary disease, unspecified COPD type (HCC)    Esophageal reflux    Hypogonadism male    Lumbar radicular pain    Pulmonary granuloma (HCC)    Male erectile disorder    Sensorineural hearing loss    Tinnitus of both ears    Vertigo    Psoriasis    Hip impingement syndrome    Kyphosis of thoracolumbar region    Sjogren's syndrome (HCC)    Spinal stenosis of lumbar region with neurogenic claudication    Lumbosacral spondylosis without myelopathy    Bilateral sacroiliitis (HCC)    Diffuse idiopathic skeletal hyperostosis    History of lumbar fusion    S/P fusion of thoracic spine    Andi's syndrome    Physical deconditioning    Tremor of both hands    Restless legs syndrome (RLS)    Andi syndrome    Abdominal pain       DIAGNOSIS AND PLAN        Chief Complaint: \"abdominal pain\"    SUBJECTIVE    Patient seen at bedside. Underwent colonic decompression yesterday but states he feels it did not work. The procedure was reported as successful with all gas and stool suctioned from colon. Patient states his symptoms did improve after the procedure but that they later returned. Patient denies any bowel movements this morning but does report recent diarrhea. Patient also reports having seen [facility] but was referred to another colorectal surgeon within that system.     OBJECTIVE  VITALS:   Vitals:    10/30/24 2030   BP: 124/64   Pulse: 75   Resp: 16   Temp: 98.6 °F (37 °C)   SpO2: 93%     CONSTITUTIONAL:  awake, alert, not distressed and moderately obese  HEENT: Normocephalic/atraumatic, without obvious abnormality.  NECK:  Supple, symmetrical, trachea midline   CARDIOVASCULAR: Regular rate and rhythm   LUNGS: normal effort with symmetric rise and fall of chest wall  ABDOMEN: Softly distended,

## 2024-10-31 NOTE — PROGRESS NOTES
Pt discharged off unit to home in good condition with belongings  Discharge instructions given  Pt denies having any further questions at this time  Locked up home medication(s)/personal items given to patient at discharge  Patient/family state they have everything they were admitted with.

## 2024-10-31 NOTE — PLAN OF CARE
Problem: Discharge Planning  Goal: Discharge to home or other facility with appropriate resources  Outcome: Adequate for Discharge  Flowsheets (Taken 10/31/2024 0904)  Discharge to home or other facility with appropriate resources:   Identify barriers to discharge with patient and caregiver   Arrange for needed discharge resources and transportation as appropriate   Identify discharge learning needs (meds, wound care, etc)     Problem: Pain  Goal: Verbalizes/displays adequate comfort level or baseline comfort level  Outcome: Adequate for Discharge     Problem: Gastrointestinal - Adult  Goal: Minimal or absence of nausea and vomiting  Outcome: Adequate for Discharge  Flowsheets (Taken 10/31/2024 0904)  Minimal or absence of nausea and vomiting: Administer IV fluids as ordered to ensure adequate hydration  Goal: Maintains or returns to baseline bowel function  Outcome: Adequate for Discharge  Goal: Maintains adequate nutritional intake  Outcome: Adequate for Discharge  Flowsheets (Taken 10/31/2024 0904)  Maintains adequate nutritional intake: Monitor percentage of each meal consumed     Problem: Safety - Adult  Goal: Free from fall injury  Outcome: Adequate for Discharge     Problem: Respiratory - Adult  Goal: Achieves optimal ventilation and oxygenation  Outcome: Adequate for Discharge  Flowsheets (Taken 10/31/2024 0904)  Achieves optimal ventilation and oxygenation:   Assess for changes in respiratory status   Assess for changes in mentation and behavior   Position to facilitate oxygenation and minimize respiratory effort     Problem: Musculoskeletal - Adult  Goal: Return ADL status to a safe level of function  Outcome: Adequate for Discharge  Flowsheets (Taken 10/31/2024 0904)  Return ADL Status to a Safe Level of Function:   Administer medication as ordered   Assess activities of daily living deficits and provide assistive devices as needed   Obtain physical therapy/occupational therapy consults as needed

## 2024-10-31 NOTE — PROGRESS NOTES
[facility]     DAILY PROGRESS NOTE      Patient:   Ramakrishna Gamboa   :    1965   Date:     10/31/2024  Consultant:   Agapito David DO FACG    Subjective:     59 y.o. male admitted 10/27/2024 with Abdominal distension [R14.0]  Abdominal pain [R10.9]  Abdominal pain, unspecified abdominal location [R10.9] and seen for Andi's.  Patient had a course of neostigmine 1.5 mg IV.  He developed some side effects including dysphagia and ocular side effects lasting 3 to 4 minutes that spontaneously resolved.  He passed a large amount of stool and gas.  He comments that his abdominal distention and discomfort have completely resolved.  He is feeling back to normal.  He ate a regular lunch.  He is currently in contact with [facility] trying to arrange GI and colorectal surgical follow-up..    Current Medications include:   Scheduled Meds:   sodium chloride flush  5-40 mL IntraVENous 2 times per day    primidone  50 mg Oral TID    citalopram  20 mg Oral Daily    traZODone  50 mg Oral Nightly    cetirizine  10 mg Oral Daily    metoprolol tartrate  12.5 mg Oral BID    furosemide  40 mg Oral Daily    melatonin  3 mg Oral Nightly    pantoprazole  40 mg Oral Nightly     Continuous Infusions:   sodium chloride       PRN Meds:.HYDROcodone 5 mg - acetaminophen, HYDROcodone 5 mg - acetaminophen, morphine, sodium chloride flush, sodium chloride flush, sodium chloride, acetaminophen, ondansetron **OR** ondansetron    Allergies:   Allergies   Allergen Reactions    Cetyl Alcohol Hives     Localized reaction by allergy testing    Cetearyl alcohol    Metronidazole Other (See Comments)     \" caused a prickly feeling in my legs \"  Other reaction(s): Unknown    Bacitracin-Polymyxin B      Other reaction(s): Unknown    Adhesive Tape Other (See Comments)     opsite and paper tape ok, bandaid ok  REDNESS AND TAKES SKIN OFF. PAPER TAPE OK.  opsite and paper tape ok, bandaid ok  Other reaction(s):

## 2024-10-31 NOTE — PROGRESS NOTES
tablet TAKE 1/2 TABLET BY MOUTH TWO TIMES A DAY   loratadine (CLARITIN) 10 MG tablet Take 1 tablet by mouth daily   vitamin D (CHOLECALCIFEROL) 25 MCG (1000 UT) TABS tablet Take 1 tablet by mouth nightly   Multiple Vitamins-Minerals (THERAPEUTIC MULTIVITAMIN-MINERALS) tablet Take 1 tablet by mouth daily   aspirin 81 MG EC tablet Take 1 tablet by mouth daily     omeprazole (PRILOSEC) 20 MG capsule Take 1 capsule by mouth nightly   ciclopirox (LOPROX) 0.77 % cream Apply topically as needed (ezcema rash)   hydrocortisone (WESTCORT) 0.2 % cream Apply topically 2 times daily as needed (rash)   Sod Fluoride-Potassium Nitrate (PREVIDENT 5000 SENSITIVE) 1.1-5 % PSTE Place onto teeth in the morning and at bedtime

## 2024-10-31 NOTE — PLAN OF CARE
Problem: Discharge Planning  Goal: Discharge to home or other facility with appropriate resources  10/30/2024 2326 by Suzanna Chavez RN  Outcome: Progressing  10/30/2024 1225 by Yudi Bond RN  Outcome: Progressing     Problem: Pain  Goal: Verbalizes/displays adequate comfort level or baseline comfort level  10/30/2024 2326 by Suzanna Chavez RN  Outcome: Progressing  10/30/2024 1225 by Yudi Bond RN  Outcome: Progressing     Problem: Gastrointestinal - Adult  Goal: Minimal or absence of nausea and vomiting  10/30/2024 2326 by Suzanna Chavez RN  Outcome: Progressing  10/30/2024 1225 by Yudi Bond RN  Outcome: Progressing  Goal: Maintains or returns to baseline bowel function  10/30/2024 2326 by Suzanna Chavez RN  Outcome: Progressing  10/30/2024 1225 by Yudi Bond RN  Outcome: Progressing  Goal: Maintains adequate nutritional intake  10/30/2024 2326 by Suzanna Chavez RN  Outcome: Progressing  10/30/2024 1225 by Yudi Bond RN  Outcome: Progressing     Problem: Safety - Adult  Goal: Free from fall injury  10/30/2024 2326 by Suzanna Chavez RN  Outcome: Progressing  10/30/2024 1225 by Yudi Bond RN  Outcome: Progressing  Flowsheets (Taken 10/30/2024 0988)  Free From Fall Injury: Instruct family/caregiver on patient safety     Problem: Respiratory - Adult  Goal: Achieves optimal ventilation and oxygenation  10/30/2024 2326 by Suzanna Chavez RN  Outcome: Progressing  10/30/2024 1225 by Yudi Bond RN  Reactivated     Problem: Musculoskeletal - Adult  Goal: Return ADL status to a safe level of function  10/30/2024 2326 by Suzanna Chavez RN  Outcome: Progressing  10/30/2024 1225 by Yudi Bond RN  Reactivated

## 2024-10-31 NOTE — PROGRESS NOTES
[facility] called, they do not have a bed at [facility] for this pt at this time.

## 2024-11-01 ENCOUNTER — ENROLLMENT (OUTPATIENT)
Dept: CARE COORDINATION | Age: 59
End: 2024-11-01

## 2024-11-01 ENCOUNTER — CARE COORDINATION (OUTPATIENT)
Dept: CARE COORDINATION | Age: 59
End: 2024-11-01

## 2024-11-01 NOTE — CARE COORDINATION
Care Transitions Note    Initial Call - Call within 2 business days of discharge: Yes    Attempted to reach patient for transitions of care follow up. Unable to reach patient.    Outreach Attempts:   HIPAA compliant voicemail left for patient.     Patient: Ramakrishna Gamboa    Patient : 1965   MRN: [id number]    Reason for Admission: abd pain  Discharge Date: 10/31/24  RURS: Readmission Risk Score: 9.8    Last Discharge Facility       Date Complaint Diagnosis Description Type Department Provider    10/27/24 Abdominal Pain Abdominal pain, unspecified abdominal location ... ED to Hosp-Admission (Discharged) (ADMITTED) Oren Segal MD; ROMY Solorzano        # 1 attempt-Attempted initial 24 hour hospital follow up call. Left a Hipaa compliant message with name and call back information. Requested return call to [phone].   Was this an external facility discharge? No    Follow Up Appointment:   Patient has hospital follow up appointment scheduled within 7 days of discharge.    Future Appointments         Provider Specialty Dept Phone    2024 1:00 PM Temo Lopez MD Primary Care [phone]    2024 11:10 AM Misael Danielle MD Pain Management [phone]    2024 10:00 AM Temo Lopez MD Primary Care [phone]            Plan for follow-up on next business day.      Lula Mojica RN

## 2024-11-04 ENCOUNTER — CARE COORDINATION (OUTPATIENT)
Dept: CARE COORDINATION | Age: 59
End: 2024-11-04

## 2024-11-04 NOTE — CARE COORDINATION
care clinics  When to call 911. The patient agrees to contact the primary care provider and/or specialist office for questions related to their healthcare.      Advance Care Planning:   Does patient have an Advance Directive: reviewed and current.    Medication Reconciliation:  Medication reconciliation was performed with patient,1111F entered: yes.     Remote Patient Monitoring:  Offered patient enrollment in the Remote Patient Monitoring (RPM) program for in-home monitoring: Yes, but did not enroll at this time: declined to enroll in the program because  .    Assessments:  Care Transitions 24 Hour Call    Schedule Follow Up Appointment with PCP: Completed  Do you have a copy of your discharge instructions?: Yes  Do you have all of your prescriptions and are they filled?: Yes  Have you been contacted by a [facility] Pharmacist?: No  Have you scheduled your follow up appointment?: Yes  How are you going to get to your appointment?: Car - family or friend to transport  Patient DME: Straight cane, Walker  Do you have support at home?: Partner/Spouse/SO  Do you feel like you have everything you need to keep you well at home?: Yes  Are you an active caregiver in your home?: No  Care Transitions Interventions     Other Services: Declined             Follow Up Appointment:   Discussed follow up appointments. Patient has hospital follow up appointment scheduled within 7 days of discharge.   Future Appointments         Provider Specialty Dept Phone    11/6/2024 1:00 PM Temo Lopez MD Primary Care [phone]    11/26/2024 11:10 AM Misael Danielle MD Pain Management [phone]    12/13/2024 10:00 AM Temo Lopez MD Primary Care [phone]            Care Transition Nurse provided contact information.  Plan for follow-up call in 6-10 days based on severity of symptoms and risk factors.  Plan for next call: symptom management-check on s/s of infection, gas, BM  follow-up appointment-check on f/u appt     Lula

## 2024-11-05 NOTE — ED PROVIDER NOTES
cheilitis     Anticoagulant long-term use     Anxiety     Arthritis     BACK AND FINGERS     Bilateral lower extremity edema 03/2021    started on Lasix per PCP    Chronic back pain     dr markham [facility]- SPINAL SPECIALIST, scheduled for OR 7/12/2021 at [facility]    COPD (chronic obstructive pulmonary disease) ([facility])     COVID-19 12/2022    cold symptoms    Depression     Dizziness     Eczema     Ex-smoker     quit in 2006, smoked for 23 years    Floaters in visual field, bilateral     GERD (gastroesophageal reflux disease)     H/O chest pain     H/O dermatitis     History of blood transfusion     History of panic attacks     [place] (hard of hearing)     LEFT EAR WORSE THAN RIGHT. getting hearing aides eloisa, Dr. Villalpando    Hypertension     DR MALCOLM PCP    Impaired mobility     occas uses cane    Kyphosis     [facility] OR 7/12/2021 with Dr. Jed Jeff cant lie flat,severe back pain    Left eye injury     BUNGY CORD STRAP BUSTED AND INJURED LEFT EYE    Muscle spasm     LOWER RIGHT BACK    Nocturia     Obesity     Sheldon's syndrome     \"bowels not working\"    Osteoarthritis     Prolonged emergence from general anesthesia     PATIENT STATES HIS B/P WOULD DROP WITH INTUBATION, GO UP AFTER ET TUBE REMOVED.    PVC's (premature ventricular contractions)     PVC'S. NO CARDIOLOGIST, PCP DR AFRICA MALCOLM    Rash     occas to see rheumatoid arthritis  workup for lupus    Restless legs syndrome     Sjogren's syndrome ([facility])     Snores     mild, denies apnea, does \"grind\" his teeth    SOB (shortness of breath)     used to see Dr. Coffey, borderline COPD/ never followed up / never filled scripts for inhalers    Use of cane as ambulatory aid     Vertigo     Dr. Villalpando    Wears glasses     Wears partial dentures     LOWER     SURGICAL HISTORY       Past Surgical History:   Procedure Laterality Date    BACK SURGERY  2006    cage    COLONOSCOPY      COLONOSCOPY      \"reversed colonoscopy to suck out air/gas\" Andi syndrome    COLONOSCOPY

## 2024-11-06 ENCOUNTER — OFFICE VISIT (OUTPATIENT)
Dept: PRIMARY CARE CLINIC | Age: 59
End: 2024-11-06

## 2024-11-06 VITALS
HEART RATE: 64 BPM | SYSTOLIC BLOOD PRESSURE: 132 MMHG | HEIGHT: 69 IN | OXYGEN SATURATION: 97 % | WEIGHT: 263.6 LBS | DIASTOLIC BLOOD PRESSURE: 68 MMHG | BODY MASS INDEX: 39.04 KG/M2

## 2024-11-06 DIAGNOSIS — G62.9 NEUROPATHY: Primary | ICD-10-CM

## 2024-11-06 DIAGNOSIS — F43.21 ADJUSTMENT DISORDER WITH DEPRESSED MOOD: ICD-10-CM

## 2024-11-06 DIAGNOSIS — Z13.220 ENCOUNTER FOR LIPID SCREENING FOR CARDIOVASCULAR DISEASE: ICD-10-CM

## 2024-11-06 DIAGNOSIS — E83.42 HYPOMAGNESEMIA: ICD-10-CM

## 2024-11-06 DIAGNOSIS — Z12.5 SCREENING PSA (PROSTATE SPECIFIC ANTIGEN): ICD-10-CM

## 2024-11-06 DIAGNOSIS — M47.817 LUMBOSACRAL SPONDYLOSIS WITHOUT MYELOPATHY: ICD-10-CM

## 2024-11-06 DIAGNOSIS — Z13.6 ENCOUNTER FOR LIPID SCREENING FOR CARDIOVASCULAR DISEASE: ICD-10-CM

## 2024-11-06 DIAGNOSIS — Z00.00 ANNUAL PHYSICAL EXAM: ICD-10-CM

## 2024-11-06 DIAGNOSIS — K59.81 OGILVIE'S SYNDROME: ICD-10-CM

## 2024-11-06 DIAGNOSIS — G25.0 BENIGN ESSENTIAL TREMOR SYNDROME: ICD-10-CM

## 2024-11-06 RX ORDER — CITALOPRAM HYDROBROMIDE 20 MG/1
20 TABLET ORAL DAILY
Qty: 90 TABLET | Refills: 3 | Status: SHIPPED | OUTPATIENT
Start: 2024-11-06

## 2024-11-06 RX ORDER — PRIMIDONE 50 MG/1
50 TABLET ORAL 3 TIMES DAILY
Qty: 270 TABLET | Refills: 3 | Status: SHIPPED | OUTPATIENT
Start: 2024-11-06

## 2024-11-06 ASSESSMENT — ENCOUNTER SYMPTOMS
COUGH: 0
EYE REDNESS: 0
RHINORRHEA: 0
DIARRHEA: 0
BACK PAIN: 1
VOMITING: 0
ABDOMINAL PAIN: 0
NAUSEA: 0
EYE DISCHARGE: 0
SHORTNESS OF BREATH: 0
SORE THROAT: 0
WHEEZING: 0

## 2024-11-06 NOTE — PROGRESS NOTES
1 tablet by mouth daily      furosemide (LASIX) 40 MG tablet TAKE 1 TABLET BY MOUTH EVERY DAY 90 tablet 0    traZODone (DESYREL) 50 MG tablet Take 1 tablet by mouth nightly 90 tablet 3    metoprolol tartrate (LOPRESSOR) 25 MG tablet TAKE 1/2 TABLET BY MOUTH TWO TIMES A DAY 90 tablet 3    ciclopirox (LOPROX) 0.77 % cream Apply topically as needed (ezcema rash)      hydrocortisone (WESTCORT) 0.2 % cream Apply topically 2 times daily as needed (rash) 45 g 1    loratadine (CLARITIN) 10 MG tablet Take 1 tablet by mouth daily      vitamin D (CHOLECALCIFEROL) 25 MCG (1000 UT) TABS tablet Take 1 tablet by mouth nightly      Multiple Vitamins-Minerals (THERAPEUTIC MULTIVITAMIN-MINERALS) tablet Take 1 tablet by mouth daily      Sod Fluoride-Potassium Nitrate (PREVIDENT 5000 SENSITIVE) 1.1-5 % PSTE Place onto teeth in the morning and at bedtime      aspirin 81 MG EC tablet Take 1 tablet by mouth daily      omeprazole (PRILOSEC) 20 MG capsule Take 1 capsule by mouth nightly       No current facility-administered medications for this visit.     Allergies   Allergen Reactions    Cetyl Alcohol Hives     Localized reaction by allergy testing    Cetearyl alcohol    Metronidazole Other (See Comments)     \" caused a prickly feeling in my legs \"  Other reaction(s): Unknown    Bacitracin-Polymyxin B      Other reaction(s): Unknown    Adhesive Tape Other (See Comments)     opsite and paper tape ok, bandaid ok  REDNESS AND TAKES SKIN OFF. PAPER TAPE OK.  opsite and paper tape ok, bandaid ok  Other reaction(s): Unknown  Other reaction(s): Unknown    Duloxetine Hcl Nausea And Vomiting     Other reaction(s): Unknown    Neosporin [Neomycin-Polymyxin-Gramicidin] Rash       Health Maintenance   Topic Date Due    HIV screen  Never done    Respiratory Syncytial Virus (RSV) Pregnant or age 60 yrs+ (1 - 1-dose 60+ series) Never done    Depression Monitoring  06/06/2025    Annual Wellness Visit (Medicare)  06/08/2025    Diabetes screen  08/11/2026

## 2024-11-13 ENCOUNTER — CARE COORDINATION (OUTPATIENT)
Dept: CARE COORDINATION | Age: 59
End: 2024-11-13

## 2024-11-13 NOTE — CARE COORDINATION
Care Transitions Note    Follow Up Call     Patient Current Location:  Home: [address]  [place] [address]    Care Transition Nurse contacted the patient by telephone. Verified name and  as identifiers.    Additional needs identified to be addressed with provider   No needs identified                 Method of communication with provider: none.    Care Summary Note: Writer spoke to patient , he is doing well, had f/u with pcp no new changes, denied any c/o fever, chills, n/v/d sob or chest pain, stated he has been feeling pretty good, got his flu shot,  reviewed up coming appts, will follow//JU    Plan of care updates since last contact:  Review of patient management of conditions/medications:         Advance Care Planning:   Does patient have an Advance Directive: reviewed during previous call, see note. .    Medication Review:  No changes since last call.         Assessments:   Goals Addressed    None          Follow Up Appointment:   Reviewed upcoming appointment(s).  Future Appointments         Provider Specialty Dept Phone    2024 11:10 AM Misael Danielle MD Pain Management [phone]    2024 10:00 AM Temo Lopez MD Primary Care [phone]    4/10/2025 4:00 PM [facility]  [phone]    4/10/2025 4:30 PM [facility]  [phone]            Care Transition Nurse provided contact information.  Plan for follow-up call in 6-10 days based on severity of symptoms and risk factors.  Plan for next call: symptom management-follow up on pain s./s of infection  joseph, beny Mojica RN

## 2024-11-16 NOTE — PROGRESS NOTES
[address] [place] [address] PRIMARY CARE  [address] [facility] [address]  Dept: [phone]    Luis Carlos Matta is a 63 y.o. male Established patient, who presents today for his medical conditions/complaints as noted below. Chief Complaint   Patient presents with    Back Pain       HPI:     HPI  Pt states still not sleeping well. Has some increase in urination with diuretic. Sates urinates for about 2 hours. States for back pain, seen Dr. Mary Diamond. Had MRI done. Pt was referred to [facility] by Dr. Mary Diamond. States [facility] deferring all surgery due to Covid. Pt states MRI of brain for tinnitus was not done. Patient states pain is still intolerable. Feels a crunching and grinding sensation in his lower back. Reviewed prior notes Neurosurgery  Reviewed previous Labs and Imaging    LDL Cholesterol (mg/dL)   Date Value   02/10/2018 94   07/23/2017 98       (goal LDL is <100)   AST (U/L)   Date Value   01/02/2021 16     ALT (U/L)   Date Value   01/02/2021 13     BUN (mg/dL)   Date Value   01/14/2021 15     Hemoglobin A1C (%)   Date Value   02/10/2018 5.5     TSH (mIU/L)   Date Value   01/06/2020 0.97     BP Readings from Last 3 Encounters:   04/20/21 126/70   04/08/21 117/68   03/18/21 118/76          (goal 120/80)    Past Medical History:   Diagnosis Date    Angular cheilitis     Arthritis     BACK AND FINGERS     Bilateral lower extremity edema 03/2021    started on Lasix per PCP    Chronic back pain     dr [place] [facility] - SPINAL SPECIALIST    Dizziness     Eczema     Ex-smoker     quit in 2006, smoked for 23 years    GERD (gastroesophageal reflux disease)     H/O chest pain     H/O dermatitis     [place] (hard of hearing)     LEFT EAR WORSE THAN RIGHT.  getting hearing aides eloisa    Hypertension     DR Reece Nephfranck PCP    Kyphosis     Left eye injury     BUNGY CORD STRAP BUSTED AND INJURED LEFT EYE    Muscle spasm     LOWER RIGHT BACK    Nocturia     Obesity     Prolonged emergence from general anesthesia     PATIENT STATES HIS B/P WOULD DROP WITH INTUBATION, GO UP AFTER ET TUBE REMOVED.  PVC's (premature ventricular contractions)     PVC'S.  NO CARDIOLOGIST, PCP DR Bradley topete to see rheumatoid arthritis  workup for lupus    Sjogren's syndrome ([facility] [address]     Snores     mild, denies apnea, does \"grind\" his teeth    SOB (shortness of breath)     occas    Use of cane as ambulatory aid     Vertigo     Wears glasses     Wears partial dentures     LOWER      Past Surgical History:   Procedure Laterality Date    BACK SURGERY  2006    cage    COLONOSCOPY      CYST REMOVAL Right     index finger    HEMORRHOID SURGERY  2017    HERNIA REPAIR      UMBILICAL    NOSE SURGERY      REALIGNED NOSE    OTHER SURGICAL HISTORY  2020    MRI cervical and thoracic spine without contrast . CT of lung , all under general anesthesia    CT COLSC FLX W/REMOVAL LESION BY HOT BX FORCEPS N/A 2017    COLONOSCOPY POLYPECTOMY HOT BIOPSY performed by Nazario Hahn MD at [address] TOE SURGERY Right     2nd toe    TONSILLECTOMY      AS A CHILD    VASECTOMY         Family History   Problem Relation Age of Onset    Coronary Art Dis Father         premature-- at 52    Diabetes Father     High Blood Pressure Father     Other Father         COPD, EMPHYSEMA    Coronary Art Dis Paternal Uncle         premature    Heart Failure Paternal Uncle     Diabetes Mother        Social History     Tobacco Use    Smoking status: Former Smoker     Packs/day: 1.50     Years: 23.00     Pack years: 34.50     Types: Cigarettes     Quit date:      Years since quitting: 15.3    Smokeless tobacco: Former User     Types: Chew     Quit date: 9/10/2019    Tobacco comment: quit 14 years ago   Substance Use Topics    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      Current Outpatient Medications   Medication Sig Dispense Refill    furosemide (LASIX) 40 MG tablet Take 1 tablet by mouth daily 90 tablet 3    potassium chloride (KLOR-CON M) 20 MEQ extended release tablet Take 1 tablet by mouth daily 90 tablet 3    meclizine (ANTIVERT) 25 MG tablet TAKE 1 TABLET BY MOUTH THREE TIMES A DAY as needed for dizziness 45 tablet 0    gabapentin (NEURONTIN) 400 MG capsule TAKE 1 CAPSULE BY MOUTH THREE TIMES A DAY  90 capsule 0    terbinafine (LAMISIL) 250 MG tablet Take 250 mg by mouth daily      ciclopirox (LOPROX) 0.77 % cream Apply topically 2 times daily. 30 g 1    hydroxychloroquine (PLAQUENIL) 200 MG tablet Take 1 tablet by mouth 2 times daily 180 tablet 3    metoprolol tartrate (LOPRESSOR) 50 MG tablet TAKE 1 TABLET BY MOUTH TWO TIMES A  tablet 2    celecoxib (CELEBREX) 200 MG capsule Take 1 capsule by mouth 2 times daily 180 capsule 3    lisinopril (PRINIVIL;ZESTRIL) 10 MG tablet TAKE 1 TABLET BY MOUTH ONE TIME A DAY  90 tablet 3    hydrocortisone (WESTCORT) 0.2 % cream Apply topically 2 times daily. 45 g 1    Multiple Vitamins-Minerals (THERAPEUTIC MULTIVITAMIN-MINERALS) tablet Take 1 tablet by mouth daily      Sod Fluoride-Potassium Nitrate (PREVIDENT 5000 SENSITIVE) 1.1-5 % PSTE Place onto teeth      Magnesium Cl-Calcium Carbonate (SLOW-MAG PO) Take 1 tablet by mouth 2 times daily      VITAMIN D PO Take 1,000 Units by mouth daily      Acetaminophen (TYLENOL ARTHRITIS PAIN PO) Take 325 mg by mouth every 6 hours as needed       glucosamine-chondroitin 500-400 MG tablet Take 1 tablet by mouth daily      aspirin 81 MG tablet Take 81 mg by mouth daily       omeprazole (PRILOSEC) 20 MG capsule Take 20 mg by mouth nightly        No current facility-administered medications for this visit.       Allergies   Allergen Reactions    Metronidazole Other (See Comments)     \" caused a prickly feeling in my legs \"    Adhesive Tape Other (See Comments)     opsite and paper tape ok, bandaid ok    Cymbalta [Duloxetine Hcl] Nausea And Vomiting    Neosporin Dispense:  90 tablet     Refill:  3    potassium chloride (KLOR-CON M) 20 MEQ extended release tablet     Sig: Take 1 tablet by mouth daily     Dispense:  90 tablet     Refill:  3       Patient given educational materials - see patient instructions. Discussed use, benefit, and side effects of prescribed medications. All patient questions answered. Pt voiced understanding. Reviewed health maintenance. Instructed to continue current medications, diet andexercise. Patient agreed with treatment plan. Follow up as directed.      Electronicallysigned by Stella Johnson MD on 4/20/2021 at 2:18 PM follow full anticoagulation nomogram

## 2024-11-21 ENCOUNTER — CARE COORDINATION (OUTPATIENT)
Dept: CARE COORDINATION | Age: 59
End: 2024-11-21

## 2024-11-21 NOTE — CARE COORDINATION
Care Transitions Note    Follow Up Call     Patient Current Location:  Home: [address]  [place] [address]    [facility] Care Coordinator contacted the patient by telephone. Verified name and  as identifiers.    Additional needs identified to be addressed with provider   No needs identified                 Method of communication with provider: chart routing.    Care Summary Note: Writer spoke to Geo today for follow up transitional care call. He reports stools alternate between  mushy and diarrhea. He stated its not oily non worsening same symptoms he has been dealing with no pain, fevers/chills n/v. No melena in stool.  He has a appointment tomorrow with [facility]. He also has a appointment 25 with a surgeon. Voiced no other needs or concerns at this time. Thanked writer for calling.     Plan of care updates since last contact:  Doing okay reviewed upcoming appts       Advance Care Planning:   Does patient have an Advance Directive: reviewed during previous call, see note. .    Medication Review:  No changes since last call.     Remote Patient Monitoring:  Offered patient enrollment in the Remote Patient Monitoring (RPM) program for in-home monitoring: Yes, but did not enroll at this time: declined to enroll in the program becausenot interested  .    Assessments:  Care Transitions Subsequent and Final Call    Subsequent and Final Calls  Care Transitions Interventions     Other Services: Declined   Other Interventions:              Follow Up Appointment:   Reviewed upcoming appointment(s).  Future Appointments         Provider Specialty Dept Phone    2024 9:50 AM Misael Danielle MD Pain Management [phone]    2024 10:00 AM Temo Lopez MD Primary Care [phone]    4/10/2025 4:00 PM [facility]  [phone]    4/10/2025 4:00 PM Krishna Love MD Physical Medicine and Rehab [phone]            [facility] Care Coordinator provided contact information.  Plan for

## 2024-11-27 ENCOUNTER — CARE COORDINATION (OUTPATIENT)
Dept: CARE COORDINATION | Age: 59
End: 2024-11-27

## 2024-11-27 NOTE — CARE COORDINATION
Care Transitions Note    Final Call     Patient Current Location:  Home: [address]  [facility] [address]    [facility] Care Coordinator contacted the patient by telephone. Verified name and  as identifiers.    Patient graduated from the Care Transitions program on 2024.  Patient/family has the ability to self manage at this time..      Advance Care Planning:   Does patient have an Advance Directive: reviewed and current.    Handoff:   Patient was not referred to the ACM team due to no additional needs identified.       Care Summary Note: Writer spoke with Geo for his final care transitions call. He states he is doing well today. He is not having any abdominal pain-no n/v/d. He reports that  cancelled his appt for  due to him already having an appt with the colorectal surgeon and the MD not being available that day. He will see the surgeon in January. He states they told him if he has symptoms again they want him to drive to the  ER id possible so he can be seen by their physicians. He denies having any new needs or concerns. Will end care transitions.     Assessments:  Care Transitions Subsequent and Final Call    Subsequent and Final Calls  Do you have any ongoing symptoms?: No  Have your medications changed?: No  Do you have any questions related to your medications?: No  Do you currently have any active services?: No  Do you have any needs or concerns that I can assist you with?: No  Identified Barriers: None  Care Transitions Interventions     Other Services: Declined   Other Interventions:              Upcoming Appointments:    Future Appointments         Provider Specialty Dept Phone    2024 9:50 AM Misael Danielle MD Pain Management [phone]    2024 10:00 AM Temo Lopez MD Primary Care [phone]    4/10/2025 4:00 PM [facility]  [phone]    4/10/2025 4:00 PM Krishna Love MD Physical Medicine and Rehab [phone]            Patient has

## 2024-11-29 DIAGNOSIS — R60.0 LOCALIZED EDEMA: ICD-10-CM

## 2024-11-29 RX ORDER — FUROSEMIDE 40 MG/1
40 TABLET ORAL DAILY
Qty: 90 TABLET | Refills: 0 | Status: SHIPPED | OUTPATIENT
Start: 2024-11-29

## 2024-12-13 ENCOUNTER — OFFICE VISIT (OUTPATIENT)
Dept: PAIN MANAGEMENT | Age: 59
End: 2024-12-13
Payer: MEDICARE

## 2024-12-13 VITALS — WEIGHT: 263 LBS | BODY MASS INDEX: 38.95 KG/M2 | HEIGHT: 69 IN

## 2024-12-13 DIAGNOSIS — Z98.1 HISTORY OF LUMBAR FUSION: ICD-10-CM

## 2024-12-13 DIAGNOSIS — M46.1 BILATERAL SACROILIITIS (HCC): Primary | ICD-10-CM

## 2024-12-13 DIAGNOSIS — Z98.1 S/P FUSION OF THORACIC SPINE: ICD-10-CM

## 2024-12-13 DIAGNOSIS — M47.817 LUMBOSACRAL SPONDYLOSIS WITHOUT MYELOPATHY: ICD-10-CM

## 2024-12-13 PROCEDURE — 99213 OFFICE O/P EST LOW 20 MIN: CPT | Performed by: ANESTHESIOLOGY

## 2024-12-13 ASSESSMENT — ENCOUNTER SYMPTOMS
CHEST TIGHTNESS: 0
BACK PAIN: 1
GASTROINTESTINAL NEGATIVE: 1
RESPIRATORY NEGATIVE: 1
NAUSEA: 0
SORE THROAT: 0
VOMITING: 0
SHORTNESS OF BREATH: 0
CONSTIPATION: 0
DIARRHEA: 0

## 2024-12-13 NOTE — PROGRESS NOTES
Stress Questionnaire     Feeling of Stress : To some extent   Social Connections: Moderately Integrated (2023)    Social Connection and Isolation Panel [NHANES]     Frequency of Communication with Friends and Family: Three times a week     Frequency of Social Gatherings with Friends and Family: Twice a week     Attends [employer] Services: 1 to 4 times per year     Active Member of Clubs or Organizations: No     Attends Club or Organization Meetings: Never     Marital Status:    Housing Stability: Low Risk  (2024)    Housing Stability Vital Sign     Unable to Pay for Housing in the Last Year: No     Number of Places Lived in the Last Year: 1     Unstable Housing in the Last Year: No       Family History   Problem Relation Age of Onset    Coronary Art Dis Father         premature-- at 49    Diabetes Father     High Blood Pressure Father     Other Father         COPD, EMPHYSEMA    Coronary Art Dis Paternal Uncle         premature    Heart Failure Paternal Uncle     Diabetes Mother        Allergies   Allergen Reactions    Cetyl Alcohol Hives     Localized reaction by allergy testing    Cetearyl alcohol    Metronidazole Other (See Comments)     \" caused a prickly feeling in my legs \"  Other reaction(s): Unknown    Bacitracin-Polymyxin B      Other reaction(s): Unknown    Adhesive Tape Other (See Comments)     opsite and paper tape ok, bandaid ok  REDNESS AND TAKES SKIN OFF. PAPER TAPE OK.  opsite and paper tape ok, bandaid ok  Other reaction(s): Unknown  Other reaction(s): Unknown    Duloxetine Hcl Nausea And Vomiting     Other reaction(s): Unknown    Neosporin [Neomycin-Polymyxin-Gramicidin] Rash       There were no vitals filed for this visit.    Current Outpatient Medications   Medication Sig Dispense Refill    furosemide (LASIX) 40 MG tablet TAKE 1 TABLET BY MOUTH EVERY DAY 90 tablet 0    citalopram (CELEXA) 20 MG tablet Take 1 tablet by mouth daily 90 tablet 3    primidone (MYSOLINE) 50 MG

## 2025-01-15 ENCOUNTER — HOSPITAL ENCOUNTER (OUTPATIENT)
Dept: PAIN MANAGEMENT | Facility: CLINIC | Age: 60
Discharge: HOME | End: 2025-01-15
Payer: MEDICARE

## 2025-01-15 VITALS
RESPIRATION RATE: 11 BRPM | OXYGEN SATURATION: 95 % | TEMPERATURE: 97.3 F | WEIGHT: 270 LBS | HEIGHT: 69 IN | DIASTOLIC BLOOD PRESSURE: 73 MMHG | HEART RATE: 75 BPM | BODY MASS INDEX: 39.99 KG/M2 | SYSTOLIC BLOOD PRESSURE: 125 MMHG

## 2025-01-15 DIAGNOSIS — R52 PAIN MANAGEMENT: ICD-10-CM

## 2025-01-15 DIAGNOSIS — Z13.220 ENCOUNTER FOR LIPID SCREENING FOR CARDIOVASCULAR DISEASE: ICD-10-CM

## 2025-01-15 DIAGNOSIS — Z00.00 ANNUAL PHYSICAL EXAM: ICD-10-CM

## 2025-01-15 DIAGNOSIS — Z13.6 ENCOUNTER FOR LIPID SCREENING FOR CARDIOVASCULAR DISEASE: ICD-10-CM

## 2025-01-15 DIAGNOSIS — Z12.5 SCREENING PSA (PROSTATE SPECIFIC ANTIGEN): ICD-10-CM

## 2025-01-15 DIAGNOSIS — M46.1 BILATERAL SACROILIITIS (HCC): Primary | ICD-10-CM

## 2025-01-15 DIAGNOSIS — E83.42 HYPOMAGNESEMIA: ICD-10-CM

## 2025-01-15 LAB
ALBUMIN/GLOBULIN RATIO: 1.3 (ref 1–2.5)
ALBUMIN: 4.2 G/DL (ref 3.5–5.2)
ALP BLD-CCNC: 100 U/L (ref 40–129)
ALT SERPL-CCNC: 15 U/L (ref 10–50)
ANION GAP SERPL CALCULATED.3IONS-SCNC: 9 MMOL/L (ref 9–16)
AST SERPL-CCNC: 21 U/L (ref 10–50)
BILIRUB SERPL-MCNC: 0.3 MG/DL (ref 0–1.2)
BILIRUBIN DIRECT: 0.1 MG/DL (ref 0–0.2)
BILIRUBIN, INDIRECT: 0.2 MG/DL (ref 0–1)
BUN BLDV-MCNC: 14 MG/DL (ref 8–23)
CALCIUM SERPL-MCNC: 9 MG/DL (ref 8.6–10.4)
CHLORIDE BLD-SCNC: 100 MMOL/L (ref 98–107)
CHOLESTEROL, FASTING: 179 MG/DL (ref 0–199)
CHOLESTEROL/HDL RATIO: 4.6
CO2: 29 MMOL/L (ref 20–31)
CREAT SERPL-MCNC: 0.6 MG/DL (ref 0.7–1.2)
GFR, ESTIMATED: >90 ML/MIN/1.73M2
GLOBULIN: 3.2 G/DL
GLUCOSE FASTING: 116 MG/DL (ref 74–99)
HDLC SERPL-MCNC: 39 MG/DL
LDL CHOLESTEROL: 115 MG/DL (ref 0–100)
MAGNESIUM: 2.1 MG/DL (ref 1.6–2.4)
POTASSIUM SERPL-SCNC: 4.4 MMOL/L (ref 3.7–5.3)
PROSTATE SPECIFIC ANTIGEN: 0.43 NG/ML (ref 0–4)
SODIUM BLD-SCNC: 138 MMOL/L (ref 136–145)
TOTAL PROTEIN: 7.4 G/DL (ref 6.6–8.7)
TRIGLYCERIDE, FASTING: 127 MG/DL (ref 0–149)
VLDLC SERPL CALC-MCNC: 25 MG/DL (ref 1–30)

## 2025-01-15 PROCEDURE — 6360000002 HC RX W HCPCS: Performed by: ANESTHESIOLOGY

## 2025-01-15 PROCEDURE — G0260 INJ FOR SACROILIAC JT ANESTH: HCPCS

## 2025-01-15 PROCEDURE — 27096 INJECT SACROILIAC JOINT: CPT | Performed by: ANESTHESIOLOGY

## 2025-01-15 PROCEDURE — 6360000004 HC RX CONTRAST MEDICATION: Performed by: ANESTHESIOLOGY

## 2025-01-15 PROCEDURE — 99152 MOD SED SAME PHYS/QHP 5/>YRS: CPT | Performed by: ANESTHESIOLOGY

## 2025-01-15 RX ORDER — TRIAMCINOLONE ACETONIDE 40 MG/ML
INJECTION, SUSPENSION INTRA-ARTICULAR; INTRAMUSCULAR
Status: COMPLETED | OUTPATIENT
Start: 2025-01-15 | End: 2025-01-15

## 2025-01-15 RX ORDER — MIDAZOLAM HYDROCHLORIDE 2 MG/2ML
INJECTION, SOLUTION INTRAMUSCULAR; INTRAVENOUS
Status: COMPLETED | OUTPATIENT
Start: 2025-01-15 | End: 2025-01-15

## 2025-01-15 RX ORDER — BUPIVACAINE HYDROCHLORIDE 5 MG/ML
INJECTION, SOLUTION EPIDURAL; INTRACAUDAL
Status: COMPLETED | OUTPATIENT
Start: 2025-01-15 | End: 2025-01-15

## 2025-01-15 RX ORDER — LIDOCAINE HYDROCHLORIDE 10 MG/ML
INJECTION, SOLUTION EPIDURAL; INFILTRATION; INTRACAUDAL; PERINEURAL
Status: COMPLETED | OUTPATIENT
Start: 2025-01-15 | End: 2025-01-15

## 2025-01-15 RX ORDER — FENTANYL CITRATE 50 UG/ML
INJECTION, SOLUTION INTRAMUSCULAR; INTRAVENOUS
Status: COMPLETED | OUTPATIENT
Start: 2025-01-15 | End: 2025-01-15

## 2025-01-15 RX ADMIN — FENTANYL CITRATE 50 MCG: 50 INJECTION, SOLUTION INTRAMUSCULAR; INTRAVENOUS at 14:04

## 2025-01-15 RX ADMIN — LIDOCAINE HYDROCHLORIDE 5 ML: 10 INJECTION, SOLUTION EPIDURAL; INFILTRATION; INTRACAUDAL at 14:04

## 2025-01-15 RX ADMIN — BUPIVACAINE HYDROCHLORIDE 6 ML: 5 INJECTION, SOLUTION EPIDURAL; INTRACAUDAL; PERINEURAL at 14:06

## 2025-01-15 RX ADMIN — MIDAZOLAM HYDROCHLORIDE 1 MG: 1 INJECTION, SOLUTION INTRAMUSCULAR; INTRAVENOUS at 14:03

## 2025-01-15 RX ADMIN — IOHEXOL 3 ML: 180 INJECTION INTRAVENOUS at 14:05

## 2025-01-15 RX ADMIN — FENTANYL CITRATE 50 MCG: 50 INJECTION, SOLUTION INTRAMUSCULAR; INTRAVENOUS at 14:16

## 2025-01-15 RX ADMIN — IOHEXOL 2 ML: 180 INJECTION INTRAVENOUS at 14:09

## 2025-01-15 RX ADMIN — TRIAMCINOLONE ACETONIDE 40 MG: 40 INJECTION, SUSPENSION INTRA-ARTICULAR; INTRAMUSCULAR at 14:06

## 2025-01-15 RX ADMIN — MIDAZOLAM HYDROCHLORIDE 1 MG: 1 INJECTION, SOLUTION INTRAMUSCULAR; INTRAVENOUS at 14:16

## 2025-01-15 ASSESSMENT — PAIN - FUNCTIONAL ASSESSMENT
PAIN_FUNCTIONAL_ASSESSMENT: 0-10
PAIN_FUNCTIONAL_ASSESSMENT: NONE - DENIES PAIN

## 2025-01-15 ASSESSMENT — PAIN DESCRIPTION - DESCRIPTORS: DESCRIPTORS: THROBBING;BURNING

## 2025-01-15 NOTE — DISCHARGE INSTRUCTIONS

## 2025-01-15 NOTE — H&P
Pain Pre-Op H&P Note    Misael Danielle MD    HPI: Ramakrishna Gamboa  presents with   Back pain  Axial located in the lumbosacral area bilaterally  No dermatomal radiation of pain  Pain is chronic  Going on for several years  Diagnosed with SI joint injection in past  Failed conservative measures with NSAID and therapy    Past Medical History:   Diagnosis Date    ADHD (attention deficit hyperactivity disorder)     Angular cheilitis     Anticoagulant long-term use     Anxiety     Arthritis     BACK AND FINGERS     Bilateral lower extremity edema 03/2021    started on Lasix per PCP    Chronic back pain     dr markham [facility], scheduled for OR 7/12/2021 at [facility]    COPD (chronic obstructive pulmonary disease) ([facility])     COVID-19 12/2022    cold symptoms    Depression     Dizziness     Eczema     Ex-smoker     quit in 2006, smoked for 23 years    Floaters in visual field, bilateral     GERD (gastroesophageal reflux disease)     H/O chest pain     H/O dermatitis     History of blood transfusion     History of panic attacks     [place] (hard of hearing)     LEFT EAR WORSE THAN RIGHT. getting hearing aides eloisa, Dr. Villalpando    Hypertension     DR MALCOLM PCP    Impaired mobility     occas uses cane    Kyphosis     [facility] OR 7/12/2021 with Dr. Jed Jeff cant lie flat,severe back pain    Left eye injury     BUNGY CORD STRAP BUSTED AND INJURED LEFT EYE    Muscle spasm     LOWER RIGHT BACK    Nocturia     Obesity     Andi's syndrome     \"bowels not working\"    Osteoarthritis     Prolonged emergence from general anesthesia     PATIENT STATES HIS B/P WOULD DROP WITH INTUBATION, GO UP AFTER ET TUBE REMOVED.    PVC's (premature ventricular contractions)     PVC'S. NO CARDIOLOGIST, PCP DR AFRICA Anna     occas to see rheumatoid arthritis  workup for lupus    Restless legs syndrome     Sjogren's syndrome ([facility])     Snores     mild, denies apnea, does \"grind\" his teeth    SOB (shortness of breath)     used

## 2025-01-15 NOTE — OP NOTE
Pre Op Diagnoses: BilateralSacroiliac joint pain  Post Op Diagnoses:Bilateral Sacroiliac joint pain     Procedure: BilateralSI joint steroid injection with flouro guidance     Blood Loss: None  Procedure:      The Patient was seen in the preop area, chart was reviewed, informed consent was obtained. Patient was taken to procedure room and was placed in prone position. Vital signs were monitored through out the Procedure. A time out was completed.  The skin over the back was prepped and draped in sterile manner.      The target point was marked at the left SI joint. Skin and deep tissues were anesthetized with 1 % lidocaine. A 22 G spinal needlele was advanced under fluoroscopy guidance in AP view. Positon was confirmed by injecting small amount of contrast dye  Finally 3 ml of treatment solution containing 5 ml of 0.5 % Bupivacaine and 1 ml of kenalog 40 mg was injected  The needle was removed and a Band-Aid was placed over the needle insertion site.    The same procedure was then repeated on the other side with same technique, the remaining 3 ml of treatment solution was injected on that side.    The patient's vital signs remained stable and the patient tolerated the procedure well.      SEDATION NOTE:    ASA CLASSIFICATION  3  MP   CLASSIFICATION  3    Moderate intravenous conscious sedation was supervised by Dr. Danielle  The patient was independently monitored by a Registered Nurse assigned to the Procedure Room  Monitoring included automated blood pressure, continuous EKG, Capnography and continuous pulse oximetry.   The detailed Conscious Record is permanently stored in the Hospital Information System.     The following is the conscious sedation record;  Start Time:  1358  End times:  1420  Duration:  22 minutes  MEDS GIVEN 2 MG VERSED  [facility] FENTANYL

## 2025-01-23 ENCOUNTER — TELEPHONE (OUTPATIENT)
Dept: BARIATRICS/WEIGHT MGMT | Age: 60
End: 2025-01-23

## 2025-01-23 NOTE — TELEPHONE ENCOUNTER
Attended Surgical Info Session on 1/22/25      Verified  Insurance Benefit   with  [facility]    Patient informed the following:    This is NOT a guarantee of payment  When stating that you have a “Benefit” or “Coverage” for Bariatric Surgery - that means that you may qualify for the surgery  Bariatric Surgery is considered an elective procedure, patient is responsible to know their benefits . Any information we obtain when calling your insurance  is not  a guarantee of  coverage  and/or  benefit.        Appointment Note :   New Patient , trudy ,   6   month visits,  PG Fee $200,  Advise to bring completed new    patient  packet.      Remind  Patient of  $200  Program fee with  $ 100  Required at  Second visit with office on initial dietician visit.     Remind Patient they must be nicotine free. They will be tested at the beginning of the program and prior to surgery.      Advise  Patient  Responsible for out of pocket, copay at medical visits,  Deductible and coinsurance applied to medical visits and procedure.    You will be responsible for any of the following:  Copays   Deductibles   Co insurances     The items mentioned above are  indicated or required by your insurance plan. Your deductible and coinsurance are applied to medical visits and procedures.     Verified with patient if he or she has had any previous bariatric surgery? no  ( If yes ,advise patient of transfer of care process and program fee)

## 2025-02-05 ENCOUNTER — OFFICE VISIT (OUTPATIENT)
Dept: BARIATRICS/WEIGHT MGMT | Age: 60
End: 2025-02-05
Payer: MEDICARE

## 2025-02-05 VITALS
BODY MASS INDEX: 37.38 KG/M2 | WEIGHT: 267 LBS | HEART RATE: 72 BPM | SYSTOLIC BLOOD PRESSURE: 128 MMHG | DIASTOLIC BLOOD PRESSURE: 74 MMHG | OXYGEN SATURATION: 96 % | RESPIRATION RATE: 18 BRPM | HEIGHT: 71 IN

## 2025-02-05 DIAGNOSIS — E66.01 MORBID OBESITY DUE TO EXCESS CALORIES: ICD-10-CM

## 2025-02-05 DIAGNOSIS — K21.9 GASTROESOPHAGEAL REFLUX DISEASE WITHOUT ESOPHAGITIS: ICD-10-CM

## 2025-02-05 DIAGNOSIS — I10 ESSENTIAL HYPERTENSION: Primary | ICD-10-CM

## 2025-02-05 DIAGNOSIS — R06.09 DYSPNEA ON EXERTION: ICD-10-CM

## 2025-02-05 PROCEDURE — 3078F DIAST BP <80 MM HG: CPT | Performed by: SURGERY

## 2025-02-05 PROCEDURE — 3074F SYST BP LT 130 MM HG: CPT | Performed by: SURGERY

## 2025-02-05 PROCEDURE — 99204 OFFICE O/P NEW MOD 45 MIN: CPT | Performed by: SURGERY

## 2025-02-05 NOTE — PROGRESS NOTES
and thought content normal. Cognition and memory are normal.     RECOMMENDATIONS:     We spent a great deal of time discussing the risks and benefits of Sleeve Gastrectomy, including but not limited to injury to intra-abdominal organs, breakdown of the gastric staple line, the need for re-operative therapy,  prolonged hospitalization,  mechanical ventilation,  and death. We discussed the possibility of bleeding, the need for blood transfusions, blood clots, hospital-acquired and intra-abdominal infection, anastomotic stricture, and worsening GERD.  And we discussed the need for post-operative visit compliance, behavior modifications and diet changes, protein and vitamin supplementation, as well as routine scheduled and dedicated exercise.  I instructed the patient to utilize the exercise log that will be given to them at their fist dietician appointment.  We discussed the potential weight loss benefit of approximately 50-60%  of his excess body weight at 12-18 months post-op, as well as the possibility of insufficient weight loss or weight gain after 2 years post-operative time.     Discussed the risk of substance abuse and or nicotine abuse today with patient. They expressed understanding of the risks of abuse of such drugs.    PLAN:       Diagnosis Orders   1. Essential hypertension        2. Dyspnea on exertion        3. Morbid obesity due to excess calories        4. Gastroesophageal reflux disease without esophagitis           Assessment & Plan    Initial Testing     Primary Procedure: Sleeve Gastrectomy     Labwork: Initial Pre-surgical Lab Tests (CMP, TSH, Fasting Lipid Profile, Mg, Zinc, Vit B1 (whole blood), Vit B12, 25-OH Vit D, Fe,  Ferritin,  Folate)    Endoscopic Studies: Upper GI Endoscopy for Dyspepsia which has been untreated.    Psychological Assessment: Psychological Evaluation and Clearance    Nutrition Assessment: Bariatric Nutrition Assessment and Clearance    Other  Consultations: Medical

## 2025-02-10 ENCOUNTER — TELEPHONE (OUTPATIENT)
Dept: PRIMARY CARE CLINIC | Age: 60
End: 2025-02-10

## 2025-02-10 NOTE — TELEPHONE ENCOUNTER
----- Message from RIVKA MEADOWS MA sent at 2/6/2025  8:18 AM EST -----  Regarding: letter of medical necessity  Karyn,    This patient was into see Dr. Ford we are requesting a letter of medical necessity.  See attached              Ramakrishna LEANNA Bee  [address]  [place] [address]        To Whom it May Concern:    The above named patient has been seen by our office for ___ years. ____ suffers from the following co morbidities:____     The patient's current weight is ___, and  @BMI@ . The patient has undergone the following weight loss attempts: _____    I feel this patient would benefit from weight loss surgery because  Ramakrishna has been unsuccessful losing weight with other diet methods, and medical conditions will become life-threatening if  Ramakrishna does not get help getting the weight under control.  I appreciate your consideration for approval.  Please feel free to contact me for any further information.    Sincerely,        @[email or web address]@

## 2025-02-13 NOTE — TELEPHONE ENCOUNTER
VM left for patient to return the call. Need to know what weight loss attempts have been made for the medical necessity letter.

## 2025-02-24 NOTE — TELEPHONE ENCOUNTER
Reason for Disposition   Patient wants to be seen    Answer Assessment - Initial Assessment Questions  1. SYMPTOM: \"What's the main symptom you're concerned about? \" (e.g., dry mouth. chapped lips, lump)      Pt states about 1 week ago, he had some mucous in is throat that felt stuck. Coughed extremely hard to get mucous up and then started experiencing some discomfort when swallowing. Feels like lump in throat when swallowing    2. ONSET: \"When did the  symptoms  start? \"      About 1 week ago    3. PAIN: \"Is there any pain? \" If so, ask: \"How bad is it? \" (Scale: 1-10; mild, moderate, severe)      Mild to moderate discomfort when swallowing. Discomfort is on right side where neck meets collar bone    4. CAUSE: \"What do you think is causing the symptoms? \"      Unsure    5. OTHER SYMPTOMS: \"Do you have any other symptoms? \" (e.g., fever, sore throat, toothache, swelling)      Denies difficulty swallowing but uncomfortable    6. PREGNANCY: \"Is there any chance you are pregnant? \" \"When was your last menstrual period? \"      n/a    Protocols used: MOUTH SYMPTOMS-ADULT-OH    Received call from [facility] at pre-service center [facility] with [employer]. Brief description of triage: See above assessment    Triage indicates for patient to Be seen in office within 3 days per patient request/ [facility] as back up    Care advice provided, patient verbalizes understanding; denies any other questions or concerns; instructed to call back for any new or worsening symptoms. Writer provided warm transfer to Carey at [facility] for appointment scheduling. Attention Provider: Thank you for allowing me to participate in the care of your patient. The patient was connected to triage in response to information provided to the [facility]. Please do not respond through this encounter as the response is not directed to a shared pool.
(1) Other Medications/None

## 2025-02-25 ENCOUNTER — NURSE ONLY (OUTPATIENT)
Dept: BARIATRICS/WEIGHT MGMT | Age: 60
End: 2025-02-25

## 2025-02-25 VITALS — WEIGHT: 275.2 LBS | BODY MASS INDEX: 38.93 KG/M2

## 2025-02-25 DIAGNOSIS — E66.01 MORBID OBESITY DUE TO EXCESS CALORIES: Primary | ICD-10-CM

## 2025-02-25 NOTE — PROGRESS NOTES
Medical Nutrition Therapy  Initial Nutrition Assessment for Metabolic/Bariatric Surgery  Required number of insurance visits prior to surgery:  6    Nutrition Assessment:  Ramakrishna Gamboa is a 59 y.o. male with a date of birth of 1965 being seen regarding weight loss surgery. Patient is working toward bariatric surgery of Gastric Sleeve.    Weight History:   Wt Readings from Last 3 Encounters:   02/05/25 121.1 kg (267 lb)   01/15/25 122.5 kg (270 lb)   12/13/24 119.3 kg (263 lb)        BMI: There is no height or weight on file to calculate BMI.     How does your weight affect your daily activities? fatigue, back pain, and shortness of breath with activity.    Weight History  Ramakrishna Gamboa's highest adult weight was 290 lbs at age 56.   Patient was at his highest weight for 3 years.   Patient's triggers/known causes to his highest weight are medical conditions.     Ramakrishna Gamboa's lowest adult weight was 175 lbs at age 54.    Patient was at his lowest weight for 6 months.  The lowest weight was achieved through hospitalization that recurred NPO then upon discharge had undiagnosed medical condition.    Physical Activity  Do you participate in a structured exercise program, step counting, or regular physical activity? No    Shared with patient the importance of documenting exercise and staying at or below start weight during visits. Instructions and exercise logs were provided to patient today see goal sheet and plan.    Previous weight loss attempts  Patient has participated in the following weight loss programs: Low-Carb    Nutrition History  Have you ever been diagnosed with an eating disorder? No  Have you ever been diagnosed with a vitamin or mineral deficiency? No  Have you ever had problems tolerating a multivitamin or mineral supplement? No     Patient dines out to a sit down restaurant 1 times per week.   Patient dines out to a fast food restaurant 1 times per week.     Patient

## 2025-02-26 ENCOUNTER — TELEPHONE (OUTPATIENT)
Dept: PRIMARY CARE CLINIC | Age: 60
End: 2025-02-26

## 2025-02-26 NOTE — TELEPHONE ENCOUNTER
Patient is asking if he can obtain a letter for jury duty.    He states due to his ongoing bowel problems, as when he has to go he has to go quick.    The jury duty notice he received is for the month of April.    Please advise.

## 2025-02-28 ENCOUNTER — OFFICE VISIT (OUTPATIENT)
Dept: PAIN MANAGEMENT | Age: 60
End: 2025-02-28
Payer: MEDICARE

## 2025-02-28 VITALS — HEIGHT: 70 IN | WEIGHT: 275 LBS | BODY MASS INDEX: 39.37 KG/M2

## 2025-02-28 DIAGNOSIS — Z98.1 S/P FUSION OF THORACIC SPINE: ICD-10-CM

## 2025-02-28 DIAGNOSIS — M46.1 BILATERAL SACROILIITIS: ICD-10-CM

## 2025-02-28 DIAGNOSIS — M47.817 LUMBOSACRAL SPONDYLOSIS WITHOUT MYELOPATHY: Primary | ICD-10-CM

## 2025-02-28 DIAGNOSIS — M54.9 MUSCULOSKELETAL BACK PAIN: ICD-10-CM

## 2025-02-28 PROCEDURE — 99213 OFFICE O/P EST LOW 20 MIN: CPT | Performed by: ANESTHESIOLOGY

## 2025-02-28 RX ORDER — METHOCARBAMOL 500 MG/1
500 TABLET, FILM COATED ORAL 3 TIMES DAILY
Qty: 30 TABLET | Refills: 0 | Status: SHIPPED | OUTPATIENT
Start: 2025-02-28 | End: 2025-03-10

## 2025-02-28 NOTE — PROGRESS NOTES
of motion and activity tolerance  Happy and satisfied with the outcome  No side effects from the procedure  Refill ordered for Robaxin  Follow-up as needed    1. Lumbosacral spondylosis without myelopathy    2. S/P fusion of thoracic spine    3. Bilateral sacroiliitis    4. Musculoskeletal back pain        No orders of the defined types were placed in this encounter.     Orders Placed This Encounter   Medications    methocarbamol (ROBAXIN) 500 MG tablet     Sig: Take 1 tablet by mouth 3 times daily for 10 days     Dispense:  30 tablet     Refill:  0            Electronically signed by Misael Danielle MD on 3/4/2025 at 1:08 PM

## 2025-03-03 SDOH — ECONOMIC STABILITY: FOOD INSECURITY: WITHIN THE PAST 12 MONTHS, YOU WORRIED THAT YOUR FOOD WOULD RUN OUT BEFORE YOU GOT MONEY TO BUY MORE.: NEVER TRUE

## 2025-03-03 SDOH — ECONOMIC STABILITY: FOOD INSECURITY: WITHIN THE PAST 12 MONTHS, THE FOOD YOU BOUGHT JUST DIDN'T LAST AND YOU DIDN'T HAVE MONEY TO GET MORE.: NEVER TRUE

## 2025-03-03 SDOH — ECONOMIC STABILITY: INCOME INSECURITY: IN THE LAST 12 MONTHS, WAS THERE A TIME WHEN YOU WERE NOT ABLE TO PAY THE MORTGAGE OR RENT ON TIME?: NO

## 2025-03-03 ASSESSMENT — COLUMBIA-SUICIDE SEVERITY RATING SCALE - C-SSRS
2. IN THE PAST MONTH, HAVE YOU ACTUALLY HAD ANY THOUGHTS OF KILLING YOURSELF?: NO
1. IN THE PAST MONTH, HAVE YOU WISHED YOU WERE DEAD OR WISHED YOU COULD GO TO SLEEP AND NOT WAKE UP?: NO
7. DID THIS OCCUR IN THE LAST THREE MONTHS: NO
6. IN YOUR LIFETIME, HAVE YOU EVER DONE ANYTHING, STARTED TO DO ANYTHING, OR PREPARED TO DO ANYTHING TO END YOUR LIFE?: YES

## 2025-03-03 ASSESSMENT — PATIENT HEALTH QUESTIONNAIRE - PHQ9
5. POOR APPETITE OR OVEREATING: NEARLY EVERY DAY
8. MOVING OR SPEAKING SO SLOWLY THAT OTHER PEOPLE COULD HAVE NOTICED. OR THE OPPOSITE - BEING SO FIDGETY OR RESTLESS THAT YOU HAVE BEEN MOVING AROUND A LOT MORE THAN USUAL: SEVERAL DAYS
7. TROUBLE CONCENTRATING ON THINGS, SUCH AS READING THE NEWSPAPER OR WATCHING TELEVISION: NEARLY EVERY DAY
SUM OF ALL RESPONSES TO PHQ QUESTIONS 1-9: 21
8. MOVING OR SPEAKING SO SLOWLY THAT OTHER PEOPLE COULD HAVE NOTICED. OR THE OPPOSITE, BEING SO FIGETY OR RESTLESS THAT YOU HAVE BEEN MOVING AROUND A LOT MORE THAN USUAL: SEVERAL DAYS
SUM OF ALL RESPONSES TO PHQ QUESTIONS 1-9: 21
7. TROUBLE CONCENTRATING ON THINGS, SUCH AS READING THE NEWSPAPER OR WATCHING TELEVISION: NEARLY EVERY DAY
3. TROUBLE FALLING OR STAYING ASLEEP: NEARLY EVERY DAY
6. FEELING BAD ABOUT YOURSELF - OR THAT YOU ARE A FAILURE OR HAVE LET YOURSELF OR YOUR FAMILY DOWN: SEVERAL DAYS
2. FEELING DOWN, DEPRESSED OR HOPELESS: NEARLY EVERY DAY
9. THOUGHTS THAT YOU WOULD BE BETTER OFF DEAD, OR OF HURTING YOURSELF: NOT AT ALL
4. FEELING TIRED OR HAVING LITTLE ENERGY: NEARLY EVERY DAY
4. FEELING TIRED OR HAVING LITTLE ENERGY: NEARLY EVERY DAY
1. LITTLE INTEREST OR PLEASURE IN DOING THINGS: NEARLY EVERY DAY
10. IF YOU CHECKED OFF ANY PROBLEMS, HOW DIFFICULT HAVE THESE PROBLEMS MADE IT FOR YOU TO DO YOUR WORK, TAKE CARE OF THINGS AT HOME, OR GET ALONG WITH OTHER PEOPLE: SOMEWHAT DIFFICULT
10. IF YOU CHECKED OFF ANY PROBLEMS, HOW DIFFICULT HAVE THESE PROBLEMS MADE IT FOR YOU TO DO YOUR WORK, TAKE CARE OF THINGS AT HOME, OR GET ALONG WITH OTHER PEOPLE: SOMEWHAT DIFFICULT
9. THOUGHTS THAT YOU WOULD BE BETTER OFF DEAD, OR OF HURTING YOURSELF: NOT AT ALL
3. TROUBLE FALLING OR STAYING ASLEEP: NEARLY EVERY DAY
8. MOVING OR SPEAKING SO SLOWLY THAT OTHER PEOPLE COULD HAVE NOTICED. OR THE OPPOSITE, BEING SO FIGETY OR RESTLESS THAT YOU HAVE BEEN MOVING AROUND A LOT MORE THAN USUAL: NOT AT ALL
5. POOR APPETITE OR OVEREATING: NEARLY EVERY DAY
6. FEELING BAD ABOUT YOURSELF - OR THAT YOU ARE A FAILURE OR HAVE LET YOURSELF OR YOUR FAMILY DOWN: NEARLY EVERY DAY
3. TROUBLE FALLING OR STAYING ASLEEP: NEARLY EVERY DAY
7. TROUBLE CONCENTRATING ON THINGS, SUCH AS READING THE NEWSPAPER OR WATCHING TELEVISION: NEARLY EVERY DAY
2. FEELING DOWN, DEPRESSED OR HOPELESS: NEARLY EVERY DAY
SUM OF ALL RESPONSES TO PHQ QUESTIONS 1-9: 21
9. THOUGHTS THAT YOU WOULD BE BETTER OFF DEAD, OR OF HURTING YOURSELF: NOT AT ALL
8. MOVING OR SPEAKING SO SLOWLY THAT OTHER PEOPLE COULD HAVE NOTICED. OR THE OPPOSITE - BEING SO FIDGETY OR RESTLESS THAT YOU HAVE BEEN MOVING AROUND A LOT MORE THAN USUAL: NOT AT ALL
9. THOUGHTS THAT YOU WOULD BE BETTER OFF DEAD, OR OF HURTING YOURSELF: NOT AT ALL
SUM OF ALL RESPONSES TO PHQ QUESTIONS 1-9: 20
SUM OF ALL RESPONSES TO PHQ QUESTIONS 1-9: 20
3. TROUBLE FALLING OR STAYING ASLEEP: NEARLY EVERY DAY
SUM OF ALL RESPONSES TO PHQ QUESTIONS 1-9: 20
2. FEELING DOWN, DEPRESSED OR HOPELESS: NEARLY EVERY DAY
7. TROUBLE CONCENTRATING ON THINGS, SUCH AS READING THE NEWSPAPER OR WATCHING TELEVISION: NEARLY EVERY DAY
5. POOR APPETITE OR OVEREATING: NEARLY EVERY DAY
5. POOR APPETITE OR OVEREATING: NEARLY EVERY DAY
1. LITTLE INTEREST OR PLEASURE IN DOING THINGS: NEARLY EVERY DAY
6. FEELING BAD ABOUT YOURSELF - OR THAT YOU ARE A FAILURE OR HAVE LET YOURSELF OR YOUR FAMILY DOWN: SEVERAL DAYS
6. FEELING BAD ABOUT YOURSELF - OR THAT YOU ARE A FAILURE OR HAVE LET YOURSELF OR YOUR FAMILY DOWN: NEARLY EVERY DAY
SUM OF ALL RESPONSES TO PHQ9 QUESTIONS 1 & 2: 6
SUM OF ALL RESPONSES TO PHQ QUESTIONS 1-9: 20
10. IF YOU CHECKED OFF ANY PROBLEMS, HOW DIFFICULT HAVE THESE PROBLEMS MADE IT FOR YOU TO DO YOUR WORK, TAKE CARE OF THINGS AT HOME, OR GET ALONG WITH OTHER PEOPLE: SOMEWHAT DIFFICULT
SUM OF ALL RESPONSES TO PHQ QUESTIONS 1-9: 21
4. FEELING TIRED OR HAVING LITTLE ENERGY: NEARLY EVERY DAY
SUM OF ALL RESPONSES TO PHQ QUESTIONS 1-9: 20
1. LITTLE INTEREST OR PLEASURE IN DOING THINGS: NEARLY EVERY DAY
10. IF YOU CHECKED OFF ANY PROBLEMS, HOW DIFFICULT HAVE THESE PROBLEMS MADE IT FOR YOU TO DO YOUR WORK, TAKE CARE OF THINGS AT HOME, OR GET ALONG WITH OTHER PEOPLE: SOMEWHAT DIFFICULT
SUM OF ALL RESPONSES TO PHQ QUESTIONS 1-9: 21
4. FEELING TIRED OR HAVING LITTLE ENERGY: NEARLY EVERY DAY

## 2025-03-06 ENCOUNTER — OFFICE VISIT (OUTPATIENT)
Dept: PRIMARY CARE CLINIC | Age: 60
End: 2025-03-06

## 2025-03-06 VITALS
BODY MASS INDEX: 39.22 KG/M2 | DIASTOLIC BLOOD PRESSURE: 86 MMHG | WEIGHT: 274 LBS | SYSTOLIC BLOOD PRESSURE: 138 MMHG | HEIGHT: 70 IN | OXYGEN SATURATION: 96 % | HEART RATE: 86 BPM

## 2025-03-06 DIAGNOSIS — M45.4 ANKYLOSING SPONDYLITIS OF THORACIC REGION (HCC): ICD-10-CM

## 2025-03-06 DIAGNOSIS — H93.13 TINNITUS OF BOTH EARS: ICD-10-CM

## 2025-03-06 DIAGNOSIS — R73.9 HYPERGLYCEMIA: ICD-10-CM

## 2025-03-06 DIAGNOSIS — J44.9 CHRONIC OBSTRUCTIVE PULMONARY DISEASE, UNSPECIFIED COPD TYPE (HCC): ICD-10-CM

## 2025-03-06 DIAGNOSIS — F43.21 ADJUSTMENT DISORDER WITH DEPRESSED MOOD: ICD-10-CM

## 2025-03-06 DIAGNOSIS — R60.0 LOCALIZED EDEMA: ICD-10-CM

## 2025-03-06 DIAGNOSIS — R73.03 PREDIABETES: ICD-10-CM

## 2025-03-06 DIAGNOSIS — K59.81 OGILVIE'S SYNDROME: Primary | ICD-10-CM

## 2025-03-06 DIAGNOSIS — I10 BENIGN ESSENTIAL HYPERTENSION: ICD-10-CM

## 2025-03-06 LAB — HBA1C MFR BLD: 6.3 %

## 2025-03-06 RX ORDER — CITALOPRAM HYDROBROMIDE 40 MG/1
40 TABLET ORAL DAILY
Qty: 90 TABLET | Refills: 3 | Status: SHIPPED | OUTPATIENT
Start: 2025-03-06

## 2025-03-06 RX ORDER — FUROSEMIDE 40 MG/1
40 TABLET ORAL DAILY
Qty: 90 TABLET | Refills: 3 | Status: SHIPPED | OUTPATIENT
Start: 2025-03-06

## 2025-03-06 ASSESSMENT — ENCOUNTER SYMPTOMS
ABDOMINAL PAIN: 0
VOMITING: 0
WHEEZING: 0
NAUSEA: 0
SORE THROAT: 0
EYE DISCHARGE: 0
SHORTNESS OF BREATH: 0
EYE REDNESS: 0
COUGH: 0
RHINORRHEA: 0
DIARRHEA: 0

## 2025-03-06 NOTE — PROGRESS NOTES
[facility]  [facility]  [address]  [place] [address]  Dept: [phone]    Ramakrishna Gamboa is a 59 y.o. male Established patient, who presents today for his medical conditions/complaints as noted below.      Chief Complaint   Patient presents with    discharge     Discharge from anus        HPI:     History of Present Illness  The patient is a 59-year-old male who presents for evaluation of rectal discharge, depression, tinnitus, elevated cholesterol and blood glucose levels, and back pain.    He reports experiencing rectal discharge, which he describes as stool-like in consistency. This symptom has been persistent for an extended period. He has a history of colonoscopies, all of which have yielded normal results. He is scheduled for another colonoscopy in either 06/2024 or 07/2024 with Dr. Silva at [facility]. He undergoes biannual endoscopic examinations due to polyps, but no abnormalities have been detected. He has sought consultation at [facility] where he was advised to undergo partial colectomy, a recommendation he declined. He experiences 2 to 3 bowel movements daily, typically loose in consistency. He has not attempted probiotic therapy. He has noticed a correlation between high-fiber diet and the recurrence of polyps. He reports significant weight gain and appreciates the letter provided for jury duty exemption. He experiences urgency in the morning, often with minimal warning, necessitating the use of liners or Depends. He avoids morning activities due to the leakage, which persists throughout the day. He has discussed these symptoms with Dr. Silva's office.    He reports a decline in mood, describing feelings of sadness and tearfulness. He is currently on citalopram and reports worsening depression. He does not endorse any suicidal or homicidal ideation. He has not previously been on a higher dose of citalopram. He reports

## 2025-03-21 ENCOUNTER — PREP FOR PROCEDURE (OUTPATIENT)
Dept: BARIATRICS/WEIGHT MGMT | Age: 60
End: 2025-03-21

## 2025-03-21 ENCOUNTER — OFFICE VISIT (OUTPATIENT)
Age: 60
End: 2025-03-21
Payer: MEDICARE

## 2025-03-21 VITALS
DIASTOLIC BLOOD PRESSURE: 60 MMHG | BODY MASS INDEX: 39.37 KG/M2 | HEART RATE: 70 BPM | HEIGHT: 70 IN | WEIGHT: 275 LBS | SYSTOLIC BLOOD PRESSURE: 110 MMHG

## 2025-03-21 DIAGNOSIS — I10 ESSENTIAL HYPERTENSION: Primary | ICD-10-CM

## 2025-03-21 DIAGNOSIS — Z87.891 FORMER TOBACCO USE: ICD-10-CM

## 2025-03-21 DIAGNOSIS — G25.81 RESTLESS LEGS SYNDROME (RLS): ICD-10-CM

## 2025-03-21 DIAGNOSIS — E55.9 VITAMIN D INSUFFICIENCY: ICD-10-CM

## 2025-03-21 DIAGNOSIS — E66.812 CLASS 2 SEVERE OBESITY DUE TO EXCESS CALORIES WITH SERIOUS COMORBIDITY AND BODY MASS INDEX (BMI) OF 39.0 TO 39.9 IN ADULT: ICD-10-CM

## 2025-03-21 DIAGNOSIS — R73.03 PREDIABETES: ICD-10-CM

## 2025-03-21 DIAGNOSIS — D64.9 ANEMIA, UNSPECIFIED TYPE: ICD-10-CM

## 2025-03-21 DIAGNOSIS — F41.9 ANXIETY DISORDER, UNSPECIFIED TYPE: ICD-10-CM

## 2025-03-21 DIAGNOSIS — E66.01 CLASS 2 SEVERE OBESITY DUE TO EXCESS CALORIES WITH SERIOUS COMORBIDITY AND BODY MASS INDEX (BMI) OF 39.0 TO 39.9 IN ADULT: ICD-10-CM

## 2025-03-21 DIAGNOSIS — K21.9 GASTROESOPHAGEAL REFLUX DISEASE WITHOUT ESOPHAGITIS: ICD-10-CM

## 2025-03-21 DIAGNOSIS — E66.01 MORBID (SEVERE) OBESITY DUE TO EXCESS CALORIES (HCC): ICD-10-CM

## 2025-03-21 PROCEDURE — 3074F SYST BP LT 130 MM HG: CPT | Performed by: NURSE PRACTITIONER

## 2025-03-21 PROCEDURE — 99214 OFFICE O/P EST MOD 30 MIN: CPT | Performed by: NURSE PRACTITIONER

## 2025-03-21 PROCEDURE — 3078F DIAST BP <80 MM HG: CPT | Performed by: NURSE PRACTITIONER

## 2025-03-21 NOTE — PROGRESS NOTES
Medical Nutrition Therapy  Supervised Diet & Exercise Pre-Op   Metabolic and Bariatric Surgery  Visit 1 out of 6    Nutrition Assessment:  Patient's start weight is 275 lbs. Patient is working toward bariatric surgery for Gastric Sleeve.    Vitals:   Wt Readings from Last 3 Encounters:   03/21/25 124.7 kg (275 lb)   03/06/25 124.3 kg (274 lb)   02/28/25 124.7 kg (275 lb)     Nutrition Diagnosis:  Knowledge deficit related to healthy behaviors that support weight management after weight loss surgery as evidenced by Body mass index is 39.46 kg/m².    Nutrition Intervention:  Nutrition Prescription:  Bariatric Pre-op Diet    Nutrition Counseling:  Reviewed patients current behaviors and habits and discussed barriers for change. Utilized motivational interviewing to set patient specific goals that promote bariatric behaviors. Patient displays understanding of the goals discussed.     Nutrition Education:   Reviewed and available in the Bariatric Education Binder.                                                Goals: The patient has a list and explanation of every goal in their \"Bariatric Education Binder\". Changes in eating patterns to promote health are noted below. See below for goals that patient is continuing to work on and completed. All goals were planned with and agreed on by the patient.    Patient completed  2 out of 5 goals.  Treatment goals for upcoming month: Continue all previous goals and add #14      I want to have surgery because I want to live longer and get my weight under control. I want to get into a good mindset. I had some recent blood work which showed elevated cholesterol and blood sugar. I've tried diets.      Goal C N/A Notes:   [] 1 I will read the education binder provided to me.    [x] []    [x] 2 I will make my psychological evaluation appoinment. [] []    [x] 3 I will bring my binder to every appointment.   [] []    [] 4 I will eliminate all tobacco/nicotine.   [] [x] Former user. Last

## 2025-03-21 NOTE — PROGRESS NOTES
Medical Weight Management Progress Note    Subjective   1 of 6     Patient being seen for medically supervised weight loss for the chronic conditions of HTN, COPD, anxiety, RLS, prediabetes, boderline high cholesterol, gallstones, anemia.   He is working on the behavior changes discussed at the initial appointment. Patient continues on diet plan. Physical activity includes walking, arm curls, leg lifts.  Start weight today is 275 lbs.  No current issues.    Letter of medical necessity -  received on 2/13/2025    EGD - scheduled for 4/4/2025    Labs - will order today.   A1C was completed by PCP on 3/6/2025 - 6.3    Psych eval - he needs to call and schedule    Pulmonary clearance - n/a    Cardiology clearance - n/a    Intro class - scheduled for 4/7/2025    Support group - scheduled for 4/3/2025    Working toward bariatric surgery:    [x] Sleeve Gastrectomy                                                           [] Harper-en-Y Gastric Bypass    Allergies:  Allergies   Allergen Reactions    Cetyl Alcohol Hives     Localized reaction by allergy testing    Cetearyl alcohol    Metronidazole Other (See Comments)     \" caused a prickly feeling in my legs \"  Other reaction(s): Unknown    Bacitracin-Polymyxin B      Other reaction(s): Unknown    Adhesive Tape Other (See Comments)     opsite and paper tape ok, bandaid ok  REDNESS AND TAKES SKIN OFF. PAPER TAPE OK.  opsite and paper tape ok, bandaid ok  Other reaction(s): Unknown  Other reaction(s): Unknown    Duloxetine Hcl Nausea And Vomiting     Other reaction(s): Unknown    Neosporin [Neomycin-Polymyxin-Gramicidin] Rash       Past Medical History:     Past Medical History:   Diagnosis Date    ADHD (attention deficit hyperactivity disorder)     Angular cheilitis     Anticoagulant long-term use     Anxiety     Arthritis     BACK AND FINGERS     Bilateral lower extremity edema 03/2021    started on Lasix per PCP    Chronic back pain     dr shahrzad MURILLO [facility]

## 2025-03-28 ENCOUNTER — TELEPHONE (OUTPATIENT)
Dept: BARIATRICS/WEIGHT MGMT | Age: 60
End: 2025-03-28

## 2025-03-28 NOTE — TELEPHONE ENCOUNTER
Hello,    You are schedule for an EGD on 04/04/25 at the [facility]. Please arrive at 6:50 am.     [facility] :  [address]  [place] [id number]      Patient Instructions: PLEASE READ!!!  The night before your procedure no food or drink after midnight.  Only CLEAR liquids 24 hours prior to procedure.  NO alcohol 24 hours prior to procedure   Take your medications with sips of water, except those that need to be taken with food.  If you take aspirin, Plavix, Coumadin (Warfarin) or another blood thinner please ask the prescribing provider if you can stop it 7 days prior to the EGD.  If you are on any GLP-1 medications (Trulicity, Semaglutide, Mounjaro,Zepbound, Victoza, Saxenda) you will \"STOP\" TWO weeks prior to procedure.  If you are having an EGD with [facility] and are taking any PPIs (Prilosec, Prevacid, Protonix or Nexium) please stop it 7 days prior to your procedure.  If you are late you may be asked to reschedule.   You MUST bring a support person with you, they need to drive you home.  You will not be able to drive after the procedure.  If you are a female, you may be asked to take a urine pregnancy test.       Any questions , concerns or need to cancel, please call [phone].    Thank you,    [facility].

## 2025-04-02 NOTE — PRE-PROCEDURE INSTRUCTIONS
VM left with pre-EGD instructions:     Date/time/location (entrance C) of procedure     NPO after MN status     Need for       Instructed pt to take BP meds with a small sip of water prior to procedure.    [facility] phone number [phone] provided for pt to call with any further questions prior to procedure.

## 2025-04-03 ENCOUNTER — CLINICAL SUPPORT (OUTPATIENT)
Dept: BARIATRICS/WEIGHT MGMT | Age: 60
End: 2025-04-03

## 2025-04-03 ENCOUNTER — ANESTHESIA EVENT (OUTPATIENT)
Dept: OPERATING ROOM | Age: 60
End: 2025-04-03
Payer: MEDICARE

## 2025-04-03 RX ORDER — SODIUM CHLORIDE 0.9 % (FLUSH) 0.9 %
5-40 SYRINGE (ML) INJECTION PRN
Status: CANCELLED | OUTPATIENT
Start: 2025-04-03

## 2025-04-03 RX ORDER — SODIUM CHLORIDE 9 MG/ML
INJECTION, SOLUTION INTRAVENOUS PRN
Status: CANCELLED | OUTPATIENT
Start: 2025-04-03

## 2025-04-03 RX ORDER — SODIUM CHLORIDE 0.9 % (FLUSH) 0.9 %
5-40 SYRINGE (ML) INJECTION EVERY 12 HOURS SCHEDULED
Status: CANCELLED | OUTPATIENT
Start: 2025-04-03

## 2025-04-03 RX ORDER — SODIUM CHLORIDE, SODIUM LACTATE, POTASSIUM CHLORIDE, CALCIUM CHLORIDE 600; 310; 30; 20 MG/100ML; MG/100ML; MG/100ML; MG/100ML
INJECTION, SOLUTION INTRAVENOUS CONTINUOUS
Status: CANCELLED | OUTPATIENT
Start: 2025-04-03

## 2025-04-03 RX ORDER — LIDOCAINE HYDROCHLORIDE 10 MG/ML
1 INJECTION, SOLUTION EPIDURAL; INFILTRATION; INTRACAUDAL; PERINEURAL
Status: CANCELLED | OUTPATIENT
Start: 2025-04-03

## 2025-04-04 ENCOUNTER — ANESTHESIA (OUTPATIENT)
Dept: OPERATING ROOM | Age: 60
End: 2025-04-04
Payer: MEDICARE

## 2025-04-04 ENCOUNTER — HOSPITAL ENCOUNTER (OUTPATIENT)
Age: 60
Setting detail: OUTPATIENT SURGERY
Discharge: HOME | End: 2025-04-04
Attending: SURGERY | Admitting: SURGERY
Payer: MEDICARE

## 2025-04-04 VITALS
WEIGHT: 275 LBS | DIASTOLIC BLOOD PRESSURE: 67 MMHG | BODY MASS INDEX: 39.37 KG/M2 | HEART RATE: 62 BPM | HEIGHT: 70 IN | TEMPERATURE: 97.4 F | RESPIRATION RATE: 17 BRPM | OXYGEN SATURATION: 96 % | SYSTOLIC BLOOD PRESSURE: 127 MMHG

## 2025-04-04 DIAGNOSIS — E66.01 MORBID (SEVERE) OBESITY DUE TO EXCESS CALORIES: ICD-10-CM

## 2025-04-04 DIAGNOSIS — K21.9 GERD (GASTROESOPHAGEAL REFLUX DISEASE): ICD-10-CM

## 2025-04-04 PROCEDURE — 88305 TISSUE EXAM BY PATHOLOGIST: CPT

## 2025-04-04 PROCEDURE — 3609012400 HC EGD TRANSORAL BIOPSY SINGLE/MULTIPLE: Performed by: SURGERY

## 2025-04-04 PROCEDURE — 88342 IMHCHEM/IMCYTCHM 1ST ANTB: CPT

## 2025-04-04 PROCEDURE — 6360000002 HC RX W HCPCS: Performed by: NURSE ANESTHETIST, CERTIFIED REGISTERED

## 2025-04-04 PROCEDURE — 3700000000 HC ANESTHESIA ATTENDED CARE: Performed by: SURGERY

## 2025-04-04 PROCEDURE — 2709999900 HC NON-CHARGEABLE SUPPLY: Performed by: SURGERY

## 2025-04-04 PROCEDURE — 2580000003 HC RX 258: Performed by: NURSE ANESTHETIST, CERTIFIED REGISTERED

## 2025-04-04 PROCEDURE — 7100000010 HC PHASE II RECOVERY - FIRST 15 MIN: Performed by: SURGERY

## 2025-04-04 PROCEDURE — 7100000011 HC PHASE II RECOVERY - ADDTL 15 MIN: Performed by: SURGERY

## 2025-04-04 RX ORDER — LIDOCAINE HYDROCHLORIDE 10 MG/ML
INJECTION, SOLUTION EPIDURAL; INFILTRATION; INTRACAUDAL; PERINEURAL
Status: DISCONTINUED | OUTPATIENT
Start: 2025-04-04 | End: 2025-04-04 | Stop reason: SDUPTHER

## 2025-04-04 RX ORDER — PROCHLORPERAZINE EDISYLATE 5 MG/ML
5 INJECTION INTRAMUSCULAR; INTRAVENOUS
Status: DISCONTINUED | OUTPATIENT
Start: 2025-04-04 | End: 2025-04-04 | Stop reason: HOSPADM

## 2025-04-04 RX ORDER — LABETALOL HYDROCHLORIDE 5 MG/ML
10 INJECTION, SOLUTION INTRAVENOUS
Status: DISCONTINUED | OUTPATIENT
Start: 2025-04-04 | End: 2025-04-04 | Stop reason: HOSPADM

## 2025-04-04 RX ORDER — SODIUM CHLORIDE 0.9 % (FLUSH) 0.9 %
5-40 SYRINGE (ML) INJECTION EVERY 12 HOURS SCHEDULED
Status: DISCONTINUED | OUTPATIENT
Start: 2025-04-04 | End: 2025-04-04 | Stop reason: HOSPADM

## 2025-04-04 RX ORDER — SODIUM CHLORIDE 9 MG/ML
INJECTION, SOLUTION INTRAVENOUS PRN
Status: DISCONTINUED | OUTPATIENT
Start: 2025-04-04 | End: 2025-04-04 | Stop reason: HOSPADM

## 2025-04-04 RX ORDER — SODIUM CHLORIDE 0.9 % (FLUSH) 0.9 %
5-40 SYRINGE (ML) INJECTION PRN
Status: DISCONTINUED | OUTPATIENT
Start: 2025-04-04 | End: 2025-04-04 | Stop reason: HOSPADM

## 2025-04-04 RX ORDER — NALOXONE HYDROCHLORIDE 0.4 MG/ML
INJECTION, SOLUTION INTRAMUSCULAR; INTRAVENOUS; SUBCUTANEOUS PRN
Status: DISCONTINUED | OUTPATIENT
Start: 2025-04-04 | End: 2025-04-04 | Stop reason: HOSPADM

## 2025-04-04 RX ORDER — HYDRALAZINE HYDROCHLORIDE 20 MG/ML
10 INJECTION INTRAMUSCULAR; INTRAVENOUS
Status: DISCONTINUED | OUTPATIENT
Start: 2025-04-04 | End: 2025-04-04 | Stop reason: HOSPADM

## 2025-04-04 RX ORDER — PROPOFOL 10 MG/ML
INJECTION, EMULSION INTRAVENOUS
Status: DISCONTINUED | OUTPATIENT
Start: 2025-04-04 | End: 2025-04-04 | Stop reason: SDUPTHER

## 2025-04-04 RX ORDER — SODIUM CHLORIDE, SODIUM LACTATE, POTASSIUM CHLORIDE, CALCIUM CHLORIDE 600; 310; 30; 20 MG/100ML; MG/100ML; MG/100ML; MG/100ML
INJECTION, SOLUTION INTRAVENOUS
Status: DISCONTINUED | OUTPATIENT
Start: 2025-04-04 | End: 2025-04-04 | Stop reason: SDUPTHER

## 2025-04-04 RX ADMIN — PROPOFOL 100 MG: 10 INJECTION, EMULSION INTRAVENOUS at 08:31

## 2025-04-04 RX ADMIN — PROPOFOL 50 MG: 10 INJECTION, EMULSION INTRAVENOUS at 08:35

## 2025-04-04 RX ADMIN — LIDOCAINE HYDROCHLORIDE 100 MG: 10 INJECTION, SOLUTION EPIDURAL; INFILTRATION; INTRACAUDAL; PERINEURAL at 08:31

## 2025-04-04 RX ADMIN — SODIUM CHLORIDE, POTASSIUM CHLORIDE, SODIUM LACTATE AND CALCIUM CHLORIDE: 600; 310; 30; 20 INJECTION, SOLUTION INTRAVENOUS at 08:28

## 2025-04-04 ASSESSMENT — PAIN - FUNCTIONAL ASSESSMENT
PAIN_FUNCTIONAL_ASSESSMENT: 0-10
PAIN_FUNCTIONAL_ASSESSMENT: NONE - DENIES PAIN

## 2025-04-04 ASSESSMENT — PAIN DESCRIPTION - DESCRIPTORS: DESCRIPTORS: SPASM

## 2025-04-04 ASSESSMENT — COPD QUESTIONNAIRES: CAT_SEVERITY: MILD

## 2025-04-04 NOTE — ANESTHESIA PRE PROCEDURE
Weight: 124.7 kg (275 lb)    Height: 1.778 m (5' 10\")                                               BP Readings from Last 3 Encounters:   04/04/25 127/71   03/21/25 110/60   03/06/25 138/86       NPO Status: Time of last liquid consumption: 0600 (sip water)                        Time of last solid consumption: 1700                        Date of last liquid consumption: 04/04/25                        Date of last solid food consumption: 04/03/25    BMI:   Wt Readings from Last 3 Encounters:   04/04/25 124.7 kg (275 lb)   03/21/25 124.7 kg (275 lb)   03/06/25 124.3 kg (274 lb)     Body mass index is 39.46 kg/m².    CBC:   Lab Results   Component Value Date/Time    WBC 6.8 10/30/2024 05:28 AM    RBC 4.22 10/30/2024 05:28 AM    HGB 12.6 10/30/2024 05:28 AM    HCT 37.8 10/30/2024 05:28 AM    MCV 89.6 10/30/2024 05:28 AM    RDW 12.6 10/30/2024 05:28 AM     10/30/2024 05:28 AM       CMP:   Lab Results   Component Value Date/Time     01/15/2025 10:06 AM    K 4.4 01/15/2025 10:06 AM     01/15/2025 10:06 AM    CO2 29 01/15/2025 10:06 AM    BUN 14 01/15/2025 10:06 AM    CREATININE 0.6 01/15/2025 10:06 AM    GFRAA >60 06/06/2022 09:59 AM    LABGLOM >90 01/15/2025 10:06 AM    LABGLOM >90 04/30/2024 06:21 AM    GLUCOSE 102 10/30/2024 05:28 AM    CALCIUM 9.0 01/15/2025 10:06 AM    BILITOT 0.3 01/15/2025 10:06 AM    ALKPHOS 100 01/15/2025 10:06 AM    AST 21 01/15/2025 10:06 AM    ALT 15 01/15/2025 10:06 AM       POC Tests: No results for input(s): \"POCGLU\", \"POCNA\", \"POCK\", \"POCCL\", \"POCBUN\", \"POCHEMO\", \"POCHCT\" in the last 72 hours.    Coags:   Lab Results   Component Value Date/Time    PROTIME 13.8 05/03/2023 05:03 AM    INR 1.1 05/03/2023 05:03 AM       HCG (If Applicable): No results found for: \"PREGTESTUR\", \"PREGSERUM\", \"HCG\", \"HCGQUANT\"     ABGs: No results found for: \"PHART\", \"PO2ART\", \"SOD3OPV\", \"OOU3SEV\", \"BEART\", \"W2QQHABC\"     Type & Screen (If Applicable):  No results found for: \"ABORH\",

## 2025-04-04 NOTE — OP NOTE
[facility] [place]  [address]  [facility] [address]  Dept: [phone]  Loc: [phone]    Preoperative Diagnosis:  GERD, Morbid obesity, BMI of Body mass index is 39.46 kg/m².    Postoperative Diagnosis: GERD, Morbid obesity, BMI of Body mass index is 39.46 kg/m².    Procedure: Esophagogastroduodenoscopy with Biopsy    Surgeon: Tomy Ford DO    Assistant:    Anesthesia: MAC, see anesthesia records    Specimen:    1) Antrum for H. Pylori    Findings:  No hiatal hernia  Normal esophagus  Normal duodenum  Mild diffuse gastritis    EBL: NONE    Operative Narrative:   The risks and benefits were explained in detail to the patient who agreed and consented to the procedure.  The patient was taken to the endoscopic suite and placed in a lateral position.  Oxygen was administered via nasal cannula and a mouth guard was placed.  MAC was administered via the anesthetic team.      The endoscope was then advanced into the oropharynx and down into the esophagus under direct visualization. The scope was further advanced through the esophagus, GE junction and stomach to the pylorus under visualization.  The scope was passed through the pylorus and duodenal sweep performed, advancing and visualizing to the second portion of the duodenum.  The scope was then withdrawn to the antrum and cold forceps were used to take biopsies of the antrum for H. Pylori.  Appropriate hemostasis was noted.  The scope was then retroflexed to visualize the GE junction.  Evidence of a hiatal hernia was not noted.  The scope was then slowly withdrawn through the GE junction.  The Z line was noted. The stomach was then decompressed.  The scope was withdrawn from the esophagus and no further lesions noted.      The scope was withdrawn.  The patient tolerated the procedure well.      He will follow up with the Bariatric Clinic for further assessment.               Nasalis-Muscle-Based Myocutaneous Island Pedicle Flap Text: Using a #15 blade, an incision was made around the donor flap to the level of the nasalis muscle. Wide lateral undermining was then performed in both the subcutaneous plane above the nasalis muscle, and in a submuscular plane just above periosteum. This allowed the formation of a free nasalis muscle axial pedicle (based on the angular artery) which was still attached to the actual cutaneous flap, increasing its mobility and vascular viability. Hemostasis was obtained with pinpoint electrocoagulation. The flap was mobilized into position and the pivotal anchor points positioned and stabilized with buried interrupted sutures. Subcutaneous and dermal tissues were closed in a multilayered fashion with sutures. Tissue redundancies were excised, and the epidermal edges were apposed without significant tension and sutured with sutures.

## 2025-04-04 NOTE — ANESTHESIA POSTPROCEDURE EVALUATION
Department of Anesthesiology  Postprocedure Note    Patient: Ramakrishna Gamboa  MRN: [id number]  YOB: 1965  Date of evaluation: 4/4/2025    Procedure Summary       Date: 04/04/25 Room / Location: [facility] PROCEDURE ROOM / [facility]    Anesthesia Start: 0828 Anesthesia Stop: 0842    Procedure: ESOPHAGOGASTRODUODENOSCOPY BIOPSY Diagnosis:       Morbid (severe) obesity due to excess calories      GERD (gastroesophageal reflux disease)      (Morbid (severe) obesity due to excess calories [E66.01])      (GERD (gastroesophageal reflux disease) [K21.9])    Surgeons: Tomy Ford DO Responsible Provider: Pito Schroeder MD    Anesthesia Type: TIVA ASA Status: 3            Anesthesia Type: No value filed.    Marci Phase I: Marci Score: 10    Marci Phase II: Marci Score: 10    Anesthesia Post Evaluation    Patient location during evaluation: PACU  Patient participation: complete - patient participated  Level of consciousness: awake and awake and alert  Pain score: 0  Nausea & Vomiting: no nausea and no vomiting  Cardiovascular status: hemodynamically stable and blood pressure returned to baseline  Respiratory status: acceptable  Hydration status: euvolemic  Multimodal analgesia pain management approach  Pain management: adequate    No notable events documented.

## 2025-04-04 NOTE — H&P
Subjective     The patient is a 59 y.o. male who is here to undergo EGD for GERD.    Body mass index is 39.46 kg/m².  They are considering a bariatric procedure for morbid obesity.       Past Medical History:   Diagnosis Date    ADHD (attention deficit hyperactivity disorder)     Angular cheilitis     Anticoagulant long-term use     Anxiety     Arthritis     BACK AND FINGERS     Bilateral lower extremity edema 03/2021    started on Lasix per PCP    Chronic back pain     dr markham [facility], scheduled for OR 7/12/2021 at [facility]    COPD (chronic obstructive pulmonary disease) ([facility])     COVID-19 12/2022    cold symptoms    Depression     Dizziness     Eczema     Ex-smoker     quit in 2006, smoked for 23 years    Floaters in visual field, bilateral     GERD (gastroesophageal reflux disease)     H/O chest pain     H/O dermatitis     History of blood transfusion     History of panic attacks     [place] (hard of hearing)     LEFT EAR WORSE THAN RIGHT. getting hearing aides [place], Dr. Villalpando    Hypertension     DR MALCOLM PCP    Impaired mobility     occas uses cane    Kyphosis     [facility] OR 7/12/2021 with Dr. Jed Jeff cant lie flat,severe back pain    Left eye injury     BUNGY CORD STRAP BUSTED AND INJURED LEFT EYE    Muscle spasm     LOWER RIGHT BACK    Nocturia     Obesity     Sloan's syndrome     \"bowels not working\"    Osteoarthritis     Prolonged emergence from general anesthesia     PATIENT STATES HIS B/P WOULD DROP WITH INTUBATION, GO UP AFTER ET TUBE REMOVED.    PVC's (premature ventricular contractions)     PVC'S. NO CARDIOLOGIST, PCP DR AFRICA MALCOLM    Rash     occas to see rheumatoid arthritis  workup for lupus    Restless legs syndrome     Sjogren's syndrome     Snores     mild, denies apnea, does \"grind\" his teeth    SOB (shortness of breath)     used to see Dr. Coffey, borderline COPD/ never followed up / never filled scripts for inhalers    Use of cane as ambulatory aid     Vertigo      Dr. Villalpando    Wears glasses     Wears partial dentures     LOWER   .    Review of Systems - A complete 14 point review of systems was performed.  All was negative unless otherwise documented in HPI.    Allergies:  Allergies   Allergen Reactions    Cetyl Alcohol Hives     Localized reaction by allergy testing    Cetearyl alcohol    Metronidazole Other (See Comments)     \" caused a prickly feeling in my legs \"  Other reaction(s): Unknown    Bacitracin-Polymyxin B      Other reaction(s): Unknown    Adhesive Tape Other (See Comments)     opsite and paper tape ok, bandaid ok  REDNESS AND TAKES SKIN OFF. PAPER TAPE OK.  opsite and paper tape ok, bandaid ok  Other reaction(s): Unknown  Other reaction(s): Unknown    Duloxetine Hcl Nausea And Vomiting     Other reaction(s): Unknown    Neosporin [Neomycin-Polymyxin-Gramicidin] Rash       Past Surgical History:  Past Surgical History:   Procedure Laterality Date    BACK SURGERY  2006    cage    COLONOSCOPY      COLONOSCOPY      \"reversed colonoscopy to suck out air/gas\" [place] syndrome    COLONOSCOPY N/A 09/06/2023    COLONOSCOPY WITH BIOPSIES performed by Santino Shearer MD at [facility] OR    COLONOSCOPY N/A 04/30/2024    COLONOSCOPY DIAGNOSTIC performed by Ramakrishna De León MD at [facility] OR    CYST REMOVAL Right     index finger    EYE SURGERY Right     CATARACT    HAMMER TOE SURGERY Left 02/20/2023    HAMMERTOE CORRECTION 2ND, 3RD & 4TH TOES performed by Christian Yip DPM at [facility] OR    HEMORRHOID SURGERY  2017    HERNIA REPAIR      UMBILICAL    NOSE SURGERY      REALIGNED NOSE    OTHER SURGICAL HISTORY  02/21/2020    MRI cervical and thoracic spine without contrast . CT of lung , all under general anesthesia    [place] COLSC FLX W/REMOVAL LESION BY HOT BX FORCEPS N/A 07/12/2017    COLONOSCOPY POLYPECTOMY HOT BIOPSY performed by Roby Ramos MD at [facility] OR    SIGMOIDOSCOPY N/A 10/28/2024    SIGMOIDOSCOPY DIAGNOSTIC FLEXIBLE WITH DECOMPRESSION performed by Ramakrishna De León MD

## 2025-04-04 NOTE — DISCHARGE INSTRUCTIONS
POST-ENDOSCOPY INSTRUCTIONS    1. ACTIVITY   No driving, operating machinery, or making important decisions for 24 hours.    Resume normal activity after 24 hours.  You may return to work after 24 hours.    2. DIET    EGD: Resume your usual diet unless specified below.                Diet Modification: Regular    3. MEDICATIONS  (Do not consume alcohol, tranquilizers, or sleeping medications for 24 hours unless advised by your physician)                 Resume your usual medications    4. PHYSICIAN FOLLOW-UP                 Please continue with your previously scheduled appointments                 See your primary care physician as planned.      6. NORMAL CHANGES YOU MAY EXPERIENCE AFTER ENDOSCOPY:      EGD:  Sore throat, some abdominal pain and cramping.            7. CALL YOUR PHYSICIAN IF YOU EXPERIENCE ANY OF THE FOLLOWING      A.  Passing blood rectally or vomiting blood (color may be red or black)      B.  Severe abdominal pain or tenderness (that is not relieved by passing air)      C.  Fever, chills, or excessive sweating      D.  Persistent nausea or vomiting      E.  Redness or swelling at the IV site    If you have additional questions, PLEASE call your doctor or the [facility] at [phone]

## 2025-04-07 ENCOUNTER — CLINICAL SUPPORT (OUTPATIENT)
Dept: BARIATRICS/WEIGHT MGMT | Age: 60
End: 2025-04-07

## 2025-04-08 DIAGNOSIS — F41.9 ANXIETY DISORDER, UNSPECIFIED TYPE: ICD-10-CM

## 2025-04-08 DIAGNOSIS — E55.9 VITAMIN D INSUFFICIENCY: ICD-10-CM

## 2025-04-08 DIAGNOSIS — I10 ESSENTIAL HYPERTENSION: ICD-10-CM

## 2025-04-08 DIAGNOSIS — E66.812 CLASS 2 SEVERE OBESITY DUE TO EXCESS CALORIES WITH SERIOUS COMORBIDITY AND BODY MASS INDEX (BMI) OF 39.0 TO 39.9 IN ADULT: ICD-10-CM

## 2025-04-08 DIAGNOSIS — G25.81 RESTLESS LEGS SYNDROME (RLS): ICD-10-CM

## 2025-04-08 DIAGNOSIS — E66.01 CLASS 2 SEVERE OBESITY DUE TO EXCESS CALORIES WITH SERIOUS COMORBIDITY AND BODY MASS INDEX (BMI) OF 39.0 TO 39.9 IN ADULT: ICD-10-CM

## 2025-04-08 DIAGNOSIS — R73.03 PREDIABETES: ICD-10-CM

## 2025-04-08 DIAGNOSIS — D64.9 ANEMIA, UNSPECIFIED TYPE: ICD-10-CM

## 2025-04-08 DIAGNOSIS — K21.9 GASTROESOPHAGEAL REFLUX DISEASE WITHOUT ESOPHAGITIS: ICD-10-CM

## 2025-04-08 DIAGNOSIS — Z87.891 FORMER TOBACCO USE: ICD-10-CM

## 2025-04-08 LAB
ALBUMIN/GLOBULIN RATIO: 1.4 (ref 1–2.5)
ALBUMIN: 4.4 G/DL (ref 3.5–5.2)
ALP BLD-CCNC: 93 U/L (ref 40–129)
ALT SERPL-CCNC: 18 U/L (ref 10–50)
ANION GAP SERPL CALCULATED.3IONS-SCNC: 11 MMOL/L (ref 9–16)
AST SERPL-CCNC: 23 U/L (ref 10–50)
BASOPHILS ABSOLUTE: 0.05 K/UL (ref 0–0.2)
BASOPHILS RELATIVE PERCENT: 1 % (ref 0–2)
BILIRUB SERPL-MCNC: 0.3 MG/DL (ref 0–1.2)
BUN BLDV-MCNC: 13 MG/DL (ref 8–23)
CALCIUM SERPL-MCNC: 9.3 MG/DL (ref 8.6–10.4)
CHLORIDE BLD-SCNC: 101 MMOL/L (ref 98–107)
CHOLESTEROL, TOTAL: 173 MG/DL (ref 0–199)
CHOLESTEROL/HDL RATIO: 4.7
CO2: 28 MMOL/L (ref 20–31)
CREAT SERPL-MCNC: 0.6 MG/DL (ref 0.7–1.2)
EOSINOPHILS ABSOLUTE: 0.18 K/UL (ref 0–0.44)
EOSINOPHILS RELATIVE PERCENT: 3 % (ref 1–4)
FERRITIN: 67 NG/ML
FOLATE: 22 NG/ML (ref 4.8–24.2)
GFR, ESTIMATED: >90 ML/MIN/1.73M2
GLUCOSE BLD-MCNC: 110 MG/DL (ref 74–99)
HCT VFR BLD CALC: 44.1 % (ref 40.7–50.3)
HDLC SERPL-MCNC: 37 MG/DL
HEMOGLOBIN: 14.2 G/DL (ref 13–17)
IMMATURE GRANULOCYTES %: 0 %
IMMATURE GRANULOCYTES ABSOLUTE: <0.03 K/UL (ref 0–0.3)
IRON % SATURATION: 32 % (ref 20–55)
IRON: 95 UG/DL (ref 61–157)
LDL CHOLESTEROL: 105 MG/DL (ref 0–100)
LYMPHOCYTES ABSOLUTE: 1.35 K/UL (ref 1.1–3.7)
LYMPHOCYTES RELATIVE PERCENT: 21 % (ref 24–43)
MAGNESIUM: 2 MG/DL (ref 1.6–2.4)
MCH RBC QN AUTO: 28.7 PG (ref 25.2–33.5)
MCHC RBC AUTO-ENTMCNC: 32.2 G/DL (ref 28.4–34.8)
MCV RBC AUTO: 89.1 FL (ref 82.6–102.9)
MONOCYTES ABSOLUTE: 0.5 K/UL (ref 0.1–1.2)
MONOCYTES RELATIVE PERCENT: 8 % (ref 3–12)
NEUTROPHILS ABSOLUTE: 4.37 K/UL (ref 1.5–8.1)
NEUTROPHILS RELATIVE PERCENT: 67 % (ref 36–65)
NRBC AUTOMATED: 0 PER 100 WBC
PDW BLD-RTO: 12.9 % (ref 11.8–14.4)
PLATELET # BLD: 205 K/UL (ref 138–453)
PMV BLD AUTO: 9.6 FL (ref 8.1–13.5)
POTASSIUM SERPL-SCNC: 4.7 MMOL/L (ref 3.7–5.3)
PTH INTACT: 62 PG/ML (ref 17.9–58.6)
RBC # BLD: 4.95 M/UL (ref 4.21–5.77)
SODIUM BLD-SCNC: 140 MMOL/L (ref 136–145)
SURGICAL PATHOLOGY REPORT: NORMAL
TOTAL IRON BINDING CAPACITY: 293 UG/DL (ref 250–450)
TOTAL PROTEIN: 7.5 G/DL (ref 6.6–8.7)
TRIGL SERPL-MCNC: 157 MG/DL
TSH SERPL DL<=0.05 MIU/L-ACNC: 0.7 UIU/ML (ref 0.27–4.2)
UNSATURATED IRON BINDING CAPACITY: 198 UG/DL (ref 112–347)
VITAMIN B-12: 553 PG/ML (ref 232–1245)
VITAMIN D 25-HYDROXY: 24.6 NG/ML (ref 30–100)
VLDLC SERPL CALC-MCNC: 31 MG/DL (ref 1–30)
WBC # BLD: 6.5 K/UL (ref 3.5–11.3)

## 2025-04-09 ENCOUNTER — RESULTS FOLLOW-UP (OUTPATIENT)
Dept: BARIATRICS/WEIGHT MGMT | Age: 60
End: 2025-04-09

## 2025-04-09 RX ORDER — ERGOCALCIFEROL 1.25 MG/1
50000 CAPSULE, LIQUID FILLED ORAL WEEKLY
Qty: 8 CAPSULE | Refills: 0 | Status: SHIPPED | OUTPATIENT
Start: 2025-04-09

## 2025-04-11 LAB
COTININE: <5 NG/ML
NICOTINE: <5 NG/ML
RETINYL PALMITATE: <0.02 MG/L (ref 0–0.1)
VITAMIN A LEVEL: 0.44 MG/L (ref 0.3–1.2)
VITAMIN A, INTERP: NORMAL
VITAMIN B1 WHOLE BLOOD: 150 NMOL/L (ref 70–180)

## 2025-04-15 ENCOUNTER — OFFICE VISIT (OUTPATIENT)
Dept: PAIN MANAGEMENT | Age: 60
End: 2025-04-15
Payer: MEDICARE

## 2025-04-15 VITALS — HEIGHT: 70 IN | WEIGHT: 275 LBS | BODY MASS INDEX: 39.37 KG/M2

## 2025-04-15 DIAGNOSIS — M47.817 LUMBOSACRAL SPONDYLOSIS WITHOUT MYELOPATHY: ICD-10-CM

## 2025-04-15 DIAGNOSIS — M46.1 BILATERAL SACROILIITIS: ICD-10-CM

## 2025-04-15 DIAGNOSIS — M54.9 MUSCULOSKELETAL BACK PAIN: Primary | ICD-10-CM

## 2025-04-15 DIAGNOSIS — Z98.1 S/P FUSION OF THORACIC SPINE: ICD-10-CM

## 2025-04-15 PROCEDURE — 99213 OFFICE O/P EST LOW 20 MIN: CPT | Performed by: ANESTHESIOLOGY

## 2025-04-15 RX ORDER — DANTROLENE SODIUM 25 MG/1
25 CAPSULE ORAL 3 TIMES DAILY
Qty: 45 CAPSULE | Refills: 0 | Status: SHIPPED | OUTPATIENT
Start: 2025-04-15 | End: 2025-04-30

## 2025-04-15 ASSESSMENT — ENCOUNTER SYMPTOMS
BACK PAIN: 1
GASTROINTESTINAL NEGATIVE: 1
RESPIRATORY NEGATIVE: 1

## 2025-04-15 NOTE — PROGRESS NOTES
Prolonged emergence from general anesthesia     PATIENT STATES HIS B/P WOULD DROP WITH INTUBATION, GO UP AFTER ET TUBE REMOVED.    PVC's (premature ventricular contractions)     PVC'S. NO CARDIOLOGIST, PCP DR AFRICA Anna     occas to see rheumatoid arthritis  workup for lupus    Restless legs syndrome     Sjogren's syndrome     Snores     mild, denies apnea, does \"grind\" his teeth    SOB (shortness of breath)     used to see Dr. Coffey, borderline COPD/ never followed up / never filled scripts for inhalers    Use of cane as ambulatory aid     Vertigo     Dr. Villalpando    Wears glasses     Wears partial dentures     LOWER        Past Surgical History:   Procedure Laterality Date    BACK SURGERY  2006    cage    COLONOSCOPY      COLONOSCOPY      \"reversed colonoscopy to suck out air/gas\" [place] syndrome    COLONOSCOPY N/A 09/06/2023    COLONOSCOPY WITH BIOPSIES performed by Santino Shearer MD at [facility] OR    COLONOSCOPY N/A 04/30/2024    COLONOSCOPY DIAGNOSTIC performed by Ramakrishna De León MD at [facility] OR    CYST REMOVAL Right     index finger    EYE SURGERY Right     CATARACT    HAMMER TOE SURGERY Left 02/20/2023    HAMMERTOE CORRECTION 2ND, 3RD & 4TH TOES performed by Christian Yip DPM at [facility] OR    HEMORRHOID SURGERY  2017    HERNIA REPAIR      UMBILICAL    NOSE SURGERY      REALIGNED NOSE    OTHER SURGICAL HISTORY  02/21/2020    MRI cervical and thoracic spine without contrast . CT of lung , all under general anesthesia    [place] COLSC FLX W/REMOVAL LESION BY HOT BX FORCEPS N/A 07/12/2017    COLONOSCOPY POLYPECTOMY HOT BIOPSY performed by Roby Ramos MD at [facility] OR    SIGMOIDOSCOPY N/A 10/28/2024    SIGMOIDOSCOPY DIAGNOSTIC FLEXIBLE WITH DECOMPRESSION performed by Ramakrishna De León MD at [facility] OR    SKIN BIOPSY      R. hip and Tip of nose    SPINAL FUSION  07/12/2021    [id number]    TOE SURGERY Right     2nd toe    TONSILLECTOMY      AS A CHILD    UMBILICAL HERNIA REPAIR      UPPER GASTROINTESTINAL

## 2025-04-18 ENCOUNTER — OFFICE VISIT (OUTPATIENT)
Age: 60
End: 2025-04-18
Payer: MEDICARE

## 2025-04-18 VITALS
WEIGHT: 272 LBS | BODY MASS INDEX: 38.94 KG/M2 | SYSTOLIC BLOOD PRESSURE: 110 MMHG | HEIGHT: 70 IN | DIASTOLIC BLOOD PRESSURE: 60 MMHG | HEART RATE: 70 BPM

## 2025-04-18 DIAGNOSIS — E66.812 CLASS 2 SEVERE OBESITY DUE TO EXCESS CALORIES WITH SERIOUS COMORBIDITY AND BODY MASS INDEX (BMI) OF 39.0 TO 39.9 IN ADULT: ICD-10-CM

## 2025-04-18 DIAGNOSIS — I10 ESSENTIAL HYPERTENSION: Primary | ICD-10-CM

## 2025-04-18 DIAGNOSIS — E66.01 CLASS 2 SEVERE OBESITY DUE TO EXCESS CALORIES WITH SERIOUS COMORBIDITY AND BODY MASS INDEX (BMI) OF 39.0 TO 39.9 IN ADULT: ICD-10-CM

## 2025-04-18 DIAGNOSIS — K21.9 GASTROESOPHAGEAL REFLUX DISEASE WITHOUT ESOPHAGITIS: ICD-10-CM

## 2025-04-18 DIAGNOSIS — R73.03 PREDIABETES: ICD-10-CM

## 2025-04-18 DIAGNOSIS — F41.9 ANXIETY DISORDER, UNSPECIFIED TYPE: ICD-10-CM

## 2025-04-18 DIAGNOSIS — G25.81 RESTLESS LEGS SYNDROME (RLS): ICD-10-CM

## 2025-04-18 DIAGNOSIS — E55.9 VITAMIN D INSUFFICIENCY: ICD-10-CM

## 2025-04-18 PROCEDURE — 3078F DIAST BP <80 MM HG: CPT | Performed by: NURSE PRACTITIONER

## 2025-04-18 PROCEDURE — 99213 OFFICE O/P EST LOW 20 MIN: CPT | Performed by: NURSE PRACTITIONER

## 2025-04-18 PROCEDURE — 3074F SYST BP LT 130 MM HG: CPT | Performed by: NURSE PRACTITIONER

## 2025-04-18 NOTE — PROGRESS NOTES
Medical Nutrition Therapy  Supervised Diet & Exercise Pre-Op   Metabolic and Bariatric Surgery  Visit 2 out of 6    Nutrition Assessment:  Patient's start weight is 275 lbs and current body weight is 272 lbs. Patient is working toward bariatric surgery for Gastric Sleeve. Patient has done well progressing and meeting goals.    Vitals:   Wt Readings from Last 3 Encounters:   04/18/25 123.4 kg (272 lb)   04/15/25 124.7 kg (275 lb)   04/04/25 124.7 kg (275 lb)     Nutrition Diagnosis:  Knowledge deficit related to healthy behaviors that support weight management after weight loss surgery as evidenced by Body mass index is 39.03 kg/m².    Nutrition Intervention:  Nutrition Prescription:  Bariatric Pre-op Diet    Nutrition Counseling:  Reviewed patients current behaviors and habits and discussed barriers for change. Utilized motivational interviewing to set patient specific goals that promote bariatric behaviors. Patient displays understanding of the goals discussed.     Nutrition Education:   Reviewed and available in the Bariatric Education Binder.                                                Goals: The patient has a list and explanation of every goal in their \"Bariatric Education Binder\". Changes in eating patterns to promote health are noted below. See below for goals that patient is continuing to work on and completed. All goals were planned with and agreed on by the patient.    Patient completed  1 out of 4 goals.  Treatment goals for upcoming month: Continue all previous goals and add #18.    I want to have surgery because I want to live longer and get my weight under control. I want to get into a good mindset. I had some recent blood work which showed elevated cholesterol and blood sugar. I've tried diets.      Goal C N/A Notes:   [] 1 I will read the education binder provided to me.    [x] []    [x] 2 I will make my psychological evaluation appoinment. [] []    [x] 3 I will bring my binder to every

## 2025-04-18 NOTE — PROGRESS NOTES
Medical Weight Management Progress Note    Subjective   2 of 6     Patient being seen for medically supervised weight loss for the chronic conditions of HTN, COPD, anxiety, RLS, prediabetes, boderline high cholesterol, gallstones, anemia.   He is working on the behavior changes discussed at the initial appointment. Patient continues on diet plan. Physical activity includes walking, arm curls, leg lifts. He states some days is back is very painful and he cannot exercise as much as he wants. He does see a back doctor, will take muscle relaxants prn. Patient has lost 3 in the last month.  No current issues.    Letter of medical necessity -  received on 2/13/2025    EGD - completed on 4/4/2025. Pathology: Mild chronic inactive gastritis. Immunostain for H. pylori is negative.   Findings:  No hiatal hernia  Normal esophagus  Normal duodenum  Mild diffuse gastritis    Labs -completed on 4/8/2025.  PTH was slightly high, vitamin D was a little low.  I sent over high-dose weekly vitamin D supplement, he is taking it.  Cholesterol and blood sugar were a little borderline high but stable.  All other labs were normal.  Nicotine was negative.  Zinc level was not resulted, it leaked in transport.  A1C was completed by PCP on 3/6/2025 - 6.3    Psych eval - appointment scheduled for 5/19/2025    Pulmonary clearance - n/a    Cardiology clearance - n/a    Intro class - completed on 4/7/2025    Support group - completed on 4/3/2025    Working toward bariatric surgery:    [x] Sleeve Gastrectomy                                                           [] Harper-en-Y Gastric Bypass    Allergies:  Allergies   Allergen Reactions    Cetyl Alcohol Hives     Localized reaction by allergy testing    Cetearyl alcohol    Metronidazole Other (See Comments)     \" caused a prickly feeling in my legs \"  Other reaction(s): Unknown    Bacitracin-Polymyxin B      Other reaction(s): Unknown    Adhesive Tape Other (See Comments)     opsite and paper

## 2025-04-27 PROBLEM — K21.9 GERD WITHOUT ESOPHAGITIS: Status: ACTIVE | Noted: 2025-04-27

## 2025-04-29 ENCOUNTER — TELEPHONE (OUTPATIENT)
Dept: BARIATRICS/WEIGHT MGMT | Age: 60
End: 2025-04-29

## 2025-04-29 NOTE — TELEPHONE ENCOUNTER
Patient called the office on 4/29/25 and stated that he will not be able to make the 5/9/25 5:30 support group zoom.  Writer informed the patient that all support group zoom are at noon and the 5:30 are in person and there is no zoom on the 9th.  Patient he told it was wrong.  Writer gave the dates on the zoom and he would like to attend the 5/1/25.  Please advise

## 2025-05-01 ENCOUNTER — CLINICAL SUPPORT (OUTPATIENT)
Dept: BARIATRICS/WEIGHT MGMT | Age: 60
End: 2025-05-01

## 2025-05-12 ENCOUNTER — TELEPHONE (OUTPATIENT)
Dept: PRIMARY CARE CLINIC | Age: 60
End: 2025-05-12

## 2025-05-12 NOTE — TELEPHONE ENCOUNTER
Santino Shearer's office called in requesting patients recent lab work done on 4/8 to be faxed to them at [phone]. Stated even though they are [facility] they are unable to see patients labwork.    -Faxed

## 2025-05-15 ENCOUNTER — OFFICE VISIT (OUTPATIENT)
Dept: PRIMARY CARE CLINIC | Age: 60
End: 2025-05-15
Payer: MEDICARE

## 2025-05-15 VITALS
HEART RATE: 64 BPM | HEIGHT: 70 IN | WEIGHT: 270 LBS | OXYGEN SATURATION: 94 % | DIASTOLIC BLOOD PRESSURE: 72 MMHG | SYSTOLIC BLOOD PRESSURE: 112 MMHG | BODY MASS INDEX: 38.65 KG/M2

## 2025-05-15 DIAGNOSIS — M54.9 MUSCULOSKELETAL BACK PAIN: ICD-10-CM

## 2025-05-15 DIAGNOSIS — M25.551 PAIN OF RIGHT HIP: ICD-10-CM

## 2025-05-15 DIAGNOSIS — L03.012 ACUTE PARONYCHIA OF FINGER OF LEFT HAND: Primary | ICD-10-CM

## 2025-05-15 PROCEDURE — 3074F SYST BP LT 130 MM HG: CPT | Performed by: FAMILY MEDICINE

## 2025-05-15 PROCEDURE — 99214 OFFICE O/P EST MOD 30 MIN: CPT | Performed by: FAMILY MEDICINE

## 2025-05-15 PROCEDURE — 3078F DIAST BP <80 MM HG: CPT | Performed by: FAMILY MEDICINE

## 2025-05-15 RX ORDER — METHOCARBAMOL 500 MG/1
500 TABLET, FILM COATED ORAL 3 TIMES DAILY
Qty: 30 TABLET | Refills: 1 | Status: SHIPPED | OUTPATIENT
Start: 2025-05-15 | End: 2025-06-04

## 2025-05-15 RX ORDER — SULFAMETHOXAZOLE AND TRIMETHOPRIM 800; 160 MG/1; MG/1
1 TABLET ORAL 2 TIMES DAILY
Qty: 20 TABLET | Refills: 0 | Status: SHIPPED | OUTPATIENT
Start: 2025-05-15 | End: 2025-05-25

## 2025-05-15 ASSESSMENT — ENCOUNTER SYMPTOMS
EYE DISCHARGE: 0
EYE REDNESS: 0
VOMITING: 0
NAUSEA: 0
ABDOMINAL PAIN: 0
COUGH: 0
DIARRHEA: 0
RHINORRHEA: 0
WHEEZING: 0
SHORTNESS OF BREATH: 0
SORE THROAT: 0

## 2025-05-15 NOTE — PROGRESS NOTES
[facility]  [facility]  [address]  [facility] [address]  Dept: [phone]    Ramakrishna Gamboa is a 59 y.o. male Established patient, who presents today for his medical conditions/complaints as noted below.      Chief Complaint   Patient presents with    Pruritis     Itching on left hand .     Hip Pain     Right hip pain        HPI:     HPI  60 y/o M presents to the office for left hand pruritus and swelling. Noticed yesterday and started on left fourth distal phalanx, however has now spread to all fingers. Reports pruritus through the night. Has not taken any medications for sx. Denies recent gardening, soap changes, detergents, pets, insect bites. Denies fever, chills, or other rash. Also reports right hip pain that began 1 week ago. No radiating pain, numbness, tingling. 0/10 pain severity with sitting, pain increases to 9/10 with movement. Able to ambulate with pain. Denies fall, injury, or strenuous activity. Has not taken any medication for pain. Recently ran out of methocarbamol he was prescribed by pain management for chronic back pain, however states that medication was improving his hip pain.     Reviewed prior notes Cardiology, Neurology, Orthopedics, Pulmonary, and Previous PCP  Reviewed previous Labs, Imaging, and Hospital Records    No components found for: \"LDLCHOLESTEROL\", \"LDLCALC\"    (goal LDL is <100)   AST (U/L)   Date Value   04/08/2025 23     ALT (U/L)   Date Value   04/08/2025 18     BUN (mg/dL)   Date Value   04/08/2025 13     Hemoglobin A1C (%)   Date Value   03/06/2025 6.3     TSH (uIU/mL)   Date Value   04/08/2025 0.70     BP Readings from Last 3 Encounters:   05/15/25 112/72   04/18/25 110/60   04/04/25 127/67          (goal 120/80)    Past Medical History:   Diagnosis Date    ADHD (attention deficit hyperactivity disorder)     Angular cheilitis     Anticoagulant long-term use     Anxiety     Arthritis     BACK AND FINGERS     Bilateral

## 2025-05-19 ENCOUNTER — OFFICE VISIT (OUTPATIENT)
Dept: BEHAVIORAL/MENTAL HEALTH CLINIC | Age: 60
End: 2025-05-19
Payer: MEDICARE

## 2025-05-19 DIAGNOSIS — F43.23 ADJUSTMENT DISORDER WITH MIXED ANXIETY AND DEPRESSED MOOD: Primary | ICD-10-CM

## 2025-05-19 PROCEDURE — 96137 PSYCL/NRPSYC TST PHY/QHP EA: CPT | Performed by: PSYCHOLOGIST

## 2025-05-19 PROCEDURE — 96130 PSYCL TST EVAL PHYS/QHP 1ST: CPT | Performed by: PSYCHOLOGIST

## 2025-05-19 PROCEDURE — 90791 PSYCH DIAGNOSTIC EVALUATION: CPT | Performed by: PSYCHOLOGIST

## 2025-05-19 PROCEDURE — 96136 PSYCL/NRPSYC TST PHY/QHP 1ST: CPT | Performed by: PSYCHOLOGIST

## 2025-05-19 PROCEDURE — 96131 PSYCL TST EVAL PHYS/QHP EA: CPT | Performed by: PSYCHOLOGIST

## 2025-05-19 NOTE — PROGRESS NOTES
Bariatric Pre-Surgical Psychology Initial Evaluation  Angi Leavitt M.A.  Clinical Psychology Doctoral Student  Supervising Licensed Clinical Psychologists:  Vidya Giles, Ph.D. ([id number], [place] [phone])    Visit Date: 2025   Time of appointment:  2:07pm-4:02pm   Time spent with patient: 76 minutes  Additional time spent testin minutes  Time spent report writing, clinical interviewing/interpretation, consultation with supervisor, supervisor review, supervisor feedback, interpretive feedback: 120  Interactive feedback provided on: 2025    Breakdown of charges:   66308- first 60-minute assessment   51220 - 30 minutes assessment (1 half-hour unit)  13985 - 25 minutes additional assessment and scoring (1 half-hour units)  55285 - first 60-minute report writing (1 one-hour unit)  17695 - 60 minutes additional report writing, clinical interviewing/interpretation, consultation with supervisor, supervisor review, supervisor feedback, interpretive feedback (1 one-hour units)    Pt provided informed consent for the behavioral health evaluation.  Discussed with patient the limits of confidentiality (i.e. abuse reporting, suicide intervention, etc.) and purpose of evaluation.  Also discussed with patient that the service provider is a supervised clinician and in particular is being supervised by Dr. Giles. Pt indicated understanding and signed consent form agreeing to be seen by a supervised clinician.     Identifying Data and Reason for Referral:  Ramakrishna is a 59-year-old male, who was seen for a pre-surgical psychological evaluation.  As part of this evaluation, a clinical interview was conducted.  Additionally, the patient was administered the [place] [facility] Medicine Diagnostic (MBMD), The [place] Cognitive Assessment (MoCA), Patient Health Questionnaire -9 (PHQ-9), Generalized Anxiety Disorder - 7 (MUSTAPHA-7), Alcohol Use Disorders Identification Test (AUDIT), and Eating Disorders Examination

## 2025-05-26 PROBLEM — F43.23 ADJUSTMENT DISORDER WITH MIXED ANXIETY AND DEPRESSED MOOD: Status: ACTIVE | Noted: 2025-05-26

## 2025-05-26 ASSESSMENT — PATIENT HEALTH QUESTIONNAIRE - PHQ9
SUM OF ALL RESPONSES TO PHQ QUESTIONS 1-9: 19
6. FEELING BAD ABOUT YOURSELF - OR THAT YOU ARE A FAILURE OR HAVE LET YOURSELF OR YOUR FAMILY DOWN: MORE THAN HALF THE DAYS
2. FEELING DOWN, DEPRESSED OR HOPELESS: SEVERAL DAYS
9. THOUGHTS THAT YOU WOULD BE BETTER OFF DEAD, OR OF HURTING YOURSELF: NOT AT ALL
7. TROUBLE CONCENTRATING ON THINGS, SUCH AS READING THE NEWSPAPER OR WATCHING TELEVISION: MORE THAN HALF THE DAYS
8. MOVING OR SPEAKING SO SLOWLY THAT OTHER PEOPLE COULD HAVE NOTICED. OR THE OPPOSITE, BEING SO FIGETY OR RESTLESS THAT YOU HAVE BEEN MOVING AROUND A LOT MORE THAN USUAL: NEARLY EVERY DAY
SUM OF ALL RESPONSES TO PHQ QUESTIONS 1-9: 19
3. TROUBLE FALLING OR STAYING ASLEEP: NEARLY EVERY DAY
SUM OF ALL RESPONSES TO PHQ QUESTIONS 1-9: 19
1. LITTLE INTEREST OR PLEASURE IN DOING THINGS: NEARLY EVERY DAY
5. POOR APPETITE OR OVEREATING: MORE THAN HALF THE DAYS
4. FEELING TIRED OR HAVING LITTLE ENERGY: NEARLY EVERY DAY
SUM OF ALL RESPONSES TO PHQ QUESTIONS 1-9: 19

## 2025-05-30 ENCOUNTER — HOSPITAL ENCOUNTER (OUTPATIENT)
Dept: GENERAL RADIOLOGY | Age: 60
Discharge: HOME | End: 2025-05-30
Payer: MEDICARE

## 2025-05-30 ENCOUNTER — OFFICE VISIT (OUTPATIENT)
Age: 60
End: 2025-05-30
Payer: MEDICARE

## 2025-05-30 VITALS
SYSTOLIC BLOOD PRESSURE: 121 MMHG | OXYGEN SATURATION: 97 % | BODY MASS INDEX: 38.65 KG/M2 | DIASTOLIC BLOOD PRESSURE: 74 MMHG | HEIGHT: 70 IN | HEART RATE: 68 BPM | WEIGHT: 270 LBS

## 2025-05-30 DIAGNOSIS — R73.03 PREDIABETES: ICD-10-CM

## 2025-05-30 DIAGNOSIS — E66.01 CLASS 2 SEVERE OBESITY DUE TO EXCESS CALORIES WITH SERIOUS COMORBIDITY AND BODY MASS INDEX (BMI) OF 38.0 TO 38.9 IN ADULT (HCC): ICD-10-CM

## 2025-05-30 DIAGNOSIS — F41.9 ANXIETY DISORDER, UNSPECIFIED TYPE: ICD-10-CM

## 2025-05-30 DIAGNOSIS — I10 ESSENTIAL HYPERTENSION: Primary | ICD-10-CM

## 2025-05-30 DIAGNOSIS — E55.9 VITAMIN D INSUFFICIENCY: ICD-10-CM

## 2025-05-30 DIAGNOSIS — G25.81 RESTLESS LEGS SYNDROME (RLS): ICD-10-CM

## 2025-05-30 DIAGNOSIS — E66.812 CLASS 2 SEVERE OBESITY DUE TO EXCESS CALORIES WITH SERIOUS COMORBIDITY AND BODY MASS INDEX (BMI) OF 38.0 TO 38.9 IN ADULT (HCC): ICD-10-CM

## 2025-05-30 DIAGNOSIS — K21.9 GASTROESOPHAGEAL REFLUX DISEASE WITHOUT ESOPHAGITIS: ICD-10-CM

## 2025-05-30 DIAGNOSIS — M25.551 PAIN OF RIGHT HIP: ICD-10-CM

## 2025-05-30 PROCEDURE — 99213 OFFICE O/P EST LOW 20 MIN: CPT | Performed by: NURSE PRACTITIONER

## 2025-05-30 PROCEDURE — 73502 X-RAY EXAM HIP UNI 2-3 VIEWS: CPT

## 2025-05-30 PROCEDURE — 3078F DIAST BP <80 MM HG: CPT | Performed by: NURSE PRACTITIONER

## 2025-05-30 PROCEDURE — 3074F SYST BP LT 130 MM HG: CPT | Performed by: NURSE PRACTITIONER

## 2025-05-30 NOTE — PROGRESS NOTES
Medical Nutrition Therapy  Supervised Diet & Exercise Pre-Op   Metabolic and Bariatric Surgery  Visit 3 out of 6    Nutrition Assessment:  Patient's start weight is 275 lbs and current body weight is 270 lbs. Patient is working toward bariatric surgery for Gastric Sleeve.    Vitals:   Wt Readings from Last 3 Encounters:   05/30/25 122.5 kg (270 lb)   05/15/25 122.5 kg (270 lb)   04/18/25 123.4 kg (272 lb)     Nutrition Diagnosis:  Knowledge deficit related to healthy behaviors that support weight management after weight loss surgery as evidenced by Body mass index is 38.74 kg/m².    Nutrition Intervention:  Nutrition Prescription:  Bariatric Pre-op Diet    Nutrition Counseling:  Reviewed patients current behaviors and habits and discussed barriers for change. Utilized motivational interviewing to set patient specific goals that promote bariatric behaviors. Patient displays understanding of the goals discussed.     Nutrition Education:   Reviewed and available in the Bariatric Education Binder.                                                Goals: The patient has a list and explanation of every goal in their \"Bariatric Education Binder\". Changes in eating patterns to promote health are noted below. See below for goals that patient is continuing to work on and completed. All goals were planned with and agreed on by the patient.    Patient completed  2 out of 4 goals.  Treatment goals for upcoming month: Continue all previous goals and add #8, 16.    I want to have surgery because I want to live longer and get my weight under control. I want to get into a good mindset. I had some recent blood work which showed elevated cholesterol and blood sugar. I've tried diets.      Goal C N/A Notes:   [] 1 I will read the education binder provided to me.    [x] []    [] 2 I will make my psychological evaluation appoinment. [x] []    [x] 3 I will bring my binder to every appointment.   [] []    [] 4 I will eliminate all

## 2025-05-30 NOTE — PROGRESS NOTES
Medical Weight Management Progress Note    Subjective   3 of 6     Patient being seen for medically supervised weight loss for the chronic conditions of HTN, COPD, anxiety, RLS, prediabetes, boderline high cholesterol, gallstones, anemia.   He is working on the behavior changes discussed at the initial appointment. Patient continues on diet plan. Physical activity includes walking, arm curls, leg lifts. He states some days is back is very painful and he cannot exercise as much as he wants. He does see a back doctor, will take muscle relaxants prn. Patient has lost 2 in the last month.  No current issues.    Letter of medical necessity -  received on 2/13/2025    EGD - completed on 4/4/2025. Pathology: Mild chronic inactive gastritis. Immunostain for H. pylori is negative.   Findings:  No hiatal hernia  Normal esophagus  Normal duodenum  Mild diffuse gastritis    Labs -completed on 4/8/2025.  PTH was slightly high, vitamin D was a little low.  I sent over high-dose weekly vitamin D supplement, he has finished it. Cholesterol and blood sugar were a little borderline high but stable.  All other labs were normal.  Nicotine was negative.  Zinc level was not resulted, it leaked in transport. He still needs to get that done.   A1C was completed by PCP on 3/6/2025 - 6.3    Psych eval - completed on 5/19/2025. He states that he spoke to Angi on Wednesday and everything was good. We just need their offical note for clearance.     Pulmonary clearance - n/a    Cardiology clearance - n/a    Intro class - completed on 4/7/2025    Support group - completed on 4/3/2025.  He also has a support group scheduled on 6/5 and 6/11    Working toward bariatric surgery:    [x] Sleeve Gastrectomy                                                           [] Harper-en-Y Gastric Bypass    Allergies:  Allergies   Allergen Reactions    Cetyl Alcohol Hives     Localized reaction by allergy testing    Cetearyl alcohol    Metronidazole Other (See

## 2025-06-03 ENCOUNTER — RESULTS FOLLOW-UP (OUTPATIENT)
Dept: PRIMARY CARE CLINIC | Age: 60
End: 2025-06-03

## 2025-06-04 DIAGNOSIS — F51.01 PRIMARY INSOMNIA: ICD-10-CM

## 2025-06-05 ENCOUNTER — CLINICAL SUPPORT (OUTPATIENT)
Dept: BARIATRICS/WEIGHT MGMT | Age: 60
End: 2025-06-05

## 2025-06-05 RX ORDER — TRAZODONE HYDROCHLORIDE 50 MG/1
50 TABLET ORAL NIGHTLY
Qty: 90 TABLET | Refills: 0 | Status: SHIPPED | OUTPATIENT
Start: 2025-06-05

## 2025-06-06 SDOH — HEALTH STABILITY: PHYSICAL HEALTH: ON AVERAGE, HOW MANY DAYS PER WEEK DO YOU ENGAGE IN MODERATE TO STRENUOUS EXERCISE (LIKE A BRISK WALK)?: 0 DAYS

## 2025-06-06 SDOH — HEALTH STABILITY: PHYSICAL HEALTH: ON AVERAGE, HOW MANY MINUTES DO YOU ENGAGE IN EXERCISE AT THIS LEVEL?: 10 MIN

## 2025-06-06 ASSESSMENT — PATIENT HEALTH QUESTIONNAIRE - PHQ9
10. IF YOU CHECKED OFF ANY PROBLEMS, HOW DIFFICULT HAVE THESE PROBLEMS MADE IT FOR YOU TO DO YOUR WORK, TAKE CARE OF THINGS AT HOME, OR GET ALONG WITH OTHER PEOPLE: SOMEWHAT DIFFICULT
8. MOVING OR SPEAKING SO SLOWLY THAT OTHER PEOPLE COULD HAVE NOTICED. OR THE OPPOSITE, BEING SO FIGETY OR RESTLESS THAT YOU HAVE BEEN MOVING AROUND A LOT MORE THAN USUAL: SEVERAL DAYS
6. FEELING BAD ABOUT YOURSELF - OR THAT YOU ARE A FAILURE OR HAVE LET YOURSELF OR YOUR FAMILY DOWN: SEVERAL DAYS
2. FEELING DOWN, DEPRESSED OR HOPELESS: NEARLY EVERY DAY
SUM OF ALL RESPONSES TO PHQ QUESTIONS 1-9: 16
5. POOR APPETITE OR OVEREATING: NOT AT ALL
7. TROUBLE CONCENTRATING ON THINGS, SUCH AS READING THE NEWSPAPER OR WATCHING TELEVISION: MORE THAN HALF THE DAYS
SUM OF ALL RESPONSES TO PHQ QUESTIONS 1-9: 16
9. THOUGHTS THAT YOU WOULD BE BETTER OFF DEAD, OR OF HURTING YOURSELF: NOT AT ALL
SUM OF ALL RESPONSES TO PHQ QUESTIONS 1-9: 16
SUM OF ALL RESPONSES TO PHQ QUESTIONS 1-9: 16
4. FEELING TIRED OR HAVING LITTLE ENERGY: NEARLY EVERY DAY
3. TROUBLE FALLING OR STAYING ASLEEP: NEARLY EVERY DAY
1. LITTLE INTEREST OR PLEASURE IN DOING THINGS: NEARLY EVERY DAY

## 2025-06-06 ASSESSMENT — LIFESTYLE VARIABLES
HOW MANY STANDARD DRINKS CONTAINING ALCOHOL DO YOU HAVE ON A TYPICAL DAY: PATIENT DOES NOT DRINK
HOW OFTEN DO YOU HAVE SIX OR MORE DRINKS ON ONE OCCASION: 1
HOW MANY STANDARD DRINKS CONTAINING ALCOHOL DO YOU HAVE ON A TYPICAL DAY: 0
HOW OFTEN DO YOU HAVE A DRINK CONTAINING ALCOHOL: NEVER
HOW OFTEN DO YOU HAVE A DRINK CONTAINING ALCOHOL: 1

## 2025-06-09 ENCOUNTER — OFFICE VISIT (OUTPATIENT)
Dept: PRIMARY CARE CLINIC | Age: 60
End: 2025-06-09
Payer: MEDICARE

## 2025-06-09 VITALS
DIASTOLIC BLOOD PRESSURE: 70 MMHG | HEART RATE: 71 BPM | BODY MASS INDEX: 37.8 KG/M2 | OXYGEN SATURATION: 97 % | SYSTOLIC BLOOD PRESSURE: 122 MMHG | WEIGHT: 264 LBS | HEIGHT: 70 IN

## 2025-06-09 DIAGNOSIS — F43.21 ADJUSTMENT DISORDER WITH DEPRESSED MOOD: ICD-10-CM

## 2025-06-09 DIAGNOSIS — I10 BENIGN ESSENTIAL HYPERTENSION: ICD-10-CM

## 2025-06-09 DIAGNOSIS — R73.03 PREDIABETES: ICD-10-CM

## 2025-06-09 DIAGNOSIS — Z00.00 MEDICARE ANNUAL WELLNESS VISIT, SUBSEQUENT: Primary | ICD-10-CM

## 2025-06-09 LAB — HBA1C MFR BLD: 5.7 %

## 2025-06-09 PROCEDURE — 3074F SYST BP LT 130 MM HG: CPT | Performed by: FAMILY MEDICINE

## 2025-06-09 PROCEDURE — G0439 PPPS, SUBSEQ VISIT: HCPCS | Performed by: FAMILY MEDICINE

## 2025-06-09 PROCEDURE — 3078F DIAST BP <80 MM HG: CPT | Performed by: FAMILY MEDICINE

## 2025-06-09 PROCEDURE — 83036 HEMOGLOBIN GLYCOSYLATED A1C: CPT | Performed by: FAMILY MEDICINE

## 2025-06-09 RX ORDER — BUPROPION HYDROCHLORIDE 150 MG/1
150 TABLET ORAL EVERY MORNING
Qty: 30 TABLET | Refills: 5 | Status: SHIPPED | OUTPATIENT
Start: 2025-06-09

## 2025-06-09 NOTE — PATIENT INSTRUCTIONS
device in the bathroom with you.   Where can you learn more?  Go to https://[email or web address].net/patientEd and enter G117 to learn more about \"Preventing Falls: Care Instructions.\"  Current as of: July 31, 2024  Content Version: 14.5  © 1976-3920 [employer].   Care instructions adapted under license by [employer]. If you have questions about a medical condition or this instruction, always ask your healthcare professional. [employer], disclaims any warranty or liability for your use of this information.         Learning About Mindfulness for Stress  What are mindfulness and stress?     Stress is your body's response to a hard situation. Your body can have a physical, emotional, or mental response. A lot of things can cause stress. You may feel stress when you go on a job interview, take a test, or run a race. This kind of short-term stress is normal and even useful. It can help you if you need to work hard or react quickly.  Stress also can last a long time. Long-term stress is caused by stressful situations or events. Examples of long-term stress include long-term health problems, ongoing problems at work, and conflicts in your family. Long-term stress can harm your health.  Mindfulness is a focus only on things happening in the present moment. It's a process of purposefully paying attention to and being aware of your surroundings, your emotions, your thoughts, and how your body feels. You are aware of these things, but you aren't judging these experiences as \"good\" or \"bad.\" Mindfulness can help you learn to calm your mind and body to help you cope with illness, pain, and stress.  How does mindfulness help to relieve stress?  Mindfulness can help quiet your mind and relax your body. Studies show that it can help some people sleep better, feel less anxious, and bring their blood pressure down. And it's been shown to help some people live and cope better with certain health problems like heart

## 2025-06-09 NOTE — PROGRESS NOTES
Medicare Annual Wellness Visit    Ramakrishna LEANNA Gamboa is here for Medicare AWV (Pt here today for Medicare Annual Wellness Visit. Pt given three words to remember: phone, happy, blue. Time given to draw was 11:15.)    Assessment & Plan   Medicare annual wellness visit, subsequent  Benign essential hypertension  Adjustment disorder with depressed mood  -     buPROPion (WELLBUTRIN XL) 150 MG extended release tablet; Take 1 tablet by mouth every morning, Disp-30 tablet, R-5Normal       Return in about 6 months (around 12/9/2025).     Subjective   The following acute and/or chronic problems were also addressed today:  Depression     Patient's complete Health Risk Assessment and screening values have been reviewed and are found in Flowsheets. The following problems were reviewed today and where indicated follow up appointments were made and/or referrals ordered.    Positive Risk Factor Screenings with Interventions:    Fall Risk:  Do you feel unsteady or are you worried about falling? : (!) (Patient-Rptd) yes  2 or more falls in past year?: (Patient-Rptd) no  Fall with injury in past year?: (Patient-Rptd) no     Interventions:    Reviewed medications, home hazards, visual acuity, and co-morbidities that can increase risk for falls     Depression:  PHQ-2 Score: (Patient-Rptd) 6  PHQ-9 Total Score: (Patient-Rptd) 16  Total Score Interpretation: 15-19 = moderately severe depression  Interventions:  Patient declines any further evaluation or treatment          General HRA Questions:  Select all that apply: (!) (Patient-Rptd) New or Increased Pain, New or Increased Fatigue, Stress  Interventions - Pain:  Patient declined any further interventions or treatment  Interventions Fatigue:  Patient declined any further interventions or treatment  Interventions - Stress:  Patient declined any further interventions or treatment      Inactivity:  On average, how many days per week do you engage in moderate to strenuous exercise (like a

## 2025-06-11 ENCOUNTER — CLINICAL SUPPORT (OUTPATIENT)
Dept: BARIATRICS/WEIGHT MGMT | Age: 60
End: 2025-06-11

## 2025-06-20 ENCOUNTER — OFFICE VISIT (OUTPATIENT)
Age: 60
End: 2025-06-20

## 2025-06-20 VITALS
WEIGHT: 272 LBS | SYSTOLIC BLOOD PRESSURE: 110 MMHG | BODY MASS INDEX: 38.94 KG/M2 | HEART RATE: 74 BPM | HEIGHT: 70 IN | OXYGEN SATURATION: 92 % | DIASTOLIC BLOOD PRESSURE: 72 MMHG

## 2025-06-20 DIAGNOSIS — E66.812 CLASS 2 SEVERE OBESITY DUE TO EXCESS CALORIES WITH SERIOUS COMORBIDITY AND BODY MASS INDEX (BMI) OF 38.0 TO 38.9 IN ADULT (HCC): ICD-10-CM

## 2025-06-20 DIAGNOSIS — R73.03 PREDIABETES: ICD-10-CM

## 2025-06-20 DIAGNOSIS — K21.9 GASTROESOPHAGEAL REFLUX DISEASE WITHOUT ESOPHAGITIS: ICD-10-CM

## 2025-06-20 DIAGNOSIS — G25.81 RESTLESS LEGS SYNDROME (RLS): ICD-10-CM

## 2025-06-20 DIAGNOSIS — E55.9 VITAMIN D INSUFFICIENCY: ICD-10-CM

## 2025-06-20 DIAGNOSIS — E66.812 CLASS 2 SEVERE OBESITY DUE TO EXCESS CALORIES WITH SERIOUS COMORBIDITY AND BODY MASS INDEX (BMI) OF 39.0 TO 39.9 IN ADULT (HCC): ICD-10-CM

## 2025-06-20 DIAGNOSIS — I10 ESSENTIAL HYPERTENSION: Primary | ICD-10-CM

## 2025-06-20 DIAGNOSIS — E66.01 CLASS 2 SEVERE OBESITY DUE TO EXCESS CALORIES WITH SERIOUS COMORBIDITY AND BODY MASS INDEX (BMI) OF 39.0 TO 39.9 IN ADULT (HCC): ICD-10-CM

## 2025-06-20 DIAGNOSIS — F41.9 ANXIETY DISORDER, UNSPECIFIED TYPE: ICD-10-CM

## 2025-06-20 DIAGNOSIS — E66.01 CLASS 2 SEVERE OBESITY DUE TO EXCESS CALORIES WITH SERIOUS COMORBIDITY AND BODY MASS INDEX (BMI) OF 38.0 TO 38.9 IN ADULT (HCC): ICD-10-CM

## 2025-06-20 NOTE — PROGRESS NOTES
Medical Weight Management Progress Note  Chief Complaint   Patient presents with    Weight Management     4 of 6       Subjective   4 of 6     Patient being seen for medically supervised weight loss for the chronic conditions of HTN, COPD, anxiety, RLS, prediabetes, boderline high cholesterol, gallstones, anemia.   He is working on the behavior changes discussed at the initial appointment. Patient continues on diet plan. Physical activity includes walking, arm curls, leg lifts. He states some days is back is very painful and he cannot exercise as much as he wants. He does see a back doctor, will take muscle relaxants prn. Patient has gain 2 lbs in the last month.  No current issues.    Start weight is 275 lbs. He is 3 lbs under his start weight.     Letter of medical necessity -  received on 2/13/2025    EGD - completed on 4/4/2025. Pathology: Mild chronic inactive gastritis. Immunostain for H. pylori is negative.   Findings:  No hiatal hernia  Normal esophagus  Normal duodenum  Mild diffuse gastritis    Labs -completed on 4/8/2025.  PTH was slightly high, vitamin D was a little low.  I sent over high-dose weekly vitamin D supplement, he has finished it. Cholesterol and blood sugar were a little borderline high but stable.  All other labs were normal.  Nicotine was negative.  Zinc level was not resulted, it leaked in transport. He still needs to get that done.   A1C was completed by PCP on 3/6/2025 - 6.3    Psych eval - completed on 5/19/2025. cleared    Pulmonary clearance - n/a    Cardiology clearance - n/a    Intro class - completed on 4/7/2025    Support group - completed on 4/3/2025, 6/5 and 6/11    Working toward bariatric surgery:    [x] Sleeve Gastrectomy                                                           [] Harper-en-Y Gastric Bypass    Allergies:  Allergies   Allergen Reactions    Cetyl Alcohol Hives     Localized reaction by allergy testing    Cetearyl alcohol    Metronidazole Other (See

## 2025-06-20 NOTE — PROGRESS NOTES
Medical Nutrition Therapy  Supervised Diet & Exercise Pre-Op   Metabolic and Bariatric Surgery  Visit 4 out of 6    Nutrition Assessment:  Patient's start weight is 275 lbs and current body weight is 272 lbs. Patient is working toward bariatric surgery for Gastric Sleeve.    Vitals:   Wt Readings from Last 3 Encounters:   06/20/25 123.4 kg (272 lb)   06/09/25 119.7 kg (264 lb)   05/30/25 122.5 kg (270 lb)     Nutrition Diagnosis:  Knowledge deficit related to healthy behaviors that support weight management after weight loss surgery as evidenced by Body mass index is 39.03 kg/m².    Nutrition Intervention:  Nutrition Prescription:  Bariatric Pre-op Diet    Nutrition Counseling:  Reviewed patients current behaviors and habits and discussed barriers for change. Utilized motivational interviewing to set patient specific goals that promote bariatric behaviors. Patient displays understanding of the goals discussed.     Nutrition Education:   Reviewed and available in the Bariatric Education Binder.                                                Goals: The patient has a list and explanation of every goal in their \"Bariatric Education Binder\". Changes in eating patterns to promote health are noted below. See below for goals that patient is continuing to work on and completed. All goals were planned with and agreed on by the patient.    Patient completed  1 out of 4 goals.  Treatment goals for upcoming month: Continue all previous goals and add #17.      I want to have surgery because I want to live longer and get my weight under control. I want to get into a good mindset. I had some recent blood work which showed elevated cholesterol and blood sugar. I've tried diets.      Goal C N/A Notes:   [] 1 I will read the education binder provided to me.    [x] []    [] 2 I will make my psychological evaluation appoinment. [x] []    [x] 3 I will bring my binder to every appointment.   [] []    [] 4 I will eliminate all

## 2025-07-18 ENCOUNTER — HOSPITAL ENCOUNTER (OUTPATIENT)
Dept: NEUROLOGY | Age: 60
Discharge: HOME | End: 2025-07-18

## 2025-07-18 ENCOUNTER — OFFICE VISIT (OUTPATIENT)
Age: 60
End: 2025-07-18
Payer: MEDICARE

## 2025-07-18 VITALS
OXYGEN SATURATION: 93 % | WEIGHT: 271 LBS | HEART RATE: 70 BPM | BODY MASS INDEX: 38.8 KG/M2 | DIASTOLIC BLOOD PRESSURE: 68 MMHG | SYSTOLIC BLOOD PRESSURE: 108 MMHG | HEIGHT: 70 IN

## 2025-07-18 DIAGNOSIS — E66.01 CLASS 2 SEVERE OBESITY DUE TO EXCESS CALORIES WITH SERIOUS COMORBIDITY AND BODY MASS INDEX (BMI) OF 38.0 TO 38.9 IN ADULT (HCC): ICD-10-CM

## 2025-07-18 DIAGNOSIS — E66.812 CLASS 2 SEVERE OBESITY DUE TO EXCESS CALORIES WITH SERIOUS COMORBIDITY AND BODY MASS INDEX (BMI) OF 38.0 TO 38.9 IN ADULT (HCC): ICD-10-CM

## 2025-07-18 DIAGNOSIS — G25.81 RESTLESS LEGS SYNDROME (RLS): ICD-10-CM

## 2025-07-18 DIAGNOSIS — E55.9 VITAMIN D INSUFFICIENCY: ICD-10-CM

## 2025-07-18 DIAGNOSIS — D64.9 ANEMIA, UNSPECIFIED TYPE: ICD-10-CM

## 2025-07-18 DIAGNOSIS — R73.03 PREDIABETES: ICD-10-CM

## 2025-07-18 DIAGNOSIS — F41.9 ANXIETY DISORDER, UNSPECIFIED TYPE: ICD-10-CM

## 2025-07-18 DIAGNOSIS — I10 ESSENTIAL HYPERTENSION: ICD-10-CM

## 2025-07-18 DIAGNOSIS — K21.9 GASTROESOPHAGEAL REFLUX DISEASE WITHOUT ESOPHAGITIS: ICD-10-CM

## 2025-07-18 DIAGNOSIS — I10 ESSENTIAL HYPERTENSION: Primary | ICD-10-CM

## 2025-07-18 PROCEDURE — 99214 OFFICE O/P EST MOD 30 MIN: CPT | Performed by: NURSE PRACTITIONER

## 2025-07-18 PROCEDURE — 3074F SYST BP LT 130 MM HG: CPT | Performed by: NURSE PRACTITIONER

## 2025-07-18 PROCEDURE — 3078F DIAST BP <80 MM HG: CPT | Performed by: NURSE PRACTITIONER

## 2025-07-18 NOTE — PROGRESS NOTES
Medical Weight Management Progress Note  Chief Complaint   Patient presents with    Weight Management     5 of 6       Subjective   5 of 6     Patient being seen for medically supervised weight loss for the chronic conditions of HTN, COPD, anxiety, RLS, prediabetes, boderline high cholesterol, gallstones, anemia.   He is working on the behavior changes discussed at the initial appointment. Patient continues on diet plan. Physical activity includes walking, arm curls, leg lifts. He states some days is back is very painful and he cannot exercise as much as he wants. He does see a back doctor, will take muscle relaxants prn. Patient has lost 1 lbs in the last month.  No current issues.    Start weight is 275 lbs. He is 4 lbs under his start weight.     Letter of medical necessity -  received on 2/13/2025    EGD - completed on 4/4/2025. Pathology: Mild chronic inactive gastritis. Immunostain for H. pylori is negative.   Findings:  No hiatal hernia  Normal esophagus  Normal duodenum  Mild diffuse gastritis    Labs -completed on 4/8/2025.  PTH was slightly high, vitamin D was a little low.  I sent over high-dose weekly vitamin D supplement, he has finished it. Cholesterol and blood sugar were a little borderline high but stable.  All other labs were normal.  Nicotine was negative.  Zinc level was not resulted, it leaked in transport. He still needs to get that done.   A1C was completed by PCP on 6/9/2025 - 5.7    Psych eval - completed on 5/19/2025. cleared    Pulmonary clearance - n/a    Cardiology clearance - n/a    Intro class - completed on 4/7/2025    Support group - completed on 4/3/2025, 6/5 and 6/11    Working toward bariatric surgery:    [x] Sleeve Gastrectomy                                                           [] Harper-en-Y Gastric Bypass    Allergies:  Allergies   Allergen Reactions    Cetyl Alcohol Hives     Localized reaction by allergy testing    Cetearyl alcohol    Metronidazole Other (See 
review goals set above.  [] Nutrition visits complete     Love Chen MS, RD, LD  Clinical Dietitian  [facility]  [phone]

## 2025-07-20 LAB — ZINC: 53.3 UG/DL (ref 60–120)

## 2025-07-24 ENCOUNTER — RESULTS FOLLOW-UP (OUTPATIENT)
Dept: PRIMARY CARE CLINIC | Age: 60
End: 2025-07-24

## 2025-07-24 NOTE — RESULT ENCOUNTER NOTE
Advised patient consistent with ulnar neuropathy.  If symptoms continue, would recommend referral to orthopedic surgery

## 2025-07-30 DIAGNOSIS — M48.10 DIFFUSE IDIOPATHIC SKELETAL HYPEROSTOSIS: ICD-10-CM

## 2025-07-30 DIAGNOSIS — M40.205 KYPHOSIS OF THORACOLUMBAR REGION, UNSPECIFIED KYPHOSIS TYPE: ICD-10-CM

## 2025-07-31 RX ORDER — METOPROLOL TARTRATE 25 MG/1
TABLET, FILM COATED ORAL
Qty: 90 TABLET | Refills: 0 | Status: SHIPPED | OUTPATIENT
Start: 2025-07-31

## 2025-08-07 ENCOUNTER — CLINICAL SUPPORT (OUTPATIENT)
Dept: BARIATRICS/WEIGHT MGMT | Age: 60
End: 2025-08-07

## 2025-08-07 DIAGNOSIS — E66.812 OBESITY, CLASS II, BMI 35-39.9: Primary | ICD-10-CM

## 2025-08-08 ENCOUNTER — OFFICE VISIT (OUTPATIENT)
Age: 60
End: 2025-08-08
Payer: MEDICARE

## 2025-08-08 VITALS
SYSTOLIC BLOOD PRESSURE: 127 MMHG | WEIGHT: 268 LBS | HEART RATE: 73 BPM | HEIGHT: 70 IN | OXYGEN SATURATION: 94 % | BODY MASS INDEX: 38.37 KG/M2 | DIASTOLIC BLOOD PRESSURE: 73 MMHG

## 2025-08-08 DIAGNOSIS — I10 ESSENTIAL HYPERTENSION: Primary | ICD-10-CM

## 2025-08-08 DIAGNOSIS — E66.812 CLASS 2 SEVERE OBESITY DUE TO EXCESS CALORIES WITH SERIOUS COMORBIDITY AND BODY MASS INDEX (BMI) OF 38.0 TO 38.9 IN ADULT (HCC): ICD-10-CM

## 2025-08-08 DIAGNOSIS — E66.01 CLASS 2 SEVERE OBESITY DUE TO EXCESS CALORIES WITH SERIOUS COMORBIDITY AND BODY MASS INDEX (BMI) OF 38.0 TO 38.9 IN ADULT (HCC): ICD-10-CM

## 2025-08-08 DIAGNOSIS — G25.81 RESTLESS LEGS SYNDROME (RLS): ICD-10-CM

## 2025-08-08 DIAGNOSIS — K21.9 GASTROESOPHAGEAL REFLUX DISEASE WITHOUT ESOPHAGITIS: ICD-10-CM

## 2025-08-08 DIAGNOSIS — R73.03 PREDIABETES: ICD-10-CM

## 2025-08-08 DIAGNOSIS — E55.9 VITAMIN D INSUFFICIENCY: ICD-10-CM

## 2025-08-08 PROCEDURE — 99213 OFFICE O/P EST LOW 20 MIN: CPT | Performed by: NURSE PRACTITIONER

## 2025-08-08 PROCEDURE — 3074F SYST BP LT 130 MM HG: CPT | Performed by: NURSE PRACTITIONER

## 2025-08-08 PROCEDURE — 3078F DIAST BP <80 MM HG: CPT | Performed by: NURSE PRACTITIONER

## 2025-08-12 ENCOUNTER — TELEPHONE (OUTPATIENT)
Dept: BARIATRICS/WEIGHT MGMT | Age: 60
End: 2025-08-12

## 2025-08-20 PROBLEM — E66.9 OBESITY, UNSPECIFIED: Status: ACTIVE | Noted: 2025-08-20

## 2025-08-20 PROBLEM — I10 ESSENTIAL HYPERTENSION, BENIGN: Status: ACTIVE | Noted: 2025-08-20

## 2025-08-20 PROBLEM — R73.03 BORDERLINE DIABETES: Status: ACTIVE | Noted: 2025-08-20

## 2025-08-20 PROBLEM — E55.9 VITAMIN D DEFICIENCY: Status: ACTIVE | Noted: 2025-08-20

## 2025-08-22 ENCOUNTER — TELEPHONE (OUTPATIENT)
Dept: BARIATRICS/WEIGHT MGMT | Age: 60
End: 2025-08-22

## 2025-08-22 ENCOUNTER — TELEPHONE (OUTPATIENT)
Dept: PRIMARY CARE CLINIC | Age: 60
End: 2025-08-22

## 2025-08-26 RX ORDER — SODIUM CHLORIDE, SODIUM LACTATE, POTASSIUM CHLORIDE, CALCIUM CHLORIDE 600; 310; 30; 20 MG/100ML; MG/100ML; MG/100ML; MG/100ML
INJECTION, SOLUTION INTRAVENOUS CONTINUOUS
OUTPATIENT
Start: 2025-08-26

## 2025-09-02 ENCOUNTER — TELEPHONE (OUTPATIENT)
Dept: BARIATRICS/WEIGHT MGMT | Age: 60
End: 2025-09-02

## 2025-09-02 ENCOUNTER — CLINICAL SUPPORT (OUTPATIENT)
Dept: BARIATRICS/WEIGHT MGMT | Age: 60
End: 2025-09-02

## 2025-09-03 ENCOUNTER — HOSPITAL ENCOUNTER (OUTPATIENT)
Dept: GENERAL RADIOLOGY | Age: 60
Discharge: HOME | End: 2025-09-05
Payer: MEDICARE

## 2025-09-03 ENCOUNTER — OFFICE VISIT (OUTPATIENT)
Dept: PRIMARY CARE CLINIC | Age: 60
End: 2025-09-03

## 2025-09-03 ENCOUNTER — HOSPITAL ENCOUNTER (OUTPATIENT)
Dept: PREADMISSION TESTING | Age: 60
Discharge: HOME | End: 2025-09-03
Payer: MEDICARE

## 2025-09-03 VITALS
OXYGEN SATURATION: 98 % | DIASTOLIC BLOOD PRESSURE: 64 MMHG | HEART RATE: 73 BPM | WEIGHT: 271.8 LBS | SYSTOLIC BLOOD PRESSURE: 118 MMHG | BODY MASS INDEX: 38.91 KG/M2 | HEIGHT: 70 IN

## 2025-09-03 VITALS
HEART RATE: 71 BPM | DIASTOLIC BLOOD PRESSURE: 70 MMHG | RESPIRATION RATE: 14 BRPM | WEIGHT: 269 LBS | OXYGEN SATURATION: 95 % | TEMPERATURE: 98.2 F | BODY MASS INDEX: 38.51 KG/M2 | SYSTOLIC BLOOD PRESSURE: 117 MMHG | HEIGHT: 70 IN

## 2025-09-03 DIAGNOSIS — M45.4 ANKYLOSING SPONDYLITIS OF THORACIC REGION (HCC): ICD-10-CM

## 2025-09-03 DIAGNOSIS — I10 BENIGN ESSENTIAL HYPERTENSION: ICD-10-CM

## 2025-09-03 DIAGNOSIS — K59.81 OGILVIE'S SYNDROME: ICD-10-CM

## 2025-09-03 DIAGNOSIS — M40.205 KYPHOSIS OF THORACOLUMBAR REGION, UNSPECIFIED KYPHOSIS TYPE: Primary | ICD-10-CM

## 2025-09-03 DIAGNOSIS — F51.01 PRIMARY INSOMNIA: ICD-10-CM

## 2025-09-03 DIAGNOSIS — E66.813 CLASS 3 SEVERE OBESITY DUE TO EXCESS CALORIES WITH SERIOUS COMORBIDITY IN ADULT, UNSPECIFIED BMI (HCC): ICD-10-CM

## 2025-09-03 DIAGNOSIS — J44.9 CHRONIC OBSTRUCTIVE PULMONARY DISEASE, UNSPECIFIED COPD TYPE (HCC): ICD-10-CM

## 2025-09-03 LAB
ANION GAP SERPL CALCULATED.3IONS-SCNC: 11 MMOL/L (ref 9–16)
BUN SERPL-MCNC: 12 MG/DL (ref 8–23)
CALCIUM SERPL-MCNC: 9.1 MG/DL (ref 8.6–10.4)
CHLORIDE SERPL-SCNC: 97 MMOL/L (ref 98–107)
CO2 SERPL-SCNC: 26 MMOL/L (ref 20–31)
CREAT SERPL-MCNC: 0.6 MG/DL (ref 0.7–1.2)
ERYTHROCYTE [DISTWIDTH] IN BLOOD BY AUTOMATED COUNT: 13.5 % (ref 11.8–14.4)
GFR, ESTIMATED: >90 ML/MIN/1.73M2
GLUCOSE SERPL-MCNC: 98 MG/DL (ref 74–99)
HCT VFR BLD AUTO: 41.1 % (ref 40.7–50.3)
HGB BLD-MCNC: 13.7 G/DL (ref 13–17)
INR PPP: 1
MCH RBC QN AUTO: 29 PG (ref 25.2–33.5)
MCHC RBC AUTO-ENTMCNC: 33.3 G/DL (ref 28.4–34.8)
MCV RBC AUTO: 86.9 FL (ref 82.6–102.9)
NRBC BLD-RTO: 0 PER 100 WBC
PARTIAL THROMBOPLASTIN TIME: 31 SEC (ref 23–36.5)
PLATELET # BLD AUTO: 178 K/UL (ref 138–453)
PMV BLD AUTO: 9.3 FL (ref 8.1–13.5)
POTASSIUM SERPL-SCNC: 4.5 MMOL/L (ref 3.7–5.3)
PROTHROMBIN TIME: 13.1 SEC (ref 11.7–14.9)
RBC # BLD AUTO: 4.73 M/UL (ref 4.21–5.77)
SODIUM SERPL-SCNC: 134 MMOL/L (ref 136–145)
WBC OTHER # BLD: 7.5 K/UL (ref 3.5–11.3)

## 2025-09-03 PROCEDURE — 71046 X-RAY EXAM CHEST 2 VIEWS: CPT

## 2025-09-03 PROCEDURE — 85610 PROTHROMBIN TIME: CPT

## 2025-09-03 PROCEDURE — G0480 DRUG TEST DEF 1-7 CLASSES: HCPCS

## 2025-09-03 PROCEDURE — 80048 BASIC METABOLIC PNL TOTAL CA: CPT

## 2025-09-03 PROCEDURE — 85027 COMPLETE CBC AUTOMATED: CPT

## 2025-09-03 PROCEDURE — 93005 ELECTROCARDIOGRAM TRACING: CPT | Performed by: SURGERY

## 2025-09-03 PROCEDURE — 36415 COLL VENOUS BLD VENIPUNCTURE: CPT

## 2025-09-03 PROCEDURE — 85730 THROMBOPLASTIN TIME PARTIAL: CPT

## 2025-09-03 RX ORDER — HEPARIN SODIUM 5000 [USP'U]/ML
5000 INJECTION, SOLUTION INTRAVENOUS; SUBCUTANEOUS ONCE
OUTPATIENT
Start: 2025-09-03 | End: 2025-09-03

## 2025-09-03 RX ORDER — GINSENG 100 MG
50 CAPSULE ORAL DAILY
COMMUNITY

## 2025-09-03 RX ORDER — WHEAT DEXTRIN 3 G/3.8 G
4 POWDER (GRAM) ORAL DAILY
COMMUNITY

## 2025-09-03 RX ORDER — METOCLOPRAMIDE 5 MG/1
5 TABLET ORAL 3 TIMES DAILY
COMMUNITY
Start: 2025-08-20

## 2025-09-03 ASSESSMENT — ENCOUNTER SYMPTOMS
ABDOMINAL PAIN: 0
VOMITING: 0
NAUSEA: 0
EYE REDNESS: 0
SORE THROAT: 0
DIARRHEA: 0
WHEEZING: 0
RHINORRHEA: 0
SHORTNESS OF BREATH: 0
COUGH: 0

## 2025-09-03 ASSESSMENT — PAIN SCALES - GENERAL: PAINLEVEL_OUTOF10: 5

## 2025-09-03 ASSESSMENT — PAIN DESCRIPTION - DESCRIPTORS: DESCRIPTORS: THROBBING

## 2025-09-03 ASSESSMENT — PAIN DESCRIPTION - LOCATION: LOCATION: BACK

## 2025-09-03 ASSESSMENT — PAIN DESCRIPTION - PAIN TYPE: TYPE: CHRONIC PAIN

## 2025-09-04 LAB
EKG ATRIAL RATE: 65 BPM
EKG P AXIS: 34 DEGREES
EKG P-R INTERVAL: 160 MS
EKG Q-T INTERVAL: 448 MS
EKG QRS DURATION: 102 MS
EKG QTC CALCULATION (BAZETT): 465 MS
EKG R AXIS: 49 DEGREES
EKG T AXIS: 29 DEGREES
EKG VENTRICULAR RATE: 65 BPM

## 2025-09-04 RX ORDER — TRAZODONE HYDROCHLORIDE 50 MG/1
50 TABLET ORAL NIGHTLY
Qty: 90 TABLET | Refills: 0 | Status: SHIPPED | OUTPATIENT
Start: 2025-09-04

## 2025-09-07 LAB
COTININE: <5 NG/ML
NICOTINE: <5 NG/ML

## (undated) DEVICE — BITEBLOCK 54FR W/ DENT RIM BLOX

## (undated) DEVICE — DRESSING PETRO W3XL3IN OIL EMUL N ADH GZ KNIT IMPREG CELOS

## (undated) DEVICE — GLOVE ORANGE PI 7 1/2   MSG9075

## (undated) DEVICE — SUTURE VCRL + SZ 3-0 L27IN ABSRB WHT FS-1 3/8 CIR REV CUT VCP442H

## (undated) DEVICE — GLOVE SURG SZ 8 L12IN FNGR THK79MIL GRN LTX FREE

## (undated) DEVICE — SYRINGE MED 50ML LUERLOCK TIP

## (undated) DEVICE — GOWN POLY REINF SONT XLG: Brand: MEDLINE INDUSTRIES, INC.

## (undated) DEVICE — MERCY HEALTH ST CHARLES: Brand: MEDLINE INDUSTRIES, INC.

## (undated) DEVICE — FORCEPS BX L240CM JAW DIA2.8MM L CAP W/ NDL MIC MESH TOOTH

## (undated) DEVICE — DEFENDO AIR WATER SUCTION AND BIOPSY VALVE KIT FOR  OLYMPUS: Brand: DEFENDO AIR/WATER/SUCTION AND BIOPSY VALVE

## (undated) DEVICE — AMBU AURASTRAIGHT U SIZE 4: Brand: AURASTRAIGHT

## (undated) DEVICE — BLADE SAW W7XL185MM THK038MM CUT THK043MM REPL SAG OSC

## (undated) DEVICE — ADAPTER TBNG LUER STUB 15 GA INTMED

## (undated) DEVICE — FORCEPS BX L240CM JAW DIA2.2MM RAD JAW 4 HOT DISP

## (undated) DEVICE — PERRYSBURG ENDO PACK: Brand: MEDLINE INDUSTRIES, INC.

## (undated) DEVICE — JELLY,LUBE,STERILE,FLIP TOP,TUBE,2-OZ: Brand: MEDLINE

## (undated) DEVICE — GOWN,AURORA,NONREINFORCED,LARGE: Brand: MEDLINE

## (undated) DEVICE — SINGLE PORT MANIFOLD: Brand: NEPTUNE 2

## (undated) DEVICE — PADDING CAST W4INXL4YD SPUN DACRON POLY POR SYN VERSATILE

## (undated) DEVICE — SOLUTION IRRIG 1000ML STRL H2O USP PLAS POUR BTL

## (undated) DEVICE — STAZ ENDO KIT: Brand: MEDLINE INDUSTRIES, INC.

## (undated) DEVICE — ADAPTER CLEANING PORPOISE CLEANING

## (undated) DEVICE — Device: Brand: DEFENDO VALVE AND CONNECTOR KIT

## (undated) DEVICE — MEDICINE CUP, GRADUATED, STER: Brand: MEDLINE

## (undated) DEVICE — Z DISCONTINUED GLOVE SURG SZ 7.5 L12IN FNGR THK13MIL WHT ISOLEX

## (undated) DEVICE — Device

## (undated) DEVICE — SUTURE ETHLN SZ 4-0 L18IN NONABSORBABLE BLK L24MM FS-1 3/8 1629H

## (undated) DEVICE — HYPODERMIC SAFETY NEEDLE: Brand: MAGELLAN

## (undated) DEVICE — INTENDED FOR TISSUE SEPARATION, AND OTHER PROCEDURES THAT REQUIRE A SHARP SURGICAL BLADE TO PUNCTURE OR CUT.: Brand: BARD-PARKER ® CARBON RIB-BACK BLADES

## (undated) DEVICE — SUTURE VCRL + SZ 4-0 L27IN ABSRB WHT FS-2 3/8 CIR REV CUT VCP422H

## (undated) DEVICE — ZIMMER® STERILE DISPOSABLE TOURNIQUET CUFF WITH PLC, DUAL PORT, SINGLE BLADDER, 18 IN. (46 CM)

## (undated) DEVICE — GAUZE,SPONGE,4"X4",16PLY,STRL,LF,10/TRAY: Brand: MEDLINE